# Patient Record
Sex: FEMALE | Race: WHITE | NOT HISPANIC OR LATINO | Employment: OTHER | URBAN - METROPOLITAN AREA
[De-identification: names, ages, dates, MRNs, and addresses within clinical notes are randomized per-mention and may not be internally consistent; named-entity substitution may affect disease eponyms.]

---

## 2017-01-05 ENCOUNTER — TELEPHONE (OUTPATIENT)
Dept: FAMILY MEDICINE | Facility: CLINIC | Age: 77
End: 2017-01-05

## 2017-01-05 DIAGNOSIS — Z00.00 ROUTINE GENERAL MEDICAL EXAMINATION AT A HEALTH CARE FACILITY: Primary | ICD-10-CM

## 2017-01-05 DIAGNOSIS — I10 HTN (HYPERTENSION), BENIGN: ICD-10-CM

## 2017-01-05 DIAGNOSIS — E11.9 CONTROLLED TYPE 2 DIABETES MELLITUS WITHOUT COMPLICATION, WITHOUT LONG-TERM CURRENT USE OF INSULIN: ICD-10-CM

## 2017-01-05 NOTE — TELEPHONE ENCOUNTER
----- Message from Alicia Olvera MA sent at 1/5/2017  1:13 PM CST -----  Contact: [phone]  Type: Orders Request    What orders/ testing are being requested? Labs    Is there a future appointment scheduled for the patient with PCP? Yes    When? 4/27/17    Comments: Please advise. Thanks!

## 2017-03-10 DIAGNOSIS — E11.9 DIABETES TYPE 2, CONTROLLED: ICD-10-CM

## 2017-03-10 RX ORDER — AMLODIPINE BESYLATE 5 MG/1
TABLET ORAL
Qty: 90 TABLET | Refills: 3 | Status: SHIPPED | OUTPATIENT
Start: 2017-03-10 | End: 2018-01-15 | Stop reason: SDUPTHER

## 2017-03-10 RX ORDER — SIMVASTATIN 10 MG/1
TABLET, FILM COATED ORAL
Qty: 90 TABLET | Refills: 3 | Status: SHIPPED | OUTPATIENT
Start: 2017-03-10 | End: 2018-01-15 | Stop reason: SDUPTHER

## 2017-04-04 RX ORDER — IBUPROFEN 600 MG/1
TABLET ORAL
Qty: 270 TABLET | Refills: 0 | Status: SHIPPED | OUTPATIENT
Start: 2017-04-04 | End: 2018-11-06

## 2017-04-21 ENCOUNTER — LAB VISIT (OUTPATIENT)
Dept: LAB | Facility: HOSPITAL | Age: 77
End: 2017-04-21
Attending: FAMILY MEDICINE
Payer: MEDICARE

## 2017-04-21 DIAGNOSIS — E11.9 CONTROLLED TYPE 2 DIABETES MELLITUS WITHOUT COMPLICATION, WITHOUT LONG-TERM CURRENT USE OF INSULIN: ICD-10-CM

## 2017-04-21 DIAGNOSIS — I10 HTN (HYPERTENSION), BENIGN: ICD-10-CM

## 2017-04-21 DIAGNOSIS — Z00.00 ROUTINE GENERAL MEDICAL EXAMINATION AT A HEALTH CARE FACILITY: ICD-10-CM

## 2017-04-21 LAB
ALBUMIN SERPL BCP-MCNC: 3.8 G/DL
ALP SERPL-CCNC: 61 U/L
ALT SERPL W/O P-5'-P-CCNC: 15 U/L
ANION GAP SERPL CALC-SCNC: 10 MMOL/L
AST SERPL-CCNC: 16 U/L
BASOPHILS # BLD AUTO: 0.05 K/UL
BASOPHILS NFR BLD: 1.3 %
BILIRUB SERPL-MCNC: 0.5 MG/DL
BUN SERPL-MCNC: 13 MG/DL
CALCIUM SERPL-MCNC: 9.6 MG/DL
CHLORIDE SERPL-SCNC: 107 MMOL/L
CHOLEST/HDLC SERPL: 2.8 {RATIO}
CO2 SERPL-SCNC: 23 MMOL/L
CREAT SERPL-MCNC: 0.8 MG/DL
DIFFERENTIAL METHOD: ABNORMAL
EOSINOPHIL # BLD AUTO: 0.3 K/UL
EOSINOPHIL NFR BLD: 8.5 %
ERYTHROCYTE [DISTWIDTH] IN BLOOD BY AUTOMATED COUNT: 13.9 %
EST. GFR  (AFRICAN AMERICAN): >60 ML/MIN/1.73 M^2
EST. GFR  (NON AFRICAN AMERICAN): >60 ML/MIN/1.73 M^2
ESTIMATED AVG GLUCOSE: 134 MG/DL
GLUCOSE SERPL-MCNC: 131 MG/DL
HBA1C MFR BLD HPLC: 6.3 %
HCT VFR BLD AUTO: 41.3 %
HDL/CHOLESTEROL RATIO: 35.8 %
HDLC SERPL-MCNC: 162 MG/DL
HDLC SERPL-MCNC: 58 MG/DL
HGB BLD-MCNC: 13.7 G/DL
LDLC SERPL CALC-MCNC: 87.4 MG/DL
LYMPHOCYTES # BLD AUTO: 1.3 K/UL
LYMPHOCYTES NFR BLD: 33.9 %
MCH RBC QN AUTO: 30 PG
MCHC RBC AUTO-ENTMCNC: 33.2 %
MCV RBC AUTO: 91 FL
MONOCYTES # BLD AUTO: 0.3 K/UL
MONOCYTES NFR BLD: 7.7 %
NEUTROPHILS # BLD AUTO: 1.8 K/UL
NEUTROPHILS NFR BLD: 48.1 %
NONHDLC SERPL-MCNC: 104 MG/DL
PLATELET # BLD AUTO: 168 K/UL
PMV BLD AUTO: 9.8 FL
POTASSIUM SERPL-SCNC: 4.1 MMOL/L
PROT SERPL-MCNC: 6.7 G/DL
RBC # BLD AUTO: 4.56 M/UL
SODIUM SERPL-SCNC: 140 MMOL/L
T4 FREE SERPL-MCNC: 0.9 NG/DL
TRIGL SERPL-MCNC: 83 MG/DL
TSH SERPL DL<=0.005 MIU/L-ACNC: 4.71 UIU/ML
WBC # BLD AUTO: 3.75 K/UL

## 2017-04-21 PROCEDURE — 85025 COMPLETE CBC W/AUTO DIFF WBC: CPT

## 2017-04-21 PROCEDURE — 36415 COLL VENOUS BLD VENIPUNCTURE: CPT | Mod: PO

## 2017-04-21 PROCEDURE — 80053 COMPREHEN METABOLIC PANEL: CPT

## 2017-04-21 PROCEDURE — 80061 LIPID PANEL: CPT

## 2017-04-21 PROCEDURE — 84439 ASSAY OF FREE THYROXINE: CPT

## 2017-04-21 PROCEDURE — 84443 ASSAY THYROID STIM HORMONE: CPT

## 2017-04-21 PROCEDURE — 83036 HEMOGLOBIN GLYCOSYLATED A1C: CPT

## 2017-04-27 ENCOUNTER — OFFICE VISIT (OUTPATIENT)
Dept: FAMILY MEDICINE | Facility: CLINIC | Age: 77
End: 2017-04-27
Payer: MEDICARE

## 2017-04-27 VITALS
BODY MASS INDEX: 28.07 KG/M2 | HEART RATE: 60 BPM | WEIGHT: 178.81 LBS | DIASTOLIC BLOOD PRESSURE: 64 MMHG | HEIGHT: 67 IN | TEMPERATURE: 98 F | SYSTOLIC BLOOD PRESSURE: 116 MMHG

## 2017-04-27 DIAGNOSIS — Z23 NEED FOR TDAP VACCINATION: ICD-10-CM

## 2017-04-27 DIAGNOSIS — Z00.00 ROUTINE GENERAL MEDICAL EXAMINATION AT A HEALTH CARE FACILITY: Primary | ICD-10-CM

## 2017-04-27 DIAGNOSIS — E78.5 HYPERLIPIDEMIA, UNSPECIFIED HYPERLIPIDEMIA TYPE: ICD-10-CM

## 2017-04-27 DIAGNOSIS — I10 HTN (HYPERTENSION), BENIGN: ICD-10-CM

## 2017-04-27 DIAGNOSIS — I70.0 ATHEROSCLEROSIS OF AORTA: ICD-10-CM

## 2017-04-27 DIAGNOSIS — R80.9 MICROALBUMINURIA DUE TO TYPE 2 DIABETES MELLITUS: ICD-10-CM

## 2017-04-27 DIAGNOSIS — Z85.3 HISTORY OF BREAST CANCER: ICD-10-CM

## 2017-04-27 DIAGNOSIS — Z28.39 IMMUNIZATION DEFICIENCY: ICD-10-CM

## 2017-04-27 DIAGNOSIS — E11.42 TYPE 2 DIABETES MELLITUS WITH DIABETIC POLYNEUROPATHY, WITHOUT LONG-TERM CURRENT USE OF INSULIN: ICD-10-CM

## 2017-04-27 DIAGNOSIS — E11.29 MICROALBUMINURIA DUE TO TYPE 2 DIABETES MELLITUS: ICD-10-CM

## 2017-04-27 DIAGNOSIS — M19.049 ARTHRITIS OF HAND: ICD-10-CM

## 2017-04-27 PROCEDURE — 82570 ASSAY OF URINE CREATININE: CPT

## 2017-04-27 PROCEDURE — 99397 PER PM REEVAL EST PAT 65+ YR: CPT | Mod: 25,S$GLB,, | Performed by: FAMILY MEDICINE

## 2017-04-27 PROCEDURE — G0009 ADMIN PNEUMOCOCCAL VACCINE: HCPCS | Mod: S$GLB,,, | Performed by: FAMILY MEDICINE

## 2017-04-27 PROCEDURE — 99499 UNLISTED E&M SERVICE: CPT | Mod: S$GLB,,, | Performed by: FAMILY MEDICINE

## 2017-04-27 PROCEDURE — 90670 PCV13 VACCINE IM: CPT | Mod: S$GLB,,, | Performed by: FAMILY MEDICINE

## 2017-04-27 PROCEDURE — 3074F SYST BP LT 130 MM HG: CPT | Mod: S$GLB,,, | Performed by: FAMILY MEDICINE

## 2017-04-27 PROCEDURE — 3078F DIAST BP <80 MM HG: CPT | Mod: S$GLB,,, | Performed by: FAMILY MEDICINE

## 2017-04-27 PROCEDURE — 99999 PR PBB SHADOW E&M-EST. PATIENT-LVL III: CPT | Mod: PBBFAC,,, | Performed by: FAMILY MEDICINE

## 2017-04-27 RX ORDER — ALBUTEROL SULFATE 90 UG/1
AEROSOL, METERED RESPIRATORY (INHALATION)
Qty: 18 G | Refills: 1 | Status: SHIPPED | OUTPATIENT
Start: 2017-04-27 | End: 2020-03-16 | Stop reason: SDUPTHER

## 2017-04-27 RX ORDER — GABAPENTIN 100 MG/1
100 CAPSULE ORAL 3 TIMES DAILY
Qty: 90 CAPSULE | Refills: 3 | Status: SHIPPED | OUTPATIENT
Start: 2017-04-27 | End: 2018-05-03 | Stop reason: SDUPTHER

## 2017-04-27 NOTE — PATIENT INSTRUCTIONS
tdap & Preumococcal 13    FOLLOW UP REMINDER for DIABETICS:  ===================================  If you have DIABETES, we should evaluate your blood test every 3 MONTHS if your Hg1c is >6.5% OR if you use INSULIN.     We can evaluate you every 6 MONTHS if your Hga1c is < 6.5% (contolled)  ===================================  Continue other medications    The future risks of uncontrolled diabetes - include heart attack, stroke, neuropathy (pain in hands & feet that could lead to infection and amputation), nephropathy (leading to kidney dialysis) , and blindness     To prevent complications that can be treated early, please see your  opthalmologist EYE DOCTOR YEARLY      Always wear protective shoes.     Focus on the American Diabetic Association diet, high fiber, low fat diet, exercise  - huffing & puffing for 20 minutes most days of the week    Focus on NO SUGAR and no sugar substitutes (splenda, etc) IN YOUR DRINKS. With respect to food - LOW CARBOHYDRATES - switch to whole wheat & brown rice.    Decrease & try to stop ALCOHOL, WHITE BREAD, WHITE PASTA, WHITE RICE , WHITE POTATOES- which all turn into sugar.    EAT an EXTRA VEGETABLE A DAY than you already do.    The ADA recommends taking  1. Aspirin 81 mg daily (unless you have had bleeding stomach ulcers or allergy to this)  2. STATIN medication (atorvastatin, pravastatin, rosuvastatin) to decrease inflammation in your blood vessels caused by SUGAR to prevent future heart attack & stroke. This is recommended even if your LDL cholesterol is <100.   3. ACE/ ARB blood pressure medication (Losartan, Lisinopril) to protect your kidneys from future dialysis from SUGAR. This is recommended even if you have normal blood pressure    ==============================================================      RECOMMENDATIONS FOR FEMALES    Prevent the #1 cause of death- cardiovascular disease (HEART ATTACK & STROKE) by checking for normal blood pressure, cholesterol, sugars, & by  not smoking.     VACCINES: Yearly FLU shot, ONE PNEUMONIA shot after 65, ONE SHINGLES shot after 60    Screening colonoscopy at AGE  50 & every 10 years to check for COLON CANCER,  one of the most common & preventable cancers. Repeat in 3 years if POLYP found    I recommend  high fiber (5 fresh fruits or vegetables daily), low fat diet and aerobic  exercise (huffing/ puffing/ sweating for 20 min straight at least 4 days a week)    Follow up yearly with fasting lipids, CMP, CBC, TSH prior.   ==============================================================    For your son:    SUBSTANCE ABUSE CLINICS  [facility] of [facility] location [phone]  [email or web address],  [place] based, [address], [facility] [phone]   [place], [address] [facility] [phone]  [facility] , [address] [facility], [phone], [phone]  [facility], [address] [facility], [address], [phone]  [facility], [address] [place] [address] , Jimmy, [phone]

## 2017-04-27 NOTE — PROGRESS NOTES
Subjective:      Patient ID: Kanchan Downign is a 76 y.o. female.    Chief Complaint: Annual Exam    Here today for general exam.     Here today for diabetes mellitus    Denies acute symptoms such as nocturia, polyuria, unexplained weight loss or blurred vision.    Last eye evaluation due    Last urine microalbumin today    Denies numbness, tingling sensation, or known h/o peripheral neuropathy.     Denies  Chest pain, shortness of breath.    Arthritis of Left knee is bad    She has some swelling of ankles after eating lots of seafood    Denies any chest pain, shortness of breath, nausea vomiting constipation diarrhea, blood in stool, heartburn      Lab Results   Component Value Date    HGBA1C 6.3 (H) 04/21/2017    HGBA1C 6.3 (H) 06/03/2016    HGBA1C 6.0 09/17/2015     No results found for: MICROALBUR  Lab Results   Component Value Date    LDLCALC 87.4 04/21/2017    LDLCALC 82.8 09/17/2015    CHOL 162 04/21/2017    HDL 58 04/21/2017    TRIG 83 04/21/2017       Lab Results   Component Value Date     04/21/2017    K 4.1 04/21/2017     04/21/2017    CO2 23 04/21/2017     (H) 04/21/2017    BUN 13 04/21/2017    CREATININE 0.8 04/21/2017    CALCIUM 9.6 04/21/2017    PROT 6.7 04/21/2017    ALBUMIN 3.8 04/21/2017    BILITOT 0.5 04/21/2017    ALKPHOS 61 04/21/2017    AST 16 04/21/2017    ALT 15 04/21/2017    ANIONGAP 10 04/21/2017    ESTGFRAFRICA >60.0 04/21/2017    EGFRNONAA >60.0 04/21/2017    WBC 3.75 (L) 04/21/2017    HGB 13.7 04/21/2017    HCT 41.3 04/21/2017    MCV 91 04/21/2017     04/21/2017    TSH 4.708 (H) 04/21/2017         Current Outpatient Prescriptions on File Prior to Visit   Medication Sig    amlodipine (NORVASC) 5 MG tablet TAKE 1 TABLET EVERY DAY    blood sugar diagnostic Strp 1 strip by Misc.(Non-Drug; Combo Route) route 2 (two) times daily. ACCU-CHEK BROOK PLUS    blood-glucose meter Misc 1 each by Misc.(Non-Drug; Combo Route) route as needed. ACCU-CHEK BROOK PLUS     cetirizine (ZYRTEC) 10 MG tablet Take 10 mg by mouth once daily.      fluticasone (FLONASE) 50 mcg/actuation nasal spray 1 spray by Each Nare route once daily.    ibuprofen (ADVIL,MOTRIN) 600 MG tablet TAKE 1 TABLET THREE TIMES DAILY WITH FOOD    lactobacillus rhamnosus, GG, (PROBIOTIC) 10 billion cell capsule Take by mouth. 1 Capsule Oral Every day    lancets Misc 1 each by Misc.(Non-Drug; Combo Route) route 2 (two) times daily. ACCU-CHEK SOFTCLIX    lisinopril (PRINIVIL,ZESTRIL) 5 MG tablet Take 1 tablet (5 mg total) by mouth once daily.    metformin (GLUCOPHAGE) 500 MG tablet Take 1 tablet (500 mg total) by mouth daily with breakfast.    metoprolol tartrate (LOPRESSOR) 100 MG tablet Take 1 tablet (100 mg total) by mouth once daily.    MULTIVITAMIN ORAL Take by mouth once daily.     NAPROXEN SODIUM (ALEVE ORAL) Take by mouth continuous prn.     polyethylene glycol (GLYCOLAX) 17 gram/dose powder TAKE 1 CAPFUL (17 GRAMS) DAILY AS DIRECTED    simvastatin (ZOCOR) 10 MG tablet TAKE 1 TABLET EVERY EVENING    UNABLE TO FIND medication name: Omega XL    [DISCONTINUED] albuterol (PROAIR HFA) 90 mcg/actuation inhaler INHALE 2 PUFFS INTO THE LUNGS EVERY 6 (SIX) HOURS AS  NEEDED FORWHEEZING.    [DISCONTINUED] letrozole (FEMARA) 2.5 mg Tab TAKE 1 TABLET ONE TIME DAILY    [DISCONTINUED] hydrocodone-acetaminophen 5-325mg (NORCO) 5-325 mg per tablet Take 1 tablet by mouth every 4 (four) hours as needed for Pain.     No current facility-administered medications on file prior to visit.      Past Medical History:   Diagnosis Date    Anxiety     Arthritis     Dr Schofield    Arthritis of hand 4/27/2017    Atherosclerosis of aorta 06/08/2016    CXR 2013    Breast cancer 2011    right breast invasive micropapillary CA, Stage !A    Diabetes mellitus type 2 without retinopathy 06/12/2014    6% metformin 500 bid, FU+ 2016    DVT (deep venous thrombosis) 2001    R , Dr Bonilla    Hyperlipidemia     LDL 82    Hypertension   "   Microalbuminuria due to type 2 diabetes mellitus 12/08/2015    taking lisinopril, losartan caused olivia effects    Strabismus      Past Surgical History:   Procedure Laterality Date    BREAST SURGERY      R breast lumpectomy    NH REMOVAL OF NAIL BED  6/10/2015    TONSILLECTOMY       Social History     Social History Narrative     to [place], 3 children, nondrinker, nonsmoker, she declines colonoscopy     Family History   Problem Relation Age of Onset    Heart disease Mother 58    Cataracts Mother     Heart disease Father 50    Thyroid disease Sister     Cancer Sister      Vitals:    04/27/17 1312   BP: 116/64   Pulse: 60   Temp: 98 °F (36.7 °C)   Weight: 81.1 kg (178 lb 12.7 oz)   Height: 5' 7" (1.702 m)     Objective:   Physical Exam   Constitutional: She is oriented to person, place, and time. She appears well-developed and well-nourished.   Mild swelling bilateral ankles   HENT:   Head: Normocephalic and atraumatic.   Right Ear: External ear normal.   Left Ear: External ear normal.   Nose: Nose normal.   Mouth/Throat: Oropharynx is clear and moist.   Eyes: EOM are normal. Pupils are equal, round, and reactive to light.   Neck: Neck supple. No thyromegaly present.   Cardiovascular: Normal rate, regular rhythm, normal heart sounds and intact distal pulses.    No murmur heard.  Pulmonary/Chest: Effort normal and breath sounds normal. She has no wheezes.   Abdominal: Soft. Bowel sounds are normal. She exhibits no distension and no mass. There is no tenderness. There is no rebound and no guarding.   Musculoskeletal: She exhibits no edema.        Right foot: There is normal range of motion and no deformity.        Left foot: There is normal range of motion and no deformity.   Enlarged DIP bilateral hands     Feet:   Right Foot:   Protective Sensation: 5 sites tested. 5 sites sensed.   Skin Integrity: Negative for ulcer, blister, skin breakdown, erythema or warmth.   Left Foot:   Protective Sensation: 5 " sites tested. 5 sites sensed.   Skin Integrity: Negative for ulcer, blister, skin breakdown, erythema or warmth.   Lymphadenopathy:     She has no cervical adenopathy.   Neurological: She is alert and oriented to person, place, and time.   I tested 5 sites on each foot, but she has DECREASED PROTECTIVE SENSATION ON BOTH FEET   Skin: Skin is warm and dry.   Psychiatric: She has a normal mood and affect. Her behavior is normal.     Assessment:     1. Routine general medical examination at a health care facility    2. Type 2 diabetes mellitus with diabetic polyneuropathy, without long-term current use of insulin    3. Microalbuminuria due to type 2 diabetes mellitus    4. Atherosclerosis of aorta    5. HTN (hypertension), benign    6. Hyperlipidemia, unspecified hyperlipidemia type    7. History of breast cancer    8. Arthritis of hand    9. Immunization deficiency    10. Need for Tdap vaccination      Plan:     Orders Placed This Encounter    Pneumococcal Conjugate Vaccine (13 Valent) (IM)    Microalbumin/creatinine urine ratio    gabapentin (NEURONTIN) 100 MG capsule    albuterol (PROAIR HFA) 90 mcg/actuation inhaler       Patient Instructions   tdap & Preumococcal 13    FOLLOW UP REMINDER for DIABETICS:  ===================================  If you have DIABETES, we should evaluate your blood test every 3 MONTHS if your Hg1c is >6.5% OR if you use INSULIN.     We can evaluate you every 6 MONTHS if your Hga1c is < 6.5% (contolled)  ===================================  Continue other medications    The future risks of uncontrolled diabetes - include heart attack, stroke, neuropathy (pain in hands & feet that could lead to infection and amputation), nephropathy (leading to kidney dialysis) , and blindness     To prevent complications that can be treated early, please see your  opthalmologist EYE DOCTOR YEARLY      Always wear protective shoes.     Focus on the American Diabetic Association diet, high fiber, low fat  diet, exercise  - huffing & puffing for 20 minutes most days of the week    Focus on NO SUGAR and no sugar substitutes (splenda, etc) IN YOUR DRINKS. With respect to food - LOW CARBOHYDRATES - switch to whole wheat & brown rice.    Decrease & try to stop ALCOHOL, WHITE BREAD, WHITE PASTA, WHITE RICE , WHITE POTATOES- which all turn into sugar.    EAT an EXTRA VEGETABLE A DAY than you already do.    The ADA recommends taking  1. Aspirin 81 mg daily (unless you have had bleeding stomach ulcers or allergy to this)  2. STATIN medication (atorvastatin, pravastatin, rosuvastatin) to decrease inflammation in your blood vessels caused by SUGAR to prevent future heart attack & stroke. This is recommended even if your LDL cholesterol is <100.   3. ACE/ ARB blood pressure medication (Losartan, Lisinopril) to protect your kidneys from future dialysis from SUGAR. This is recommended even if you have normal blood pressure    ==============================================================      RECOMMENDATIONS FOR FEMALES    Prevent the #1 cause of death- cardiovascular disease (HEART ATTACK & STROKE) by checking for normal blood pressure, cholesterol, sugars, & by not smoking.     VACCINES: Yearly FLU shot, ONE PNEUMONIA shot after 65, ONE SHINGLES shot after 60    Screening colonoscopy at AGE  50 & every 10 years to check for COLON CANCER,  one of the most common & preventable cancers. Repeat in 3 years if POLYP found    I recommend  high fiber (5 fresh fruits or vegetables daily), low fat diet and aerobic  exercise (huffing/ puffing/ sweating for 20 min straight at least 4 days a week)    Follow up yearly with fasting lipids, CMP, CBC, TSH prior.   ==============================================================    For your son:    SUBSTANCE ABUSE CLINICS  [facility] of [place] , [place]  location [phone]  [email or web address],  [place] based, [address], [place] [phone]    [employer], [address] [facility], [place] [phone]  [facility] , [address] [facility], [phone], [phone]  [facility], [address] [facility], [address], [phone]  [facility], [address] [place] [address] Jimmy Nicole [phone]

## 2017-04-27 NOTE — MR AVS SNAPSHOT
[facility]  [address] [facility], [address]  [facility] [address]  Phone: [phone]  Fax: [phone]                  Kanchan Downing   2017 1:00 PM   Office Visit    Description:  Female : 1940   Provider:  Viktoria Mckeon MD   Department:  [facility]           Reason for Visit     Annual Exam           Diagnoses this Visit        Comments    Routine general medical examination at a health care facility    -  Primary     Type 2 diabetes mellitus with diabetic polyneuropathy, without long-term current use of insulin         Microalbuminuria due to type 2 diabetes mellitus         Atherosclerosis of aorta         HTN (hypertension), benign         Hyperlipidemia, unspecified hyperlipidemia type         History of breast cancer         Arthritis of hand                To Do List           Goals (5 Years of Data)     None       These Medications        Disp Refills Start End    gabapentin (NEURONTIN) 100 MG capsule 90 capsule 3 2017    Take 1 capsule (100 mg total) by mouth 3 (three) times daily. - Oral    Pharmacy: [facility] - [address] Ph #: [phone]       albuterol (PROAIR HFA) 90 mcg/actuation inhaler 18 g 1 2017     INHALE 2 PUFFS INTO THE LUNGS EVERY 6 (SIX) HOURS AS  NEEDED FORWHEEZING.    Pharmacy: [facility] - [place] [id number] [facility] Ph #: [phone]         [facility]     [facility] Nurse Care Line - 24 Assistance  Unless otherwise directed by your provider, please contact [facility], our nurse care line that is available for / assistance.     Registered nurses in the [facility] provide: appointment scheduling, clinical advisement, health education, and other advisory services.  Call: [phone] (toll free)               Medications           Message regarding Medications     Verify the changes and/or  additions to your medication regime listed below are the same as discussed with your clinician today.  If any of these changes or additions are incorrect, please notify your healthcare provider.        START taking these NEW medications        Refills    gabapentin (NEURONTIN) 100 MG capsule 3    Sig: Take 1 capsule (100 mg total) by mouth 3 (three) times daily.    Class: Normal    Route: Oral      STOP taking these medications     hydrocodone-acetaminophen 5-325mg (NORCO) 5-325 mg per tablet Take 1 tablet by mouth every 4 (four) hours as needed for Pain.    letrozole (FEMARA) 2.5 mg Tab TAKE 1 TABLET ONE TIME DAILY           Verify that the below list of medications is an accurate representation of the medications you are currently taking.  If none reported, the list may be blank. If incorrect, please contact your healthcare provider. Carry this list with you in case of emergency.           Current Medications     albuterol (PROAIR HFA) 90 mcg/actuation inhaler INHALE 2 PUFFS INTO THE LUNGS EVERY 6 (SIX) HOURS AS  NEEDED FORWHEEZING.    amlodipine (NORVASC) 5 MG tablet TAKE 1 TABLET EVERY DAY    blood sugar diagnostic Strp 1 strip by Misc.(Non-Drug; Combo Route) route 2 (two) times daily. ACCU-CHEK BROOK PLUS    blood-glucose meter Misc 1 each by Misc.(Non-Drug; Combo Route) route as needed. ACCU-CHEK BROOK PLUS    cetirizine (ZYRTEC) 10 MG tablet Take 10 mg by mouth once daily.      fluticasone (FLONASE) 50 mcg/actuation nasal spray 1 spray by Each Nare route once daily.    ibuprofen (ADVIL,MOTRIN) 600 MG tablet TAKE 1 TABLET THREE TIMES DAILY WITH FOOD    lactobacillus rhamnosus, GG, (PROBIOTIC) 10 billion cell capsule Take by mouth. 1 Capsule Oral Every day    lancets Misc 1 each by Misc.(Non-Drug; Combo Route) route 2 (two) times daily. ACCU-CHEK SOFTCLIX    lisinopril (PRINIVIL,ZESTRIL) 5 MG tablet Take 1 tablet (5 mg total) by mouth once daily.    metformin (GLUCOPHAGE) 500 MG tablet Take 1 tablet (500 mg  "total) by mouth daily with breakfast.    metoprolol tartrate (LOPRESSOR) 100 MG tablet Take 1 tablet (100 mg total) by mouth once daily.    MULTIVITAMIN ORAL Take by mouth once daily.     NAPROXEN SODIUM (ALEVE ORAL) Take by mouth continuous prn.     polyethylene glycol (GLYCOLAX) 17 gram/dose powder TAKE 1 CAPFUL (17 GRAMS) DAILY AS DIRECTED    simvastatin (ZOCOR) 10 MG tablet TAKE 1 TABLET EVERY EVENING    UNABLE TO FIND medication name: Omega XL    gabapentin (NEURONTIN) 100 MG capsule Take 1 capsule (100 mg total) by mouth 3 (three) times daily.           Clinical Reference Information           Your Vitals Were     BP Pulse Temp Height Weight BMI    116/64 (BP Location: Right arm) 60 98 °F (36.7 °C) 5' 7" (1.702 m) 81.1 kg (178 lb 12.7 oz) 28 kg/m2      Blood Pressure          Most Recent Value    BP  116/64      Allergies as of 4/27/2017     Sulfa (Sulfonamide Antibiotics)      Immunizations Administered on Date of Encounter - 4/27/2017     None      Instructions    tdap & Preumococcal 13    FOLLOW UP REMINDER for DIABETICS:  ===================================  If you have DIABETES, we should evaluate your blood test every 3 MONTHS if your Hg1c is >6.5% OR if you use INSULIN.     We can evaluate you every 6 MONTHS if your Hga1c is < 6.5% (contolled)  ===================================  Continue other medications    The future risks of uncontrolled diabetes - include heart attack, stroke, neuropathy (pain in hands & feet that could lead to infection and amputation), nephropathy (leading to kidney dialysis) , and blindness     To prevent complications that can be treated early, please see your  opthalmologist EYE DOCTOR YEARLY      Always wear protective shoes.     Focus on the American Diabetic Association diet, high fiber, low fat diet, exercise  - huffing & puffing for 20 minutes most days of the week    Focus on NO SUGAR and no sugar substitutes (splenda, etc) IN YOUR DRINKS. With respect to food - LOW " CARBOHYDRATES - switch to whole wheat & brown rice.    Decrease & try to stop ALCOHOL, WHITE BREAD, WHITE PASTA, WHITE RICE , WHITE POTATOES- which all turn into sugar.    EAT an EXTRA VEGETABLE A DAY than you already do.    The ADA recommends taking  1. Aspirin 81 mg daily (unless you have had bleeding stomach ulcers or allergy to this)  2. STATIN medication (atorvastatin, pravastatin, rosuvastatin) to decrease inflammation in your blood vessels caused by SUGAR to prevent future heart attack & stroke. This is recommended even if your LDL cholesterol is <100.   3. ACE/ ARB blood pressure medication (Losartan, Lisinopril) to protect your kidneys from future dialysis from SUGAR. This is recommended even if you have normal blood pressure    ==============================================================      RECOMMENDATIONS FOR FEMALES    Prevent the #1 cause of death- cardiovascular disease (HEART ATTACK & STROKE) by checking for normal blood pressure, cholesterol, sugars, & by not smoking.     VACCINES: Yearly FLU shot, ONE PNEUMONIA shot after 65, ONE SHINGLES shot after 60    Screening colonoscopy at AGE  50 & every 10 years to check for COLON CANCER,  one of the most common & preventable cancers. Repeat in 3 years if POLYP found    I recommend  high fiber (5 fresh fruits or vegetables daily), low fat diet and aerobic  exercise (huffing/ puffing/ sweating for 20 min straight at least 4 days a week)    Follow up yearly with fasting lipids, CMP, CBC, TSH prior.   ==============================================================    For your son:    SUBSTANCE ABUSE CLINICS  [facility] of [place] , [place] [facility] location [phone]  [email or web address],  [place] based, [address], [place] [phone]   [facility] [address] [facility] [phone]  [facility] , [address] [facility], [phone], [phone]  [facility], [address], [address],  [phone]  [facility], [address] [place] [address] Jimmy Nicole, [phone]             Language Assistance Services     ATTENTION: Language assistance services are available, free of charge. Please call [phone].      ATENCIÓN: Si habla radhaañol, tiene a guzman disposición servicios gratuitos de asistencia lingüística. Llame al [phone].     CHÚ Ý: N?u b?n nói Ti?ng Vi?t, có các d?ch v? h? tr? ngôn ng? mi?n phí dành cho b?n. G?i s? [phone].         [facility] complies with applicable Federal civil rights laws and does not discriminate on the basis of race, color, national origin, age, disability, or sex.

## 2017-04-27 NOTE — PROGRESS NOTES
Two patient identifiers used, allergies reviewed, vaccine confirmed.  Prevnar/12 pneumococcal conjugate vaccine administered IM.  Pt tolerated well, no redness or bruising at injection site.  Pt advised to remain in clinic 15 mins following injection for observation.

## 2017-04-28 LAB
CREAT UR-MCNC: 49 MG/DL
MICROALBUMIN UR DL<=1MG/L-MCNC: 3 UG/ML
MICROALBUMIN/CREATININE RATIO: 6.1 UG/MG

## 2017-05-16 DIAGNOSIS — E11.9 DIABETES TYPE 2, CONTROLLED: ICD-10-CM

## 2017-05-16 RX ORDER — METFORMIN HYDROCHLORIDE 500 MG/1
TABLET ORAL
Qty: 90 TABLET | Refills: 1 | Status: SHIPPED | OUTPATIENT
Start: 2017-05-16 | End: 2017-12-29 | Stop reason: SDUPTHER

## 2017-05-16 RX ORDER — LISINOPRIL 5 MG/1
TABLET ORAL
Qty: 90 TABLET | Refills: 3 | Status: SHIPPED | OUTPATIENT
Start: 2017-05-16 | End: 2018-02-27 | Stop reason: SDUPTHER

## 2017-06-05 DIAGNOSIS — E11.9 DIABETES TYPE 2, CONTROLLED: ICD-10-CM

## 2017-06-06 RX ORDER — METOPROLOL TARTRATE 100 MG/1
TABLET ORAL
Qty: 90 TABLET | Refills: 3 | Status: SHIPPED | OUTPATIENT
Start: 2017-06-06 | End: 2018-03-19 | Stop reason: SDUPTHER

## 2017-09-15 RX ORDER — METHOCARBAMOL 500 MG/1
500 TABLET, FILM COATED ORAL 3 TIMES DAILY
Qty: 30 TABLET | Refills: 0 | Status: SHIPPED | OUTPATIENT
Start: 2017-09-15 | End: 2017-09-25

## 2017-09-15 NOTE — TELEPHONE ENCOUNTER
----- Message from Ekaterina Rehman sent at 9/15/2017  2:45 PM CDT -----  Contact: self/[phone]  Patient called in regards needing to talk with Dr Mckeon about if she can get some rx for muscle spasm. Please call and advise.       Thank you!!!

## 2017-09-15 NOTE — TELEPHONE ENCOUNTER
"LOV 04/27/17. Patient verbalizes that she is having "gripping" muscle spasms in her left hip for the past 2 days. Patient requesting a muscle relaxer. Please advise.   "

## 2017-10-31 ENCOUNTER — CLINICAL SUPPORT (OUTPATIENT)
Dept: CARDIOLOGY | Facility: CLINIC | Age: 77
End: 2017-10-31
Payer: MEDICARE

## 2017-10-31 ENCOUNTER — OFFICE VISIT (OUTPATIENT)
Dept: FAMILY MEDICINE | Facility: CLINIC | Age: 77
End: 2017-10-31
Payer: MEDICARE

## 2017-10-31 ENCOUNTER — HOSPITAL ENCOUNTER (EMERGENCY)
Facility: HOSPITAL | Age: 77
Discharge: HOME | End: 2017-10-31
Attending: EMERGENCY MEDICINE
Payer: MEDICARE

## 2017-10-31 VITALS
BODY MASS INDEX: 28.93 KG/M2 | WEIGHT: 180 LBS | TEMPERATURE: 98 F | HEART RATE: 61 BPM | HEIGHT: 66 IN | DIASTOLIC BLOOD PRESSURE: 66 MMHG | OXYGEN SATURATION: 98 % | SYSTOLIC BLOOD PRESSURE: 140 MMHG | RESPIRATION RATE: 16 BRPM

## 2017-10-31 VITALS
SYSTOLIC BLOOD PRESSURE: 139 MMHG | DIASTOLIC BLOOD PRESSURE: 80 MMHG | HEIGHT: 66 IN | TEMPERATURE: 98 F | HEART RATE: 80 BPM | BODY MASS INDEX: 28.95 KG/M2 | WEIGHT: 180.13 LBS

## 2017-10-31 DIAGNOSIS — Z86.718 HISTORY OF DVT (DEEP VEIN THROMBOSIS): ICD-10-CM

## 2017-10-31 DIAGNOSIS — M79.89 LEG SWELLING: Primary | ICD-10-CM

## 2017-10-31 DIAGNOSIS — E11.42 TYPE 2 DIABETES MELLITUS WITH DIABETIC POLYNEUROPATHY, WITHOUT LONG-TERM CURRENT USE OF INSULIN: ICD-10-CM

## 2017-10-31 DIAGNOSIS — Z85.3 HISTORY OF BREAST CANCER: ICD-10-CM

## 2017-10-31 DIAGNOSIS — I74.9 EMBOLISM AND THROMBOSIS OF ARTERY: ICD-10-CM

## 2017-10-31 DIAGNOSIS — I82.4Y1 DEEP VEIN THROMBOSIS (DVT) OF PROXIMAL VEIN OF RIGHT LOWER EXTREMITY, UNSPECIFIED CHRONICITY: Primary | ICD-10-CM

## 2017-10-31 DIAGNOSIS — I70.0 ATHEROSCLEROSIS OF AORTA: ICD-10-CM

## 2017-10-31 DIAGNOSIS — M79.89 LEG SWELLING: ICD-10-CM

## 2017-10-31 DIAGNOSIS — E78.5 HYPERLIPIDEMIA, UNSPECIFIED HYPERLIPIDEMIA TYPE: ICD-10-CM

## 2017-10-31 DIAGNOSIS — I10 HTN (HYPERTENSION), BENIGN: ICD-10-CM

## 2017-10-31 DIAGNOSIS — Z28.39 IMMUNIZATION DEFICIENCY: ICD-10-CM

## 2017-10-31 DIAGNOSIS — E11.59 CONTROLLED TYPE 2 DIABETES MELLITUS WITH OTHER CIRCULATORY COMPLICATION, WITHOUT LONG-TERM CURRENT USE OF INSULIN: ICD-10-CM

## 2017-10-31 LAB
ALBUMIN SERPL BCP-MCNC: 4 G/DL
ALP SERPL-CCNC: 72 U/L
ALT SERPL W/O P-5'-P-CCNC: 22 U/L
ANION GAP SERPL CALC-SCNC: 10 MMOL/L
AST SERPL-CCNC: 22 U/L
BASOPHILS # BLD AUTO: 0.08 K/UL
BASOPHILS NFR BLD: 1.3 %
BILIRUB SERPL-MCNC: 0.5 MG/DL
BUN SERPL-MCNC: 11 MG/DL
CALCIUM SERPL-MCNC: 10.2 MG/DL
CHLORIDE SERPL-SCNC: 104 MMOL/L
CO2 SERPL-SCNC: 24 MMOL/L
CREAT SERPL-MCNC: 0.8 MG/DL
DIFFERENTIAL METHOD: NORMAL
EOSINOPHIL # BLD AUTO: 0.4 K/UL
EOSINOPHIL NFR BLD: 6.1 %
ERYTHROCYTE [DISTWIDTH] IN BLOOD BY AUTOMATED COUNT: 13.2 %
EST. GFR  (AFRICAN AMERICAN): >60 ML/MIN/1.73 M^2
EST. GFR  (NON AFRICAN AMERICAN): >60 ML/MIN/1.73 M^2
GLUCOSE SERPL-MCNC: 122 MG/DL
HCT VFR BLD AUTO: 43.3 %
HGB BLD-MCNC: 14.5 G/DL
IMM GRANULOCYTES # BLD AUTO: 0.02 K/UL
IMM GRANULOCYTES NFR BLD AUTO: 0.3 %
INR PPP: 1
LYMPHOCYTES # BLD AUTO: 1.3 K/UL
LYMPHOCYTES NFR BLD: 21.5 %
MCH RBC QN AUTO: 29.1 PG
MCHC RBC AUTO-ENTMCNC: 33.5 G/DL
MCV RBC AUTO: 87 FL
MONOCYTES # BLD AUTO: 0.4 K/UL
MONOCYTES NFR BLD: 6.2 %
NEUTROPHILS # BLD AUTO: 3.8 K/UL
NEUTROPHILS NFR BLD: 64.6 %
NRBC BLD-RTO: 0 /100 WBC
PLATELET # BLD AUTO: 183 K/UL
PMV BLD AUTO: 9.3 FL
POTASSIUM SERPL-SCNC: 3.9 MMOL/L
PROT SERPL-MCNC: 7.5 G/DL
PROTHROMBIN TIME: 10.8 SEC
RBC # BLD AUTO: 4.99 M/UL
SODIUM SERPL-SCNC: 138 MMOL/L
WBC # BLD AUTO: 5.94 K/UL

## 2017-10-31 PROCEDURE — 99284 EMERGENCY DEPT VISIT MOD MDM: CPT | Mod: ,,, | Performed by: PHYSICIAN ASSISTANT

## 2017-10-31 PROCEDURE — 99214 OFFICE O/P EST MOD 30 MIN: CPT | Mod: S$GLB,,, | Performed by: FAMILY MEDICINE

## 2017-10-31 PROCEDURE — 99499 UNLISTED E&M SERVICE: CPT | Mod: S$GLB,,, | Performed by: FAMILY MEDICINE

## 2017-10-31 PROCEDURE — 80053 COMPREHEN METABOLIC PANEL: CPT

## 2017-10-31 PROCEDURE — 99283 EMERGENCY DEPT VISIT LOW MDM: CPT

## 2017-10-31 PROCEDURE — 25000003 PHARM REV CODE 250: Performed by: PHYSICIAN ASSISTANT

## 2017-10-31 PROCEDURE — 93971 EXTREMITY STUDY: CPT | Mod: S$GLB,,, | Performed by: INTERNAL MEDICINE

## 2017-10-31 PROCEDURE — 99999 PR PBB SHADOW E&M-EST. PATIENT-LVL IV: CPT | Mod: PBBFAC,,, | Performed by: FAMILY MEDICINE

## 2017-10-31 PROCEDURE — 85025 COMPLETE CBC W/AUTO DIFF WBC: CPT

## 2017-10-31 PROCEDURE — 85610 PROTHROMBIN TIME: CPT

## 2017-10-31 RX ORDER — NAPROXEN SODIUM 220 MG/1
81 TABLET, FILM COATED ORAL DAILY
Refills: 0 | COMMUNITY
Start: 2017-10-31 | End: 2019-05-02

## 2017-10-31 RX ADMIN — APIXABAN 10 MG: 5 TABLET, FILM COATED ORAL at 02:10

## 2017-10-31 NOTE — ED NOTES
"Appearance: Patient resting comfortably and in no acute distress. Patient is clean and well groomed, with clothing properly fastened.  Cardiac: Heart sounds audible and without abnormalities noted. Patient has a normal rate and regular rhythm, peripheral edema noted to bilateral lower extremities.  Neuro/LOC: Eyes open spontaneously, behavior appropriate to situation, follows commands, facial expression symmetrical, purposeful motor response noted. AAOx4; The patient is awake, alert and aware of environment with an appropriate affect, and speaking appropriately.  Respiratory: Breath sounds audible, slight expiratory wheezing noted bilaterally. Airway is open and patent, respirations are spontaneous, patient has a normal effort and rate, no accessory muscle use noted.  Skin: Intact, warm and dry. Color is consistent with ethnicity. Normal skin turgor and moist mucous membranes.  Gastro: Bowel sounds audible and active x4 quadrants. No tenderness and no distention are present.  Musculoskeletal: Patient moving all extremities spontaneously, no obvious swelling or deformities noted. Patient states she had "an US done this AM that showed a blood clot" to right posterior thigh. No redness or warmth noted.  Peripheral vascular: Pulses +2 x4 upper/lower extremities.     -Patient identifiers verified and correct for this patient.  -Patient resting with bedrails up x2 for safety, bed locked in low position, and call bell within reach.  "

## 2017-10-31 NOTE — ED NOTES
FRANCIS Almonte, requested to give patient 2 packs of adrián crackers due to patient feeling dizzy. Her BG was normal. Will watch patient for a few minutes to see how she feels after eating.

## 2017-10-31 NOTE — ED NOTES
Patient is resting on stretcher with call bell within reach, bed locked in the low position, and side rails up x2 for safety. Patient is in NAD. RR spontaneous, even, and unlabored. Denies pain. Will continue to monitor.    Family members remain at bedside.

## 2017-10-31 NOTE — ED TRIAGE NOTES
"Patient states she noticed "swelling to her right leg" but couldn't recall when, just says "for a long time." States "I had a blot clot years ago that dissolved on its own, by taking aspirin and putting my legs up real high."    +SOB "sometimes" that comes with doing house work.   Denies CP.  +numbness to right ankle and foot.  "

## 2017-10-31 NOTE — PROGRESS NOTES
Subjective:      Patient ID: Kanchan Downing is a 77 y.o. female.    Chief Complaint: Leg Swelling (bilateral, mostly right)    Here today for bilateral leg swelling, , R > L . She denies leg pain & the swelling does decrease when she raises the leg at night while sleeping. She has a hx of DVT Right leg in the past, but this swelling is new. I do not have previous cardiovascular reports on her R leg.     She has hx of breast cancer.     Denies any chest pain, shortness of breath, nausea vomiting constipation diarrhea, blood in stool      Lab Results   Component Value Date     04/21/2017    K 4.1 04/21/2017     04/21/2017    CO2 23 04/21/2017     (H) 04/21/2017    BUN 13 04/21/2017    CREATININE 0.8 04/21/2017    CALCIUM 9.6 04/21/2017    PROT 6.7 04/21/2017    ALBUMIN 3.8 04/21/2017    BILITOT 0.5 04/21/2017    ALKPHOS 61 04/21/2017    AST 16 04/21/2017    ALT 15 04/21/2017    ANIONGAP 10 04/21/2017    ESTGFRAFRICA >60.0 04/21/2017    EGFRNONAA >60.0 04/21/2017    WBC 3.75 (L) 04/21/2017    HGB 13.7 04/21/2017    HCT 41.3 04/21/2017    MCV 91 04/21/2017     04/21/2017    TSH 4.708 (H) 04/21/2017    MBRFPXLT10TF 41 05/08/2014         Current Outpatient Prescriptions on File Prior to Visit   Medication Sig    albuterol (PROAIR HFA) 90 mcg/actuation inhaler INHALE 2 PUFFS INTO THE LUNGS EVERY 6 (SIX) HOURS AS  NEEDED FORWHEEZING.    amlodipine (NORVASC) 5 MG tablet TAKE 1 TABLET EVERY DAY    blood sugar diagnostic Strp 1 strip by Misc.(Non-Drug; Combo Route) route 2 (two) times daily. ACCU-CHEK BROOK PLUS    cetirizine (ZYRTEC) 10 MG tablet Take 10 mg by mouth once daily.      fluticasone (FLONASE) 50 mcg/actuation nasal spray 1 spray by Each Nare route once daily.    gabapentin (NEURONTIN) 100 MG capsule Take 1 capsule (100 mg total) by mouth 3 (three) times daily.    ibuprofen (ADVIL,MOTRIN) 600 MG tablet TAKE 1 TABLET THREE TIMES DAILY WITH FOOD    lactobacillus rhamnosus, GG,  (PROBIOTIC) 10 billion cell capsule Take by mouth. 1 Capsule Oral Every day    lancets Misc 1 each by Misc.(Non-Drug; Combo Route) route 2 (two) times daily. ACCU-CHEK SOFTCLIX    lisinopril (PRINIVIL,ZESTRIL) 5 MG tablet TAKE 1 TABLET ONE TIME DAILY    metformin (GLUCOPHAGE) 500 MG tablet TAKE 1 TABLET EVERY DAY WITH BREAKFAST    metoprolol tartrate (LOPRESSOR) 100 MG tablet TAKE 1 TABLET ONE TIME DAILY    MULTIVITAMIN ORAL Take by mouth once daily.     NAPROXEN SODIUM (ALEVE ORAL) Take by mouth continuous prn.     polyethylene glycol (GLYCOLAX) 17 gram/dose powder TAKE 1 CAPFUL (17 GRAMS) DAILY AS DIRECTED    simvastatin (ZOCOR) 10 MG tablet TAKE 1 TABLET EVERY EVENING    UNABLE TO FIND medication name: Omega XL    blood-glucose meter Misc 1 each by Misc.(Non-Drug; Combo Route) route as needed. ACCU-CHEK BROOK PLUS     No current facility-administered medications on file prior to visit.      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, without long-term current use of insulin 10/31/2017    Diabetes mellitus type 2 without retinopathy 06/12/2014    6% metformin 500 bid, FU+ 2016    DVT (deep venous thrombosis) 2001    R , Dr Bonilla    Hyperlipidemia     LDL 82    Hypertension     Microalbuminuria due to type 2 diabetes mellitus 12/08/2015    taking lisinopril, losartan caused olivia effects    Strabismus      Past Surgical History:   Procedure Laterality Date    BREAST SURGERY      R breast lumpectomy    MO REMOVAL OF NAIL BED  6/10/2015    TONSILLECTOMY       Social History     Social History Narrative     to Mar[place], 3 children, nondrinker, nonsmoker, she declines colonoscopy     Family History   Problem Relation Age of Onset    Heart  "disease Mother 58    Cataracts Mother     Heart disease Father 50    Thyroid disease Sister      Vitals:    10/31/17 0834   BP: 139/80   Pulse: 80   Temp: 97.6 °F (36.4 °C)   Weight: 81.7 kg (180 lb 1.9 oz)   Height: 5' 6" (1.676 m)   PainSc:   3     Objective:   Physical Exam   Constitutional: She appears well-developed and well-nourished. No distress.   Cardiovascular: Normal rate and regular rhythm.    Pulmonary/Chest: Effort normal and breath sounds normal.   Musculoskeletal:   Compression hose, R leg 3 cm larger than L, nontender calf, negative Stanislav's , R knee with crepitance , no erythema or warmth, 1 + pulses distal bilateral DP, limping gait,    Psychiatric: She has a normal mood and affect. Her behavior is normal. Judgment and thought content normal.     Assessment:     1. Leg swelling    2. History of DVT (deep vein thrombosis)    3. Immunization deficiency    4. History of breast cancer    5. Type 2 diabetes mellitus with diabetic polyneuropathy, without long-term current use of insulin    6. Controlled type 2 diabetes mellitus with other circulatory complication, without long-term current use of insulin    7. Embolism and thrombosis of artery     8. Atherosclerosis of aorta    9. Hyperlipidemia, unspecified hyperlipidemia type    10. HTN (hypertension), benign      Plan:     Orders Placed This Encounter    Hemoglobin A1c    Lipid panel    Ambulatory referral to Vascular Surgery    Ambulatory referral to Ophthalmology    CAR Ultrasound doppler venous leg right    DIPH,PERTUSS,ACEL,,TET VAC,PF, ADULT (ADACEL) 2 Lf-(2.5-5-3-5 mcg)-5Lf/0.5 mL Syrg    aspirin 81 MG Chew     I just spoke with Cardiologist & study reveals a new DVT in R leg femoral - popliteal    I spoke with pt & advised her to go directly to the ER for evaluation and treatment. She agrees and will do so.     Patient Instructions   Wear compression hose    --------------------------  WATER BALANCE-  Your body systems work best with " 64 ounces DAILY (HALF A GALLON DAILY)  - to flush out impurities & cleanse our organs. For every 8 ounces of caffeine you drink, ADD ANOTHER CUP of water to your daily water needs. (2 cups of coffee and one diet coke = you need 11 glasses of water a day). We were not made to drink sugary drinks  - not even artificial sweeteners. You'll notice a difference in your brain function, energy level & overall wellness. Your liver & kidney will thank you too for keeping them properly cleansed  ---------------------------      Continue other medications    FOLLOW UP REMINDER for DIABETICS:  ===================================  If you have DIABETES, we should evaluate your blood test every 3 MONTHS if your Hg1c is >7% (uncontrolled)    We can evaluate you every 6 MONTHS if your Hga1c is < 6.9% (contolled)  ===================================    Your 1# medicine is  EXERCISE  - huffing & puffing for 20  MINUTES EVERY DAY - check your sugars & you'll see them go down    The future risks of uncontrolled diabetes - include heart attack, stroke, neuropathy (pain in hands & feet that could lead to infection and amputation), nephropathy (leading to kidney dialysis) , and blindness     To prevent complications that can be treated early, please see your  opthalmologist EYE DOCTOR YEARLY      Always wear protective SHOES    Focus on NO SUGAR and no sugar substitutes (splenda,s tevia, etc) IN YOUR DRINKS. With respect to food - LOW CARBOHYDRATES - switch to whole wheat & brown rice.    Decrease & try to stop ALCOHOL, WHITE BREAD, WHITE PASTA, WHITE RICE , WHITE POTATOES- which all turn into sugar.    EAT an EXTRA VEGETABLE A DAY than you already do.    The ADA recommends taking  1. Aspirin 81 mg daily (unless you have had bleeding stomach ulcers or allergy to this)  2. STATIN medication (atorvastatin, pravastatin, rosuvastatin) to decrease inflammation in your blood vessels caused by SUGAR to prevent future heart attack & stroke. This is  recommended even if your LDL cholesterol is <100.   3. ARB blood pressure medication (Losartan) to protect your kidneys from future dialysis from SUGAR. This is recommended even if you have normal blood pressure    Consider reading the book -  End Of Diabetes by Dr Avila    Once YEARLY I will check basic labs CBC, CMP, fasting lipids, TSH, Hga1C prior to your visit      ----------------------------

## 2017-10-31 NOTE — ED NOTES
FRANCIS Almonte, OK with patient going home. Patient is changing into her clothes, with assistance, and will be walked to [facility].

## 2017-10-31 NOTE — ED PROVIDER NOTES
Encounter Date: 10/31/2017       History     Chief Complaint   Patient presents with    Deep Vein Thrombosis     had ultrasound and large blood clot behing r leg     77 year old female with a PMH of HTN, DM, breast ca, DVT who presents to the ED with leg swelling. She reports a bilateral lower extremity swelling, worse on the right, for several months. A good friend noticed increased swelling of the right leg and recommended she see a doctor. She was seen in the clinic today and an outpatient ultrasound was ordered which demonstrates a DVT of the right femoral, popliteal, and posterior tibial veins. She was referred to the ED for further evaluation. Patient notes a prior hx of DVT in the right lower extremity over 30 years ago. She denies recent travel, surgery or immobilization, or exogenous estrogen use. She has not had a fever, chills, chest pain, shortness of breath, nausea/vomiting, abdominal pain, weakness or numbness.           Review of patient's allergies indicates:   Allergen Reactions    Sulfa (sulfonamide antibiotics)      Other reaction(s): Rash     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, without long-term current use of insulin 10/31/2017    Diabetes mellitus type 2 without retinopathy 06/12/2014    6% metformin 500 bid, FU+ 2016    DVT (deep venous thrombosis) 2001    R Dr Bonilla    Hyperlipidemia     LDL 82    Hypertension     Microalbuminuria due to type 2 diabetes mellitus 12/08/2015    taking lisinopril, losartan caused olivia effects    Strabismus      Past Surgical History:   Procedure Laterality Date    BREAST SURGERY      R breast lumpectomy    AL REMOVAL OF NAIL BED  6/10/2015    TONSILLECTOMY       Family History   Problem  Relation Age of Onset    Heart disease Mother 58    Cataracts Mother     Heart disease Father 50    Thyroid disease Sister     Cancer Sister     No Known Problems Brother     No Known Problems Maternal Aunt     No Known Problems Maternal Uncle     No Known Problems Paternal Aunt     No Known Problems Paternal Uncle     No Known Problems Maternal Grandmother     No Known Problems Maternal Grandfather     No Known Problems Paternal Grandmother     No Known Problems Paternal Grandfather     Amblyopia Neg Hx     Blindness Neg Hx     Diabetes Neg Hx     Glaucoma Neg Hx     Hypertension Neg Hx     Macular degeneration Neg Hx     Retinal detachment Neg Hx     Strabismus Neg Hx     Stroke Neg Hx      Social History   Substance Use Topics    Smoking status: Former Smoker     Packs/day: 7.00     Years: 0.50    Smokeless tobacco: Never Used    Alcohol use No     Review of Systems   Constitutional: Negative for chills and fever.   HENT: Negative for sore throat.    Respiratory: Negative for shortness of breath.    Cardiovascular: Positive for leg swelling. Negative for chest pain.   Gastrointestinal: Negative for nausea and vomiting.   Genitourinary: Negative for dysuria.   Musculoskeletal: Negative for back pain.   Skin: Negative for rash.   Neurological: Negative for weakness.   Hematological: Does not bruise/bleed easily.       Physical Exam     Initial Vitals [10/31/17 1201]   BP Pulse Resp Temp SpO2   (!) 142/71 67 18 98.1 °F (36.7 °C) 97 %      MAP       94.67         Physical Exam    Constitutional: She appears well-developed and well-nourished. No distress.   HENT:   Head: Atraumatic.   Eyes: Conjunctivae and EOM are normal. Pupils are equal, round, and reactive to light.   Neck: Normal range of motion. Neck supple.   Cardiovascular: Normal rate, regular rhythm, normal heart sounds and intact distal pulses.   Pulses:       Dorsalis pedis pulses are 2+ on the right side, and 2+ on the left side.  "  Right lower extremity edema. No overlying erythema or skin changes. Calf is nontender, negative Stanislav's sign. Sensation intact bilaterally.   Pulmonary/Chest: Breath sounds normal. No respiratory distress. She has no wheezes. She has no rhonchi. She has no rales.   Abdominal: Soft. Bowel sounds are normal. There is no tenderness. There is no rebound and no guarding.   Neurological: She is alert and oriented to person, place, and time.   Skin: Skin is warm and dry. No rash noted.         ED Course   Procedures  Labs Reviewed   COMPREHENSIVE METABOLIC PANEL - Abnormal; Notable for the following:        Result Value    Glucose 122 (*)     All other components within normal limits   CBC W/ AUTO DIFFERENTIAL   PROTIME-INR     U/S results reviewed and notable for DVT.          Medical Decision Making:   History:   Old Medical Records: I decided to obtain old medical records.  Old Records Summarized: other records.       <> Summary of Records:     Lower extremity US 10/31/17  "Age Indeterminate DVT of the Femoral and Popliteal and Posterior Tibial Veins."       APC / Resident Notes:   77 year old female referred to the ED for further evaluation of DVT seen on ultrasound today.  On exam she is afebrile and nontoxic. Lungs are clear. There is swelling of the right lower extremity without erythema, skin changes, or ttp. DP pulses are 2+ bilaterally and distal sensation is intact. Skin is warm to touch. There is no palpable cord.    Patient has prior hx of DVT.   Labs demonstrate normal H&H and renal function.    Discussed treatment with anticoagulation. Patient denies frequent falls, stroke, GI or other sources of bleeding. Recommend Eliquis for outpatient treatment of DVT. Discussed risks associated with anticoagulation including easy bleeding/bruising, nonreversible bleeding with trauma, GI bleeding. Advised benefits of anticoagulation for DVT. Patient verbalized understanding and agrees with course of treatment. All " questions were answered to patient and family's satisfaction.     Initial dose of Eliquis given in ED, prescription provided for 30 day supply. Referral to vascular surgery, , advised pt to schedule outpatient follow up in 1-2 weeks. She is stable for discharge. Return to ED precautions given. I discussed the care of this patient with my supervising MD.            Attending Attestation:     Physician Attestation Statement for NP/PA:   I discussed this assessment and plan of this patient with the NP/PA, but I did not personally examine the patient. The face to face encounter was performed by the NP/PA.                  ED Course      Clinical Impression:   The encounter diagnosis was Deep vein thrombosis (DVT) of proximal vein of right lower extremity, unspecified chronicity.    Disposition:   Disposition: Discharged  Condition: Stable                        Suzy Fisher PA-C  10/31/17 1929       Anastacio Chandra MD  11/01/17 1021

## 2017-10-31 NOTE — PATIENT INSTRUCTIONS
Wear compression hose    --------------------------  WATER BALANCE-  Your body systems work best with 64 ounces DAILY (HALF A GALLON DAILY)  - to flush out impurities & cleanse our organs. For every 8 ounces of caffeine you drink, ADD ANOTHER CUP of water to your daily water needs. (2 cups of coffee and one diet coke = you need 11 glasses of water a day). We were not made to drink sugary drinks  - not even artificial sweeteners. You'll notice a difference in your brain function, energy level & overall wellness. Your liver & kidney will thank you too for keeping them properly cleansed  ---------------------------      Continue other medications    FOLLOW UP REMINDER for DIABETICS:  ===================================  If you have DIABETES, we should evaluate your blood test every 3 MONTHS if your Hg1c is >7% (uncontrolled)    We can evaluate you every 6 MONTHS if your Hga1c is < 6.9% (contolled)  ===================================    Your 1# medicine is  EXERCISE  - huffing & puffing for 20  MINUTES EVERY DAY - check your sugars & you'll see them go down    The future risks of uncontrolled diabetes - include heart attack, stroke, neuropathy (pain in hands & feet that could lead to infection and amputation), nephropathy (leading to kidney dialysis) , and blindness     To prevent complications that can be treated early, please see your  opthalmologist EYE DOCTOR YEARLY      Always wear protective SHOES    Focus on NO SUGAR and no sugar substitutes (splenda,s tevia, etc) IN YOUR DRINKS. With respect to food - LOW CARBOHYDRATES - switch to whole wheat & brown rice.    Decrease & try to stop ALCOHOL, WHITE BREAD, WHITE PASTA, WHITE RICE , WHITE POTATOES- which all turn into sugar.    EAT an EXTRA VEGETABLE A DAY than you already do.    The ADA recommends taking  1. Aspirin 81 mg daily (unless you have had bleeding stomach ulcers or allergy to this)  2. STATIN medication (atorvastatin, pravastatin, rosuvastatin) to  decrease inflammation in your blood vessels caused by SUGAR to prevent future heart attack & stroke. This is recommended even if your LDL cholesterol is <100.   3. ARB blood pressure medication (Losartan) to protect your kidneys from future dialysis from SUGAR. This is recommended even if you have normal blood pressure    Consider reading the book -  End Of Diabetes by Dr Avila    Once YEARLY I will check basic labs CBC, CMP, fasting lipids, TSH, Hga1C prior to your visit      ----------------------------

## 2017-10-31 NOTE — DISCHARGE INSTRUCTIONS
Take Eliquis 10mg twice a day for the first week, then 5mg twice a day every day after that.    Follow up with  in the vascular surgery clinic.    Return to the ER if you have a fall, head injury, headache, black or bloody bowel movement, lightheadedness, dizziness, or any new or concerning symptoms.

## 2017-11-03 ENCOUNTER — TELEPHONE (OUTPATIENT)
Dept: FAMILY MEDICINE | Facility: CLINIC | Age: 77
End: 2017-11-03

## 2017-11-03 NOTE — TELEPHONE ENCOUNTER
Please let pt know that her cholesterol is fine. Her sugar is excellent 6%, see me 6 months, Hga1c prior

## 2017-11-15 ENCOUNTER — TELEPHONE (OUTPATIENT)
Dept: HEMATOLOGY/ONCOLOGY | Facility: CLINIC | Age: 77
End: 2017-11-15

## 2017-11-15 NOTE — TELEPHONE ENCOUNTER
Returned call, spoke with patient and assisted with rescheduling her apt. New apt slips mailed out

## 2017-11-15 NOTE — TELEPHONE ENCOUNTER
----- Message from Edelmira Arevalo PA-C sent at 11/15/2017  2:46 PM CST -----  Contact: Pt       ----- Message -----  From: Jesica Reardon MA  Sent: 11/15/2017   1:31 PM  To: Edelmira Arevalo PA-C        ----- Message -----  From: Nithya Weir  Sent: 11/15/2017   1:19 PM  To: Irina PERES ([facility]) Staff    Pt would like the nurse to give her a call back in regards to rescheduling her appointments for next year .. Contact number [phone]..

## 2017-11-17 ENCOUNTER — HOSPITAL ENCOUNTER (OUTPATIENT)
Dept: VASCULAR SURGERY | Facility: CLINIC | Age: 77
Discharge: HOME | End: 2017-11-17
Attending: SURGERY
Payer: MEDICARE

## 2017-11-17 ENCOUNTER — INITIAL CONSULT (OUTPATIENT)
Dept: VASCULAR SURGERY | Facility: CLINIC | Age: 77
End: 2017-11-17
Payer: MEDICARE

## 2017-11-17 VITALS
TEMPERATURE: 98 F | BODY MASS INDEX: 28.9 KG/M2 | DIASTOLIC BLOOD PRESSURE: 82 MMHG | SYSTOLIC BLOOD PRESSURE: 139 MMHG | WEIGHT: 179.81 LBS | RESPIRATION RATE: 18 BRPM | HEIGHT: 66 IN | HEART RATE: 82 BPM

## 2017-11-17 DIAGNOSIS — M79.604 PAIN OF RIGHT LOWER EXTREMITY: Primary | ICD-10-CM

## 2017-11-17 DIAGNOSIS — Z86.718 PERSONAL HISTORY OF DVT (DEEP VEIN THROMBOSIS): ICD-10-CM

## 2017-11-17 DIAGNOSIS — I82.A11 DVT OF RIGHT AXILLARY VEIN, ACUTE: ICD-10-CM

## 2017-11-17 DIAGNOSIS — M79.604 PAIN OF RIGHT LOWER EXTREMITY: ICD-10-CM

## 2017-11-17 PROCEDURE — 99499 UNLISTED E&M SERVICE: CPT | Mod: S$GLB,,, | Performed by: SURGERY

## 2017-11-17 PROCEDURE — 99204 OFFICE O/P NEW MOD 45 MIN: CPT | Mod: S$GLB,,, | Performed by: SURGERY

## 2017-11-17 PROCEDURE — 99999 PR PBB SHADOW E&M-EST. PATIENT-LVL IV: CPT | Mod: PBBFAC,,, | Performed by: SURGERY

## 2017-11-17 PROCEDURE — 93971 EXTREMITY STUDY: CPT | Mod: S$GLB,,, | Performed by: SURGERY

## 2017-11-17 RX ORDER — TETANUS TOXOID, REDUCED DIPHTHERIA TOXOID AND ACELLULAR PERTUSSIS VACCINE, ADSORBED 5; 2.5; 8; 8; 2.5 [IU]/.5ML; [IU]/.5ML; UG/.5ML; UG/.5ML; UG/.5ML
SUSPENSION INTRAMUSCULAR
COMMUNITY
Start: 2017-11-02 | End: 2018-05-01

## 2017-11-17 NOTE — LETTER
November 17, 2017      Viktoria Mckeon MD  [address] [facility]  [address]  [facility] [address]           [facility] - Vascular Surgery  [address]  [place] [address]  Phone: [phone]  Fax: [phone]          Patient: Kanchan Downing   MR Number: [id number]   YOB: 1940   Date of Visit: 11/17/2017       Dear Dr. Viktoria Mckeon:    Thank you for referring Kanchan Downing to me for evaluation. Attached you will find relevant portions of my assessment and plan of care.    If you have questions, please do not hesitate to call me. I look forward to following Kanchan Downing along with you.    Sincerely,    Franklin Stewart MD    Enclosure  CC:  No Recipients    If you would like to receive this communication electronically, please contact [email or web address] or [phone] to request more information on [facility] [employer] access.    For providers and/or their staff who would like to refer a patient to [facility], please contact us through our one-stop-shop provider referral line, [facility], at [phone].    If you feel you have received this communication in error or would no longer like to receive these types of communications, please e-mail [email or web address]

## 2017-11-17 NOTE — PROGRESS NOTES
Kanchan WALTER Downing  11/17/2017    HPI:  Patient is a 77 y.o. female referred by Dr. Mckeon with a h/o DVT in RLE 30 years ago, HTN, Dm, Breast CA  who is here today for evaluation of a recently diagnosed RLE DTV.  She reports a bilateral lower extremity swelling, worse on the right, for several years. No recent travel or estrogen use.  A good friend noticed increased swelling of the right leg and recommended she see a doctor. She was seen in another clinic on 10/31 and an outpatient ultrasound was ordered which demonstrated an age indeterminate DVT of the right femoral, popliteal, and posterior tibial veins. She presented to the ED on 11/ 1 and she was prescribed eliquis and compression stockings. She reports somewhat decreased but persistent swelling of the RLE. She intermittently wears the compression stockings which do give her some relief. Has a history of DVT in same leg 30 years ago. No chest pain, fevers or SOB.       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, without long-term current use of insulin 10/31/2017    Diabetes mellitus type 2 without retinopathy 06/12/2014    6% metformin 500 bid, FU+ 2016    DVT (deep venous thrombosis) 2001    R , Dr Bonilla    Hyperlipidemia     LDL 82    Hypertension     Microalbuminuria due to type 2 diabetes mellitus 12/08/2015    taking lisinopril, losartan caused olivia effects    Strabismus      Past Surgical History:   Procedure Laterality Date    BREAST SURGERY      R breast lumpectomy    [place] REMOVAL OF NAIL BED  6/10/2015    TONSILLECTOMY       Family History   Problem Relation Age of Onset    Heart disease Mother 58    Cataracts Mother     Heart disease Father 50    Thyroid  disease Sister     Cancer Sister     No Known Problems Brother     No Known Problems Maternal Aunt     No Known Problems Maternal Uncle     No Known Problems Paternal Aunt     No Known Problems Paternal Uncle     No Known Problems Maternal Grandmother     No Known Problems Maternal Grandfather     No Known Problems Paternal Grandmother     No Known Problems Paternal Grandfather     Amblyopia Neg Hx     Blindness Neg Hx     Diabetes Neg Hx     Glaucoma Neg Hx     Hypertension Neg Hx     Macular degeneration Neg Hx     Retinal detachment Neg Hx     Strabismus Neg Hx     Stroke Neg Hx      Social History     Social History    Marital status:      Spouse name: N/A    Number of children: N/A    Years of education: N/A     Occupational History    Not on file.     Social History Main Topics    Smoking status: Former Smoker     Packs/day: 7.00     Years: 0.50    Smokeless tobacco: Never Used    Alcohol use No    Drug use: Unknown    Sexual activity: No     Other Topics Concern    Not on file     Social History Narrative     to Marques, 3 children, nondrinker, nonsmoker, she declines colonoscopy       Current Outpatient Prescriptions:     albuterol (PROAIR HFA) 90 mcg/actuation inhaler, INHALE 2 PUFFS INTO THE LUNGS EVERY 6 (SIX) HOURS AS  NEEDED FORWHEEZING., Disp: 18 g, Rfl: 1    amlodipine (NORVASC) 5 MG tablet, TAKE 1 TABLET EVERY DAY, Disp: 90 tablet, Rfl: 3    apixaban 5 mg Tab, Take 2 tablets (10mg) every 12 hours for the first 7 days. Then take 1 tablet (5mg) every 12 hours daily., Disp: 74 tablet, Rfl: 0    cetirizine (ZYRTEC) 10 MG tablet, Take 10 mg by mouth once daily.  , Disp: , Rfl:     fluticasone (FLONASE) 50 mcg/actuation nasal spray, 1 spray by Each Nare route once daily., Disp: 16 g, Rfl: 12    ibuprofen (ADVIL,MOTRIN) 600 MG tablet, TAKE 1 TABLET THREE TIMES DAILY WITH FOOD, Disp: 270 tablet, Rfl: 0    lactobacillus rhamnosus, GG, (PROBIOTIC) 10 billion cell  capsule, Take by mouth. 1 Capsule Oral Every day, Disp: , Rfl:     lancets Misc, 1 each by Misc.(Non-Drug; Combo Route) route 2 (two) times daily. ACCU-CHEK SOFTCLIX, Disp: 200 each, Rfl: 3    lisinopril (PRINIVIL,ZESTRIL) 5 MG tablet, TAKE 1 TABLET ONE TIME DAILY, Disp: 90 tablet, Rfl: 3    metformin (GLUCOPHAGE) 500 MG tablet, TAKE 1 TABLET EVERY DAY WITH BREAKFAST, Disp: 90 tablet, Rfl: 1    metoprolol tartrate (LOPRESSOR) 100 MG tablet, TAKE 1 TABLET ONE TIME DAILY, Disp: 90 tablet, Rfl: 3    MULTIVITAMIN ORAL, Take by mouth once daily. , Disp: , Rfl:     NAPROXEN SODIUM (ALEVE ORAL), Take by mouth continuous prn. , Disp: , Rfl:     polyethylene glycol (GLYCOLAX) 17 gram/dose powder, TAKE 1 CAPFUL (17 GRAMS) DAILY AS DIRECTED, Disp: 527 g, Rfl: 10    simvastatin (ZOCOR) 10 MG tablet, TAKE 1 TABLET EVERY EVENING, Disp: 90 tablet, Rfl: 3    UNABLE TO FIND, medication name: Omega XL, Disp: , Rfl:     aspirin 81 MG Chew, Take 1 tablet (81 mg total) by mouth once daily., Disp: , Rfl: 0    blood sugar diagnostic Strp, 1 strip by Misc.(Non-Drug; Combo Route) route 2 (two) times daily. ACCU-CHEK BROOK PLUS, Disp: 200 strip, Rfl: 3    blood-glucose meter Misc, 1 each by Misc.(Non-Drug; Combo Route) route as needed. ACCU-CHEK BROOK PLUS, Disp: 1 each, Rfl: PRN    BOOSTRIX TDAP 2.5-8-5 Lf-mcg-Lf/0.5mL Syrg injection, , Disp: , Rfl:     gabapentin (NEURONTIN) 100 MG capsule, Take 1 capsule (100 mg total) by mouth 3 (three) times daily., Disp: 90 capsule, Rfl: 3    REVIEW OF SYSTEMS:  General: negative; ENT: negative; Allergy and Immunology: negative; Hematological and Lymphatic: Negative; Endocrine: negative; Respiratory: no cough, shortness of breath, or wheezing; Cardiovascular: no chest pain or dyspnea on exertion; Gastrointestinal: no abdominal pain/back, change in bowel habits, or bloody stools; Genito-Urinary: no dysuria, trouble voiding, or hematuria; Musculoskeletal: negative  Neurological: no TIA or  stroke symptoms; Psychiatric: no nervousness, anxiety or depression.    PHYSICAL EXAM:   Right Arm BP - Sittin/82 (17 1355)  Left Arm BP - Sittin/80 (17 1355)  Pulse: 82  Temp: 97.6 °F (36.4 °C)      General appearance:  Alert, well-appearing, and in no distress.  Oriented to person, place, and time   Neurological: Normal speech, no focal findings noted; CN II - XII grossly intact           Musculoskeletal: Digits/nail without cyanosis/clubbing.  Normal muscle strength/tone.                 Neck: Supple, no significant adenopathy; thyroid is not enlarged                  No carotid bruit can be auscultated                Chest:  Clear to auscultation, no wheezes, rales or rhonchi, symmetric air entry     No use of accessory muscles             Cardiac: Normal rate and regular rhythm, S1 and S2 normal; PMI non-displaced          Abdomen: Soft, nontender, nondistended, no masses or organomegaly     No rebound tenderness noted; bowel sounds normal     No pulsatile aortic mass.     No groin adenopathy      Extremities:   2+ femoral pulses bilaterally, Moderate swelling to RLE - approx 50% greater circumference than the left     Bilateral pedal pulses palpable 2+     No pre-tibial edema     No ulcerations    LAB RESULTS:  Lab Results   Component Value Date    K 3.9 10/31/2017    K 4.1 2017    K 4.0 2015    CREATININE 0.8 10/31/2017    CREATININE 0.8 2017    CREATININE 0.8 2015     Lab Results   Component Value Date    WBC 5.94 10/31/2017    WBC 3.75 (L) 2017    WBC 3.48 (L) 2014    HCT 43.3 10/31/2017    HCT 41.3 2017    HCT 43.9 2014     10/31/2017     2017     2014     Lab Results   Component Value Date    HGBA1C 6.0 (H) 10/31/2017    HGBA1C 6.3 (H) 2017    HGBA1C 6.3 (H) 2016     IMAGING:  Date of Procedure: 10/31/2017    PRE-TEST DATA   Findings:    RIGHT:  Normal compressibility, augmentation, and  flow were visualized in the Right Common Femoral, Profunda Femoral, Anterior Tibial, Peroneal, External Iliac, and Saphenofemoral Junction Veins.  The Posterior Tibial Vein is partially compressible.  The Femoral and Popliteal Veins are non-compressible.      Impression:    RIGHT:  Age Indeterminate DVT of the Femoral and Popliteal and Posterior Tibial Veins        IMP/PLAN:  77 y.o. female with probably subacute DVT of RLE now on Eliquis per ED visit on 11/1, she has completed 2 weeks of therapy thus far. No acute vascular intervention needed at this time.  Will obtain lower extremity venous duplex ultrasound today in our vascular lab for further evaluation.  Given the chronicity of her symptoms and her fall risk, if her DVT appears chronic in nature, I would recommend stopping the eliquis.  If it is indeed an acute DVT, she should continue the eliquis for a total of three months.      1) continue eliquis for now  2) bilateral lower extremity venous duplex today  3) continue compression stockings (new rx given today for 15-20 mmHg given patient difficulty with higher level of compression)  4) will plan to speak to patient by phone with US result and anticoag recommendation    Franklin Stewart MD  Vascular & Endovascular Surgery

## 2017-12-01 ENCOUNTER — TELEPHONE (OUTPATIENT)
Dept: VASCULAR SURGERY | Facility: CLINIC | Age: 77
End: 2017-12-01

## 2017-12-01 ENCOUNTER — TELEPHONE (OUTPATIENT)
Dept: FAMILY MEDICINE | Facility: CLINIC | Age: 77
End: 2017-12-01

## 2017-12-01 DIAGNOSIS — Z86.718 PERSONAL HISTORY OF VENOUS THROMBOSIS AND EMBOLISM: ICD-10-CM

## 2017-12-01 DIAGNOSIS — I82.592 CHRONIC EMBOLISM AND THROMBOSIS OF OTHER SPECIFIED DEEP VEIN OF LEFT LOWER EXTREMITY: ICD-10-CM

## 2017-12-01 DIAGNOSIS — I70.0 ATHEROSCLEROSIS OF AORTA: Primary | ICD-10-CM

## 2017-12-01 NOTE — TELEPHONE ENCOUNTER
Pt was able to speak with Dr. Stewart this morning at the number I provided.  All of her questions have been answered.

## 2017-12-01 NOTE — TELEPHONE ENCOUNTER
Pt advised Eliquis was not prescribed by Dr. Mckeon.  Pt is calling for our assistance because she has placed two calls to Dr. Stewart's office and has not received a return call.  Pt had US 11/17, but has not been contacted regarding result.    Pt advised that Dr. Mckeon is not in clinic on Fridays.  I will send her a msg.  Recommended that pt again try to contact Dr. Michael's Office @ [phone] which is the number is show for vascular medicine.

## 2017-12-01 NOTE — TELEPHONE ENCOUNTER
----- Message from Armida Sen sent at 12/1/2017  9:34 AM CST -----  Contact: pt [phone]  Pt only has two pills left for today for Eliquis. She would like a call back to discuss if she needs a refill or an appointment to get a refill

## 2017-12-01 NOTE — TELEPHONE ENCOUNTER
Patient had no more refills on Apixaban, Dr. Stewart explained to her to stop taking it and follow up in one month.  Patient understands

## 2017-12-29 NOTE — TELEPHONE ENCOUNTER
"----- Message from Zhanna Olson sent at 12/29/2017 11:30 AM CST -----  Contact: Pt [phone]  RX request - refill or new RX.  Is this a refill or new RX:  Refill   RX name and strength: metformin (GLUCOPHAGE) 500 MG tablet  Directions:   Is this a 30 day or 90 day RX:    Pharmacy name and phone # (DON'T enter "on file" or "in chart"): [facility] - [facility] [address] [phone] (Phone)  [phone] (Fax)  Comments:        "

## 2018-01-02 RX ORDER — METFORMIN HYDROCHLORIDE 500 MG/1
TABLET ORAL
Qty: 90 TABLET | Refills: 1 | Status: SHIPPED | OUTPATIENT
Start: 2018-01-02 | End: 2018-05-01

## 2018-01-05 ENCOUNTER — HOSPITAL ENCOUNTER (OUTPATIENT)
Dept: VASCULAR SURGERY | Facility: CLINIC | Age: 78
Discharge: HOME | End: 2018-01-05
Attending: SURGERY
Payer: MEDICARE

## 2018-01-05 ENCOUNTER — OFFICE VISIT (OUTPATIENT)
Dept: VASCULAR SURGERY | Facility: CLINIC | Age: 78
End: 2018-01-05
Attending: SURGERY
Payer: MEDICARE

## 2018-01-05 VITALS
HEART RATE: 66 BPM | WEIGHT: 180 LBS | TEMPERATURE: 98 F | BODY MASS INDEX: 28.25 KG/M2 | DIASTOLIC BLOOD PRESSURE: 69 MMHG | SYSTOLIC BLOOD PRESSURE: 136 MMHG | HEIGHT: 67 IN

## 2018-01-05 DIAGNOSIS — I70.0 ATHEROSCLEROSIS OF AORTA: ICD-10-CM

## 2018-01-05 DIAGNOSIS — I87.099 CHRONIC VENOUS HYPERTENSION DUE TO DVT: Primary | ICD-10-CM

## 2018-01-05 DIAGNOSIS — I82.592 CHRONIC EMBOLISM AND THROMBOSIS OF OTHER SPECIFIED DEEP VEIN OF LEFT LOWER EXTREMITY: ICD-10-CM

## 2018-01-05 DIAGNOSIS — Z86.718 PERSONAL HISTORY OF VENOUS THROMBOSIS AND EMBOLISM: ICD-10-CM

## 2018-01-05 PROCEDURE — 99214 OFFICE O/P EST MOD 30 MIN: CPT | Mod: S$GLB,,, | Performed by: SURGERY

## 2018-01-05 PROCEDURE — 99999 PR PBB SHADOW E&M-EST. PATIENT-LVL IV: CPT | Mod: PBBFAC,,, | Performed by: SURGERY

## 2018-01-05 PROCEDURE — 93971 EXTREMITY STUDY: CPT | Mod: S$GLB,,, | Performed by: SURGERY

## 2018-01-05 PROCEDURE — 99499 UNLISTED E&M SERVICE: CPT | Mod: S$GLB,,, | Performed by: SURGERY

## 2018-01-05 NOTE — PROGRESS NOTES
Kanchan M Chang  01/05/2018    HPI:  Patient is a 77 y.o. female referred by Dr. Mckeon with a h/o DVT in RLE 30 years ago, HTN, Dm, Breast CA, fall risk who presents today as a follow up for E DTV on 10/31. She initially presented with acute vs chronic right leg swelling and had an ultrasound  demonstrating an age indeterminate DVT of the right femoral, popliteal, and posterior tibial veins. She was started on eliquis and compression stockings. She had repeat US on 12/18 which revealed chronic residual non-occlusive thrombus in the distal femoral vein, popliteal vein and the anterior paired PTV. Eliquis was stopped on 12/1 due to probable chronic nature of DVT and patient's fall risks. Today she continues to have mild, chronic bilateral lower extremity swelling. No new complaints.  She intermittently wears rx compression stockings.     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, without long-term current use of insulin 10/31/2017    Diabetes mellitus type 2 without retinopathy 06/12/2014    6% metformin 500 bid, FU+ 2016    DVT (deep venous thrombosis) 2001    R , Dr Bonilla    Hyperlipidemia     LDL 82    Hypertension     Microalbuminuria due to type 2 diabetes mellitus 12/08/2015    taking lisinopril, losartan caused olivia effects    Strabismus      Past Surgical History:   Procedure Laterality Date    BREAST SURGERY      R breast lumpectomy    [place] REMOVAL OF NAIL BED  6/10/2015    TONSILLECTOMY       Family History   Problem Relation Age of Onset    Heart disease Mother 58    Cataracts Mother     Heart disease Father 50    Thyroid disease Sister     Cancer Sister     No Known Problems Brother     No Known  Problems Maternal Aunt     No Known Problems Maternal Uncle     No Known Problems Paternal Aunt     No Known Problems Paternal Uncle     No Known Problems Maternal Grandmother     No Known Problems Maternal Grandfather     No Known Problems Paternal Grandmother     No Known Problems Paternal Grandfather     Amblyopia Neg Hx     Blindness Neg Hx     Diabetes Neg Hx     Glaucoma Neg Hx     Hypertension Neg Hx     Macular degeneration Neg Hx     Retinal detachment Neg Hx     Strabismus Neg Hx     Stroke Neg Hx      Social History     Social History    Marital status:      Spouse name: N/A    Number of children: N/A    Years of education: N/A     Occupational History    Not on file.     Social History Main Topics    Smoking status: Former Smoker     Packs/day: 7.00     Years: 0.50    Smokeless tobacco: Never Used    Alcohol use No    Drug use: Unknown    Sexual activity: No     Other Topics Concern    Not on file     Social History Narrative     to [place], 3 children, nondrinker, nonsmoker, she declines colonoscopy       Current Outpatient Prescriptions:     albuterol (PROAIR HFA) 90 mcg/actuation inhaler, INHALE 2 PUFFS INTO THE LUNGS EVERY 6 (SIX) HOURS AS  NEEDED FORWHEEZING., Disp: 18 g, Rfl: 1    amlodipine (NORVASC) 5 MG tablet, TAKE 1 TABLET EVERY DAY, Disp: 90 tablet, Rfl: 3    aspirin 81 MG Chew, Take 1 tablet (81 mg total) by mouth once daily., Disp: , Rfl: 0    blood sugar diagnostic Strp, 1 strip by Misc.(Non-Drug; Combo Route) route 2 (two) times daily. ACCU-CHEK BROOK PLUS, Disp: 200 strip, Rfl: 3    BOOSTRIX TDAP 2.5-8-5 Lf-mcg-Lf/0.5mL Syrg injection, , Disp: , Rfl:     cetirizine (ZYRTEC) 10 MG tablet, Take 10 mg by mouth once daily.  , Disp: , Rfl:     fluticasone (FLONASE) 50 mcg/actuation nasal spray, 1 spray by Each Nare route once daily., Disp: 16 g, Rfl: 12    gabapentin (NEURONTIN) 100 MG capsule, Take 1 capsule (100 mg total) by mouth 3 (three)  times daily., Disp: 90 capsule, Rfl: 3    ibuprofen (ADVIL,MOTRIN) 600 MG tablet, TAKE 1 TABLET THREE TIMES DAILY WITH FOOD, Disp: 270 tablet, Rfl: 0    lactobacillus rhamnosus, GG, (PROBIOTIC) 10 billion cell capsule, Take by mouth. 1 Capsule Oral Every day, Disp: , Rfl:     lancets Misc, 1 each by Misc.(Non-Drug; Combo Route) route 2 (two) times daily. ACCU-CHEK SOFTCLIX, Disp: 200 each, Rfl: 3    lisinopril (PRINIVIL,ZESTRIL) 5 MG tablet, TAKE 1 TABLET ONE TIME DAILY, Disp: 90 tablet, Rfl: 3    metFORMIN (GLUCOPHAGE) 500 MG tablet, TAKE 1 TABLET EVERY DAY WITH BREAKFAST, Disp: 90 tablet, Rfl: 1    metoprolol tartrate (LOPRESSOR) 100 MG tablet, TAKE 1 TABLET ONE TIME DAILY, Disp: 90 tablet, Rfl: 3    MULTIVITAMIN ORAL, Take by mouth once daily. , Disp: , Rfl:     NAPROXEN SODIUM (ALEVE ORAL), Take by mouth continuous prn. , Disp: , Rfl:     polyethylene glycol (GLYCOLAX) 17 gram/dose powder, TAKE 1 CAPFUL (17 GRAMS) DAILY AS DIRECTED, Disp: 527 g, Rfl: 10    simvastatin (ZOCOR) 10 MG tablet, TAKE 1 TABLET EVERY EVENING, Disp: 90 tablet, Rfl: 3    UNABLE TO FIND, medication name: Omega XL, Disp: , Rfl:     blood-glucose meter Misc, 1 each by Misc.(Non-Drug; Combo Route) route as needed. ACCU-CHEK BROOK PLUS, Disp: 1 each, Rfl: PRN    REVIEW OF SYSTEMS:  General: negative; ENT: negative; Allergy and Immunology: negative; Hematological and Lymphatic: Negative; Endocrine: negative; Respiratory: no cough, shortness of breath, or wheezing; Cardiovascular: no chest pain or dyspnea on exertion; Gastrointestinal: no abdominal pain/back, change in bowel habits, or bloody stools; Genito-Urinary: no dysuria, trouble voiding, or hematuria; Musculoskeletal: negative  Neurological: no TIA or stroke symptoms; Psychiatric: no nervousness, anxiety or depression.    PHYSICAL EXAM:   Right Arm BP - Sittin/85 (18 1326)  Left Arm BP - Sittin/69 (18 1326)  Pulse: 66  Temp: 97.5 °F (36.4 °C)       General appearance:  Alert, well-appearing, and in no distress.  Oriented to person, place, and time   Neurological:  Normal speech, no focal findings noted; CN II - XII grossly intact           Musculoskeletal: Digits/nail without cyanosis/clubbing.  Normal muscle strength/tone.                 Neck: Supple, no significant adenopathy; thyroid is not enlarged                  No carotid bruit can be auscultated                Chest:  Clear to auscultation, no wheezes, rales or rhonchi, symmetric air entry      No use of accessory muscles             Cardiac: Normal rate and regular rhythm, S1 and S2 normal; PMI non-displaced          Abdomen: Soft, nontender, nondistended, no masses or organomegaly      No rebound tenderness noted; bowel sounds normal      No pulsatile aortic mass.      No groin adenopathy      Extremities:   2+ femoral pulses bilaterally, mild swelling bilateral LE R>L      Bilateral pedal pulses palpable 2+      No pre-tibial edema      No ulcerations    LAB RESULTS:  Lab Results   Component Value Date    K 3.9 10/31/2017    K 4.1 04/21/2017    K 4.0 09/17/2015    CREATININE 0.8 10/31/2017    CREATININE 0.8 04/21/2017    CREATININE 0.8 09/17/2015     Lab Results   Component Value Date    WBC 5.94 10/31/2017    WBC 3.75 (L) 04/21/2017    WBC 3.48 (L) 05/08/2014    HCT 43.3 10/31/2017    HCT 41.3 04/21/2017    HCT 43.9 05/08/2014     10/31/2017     04/21/2017     05/08/2014     Lab Results   Component Value Date    HGBA1C 6.0 (H) 10/31/2017    HGBA1C 6.3 (H) 04/21/2017    HGBA1C 6.3 (H) 06/03/2016     IMAGING:   LE Venous Duplex 1/5/18  Minimal chronic residual non-occlusive thrombus in the popliteal vein. No evidence of acute DVT.    IMP/PLAN:  77 y.o. female with RLE DTV on 10/31. Initial us showing DVT of the right femoral, popliteal, and posterior tibial veins.  LE venous duplex 1/5/18 shows minimal chronic residual non-occlusive thrombus of the pop vein, much improved from  previous US on 12/18. Eliquis stopped 12/1. Continues to have chronic bilateral swelling but no new complaints.     1) continue compression stockings (new rx given today for 15-20 mmHg given patient difficulty with higher level of compression)  2) No need for anticoagulation at this time. Continue daily 81 mg ASA    Franklin Stewart MD  Vascular & Endovascular Surgery

## 2018-01-15 DIAGNOSIS — E11.9 DIABETES TYPE 2, CONTROLLED: ICD-10-CM

## 2018-01-16 RX ORDER — AMLODIPINE BESYLATE 5 MG/1
TABLET ORAL
Qty: 90 TABLET | Refills: 1 | Status: SHIPPED | OUTPATIENT
Start: 2018-01-16 | End: 2018-05-01 | Stop reason: SDUPTHER

## 2018-01-16 RX ORDER — SIMVASTATIN 10 MG/1
TABLET, FILM COATED ORAL
Qty: 90 TABLET | Refills: 1 | Status: SHIPPED | OUTPATIENT
Start: 2018-01-16 | End: 2018-05-01 | Stop reason: SDUPTHER

## 2018-01-29 ENCOUNTER — TELEPHONE (OUTPATIENT)
Dept: HEMATOLOGY/ONCOLOGY | Facility: CLINIC | Age: 78
End: 2018-01-29

## 2018-01-29 NOTE — TELEPHONE ENCOUNTER
----- Message from Jesica Reardon MA sent at 1/29/2018  8:47 AM CST -----  Contact: self      ----- Message -----  From: Sheila Carrasco  Sent: 1/29/2018   8:35 AM  To: Irina PERES ([facility]) Staff    Pt is calling in regards to rescheduling her appts for today. Per pt she is not feeling well on today. Pt would like a call back to schedule new appts.    Pt can be reached at [phone].    Thank you

## 2018-02-06 DIAGNOSIS — Z00.00 ROUTINE GENERAL MEDICAL EXAMINATION AT A HEALTH CARE FACILITY: Primary | ICD-10-CM

## 2018-02-06 DIAGNOSIS — E11.59 CONTROLLED TYPE 2 DIABETES MELLITUS WITH OTHER CIRCULATORY COMPLICATION, WITHOUT LONG-TERM CURRENT USE OF INSULIN: ICD-10-CM

## 2018-02-20 ENCOUNTER — HOSPITAL ENCOUNTER (OUTPATIENT)
Dept: RADIOLOGY | Facility: HOSPITAL | Age: 78
Discharge: HOME | End: 2018-02-20
Attending: INTERNAL MEDICINE
Payer: MEDICARE

## 2018-02-20 ENCOUNTER — OFFICE VISIT (OUTPATIENT)
Dept: HEMATOLOGY/ONCOLOGY | Facility: CLINIC | Age: 78
End: 2018-02-20
Payer: MEDICARE

## 2018-02-20 VITALS
WEIGHT: 178.38 LBS | RESPIRATION RATE: 16 BRPM | OXYGEN SATURATION: 97 % | SYSTOLIC BLOOD PRESSURE: 130 MMHG | BODY MASS INDEX: 28 KG/M2 | HEART RATE: 53 BPM | TEMPERATURE: 98 F | HEIGHT: 67 IN | DIASTOLIC BLOOD PRESSURE: 62 MMHG

## 2018-02-20 VITALS — BODY MASS INDEX: 28.25 KG/M2 | HEIGHT: 67 IN | WEIGHT: 180 LBS

## 2018-02-20 DIAGNOSIS — Z85.3 HISTORY OF BREAST CANCER: Primary | ICD-10-CM

## 2018-02-20 DIAGNOSIS — Z85.3 HISTORY OF BREAST CANCER: ICD-10-CM

## 2018-02-20 PROCEDURE — 77066 DX MAMMO INCL CAD BI: CPT | Mod: 26,,, | Performed by: RADIOLOGY

## 2018-02-20 PROCEDURE — 99999 PR PBB SHADOW E&M-EST. PATIENT-LVL V: CPT | Mod: PBBFAC,,, | Performed by: PHYSICIAN ASSISTANT

## 2018-02-20 PROCEDURE — 77062 BREAST TOMOSYNTHESIS BI: CPT | Mod: 26,,, | Performed by: RADIOLOGY

## 2018-02-20 PROCEDURE — 77062 BREAST TOMOSYNTHESIS BI: CPT | Mod: TC,PO

## 2018-02-20 PROCEDURE — 1126F AMNT PAIN NOTED NONE PRSNT: CPT | Mod: S$GLB,,, | Performed by: PHYSICIAN ASSISTANT

## 2018-02-20 PROCEDURE — 1159F MED LIST DOCD IN RCRD: CPT | Mod: S$GLB,,, | Performed by: PHYSICIAN ASSISTANT

## 2018-02-20 PROCEDURE — 3008F BODY MASS INDEX DOCD: CPT | Mod: S$GLB,,, | Performed by: PHYSICIAN ASSISTANT

## 2018-02-20 PROCEDURE — 99213 OFFICE O/P EST LOW 20 MIN: CPT | Mod: S$GLB,,, | Performed by: PHYSICIAN ASSISTANT

## 2018-02-20 NOTE — PROGRESS NOTES
Subjective:       Patient ID: Kanchan Downing is a 77 y.o. female.    Chief Complaint: Breast cancer, stage 1, estrogen receptor positive, right    Ms. Downing is a 77-year-old with stage I carcinoma of   the right breast upper inner quadrant, status post lumpectomy in 01/2011   for T1c N0, ER positive, HER-2 negative tumor. She has been on adjvuant endocrine therapy since 2/2011.    Patient with dry patches on right side of back X 1 year.  No associated pruritus.    Overall feeling ok, notes continued arthritic symptoms.    No shortness of breath, breast pain, headaches or visual changes. Appetite and energy level are good.     Impression:  Bilateral  There is no mammographic evidence of malignancy.  BI-RADS Category:   Overall: 2 - Benign  Recommendation:  There are no mammo recommendations for this study.        Review of Systems   Constitutional: Negative.    HENT: Negative for congestion, rhinorrhea, sore throat and trouble swallowing.    Eyes: Negative for visual disturbance.   Respiratory: Negative for cough, chest tightness and shortness of breath.    Cardiovascular: Negative for chest pain, palpitations and leg swelling.   Gastrointestinal: Negative for abdominal pain, blood in stool, constipation, diarrhea, nausea and vomiting.   Genitourinary: Negative for dysuria, hematuria and vaginal bleeding.   Musculoskeletal: Positive for arthralgias. Negative for back pain and myalgias.   Skin: Positive for rash (at right upper back). Negative for pallor.   Neurological: Negative for dizziness, weakness and headaches.   Hematological: Negative for adenopathy. Does not bruise/bleed easily.   Psychiatric/Behavioral: Negative for dysphoric mood and suicidal ideas. The patient is not nervous/anxious.        Objective:      Physical Exam   Constitutional: She is oriented to person, place, and time. She appears well-developed and well-nourished. No distress.   Presents alone   HENT:   Head: Normocephalic.   Mouth/Throat:  Oropharynx is clear and moist. No oropharyngeal exudate.   Eyes: Conjunctivae are normal. Pupils are equal, round, and reactive to light. No scleral icterus.   Neck: Normal range of motion. Neck supple. No thyromegaly present.   Cardiovascular: Normal rate and regular rhythm.    Pulmonary/Chest: Effort normal and breath sounds normal. No respiratory distress.   Lumpectomy incision at medial right breast with associated volume loss. There is fibrosis at medial aspect of incision. Nipple inversion on left, present since prior to surgery.  Left breast without mass or nodule. No axillary or supraclavicular adenopathy.    Abdominal: Soft. Bowel sounds are normal. She exhibits no distension and no mass. There is no tenderness.   Musculoskeletal: Normal range of motion. She exhibits no edema or tenderness.   No spinal or paraspinal tenderness to palpation     Lymphadenopathy:     She has no cervical adenopathy.   Neurological: She is alert and oriented to person, place, and time. No cranial nerve deficit.   Skin: Skin is warm and dry.   Psychiatric: She has a normal mood and affect. Her behavior is normal. Judgment and thought content normal.   Vitals reviewed.      Assessment:       1. History of breast cancer        Plan:       Return to clinic in one year with annual mammogram.    Distress Screening Results: Psychosocial Distress screening score of Distress Score: 3 noted and reviewed. No intervention indicated.

## 2018-02-26 ENCOUNTER — OFFICE VISIT (OUTPATIENT)
Dept: URGENT CARE | Facility: CLINIC | Age: 78
End: 2018-02-26
Payer: MEDICARE

## 2018-02-26 VITALS
OXYGEN SATURATION: 98 % | HEART RATE: 61 BPM | TEMPERATURE: 99 F | DIASTOLIC BLOOD PRESSURE: 76 MMHG | BODY MASS INDEX: 28.09 KG/M2 | WEIGHT: 179 LBS | SYSTOLIC BLOOD PRESSURE: 145 MMHG | HEIGHT: 67 IN

## 2018-02-26 DIAGNOSIS — H69.93 DYSFUNCTION OF BOTH EUSTACHIAN TUBES: ICD-10-CM

## 2018-02-26 DIAGNOSIS — J30.9 ALLERGIC RHINITIS, UNSPECIFIED CHRONICITY, UNSPECIFIED SEASONALITY, UNSPECIFIED TRIGGER: Primary | ICD-10-CM

## 2018-02-26 PROCEDURE — 3008F BODY MASS INDEX DOCD: CPT | Mod: S$GLB,,, | Performed by: PHYSICIAN ASSISTANT

## 2018-02-26 PROCEDURE — 1159F MED LIST DOCD IN RCRD: CPT | Mod: S$GLB,,, | Performed by: PHYSICIAN ASSISTANT

## 2018-02-26 PROCEDURE — 99214 OFFICE O/P EST MOD 30 MIN: CPT | Mod: S$GLB,,, | Performed by: PHYSICIAN ASSISTANT

## 2018-02-26 NOTE — PATIENT INSTRUCTIONS
- Rest.    - Drink plenty of fluids.    - Tylenol or Ibuprofen as directed as needed for fever/pain.    - Take over-the-counter claritin, zyrtec, allegra, or xyzal as directed.  - Use over the counter Flonase as directed for sinus congestion and postnasal drip.  - use nasal saline prior to Flonase.  - Use Ocean Spray Nasal Saline 1-3 puffs each nostril every 2-3 hours then blow out onto tissue. This is the irrigate the nasal passage way to clear the sinus openings. Use until sinus problem resolved   - Follow up with your PCP or specialty clinic as directed in the next 1-2 weeks if not improved or as needed.  You can call [phone] to schedule an appointment with the appropriate provider.    - Go to the ED if your symptoms worsen.    Allergic Rhinitis  Allergic rhinitis is an allergic reaction that affects the nose, and often the eyes. Its often known as nasal allergies. Nasal allergies are often due to things in the environment that are breathed in. Depending what you are sensitive to, nasal allergies may occur only during certain seasons. Or they may occur year round. Common indoor allergens include house dust mites, mold, cockroaches, and pet dander. Outdoor allergens include pollen from trees, grasses, and weeds.   Symptoms include a drippy, stuffy, and itchy nose. They also include sneezing and red and itchy eyes. You may feel tired more often. Severe allergies may also affect your breathing and trigger a condition called asthma.   Tests can be done to see what allergens are affecting you. You may be referred to an allergy specialist for testing and further evaluation.  Home care  Your healthcare provider may prescribe medicines to help relieve allergy symptoms. These may include oral medicines, nasal sprays, or eye drops.  Ask your provider for advice on how to avoid substances that you are allergic to. Below are a few tips for each type of allergen.  Pet dander:  · Do not have pets with fur and  feathers.  · If you can't avoid having a pet, keep it out of your bedroom and off upholstered furniture.  Pollen:  · When pollen counts are high, keep windows of your car and home closed. If possible, use an air conditioner instead.  · Wear a filter mask when mowing or doing yard work.  House dust mites:  · Wash bedding every week in warm water and detergent and dry on a hot setting.  · Cover the mattress, box spring, and pillows with allergy covers.   · If possible, sleep in a room with no carpet, curtains, or upholstered furniture.  Cockroaches:  · Store food in sealed containers.  · Remove garbage from the home promptly.  · Fix water leaks  Mold:  · Keep humidity low by using a dehumidifier or air conditioner. Keep the dehumidifier and air conditioner clean and free of mold.  · Clean moldy areas with bleach and water.  In general:  · Vacuum once or twice a week. If possible, use a vacuum with a high-efficiency particulate air (HEPA) filter.  · Do not smoke. Avoid cigarette smoke. Cigarette smoke is an irritant that can make symptoms worse.  Follow-up care  Follow up as advised by the healthcare provider or our staff. If you were referred to an allergy specialist, make this appointment promptly.  When to seek medical advice  Call your healthcare provider right away if the following occur:  · Coughing or wheezing  · Fever greater than 100.4°F (38°C)  · Hives (raised red bumps)  · Continuing symptoms, new symptoms, or worsening symptoms  Call 911 right away if you have:  · Trouble breathing  · Severe swelling of the face or severe itching of the eyes or mouth  Date Last Reviewed: 3/1/2017  © 0701-6083 [employer]. [address] [place] [address], [place] [address]. All rights reserved. This information is not intended as a substitute for professional medical care. Always follow your healthcare professional's instructions.        Nasal Allergies: Related Problems  Allergies can cause nasal passages to swell.  This narrows the air passages. Allergies also cause increased mucus production in the nose. These changes result in nasal allergy symptoms. Common symptoms include itching, sneezing, stuffy nose, and runny nose. Nasal allergies can also cause problems in other parts of the respiratory system. Some of the more common problems are discussed below. If you think you have any of these problems, talk to your healthcare provider about treatment choices.    Sinus infections  Fluid may be trapped in the sinuses. Bacteria may grow in trapped fluid. This causes sinus infection (sinusitis).  Conjunctivitis  Allergens irritate your eyes, including the lining of the conjunctiva. This causes eyes to become red, itchy, puffy, and watery.  Ear problems  The eustachian tube connects the middle ear to nasal passages.  Allergies can block this tube, and make the ears feel plugged. Fluid may also build up, leading to an ear infection (otitis media).  Nasal polyps  Allergies cause nasal passages to swell. Constant swelling can lead to formation of a sac called a polyp. Polyps can grow large enough to block nasal passages.  Asthma  Asthma is inflammation and swelling of the air passages in the lungs. The symptoms are wheezing, shortness of breath, coughing, and chest tightness. Allergies, including nasal allergies, are common in people with asthma.  Date Last Reviewed: 9/1/2016 © 2000-2017 [employer]. [address] [place] [address], [place] [address]. All rights reserved. This information is not intended as a substitute for professional medical care. Always follow your healthcare professional's instructions.

## 2018-02-26 NOTE — PROGRESS NOTES
"Subjective:       Patient ID: Kanchan Downing is a 77 y.o. female.    Vitals:  height is 5' 7" (1.702 m) and weight is 81.2 kg (179 lb). Her oral temperature is 98.5 °F (36.9 °C). Her blood pressure is 145/76 (abnormal) and her pulse is 61. Her oxygen saturation is 98%.     Chief Complaint: Otalgia    This is a 77 y.o. 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  06/08/2016: Atherosclerosis of aorta      Comment: CXR 2013 2011: Breast cancer      Comment: right breast invasive micropapillary CA, Stage               !A  10/31/2017: Controlled type 2 diabetes mellitus with circu*  06/12/2014: Diabetes mellitus type 2 without retinopathy      Comment: 6% metformin 500 bid, FU+ 2016 2001: DVT (deep venous thrombosis)      Comment: Dr Chad PETIT  No date: Hyperlipidemia      Comment: LDL 82  No date: Hypertension  12/08/2015: Microalbuminuria due to type 2 diabetes mellit*      Comment: taking lisinopril, losartan caused olivia effects  No date: Strabismus   who presents today with a chief complaint of bilateral ears feeling clogged.  She has been having sinus congestion for approximately 1 week.  She then had left ear pain a few days ago, which has resolved.  Now it feels like her ears are "clogged".      Otalgia    There is pain in both ears. This is a new problem. The current episode started in the past 7 days. The problem occurs constantly. The problem has been waxing and waning. Pertinent negatives include no abdominal pain, coughing, headaches or sore throat.     Review of Systems   Constitution: Negative for chills, fever and malaise/fatigue.   HENT: Positive for ear pain. Negative for congestion, hoarse voice and sore throat.    Eyes: Negative for discharge and redness.   Cardiovascular: Negative for chest pain, dyspnea on exertion and leg swelling.   Respiratory: Negative for cough, shortness of breath, sputum production and wheezing.  "   Musculoskeletal: Negative for myalgias.   Gastrointestinal: Negative for abdominal pain and nausea.   Neurological: Negative for headaches.       Objective:      Physical Exam   Constitutional: She is oriented to person, place, and time. She appears well-developed and well-nourished.   HENT:   Head: Normocephalic and atraumatic.   Right Ear: Hearing, external ear and ear canal normal. A middle ear effusion (clear) is present.   Left Ear: Hearing, external ear and ear canal normal. A middle ear effusion (clear) is present.   Nose: Rhinorrhea present. Right sinus exhibits no maxillary sinus tenderness and no frontal sinus tenderness. Left sinus exhibits no maxillary sinus tenderness and no frontal sinus tenderness.   Mouth/Throat: Uvula is midline and oropharynx is clear and moist.   Eyes: Conjunctivae are normal.   Neck: Normal range of motion. Neck supple. No thyromegaly present.   Cardiovascular: Normal rate and regular rhythm.  Exam reveals no gallop and no friction rub.    No murmur heard.  Pulmonary/Chest: Effort normal and breath sounds normal. She has no wheezes. She has no rales.   Musculoskeletal: Normal range of motion.   Lymphadenopathy:     She has no cervical adenopathy.   Neurological: She is alert and oriented to person, place, and time.   Skin: Skin is warm and dry. No rash noted. No erythema.   Psychiatric: She has a normal mood and affect. Her behavior is normal. Judgment and thought content normal.   Nursing note and vitals reviewed.      Assessment:       1. Allergic rhinitis, unspecified chronicity, unspecified seasonality, unspecified trigger    2. Dysfunction of both eustachian tubes        Plan:         Allergic rhinitis, unspecified chronicity, unspecified seasonality, unspecified trigger    Dysfunction of both eustachian tubes      Kanchan was seen today for otalgia.    Diagnoses and all orders for this visit:    Allergic rhinitis, unspecified chronicity, unspecified seasonality,  unspecified trigger    Dysfunction of both eustachian tubes      Patient Instructions   - Rest.    - Drink plenty of fluids.    - Tylenol or Ibuprofen as directed as needed for fever/pain.    - Take over-the-counter claritin, zyrtec, allegra, or xyzal as directed.  - Use over the counter Flonase as directed for sinus congestion and postnasal drip.  - use nasal saline prior to Flonase.  - Use Ocean Spray Nasal Saline 1-3 puffs each nostril every 2-3 hours then blow out onto tissue. This is the irrigate the nasal passage way to clear the sinus openings. Use until sinus problem resolved   - Follow up with your PCP or specialty clinic as directed in the next 1-2 weeks if not improved or as needed.  You can call [phone] to schedule an appointment with the appropriate provider.    - Go to the ED if your symptoms worsen.    Allergic Rhinitis  Allergic rhinitis is an allergic reaction that affects the nose, and often the eyes. Its often known as nasal allergies. Nasal allergies are often due to things in the environment that are breathed in. Depending what you are sensitive to, nasal allergies may occur only during certain seasons. Or they may occur year round. Common indoor allergens include house dust mites, mold, cockroaches, and pet dander. Outdoor allergens include pollen from trees, grasses, and weeds.   Symptoms include a drippy, stuffy, and itchy nose. They also include sneezing and red and itchy eyes. You may feel tired more often. Severe allergies may also affect your breathing and trigger a condition called asthma.   Tests can be done to see what allergens are affecting you. You may be referred to an allergy specialist for testing and further evaluation.  Home care  Your healthcare provider may prescribe medicines to help relieve allergy symptoms. These may include oral medicines, nasal sprays, or eye drops.  Ask your provider for advice on how to avoid substances that you are allergic to. Below are a few  tips for each type of allergen.  Pet dander:  · Do not have pets with fur and feathers.  · If you can't avoid having a pet, keep it out of your bedroom and off upholstered furniture.  Pollen:  · When pollen counts are high, keep windows of your car and home closed. If possible, use an air conditioner instead.  · Wear a filter mask when mowing or doing yard work.  House dust mites:  · Wash bedding every week in warm water and detergent and dry on a hot setting.  · Cover the mattress, box spring, and pillows with allergy covers.   · If possible, sleep in a room with no carpet, curtains, or upholstered furniture.  Cockroaches:  · Store food in sealed containers.  · Remove garbage from the home promptly.  · Fix water leaks  Mold:  · Keep humidity low by using a dehumidifier or air conditioner. Keep the dehumidifier and air conditioner clean and free of mold.  · Clean moldy areas with bleach and water.  In general:  · Vacuum once or twice a week. If possible, use a vacuum with a high-efficiency particulate air (HEPA) filter.  · Do not smoke. Avoid cigarette smoke. Cigarette smoke is an irritant that can make symptoms worse.  Follow-up care  Follow up as advised by the healthcare provider or our staff. If you were referred to an allergy specialist, make this appointment promptly.  When to seek medical advice  Call your healthcare provider right away if the following occur:  · Coughing or wheezing  · Fever greater than 100.4°F (38°C)  · Hives (raised red bumps)  · Continuing symptoms, new symptoms, or worsening symptoms  Call 911 right away if you have:  · Trouble breathing  · Severe swelling of the face or severe itching of the eyes or mouth  Date Last Reviewed: 3/1/2017  © 9809-6461 [employer]. [address] [place] [address], [place] [address]. All rights reserved. This information is not intended as a substitute for professional medical care. Always follow your healthcare professional's instructions.        Nasal  Allergies: Related Problems  Allergies can cause nasal passages to swell. This narrows the air passages. Allergies also cause increased mucus production in the nose. These changes result in nasal allergy symptoms. Common symptoms include itching, sneezing, stuffy nose, and runny nose. Nasal allergies can also cause problems in other parts of the respiratory system. Some of the more common problems are discussed below. If you think you have any of these problems, talk to your healthcare provider about treatment choices.    Sinus infections  Fluid may be trapped in the sinuses. Bacteria may grow in trapped fluid. This causes sinus infection (sinusitis).  Conjunctivitis  Allergens irritate your eyes, including the lining of the conjunctiva. This causes eyes to become red, itchy, puffy, and watery.  Ear problems  The eustachian tube connects the middle ear to nasal passages.  Allergies can block this tube, and make the ears feel plugged. Fluid may also build up, leading to an ear infection (otitis media).  Nasal polyps  Allergies cause nasal passages to swell. Constant swelling can lead to formation of a sac called a polyp. Polyps can grow large enough to block nasal passages.  Asthma  Asthma is inflammation and swelling of the air passages in the lungs. The symptoms are wheezing, shortness of breath, coughing, and chest tightness. Allergies, including nasal allergies, are common in people with asthma.  Date Last Reviewed: 9/1/2016 © 2000-2017 [employer]. [address] [place] [id number]. All rights reserved. This information is not intended as a substitute for professional medical care. Always follow your healthcare professional's instructions.

## 2018-02-27 RX ORDER — LISINOPRIL 5 MG/1
TABLET ORAL
Qty: 90 TABLET | Refills: 3 | Status: SHIPPED | OUTPATIENT
Start: 2018-02-27 | End: 2018-05-01 | Stop reason: SDUPTHER

## 2018-03-19 DIAGNOSIS — E11.9 DIABETES TYPE 2, CONTROLLED: ICD-10-CM

## 2018-03-20 RX ORDER — METOPROLOL TARTRATE 100 MG/1
TABLET ORAL
Qty: 90 TABLET | Refills: 3 | Status: SHIPPED | OUTPATIENT
Start: 2018-03-20 | End: 2018-05-01 | Stop reason: SDUPTHER

## 2018-04-11 ENCOUNTER — PES CALL (OUTPATIENT)
Dept: ADMINISTRATIVE | Facility: CLINIC | Age: 78
End: 2018-04-11

## 2018-04-23 ENCOUNTER — LAB VISIT (OUTPATIENT)
Dept: LAB | Facility: HOSPITAL | Age: 78
End: 2018-04-23
Attending: FAMILY MEDICINE
Payer: MEDICARE

## 2018-04-23 DIAGNOSIS — E11.59 CONTROLLED TYPE 2 DIABETES MELLITUS WITH OTHER CIRCULATORY COMPLICATION, WITHOUT LONG-TERM CURRENT USE OF INSULIN: ICD-10-CM

## 2018-04-23 DIAGNOSIS — Z00.00 ROUTINE GENERAL MEDICAL EXAMINATION AT A HEALTH CARE FACILITY: ICD-10-CM

## 2018-04-23 LAB
ALBUMIN SERPL BCP-MCNC: 3.8 G/DL
ALP SERPL-CCNC: 55 U/L
ALT SERPL W/O P-5'-P-CCNC: 17 U/L
ANION GAP SERPL CALC-SCNC: 7 MMOL/L
AST SERPL-CCNC: 19 U/L
BASOPHILS # BLD AUTO: 0.06 K/UL
BASOPHILS NFR BLD: 1.4 %
BILIRUB SERPL-MCNC: 0.5 MG/DL
BUN SERPL-MCNC: 12 MG/DL
CALCIUM SERPL-MCNC: 9.8 MG/DL
CHLORIDE SERPL-SCNC: 108 MMOL/L
CHOLEST SERPL-MCNC: 165 MG/DL
CHOLEST/HDLC SERPL: 3.1 {RATIO}
CO2 SERPL-SCNC: 25 MMOL/L
CREAT SERPL-MCNC: 0.8 MG/DL
DIFFERENTIAL METHOD: ABNORMAL
EOSINOPHIL # BLD AUTO: 0.5 K/UL
EOSINOPHIL NFR BLD: 11.8 %
ERYTHROCYTE [DISTWIDTH] IN BLOOD BY AUTOMATED COUNT: 13.4 %
EST. GFR  (AFRICAN AMERICAN): >60 ML/MIN/1.73 M^2
EST. GFR  (NON AFRICAN AMERICAN): >60 ML/MIN/1.73 M^2
ESTIMATED AVG GLUCOSE: 120 MG/DL
GLUCOSE SERPL-MCNC: 136 MG/DL
HBA1C MFR BLD HPLC: 5.8 %
HCT VFR BLD AUTO: 42.9 %
HDLC SERPL-MCNC: 53 MG/DL
HDLC SERPL: 32.1 %
HGB BLD-MCNC: 13.9 G/DL
IMM GRANULOCYTES # BLD AUTO: 0.01 K/UL
IMM GRANULOCYTES NFR BLD AUTO: 0.2 %
LDLC SERPL CALC-MCNC: 95.6 MG/DL
LYMPHOCYTES # BLD AUTO: 1.4 K/UL
LYMPHOCYTES NFR BLD: 31.8 %
MCH RBC QN AUTO: 29.6 PG
MCHC RBC AUTO-ENTMCNC: 32.4 G/DL
MCV RBC AUTO: 91 FL
MONOCYTES # BLD AUTO: 0.4 K/UL
MONOCYTES NFR BLD: 8.7 %
NEUTROPHILS # BLD AUTO: 2 K/UL
NEUTROPHILS NFR BLD: 46.1 %
NONHDLC SERPL-MCNC: 112 MG/DL
NRBC BLD-RTO: 0 /100 WBC
PLATELET # BLD AUTO: 170 K/UL
PMV BLD AUTO: 10.3 FL
POTASSIUM SERPL-SCNC: 4.1 MMOL/L
PROT SERPL-MCNC: 6.6 G/DL
RBC # BLD AUTO: 4.7 M/UL
SODIUM SERPL-SCNC: 140 MMOL/L
TRIGL SERPL-MCNC: 82 MG/DL
WBC # BLD AUTO: 4.24 K/UL

## 2018-04-23 PROCEDURE — 36415 COLL VENOUS BLD VENIPUNCTURE: CPT | Mod: PO

## 2018-04-23 PROCEDURE — 80061 LIPID PANEL: CPT

## 2018-04-23 PROCEDURE — 83036 HEMOGLOBIN GLYCOSYLATED A1C: CPT

## 2018-04-23 PROCEDURE — 80053 COMPREHEN METABOLIC PANEL: CPT

## 2018-04-23 PROCEDURE — 85025 COMPLETE CBC W/AUTO DIFF WBC: CPT

## 2018-05-01 ENCOUNTER — OFFICE VISIT (OUTPATIENT)
Dept: FAMILY MEDICINE | Facility: CLINIC | Age: 78
End: 2018-05-01
Payer: MEDICARE

## 2018-05-01 VITALS
HEART RATE: 53 BPM | DIASTOLIC BLOOD PRESSURE: 66 MMHG | TEMPERATURE: 98 F | WEIGHT: 179.44 LBS | HEIGHT: 68 IN | BODY MASS INDEX: 27.19 KG/M2 | SYSTOLIC BLOOD PRESSURE: 126 MMHG

## 2018-05-01 DIAGNOSIS — E11.59 HYPERTENSION ASSOCIATED WITH DIABETES: ICD-10-CM

## 2018-05-01 DIAGNOSIS — E11.69 HYPERLIPIDEMIA ASSOCIATED WITH TYPE 2 DIABETES MELLITUS: ICD-10-CM

## 2018-05-01 DIAGNOSIS — Z00.00 ROUTINE GENERAL MEDICAL EXAMINATION AT A HEALTH CARE FACILITY: Primary | ICD-10-CM

## 2018-05-01 DIAGNOSIS — I70.0 ATHEROSCLEROSIS OF AORTA: ICD-10-CM

## 2018-05-01 DIAGNOSIS — E78.5 HYPERLIPIDEMIA ASSOCIATED WITH TYPE 2 DIABETES MELLITUS: ICD-10-CM

## 2018-05-01 DIAGNOSIS — E11.42 TYPE 2 DIABETES MELLITUS WITH DIABETIC POLYNEUROPATHY, WITHOUT LONG-TERM CURRENT USE OF INSULIN: ICD-10-CM

## 2018-05-01 DIAGNOSIS — Z85.3 HISTORY OF BREAST CANCER: ICD-10-CM

## 2018-05-01 DIAGNOSIS — E11.51 CONTROLLED TYPE 2 DIABETES MELLITUS WITH DIABETIC PERIPHERAL ANGIOPATHY WITHOUT GANGRENE, WITHOUT LONG-TERM CURRENT USE OF INSULIN: ICD-10-CM

## 2018-05-01 DIAGNOSIS — Z91.89 RISK FOR CORONARY ARTERY DISEASE GREATER THAN 20% IN NEXT 10 YEARS PER FRAMINGHAM SCORE: ICD-10-CM

## 2018-05-01 DIAGNOSIS — E11.21 CONTROLLED TYPE 2 DIABETES MELLITUS WITH DIABETIC NEPHROPATHY, WITHOUT LONG-TERM CURRENT USE OF INSULIN: ICD-10-CM

## 2018-05-01 DIAGNOSIS — I82.592 CHRONIC EMBOLISM AND THROMBOSIS OF OTHER SPECIFIED DEEP VEIN OF LEFT LOWER EXTREMITY: ICD-10-CM

## 2018-05-01 DIAGNOSIS — E11.21 CONTROLLED TYPE 2 DIABETES MELLITUS WITH DIABETIC NEPHROPATHY, WITHOUT LONG-TERM CURRENT USE OF INSULIN: Primary | ICD-10-CM

## 2018-05-01 DIAGNOSIS — I15.2 HYPERTENSION ASSOCIATED WITH DIABETES: ICD-10-CM

## 2018-05-01 PROCEDURE — 3078F DIAST BP <80 MM HG: CPT | Mod: CPTII,S$GLB,, | Performed by: FAMILY MEDICINE

## 2018-05-01 PROCEDURE — 3074F SYST BP LT 130 MM HG: CPT | Mod: CPTII,S$GLB,, | Performed by: FAMILY MEDICINE

## 2018-05-01 PROCEDURE — 99499 UNLISTED E&M SERVICE: CPT | Mod: S$GLB,,, | Performed by: FAMILY MEDICINE

## 2018-05-01 PROCEDURE — 99397 PER PM REEVAL EST PAT 65+ YR: CPT | Mod: S$GLB,,, | Performed by: FAMILY MEDICINE

## 2018-05-01 PROCEDURE — 99999 PR PBB SHADOW E&M-EST. PATIENT-LVL III: CPT | Mod: PBBFAC,,, | Performed by: FAMILY MEDICINE

## 2018-05-01 RX ORDER — AMLODIPINE BESYLATE 5 MG/1
5 TABLET ORAL DAILY
Qty: 90 TABLET | Refills: 1 | Status: SHIPPED | OUTPATIENT
Start: 2018-05-01 | End: 2018-11-01 | Stop reason: SDUPTHER

## 2018-05-01 RX ORDER — METOPROLOL TARTRATE 100 MG/1
100 TABLET ORAL DAILY
Qty: 90 TABLET | Refills: 3 | Status: SHIPPED | OUTPATIENT
Start: 2018-05-01 | End: 2018-11-06 | Stop reason: SDUPTHER

## 2018-05-01 RX ORDER — SIMVASTATIN 10 MG/1
10 TABLET, FILM COATED ORAL NIGHTLY
Qty: 90 TABLET | Refills: 3 | Status: SHIPPED | OUTPATIENT
Start: 2018-05-01 | End: 2018-11-06 | Stop reason: SDUPTHER

## 2018-05-01 RX ORDER — LISINOPRIL 5 MG/1
5 TABLET ORAL DAILY
Qty: 90 TABLET | Refills: 3 | Status: SHIPPED | OUTPATIENT
Start: 2018-05-01 | End: 2018-11-06 | Stop reason: SDUPTHER

## 2018-05-01 RX ORDER — METFORMIN HYDROCHLORIDE 500 MG/1
TABLET ORAL
Qty: 90 TABLET | Refills: 1 | Status: SHIPPED | OUTPATIENT
Start: 2018-05-01 | End: 2018-10-23 | Stop reason: SDUPTHER

## 2018-05-01 NOTE — PROGRESS NOTES
Subjective:      Patient ID: Kanchan Downing is a 77 y.o. female.    Chief Complaint: Annual Exam    Here today for general exam & DM with periopheral vascular disease    She is overdue to see eye doctor    For PVD, she wears compression hose. Denies swelling.     Denies any chest pain, shortness of breath, nausea vomiting constipation diarrhea, blood in stool, heartburn    The 10-year ASCVD risk score (Johnson MIRAMONTES Jr., et al., 2013) is: 42.5%    Values used to calculate the score:      Age: 77 years      Sex: Female      Is Non- : No      Diabetic: Yes      Tobacco smoker: No      Systolic Blood Pressure: 126 mmHg      Is BP treated: Yes      HDL Cholesterol: 53 mg/dL      Total Cholesterol: 165 mg/dL      Lab Results   Component Value Date    HGBA1C 5.8 (H) 04/23/2018    HGBA1C 6.0 (H) 10/31/2017    HGBA1C 6.3 (H) 04/21/2017     Lab Results   Component Value Date    MICALBCREAT 6.1 04/27/2017     Lab Results   Component Value Date    LDLCALC 95.6 04/23/2018    LDLCALC 99.2 10/31/2017    CHOL 165 04/23/2018    HDL 53 04/23/2018    TRIG 82 04/23/2018       Lab Results   Component Value Date     04/23/2018    K 4.1 04/23/2018     04/23/2018    CO2 25 04/23/2018     (H) 04/23/2018    BUN 12 04/23/2018    CREATININE 0.8 04/23/2018    CALCIUM 9.8 04/23/2018    PROT 6.6 04/23/2018    ALBUMIN 3.8 04/23/2018    BILITOT 0.5 04/23/2018    ALKPHOS 55 04/23/2018    AST 19 04/23/2018    ALT 17 04/23/2018    ANIONGAP 7 (L) 04/23/2018    ESTGFRAFRICA >60.0 04/23/2018    EGFRNONAA >60.0 04/23/2018    WBC 4.24 04/23/2018    HGB 13.9 04/23/2018    HCT 42.9 04/23/2018    MCV 91 04/23/2018     04/23/2018    TSH 4.708 (H) 04/21/2017    [id number] 41 05/08/2014         Current Outpatient Prescriptions on File Prior to Visit   Medication Sig    albuterol (PROAIR HFA) 90 mcg/actuation inhaler INHALE 2 PUFFS INTO THE LUNGS EVERY 6 (SIX) HOURS AS  NEEDED FORWHEEZING.    amLODIPine (NORVASC)  5 MG tablet TAKE 1 TABLET EVERY DAY    aspirin 81 MG Chew Take 1 tablet (81 mg total) by mouth once daily.    cetirizine (ZYRTEC) 10 MG tablet Take 10 mg by mouth once daily.      fluticasone (FLONASE) 50 mcg/actuation nasal spray 1 spray by Each Nare route once daily.    ibuprofen (ADVIL,MOTRIN) 600 MG tablet TAKE 1 TABLET THREE TIMES DAILY WITH FOOD    lactobacillus rhamnosus, GG, (PROBIOTIC) 10 billion cell capsule Take by mouth. 1 Capsule Oral Every day    lisinopril (PRINIVIL,ZESTRIL) 5 MG tablet TAKE 1 TABLET EVERY DAY    metoprolol tartrate (LOPRESSOR) 100 MG tablet TAKE 1 TABLET EVERY DAY    MULTIVITAMIN ORAL Take by mouth once daily.     NAPROXEN SODIUM (ALEVE ORAL) Take by mouth continuous prn.     polyethylene glycol (GLYCOLAX) 17 gram/dose powder TAKE 1 CAPFUL (17 GRAMS) DAILY AS DIRECTED    simvastatin (ZOCOR) 10 MG tablet TAKE 1 TABLET EVERY EVENING    UNABLE TO FIND medication name: Omega XL         blood sugar diagnostic Strp 1 strip by Misc.(Non-Drug; Combo Route) route 2 (two) times daily. ACCU-CHEK BROOK PLUS    blood-glucose meter Misc 1 each by Misc.(Non-Drug; Combo Route) route as needed. ACCU-CHEK BROOK PLUS    gabapentin (NEURONTIN) 100 MG capsule Take 1 capsule (100 mg total) by mouth 3 (three) times daily.    lancets Misc 1 each by Misc.(Non-Drug; Combo Route) route 2 (two) times daily. ACCU-CHEK SOFTCLIX     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, without long-term current use of insulin 10/31/2017    Diabetes mellitus type 2 without retinopathy 06/12/2014    6% metformin 500 bid, FU+ 2016    DVT (deep venous thrombosis) 2001    R , Dr Bonilla    Hyperlipidemia     LDL 82    Hyperlipidemia associated with type 2  "diabetes mellitus 9/11/2012    Hypertension     Hypertension associated with diabetes 9/11/2012    Microalbuminuria due to type 2 diabetes mellitus 12/08/2015    taking lisinopril, losartan caused olivia effects    Risk for coronary artery disease greater than 20% in next 10 years per Surrey score 5/1/2018    Strabismus      Past Surgical History:   Procedure Laterality Date    BREAST BIOPSY Right 2011    BREAST LUMPECTOMY Right 2011    CA REMOVAL OF NAIL BED  6/10/2015    TONSILLECTOMY       Social History     Social History Narrative     to Marques, 3 children, nondrinker, nonsmoker, she declines colonoscopy     Family History   Problem Relation Age of Onset    Heart disease Mother 58    Cataracts Mother     Heart disease Father 50    Thyroid disease Sister     Cancer Sister      Vitals:    05/01/18 1314   BP: 126/66   Pulse: (!) 53   Temp: 97.8 °F (36.6 °C)   TempSrc: Oral   Weight: 81.4 kg (179 lb 7.3 oz)   Height: 5' 8" (1.727 m)   PainSc:   6   PainLoc: Generalized     Objective:   Physical Exam   Constitutional: She is oriented to person, place, and time. She appears well-developed and well-nourished.   HENT:   Head: Normocephalic and atraumatic.   Right Ear: External ear normal.   Left Ear: External ear normal.   Nose: Nose normal.   Mouth/Throat: Oropharynx is clear and moist.   Eyes: EOM are normal. Pupils are equal, round, and reactive to light.   Neck: Neck supple. No thyromegaly present.   Cardiovascular: Normal rate, regular rhythm, normal heart sounds and intact distal pulses.    No murmur heard.  Pulmonary/Chest: Effort normal and breath sounds normal. She has no wheezes.   Abdominal: Soft. Bowel sounds are normal. She exhibits no distension and no mass. There is no tenderness. There is no rebound and no guarding.   Musculoskeletal: She exhibits no edema.        Right foot: There is normal range of motion and no deformity.        Left foot: There is normal range of motion and no " deformity.        Feet:   Right Foot:   Protective Sensation: 5 sites tested. 5 sites sensed.   Skin Integrity: Negative for ulcer, blister, skin breakdown, erythema or warmth.   Left Foot:   Protective Sensation: 5 sites tested. 5 sites sensed.   Skin Integrity: Negative for ulcer, blister, skin breakdown, erythema or warmth.   Lymphadenopathy:     She has no cervical adenopathy.   Neurological: She is alert and oriented to person, place, and time.   Skin: Skin is warm and dry.   Psychiatric: She has a normal mood and affect. Her behavior is normal.     Assessment:     1. Routine general medical examination at a health care facility    2. Atherosclerosis of aorta    3. History of breast cancer    4. Hypertension associated with diabetes    5. Hyperlipidemia associated with type 2 diabetes mellitus    6. Controlled type 2 diabetes mellitus with diabetic peripheral angiopathy without gangrene, without long-term current use of insulin    7. Type 2 diabetes mellitus with diabetic polyneuropathy, without long-term current use of insulin    8. 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 Risk for coronary artery disease greater than 20% in next 10 years per Stuart score    10. Controlled type 2 diabetes mellitus with diabetic nephropathy, without long-term current use of insulin      Plan:     Orders Placed This Encounter    Ambulatory referral to Ophthalmology    metFORMIN (GLUCOPHAGE) 500 MG tablet    simvastatin (ZOCOR) 10 MG tablet    metoprolol tartrate (LOPRESSOR) 100 MG tablet    lisinopril (PRINIVIL,ZESTRIL) 5 MG tablet    amLODIPine (NORVASC) 5 MG tablet       Patient Instructions   Continue other medications    FOR  DIABETES:  ===================================  SEE ME EVERY 3 MONTHS if your Hg1c is >7% (uncontrolled), try the [employer] laverne.  GET BLOODWORK ONE WEEK PRIOR     SEE ME EVERY 6 MONTHS if your Hga1c is < 6.9% (controlled)   GET BLOODWORK ONE WEEK  PRIOR  ===================================    Your 1# medicine is  EXERCISE  - huffing & puffing for 20  MINUTES EVERY DAY - check your sugars & you'll see them go down    The future risks of uncontrolled diabetes - include heart attack, stroke, neuropathy (pain in hands & feet that could lead to infection and amputation), nephropathy (leading to kidney dialysis) , and blindness     To prevent complications that can be treated early, please see your  opthalmologist EYE DOCTOR YEARLY      Always wear protective SHOES    Focus on NO SUGAR and no sugar substitutes (splenda,s tevia, etc) IN YOUR DRINKS. With respect to food - LOW CARBOHYDRATES - switch to whole wheat & brown rice.    Decrease & try to stop ALCOHOL, WHITE BREAD, WHITE PASTA, WHITE RICE , WHITE POTATOES- which all turn into sugar.    EAT an EXTRA VEGETABLE A DAY than you already do.    The ADA recommends taking  1. Aspirin 81 mg daily (unless you have had bleeding stomach ulcers or allergy to this)  2. STATIN medication (atorvastatin, pravastatin, rosuvastatin) to decrease inflammation in your blood vessels caused by SUGAR to prevent future heart attack & stroke. This is recommended even if your LDL cholesterol is <100.   3. ARB blood pressure medication (Losartan) to protect your kidneys from future dialysis from SUGAR. This is recommended even if you have normal blood pressure    Consider reading the book -  End Of Diabetes by Dr Avila    Once YEARLY I will check basic labs CBC, CMP, fasting lipids, TSH, Hga1C prior to your visit      ----------------------------      Due for  Vaccines  -   Your insurance will pay for certain vaccines only when administered by your pharmacy. We sent a prescription to your pharmacy    ==============================================================      RECOMMENDATIONS FOR FEMALES    Your #1 MEDICINE is DAILY EXERCISE - 15-20 minutes of huffing & puffing EVERY DAY.     Prevent the #1 cause of death- cardiovascular  disease (HEART ATTACK & STROKE) by checking for normal blood pressure, cholesterol, sugars, & by not smoking.     VACCINES: Yearly FLU shot, ONE PNEUMONIA shot after 65,  SHINGLES shot after 60    Screening colonoscopy at AGE  50 & every 10 years to check for COLON CANCER,  one of the most common & preventable cancers. Or FIT z12.11, Repeat in 3 years if POLYP found     I recommend  high fiber (5 fresh fruits or vegetables daily), low fat diet and aerobic  exercise (huffing/ puffing/ sweating for 20 min straight at least 4 days a week)    Follow up yearly with mammogram (every 2 years) , fasting lipids, CMP, CBC, TSH prior.   ==============================================================

## 2018-05-01 NOTE — PATIENT INSTRUCTIONS
Continue other medications    FOR  DIABETES:  ===================================  SEE ME EVERY 3 MONTHS if your Hg1c is >7% (uncontrolled), try the [employer] laverne.  GET BLOODWORK ONE WEEK PRIOR     SEE ME EVERY 6 MONTHS if your Hga1c is < 6.9% (controlled)   GET BLOODWORK ONE WEEK PRIOR  ===================================    Your 1# medicine is  EXERCISE  - huffing & puffing for 20  MINUTES EVERY DAY - check your sugars & you'll see them go down    The future risks of uncontrolled diabetes - include heart attack, stroke, neuropathy (pain in hands & feet that could lead to infection and amputation), nephropathy (leading to kidney dialysis) , and blindness     To prevent complications that can be treated early, please see your  opthalmologist EYE DOCTOR YEARLY      Always wear protective SHOES    Focus on NO SUGAR and no sugar substitutes (splenda,s tevia, etc) IN YOUR DRINKS. With respect to food - LOW CARBOHYDRATES - switch to whole wheat & brown rice.    Decrease & try to stop ALCOHOL, WHITE BREAD, WHITE PASTA, WHITE RICE , WHITE POTATOES- which all turn into sugar.    EAT an EXTRA VEGETABLE A DAY than you already do.    The ADA recommends taking  1. Aspirin 81 mg daily (unless you have had bleeding stomach ulcers or allergy to this)  2. STATIN medication (atorvastatin, pravastatin, rosuvastatin) to decrease inflammation in your blood vessels caused by SUGAR to prevent future heart attack & stroke. This is recommended even if your LDL cholesterol is <100.   3. ARB blood pressure medication (Losartan) to protect your kidneys from future dialysis from SUGAR. This is recommended even if you have normal blood pressure    Consider reading the book -  End Of Diabetes by Dr Avila    Once YEARLY I will check basic labs CBC, CMP, fasting lipids, TSH, Hga1C prior to your visit      ----------------------------      Due for  Vaccines  -   Your insurance will pay for certain vaccines only when administered by your pharmacy.  We sent a prescription to your pharmacy    ==============================================================      RECOMMENDATIONS FOR FEMALES    Your #1 MEDICINE is DAILY EXERCISE - 15-20 minutes of huffing & puffing EVERY DAY.     Prevent the #1 cause of death- cardiovascular disease (HEART ATTACK & STROKE) by checking for normal blood pressure, cholesterol, sugars, & by not smoking.     VACCINES: Yearly FLU shot, ONE PNEUMONIA shot after 65,  SHINGLES shot after 60    Screening colonoscopy at AGE  50 & every 10 years to check for COLON CANCER,  one of the most common & preventable cancers. Or FIT z12.11, Repeat in 3 years if POLYP found     I recommend  high fiber (5 fresh fruits or vegetables daily), low fat diet and aerobic  exercise (huffing/ puffing/ sweating for 20 min straight at least 4 days a week)    Follow up yearly with mammogram (every 2 years) , fasting lipids, CMP, CBC, TSH prior.   ==============================================================

## 2018-05-03 ENCOUNTER — TELEPHONE (OUTPATIENT)
Dept: FAMILY MEDICINE | Facility: CLINIC | Age: 78
End: 2018-05-03

## 2018-05-03 RX ORDER — GABAPENTIN 100 MG/1
100 CAPSULE ORAL 3 TIMES DAILY
Qty: 270 CAPSULE | Refills: 3 | Status: SHIPPED | OUTPATIENT
Start: 2018-05-03 | End: 2018-11-06 | Stop reason: SDUPTHER

## 2018-05-03 NOTE — TELEPHONE ENCOUNTER
Pt reports that Gabapentin was discussed, but no Rx was sent to her pharm.  Pt requesting 90 day Rx to [facility] which will be 0 copay.

## 2018-07-23 ENCOUNTER — OFFICE VISIT (OUTPATIENT)
Dept: OPTOMETRY | Facility: CLINIC | Age: 78
End: 2018-07-23
Payer: COMMERCIAL

## 2018-07-23 DIAGNOSIS — H52.7 REFRACTIVE ERROR: ICD-10-CM

## 2018-07-23 DIAGNOSIS — H25.13 NUCLEAR SCLEROSIS OF BOTH EYES: ICD-10-CM

## 2018-07-23 DIAGNOSIS — E11.9 TYPE 2 DIABETES MELLITUS WITHOUT RETINOPATHY: Primary | ICD-10-CM

## 2018-07-23 PROCEDURE — 99999 PR PBB SHADOW E&M-EST. PATIENT-LVL II: CPT | Mod: PBBFAC,,, | Performed by: OPTOMETRIST

## 2018-07-23 PROCEDURE — 92004 COMPRE OPH EXAM NEW PT 1/>: CPT | Mod: S$GLB,,, | Performed by: OPTOMETRIST

## 2018-07-23 PROCEDURE — 92015 DETERMINE REFRACTIVE STATE: CPT | Mod: S$GLB,,, | Performed by: OPTOMETRIST

## 2018-07-23 NOTE — PROGRESS NOTES
HPI     07/2015 Diabetic hasn't taken BS at home for awhile. Glasses about 3 yrs.   Old, takes longer to re-focus from near to distance. Has had ocular   migraine symptoms in the past. Uses allergy drops prn.  Diabetic eye exam  Hemoglobin A1C       Date                     Value               Ref Range             Status                04/23/2018               5.8 (H)             4.0 - 5.6 %           Final                10/31/2017               6.0 (H)             4.0 - 5.6 %           Final                 04/21/2017               6.3 (H)             4.5 - 6.2 %           Final                 Last edited by Khadar Boone, OD on 7/23/2018  1:41 PM. (History)            Assessment /Plan     For exam results, see Encounter Report.    Type 2 diabetes mellitus without retinopathy    Nuclear sclerosis of both eyes    Refractive error      1. No diabetic retinopathy, no csme. Return in 1 year for dilated eye exam.  2. Educated pt on presence of cataracts and effects on vision. No surgery at this time. Recheck in one year.  3. Spec Rx given. Different lens options discussed with patient. RTC 1 year full exam.

## 2018-07-23 NOTE — LETTER
July 23, 2018      Viktoria Mckeon MD  [address] [facility]  [address]  [facility] [address]           [place] [facility]  [address] [facility]  [place] [address]  Phone: [phone]  Fax: [phone]          Patient: Kanchan Downing   MR Number: [id number]   YOB: 1940   Date of Visit: 7/23/2018       Dear Dr. Viktoria Mckeon:    Thank you for referring Kanchan Downing to me for evaluation. Attached you will find relevant portions of my assessment and plan of care.    If you have questions, please do not hesitate to call me. I look forward to following Kanchan Downing along with you.    Sincerely,    Khadar Boone, OD    Enclosure  CC:  No Recipients    If you would like to receive this communication electronically, please contact [email or web address] or [phone] to request more information on [employer] access.    For providers and/or their staff who would like to refer a patient to [facility], please contact us through our one-stop-shop provider referral line, [facility], at [phone].    If you feel you have received this communication in error or would no longer like to receive these types of communications, please e-mail [email or web address]

## 2018-08-16 NOTE — TELEPHONE ENCOUNTER
Pt requesting Rx for new Freestyle Danna reader and sensors. She would like RXs sent to [facility].

## 2018-08-16 NOTE — TELEPHONE ENCOUNTER
----- Message from Melvin Cadena sent at 8/16/2018 12:19 PM CDT -----  Contact: Self [phone]  Pt will like to speak to the doctor or nurse in regarding a new prescription for diabetic machine

## 2018-09-25 ENCOUNTER — PES CALL (OUTPATIENT)
Dept: ADMINISTRATIVE | Facility: CLINIC | Age: 78
End: 2018-09-25

## 2018-09-27 ENCOUNTER — TELEPHONE (OUTPATIENT)
Dept: ORTHOPEDICS | Facility: CLINIC | Age: 78
End: 2018-09-27

## 2018-09-27 DIAGNOSIS — M25.562 LEFT KNEE PAIN, UNSPECIFIED CHRONICITY: Primary | ICD-10-CM

## 2018-09-27 NOTE — TELEPHONE ENCOUNTER
----- Message from Sangeetha Shepherd sent at 9/27/2018  4:17 PM CDT -----  Contact: self   Pt is requesting a call back to reschedule appt on 10/01. Pt also have xrays scheduled. Pt can be reached at [phone].

## 2018-10-23 DIAGNOSIS — E11.51 CONTROLLED TYPE 2 DIABETES MELLITUS WITH DIABETIC PERIPHERAL ANGIOPATHY WITHOUT GANGRENE, WITHOUT LONG-TERM CURRENT USE OF INSULIN: ICD-10-CM

## 2018-10-23 RX ORDER — METFORMIN HYDROCHLORIDE 500 MG/1
TABLET ORAL
Qty: 90 TABLET | Refills: 1 | Status: SHIPPED | OUTPATIENT
Start: 2018-10-23 | End: 2018-11-06 | Stop reason: SDUPTHER

## 2018-11-01 DIAGNOSIS — E11.21 CONTROLLED TYPE 2 DIABETES MELLITUS WITH DIABETIC NEPHROPATHY, WITHOUT LONG-TERM CURRENT USE OF INSULIN: ICD-10-CM

## 2018-11-01 RX ORDER — AMLODIPINE BESYLATE 5 MG/1
TABLET ORAL
Qty: 90 TABLET | Refills: 1 | Status: SHIPPED | OUTPATIENT
Start: 2018-11-01 | End: 2018-11-06 | Stop reason: SDUPTHER

## 2018-11-02 ENCOUNTER — LAB VISIT (OUTPATIENT)
Dept: LAB | Facility: HOSPITAL | Age: 78
End: 2018-11-02
Attending: FAMILY MEDICINE
Payer: MEDICARE

## 2018-11-02 DIAGNOSIS — E11.21 CONTROLLED TYPE 2 DIABETES MELLITUS WITH DIABETIC NEPHROPATHY, WITHOUT LONG-TERM CURRENT USE OF INSULIN: ICD-10-CM

## 2018-11-02 LAB
ESTIMATED AVG GLUCOSE: 128 MG/DL
HBA1C MFR BLD HPLC: 6.1 %

## 2018-11-02 PROCEDURE — 36415 COLL VENOUS BLD VENIPUNCTURE: CPT | Mod: HCNC,PO

## 2018-11-02 PROCEDURE — 83036 HEMOGLOBIN GLYCOSYLATED A1C: CPT | Mod: HCNC

## 2018-11-06 ENCOUNTER — OFFICE VISIT (OUTPATIENT)
Dept: FAMILY MEDICINE | Facility: CLINIC | Age: 78
End: 2018-11-06
Payer: MEDICARE

## 2018-11-06 VITALS
SYSTOLIC BLOOD PRESSURE: 138 MMHG | WEIGHT: 184.94 LBS | HEIGHT: 67 IN | TEMPERATURE: 98 F | DIASTOLIC BLOOD PRESSURE: 88 MMHG | HEART RATE: 60 BPM | BODY MASS INDEX: 29.03 KG/M2

## 2018-11-06 DIAGNOSIS — I70.0 ATHEROSCLEROSIS OF AORTA: ICD-10-CM

## 2018-11-06 DIAGNOSIS — E11.59 HYPERTENSION ASSOCIATED WITH DIABETES: ICD-10-CM

## 2018-11-06 DIAGNOSIS — E11.21 CONTROLLED TYPE 2 DIABETES MELLITUS WITH DIABETIC NEPHROPATHY, WITHOUT LONG-TERM CURRENT USE OF INSULIN: ICD-10-CM

## 2018-11-06 DIAGNOSIS — M19.049 ARTHRITIS OF HAND: ICD-10-CM

## 2018-11-06 DIAGNOSIS — I15.2 HYPERTENSION ASSOCIATED WITH DIABETES: ICD-10-CM

## 2018-11-06 DIAGNOSIS — E11.51 CONTROLLED TYPE 2 DIABETES MELLITUS WITH DIABETIC PERIPHERAL ANGIOPATHY WITHOUT GANGRENE, WITHOUT LONG-TERM CURRENT USE OF INSULIN: ICD-10-CM

## 2018-11-06 DIAGNOSIS — E11.42 TYPE 2 DIABETES MELLITUS WITH DIABETIC POLYNEUROPATHY, WITHOUT LONG-TERM CURRENT USE OF INSULIN: ICD-10-CM

## 2018-11-06 DIAGNOSIS — M15.9 GENERALIZED OSTEOARTHRITIS: ICD-10-CM

## 2018-11-06 DIAGNOSIS — Z00.00 ROUTINE GENERAL MEDICAL EXAMINATION AT A HEALTH CARE FACILITY: Primary | ICD-10-CM

## 2018-11-06 DIAGNOSIS — E78.5 HYPERLIPIDEMIA ASSOCIATED WITH TYPE 2 DIABETES MELLITUS: ICD-10-CM

## 2018-11-06 DIAGNOSIS — Z85.3 HISTORY OF BREAST CANCER: ICD-10-CM

## 2018-11-06 DIAGNOSIS — E11.69 HYPERLIPIDEMIA ASSOCIATED WITH TYPE 2 DIABETES MELLITUS: ICD-10-CM

## 2018-11-06 LAB
ALBUMIN/CREAT UR: 10.5 UG/MG
CREAT UR-MCNC: 86 MG/DL
MICROALBUMIN UR DL<=1MG/L-MCNC: 9 UG/ML

## 2018-11-06 PROCEDURE — 99499 UNLISTED E&M SERVICE: CPT | Mod: HCNC,S$GLB,, | Performed by: FAMILY MEDICINE

## 2018-11-06 PROCEDURE — 99999 PR PBB SHADOW E&M-EST. PATIENT-LVL III: CPT | Mod: PBBFAC,HCNC,, | Performed by: FAMILY MEDICINE

## 2018-11-06 PROCEDURE — 3079F DIAST BP 80-89 MM HG: CPT | Mod: CPTII,HCNC,S$GLB, | Performed by: FAMILY MEDICINE

## 2018-11-06 PROCEDURE — 3075F SYST BP GE 130 - 139MM HG: CPT | Mod: CPTII,HCNC,S$GLB, | Performed by: FAMILY MEDICINE

## 2018-11-06 PROCEDURE — 1101F PT FALLS ASSESS-DOCD LE1/YR: CPT | Mod: CPTII,HCNC,S$GLB, | Performed by: FAMILY MEDICINE

## 2018-11-06 PROCEDURE — 99214 OFFICE O/P EST MOD 30 MIN: CPT | Mod: HCNC,S$GLB,, | Performed by: FAMILY MEDICINE

## 2018-11-06 PROCEDURE — 82043 UR ALBUMIN QUANTITATIVE: CPT | Mod: HCNC

## 2018-11-06 RX ORDER — SIMVASTATIN 10 MG/1
10 TABLET, FILM COATED ORAL NIGHTLY
Qty: 90 TABLET | Refills: 3 | Status: SHIPPED | OUTPATIENT
Start: 2018-11-06 | End: 2019-03-18 | Stop reason: SDUPTHER

## 2018-11-06 RX ORDER — GABAPENTIN 100 MG/1
100 CAPSULE ORAL 3 TIMES DAILY
Qty: 270 CAPSULE | Refills: 3 | Status: SHIPPED | OUTPATIENT
Start: 2018-11-06 | End: 2019-05-02

## 2018-11-06 RX ORDER — AMLODIPINE BESYLATE 5 MG/1
TABLET ORAL
Qty: 90 TABLET | Refills: 3 | Status: SHIPPED | OUTPATIENT
Start: 2018-11-06 | End: 2019-03-18 | Stop reason: SDUPTHER

## 2018-11-06 RX ORDER — METOPROLOL TARTRATE 100 MG/1
100 TABLET ORAL DAILY
Qty: 90 TABLET | Refills: 3 | Status: SHIPPED | OUTPATIENT
Start: 2018-11-06 | End: 2019-03-18 | Stop reason: SDUPTHER

## 2018-11-06 RX ORDER — DICLOFENAC SODIUM 10 MG/G
2 GEL TOPICAL DAILY
Qty: 100 G | Refills: 6 | Status: SHIPPED | OUTPATIENT
Start: 2018-11-06 | End: 2022-05-24

## 2018-11-06 RX ORDER — METFORMIN HYDROCHLORIDE 500 MG/1
TABLET ORAL
Qty: 90 TABLET | Refills: 3 | Status: SHIPPED | OUTPATIENT
Start: 2018-11-06 | End: 2019-03-18 | Stop reason: SDUPTHER

## 2018-11-06 RX ORDER — LISINOPRIL 5 MG/1
5 TABLET ORAL DAILY
Qty: 90 TABLET | Refills: 3 | Status: SHIPPED | OUTPATIENT
Start: 2018-11-06 | End: 2019-03-18 | Stop reason: SDUPTHER

## 2018-11-06 NOTE — PATIENT INSTRUCTIONS
Continue medications    Consider stopping all dairy for 2 weeks to see if this helps with arthritis. Also can try an antiinflammatory diet.     FOR  DIABETES:  ==================================  SEE ME every  6 MONTHS with  BLOODWORK one week PRIOR  ===================================    Your 1# medicine is  EXERCISE  - huffing & puffing for 20  MINUTES EVERY DAY - check your sugars & you'll see them go down    The future risks of uncontrolled diabetes - include heart attack, stroke, neuropathy (pain in hands & feet that could lead to infection and amputation), nephropathy (leading to kidney dialysis) , and blindness     To prevent complications that can be treated early, please see your  opthalmologist EYE DOCTOR YEARLY      Always wear protective SHOES    Focus on NO SUGAR and no sugar substitutes (splenda,s tevia, etc) IN YOUR DRINKS. With respect to food - LOW CARBOHYDRATES - switch to whole wheat & brown rice.    Decrease & try to stop ALCOHOL, WHITE BREAD, WHITE PASTA, WHITE RICE , WHITE POTATOES- which all turn into sugar.    EAT an EXTRA VEGETABLE A DAY than you already do.    ================  Follow with GYN for female health & cancer prevention    ==============================  RECOMMENDATIONS FOR FEMALES  ==============================  Your #1 MEDICINE is DAILY EXERCISE - 15-20 minutes of huffing & puffing EVERY DAY.     Prevent the #1 cause of death- cardiovascular disease (HEART ATTACK & STROKE) by checking for normal blood pressure, cholesterol, sugars, & by not smoking.     VACCINES: Yearly FLU shot, PNEUMONIA shot after 65,  SHINGLES shot after 50    Screening colonoscopy at AGE  50 & every 10 years to check for COLON CANCER,  one of the most common & preventable cancers (Or FIT kit yearly) Repeat in 3 years if POLYP found     I recommend  high fiber (5 fresh fruits or vegetables daily), low fat diet and aerobic  exercise (huffing/ puffing/ sweating for 20 min straight at least 4 days a  week)    Follow up yearly with mammogram, fasting lipids, CMP, CBC prior.   ==============================================================

## 2018-11-06 NOTE — PROGRESS NOTES
Subjective:      Patient ID: Kanchan Downing is a 78 y.o. female.    Chief Complaint: Annual Exam; Fall; and Bleeding/Bruising (shoulder from fall)    Here today for general exam & DM    Denies acute symptoms such as nocturia, polyuria, unexplained weight loss or blurred vision.    Eye evaluation yearly    Urine microalbumin today     She does have peripheral neuropathy. She is requesting a topical med to use for swollen fingers & knees for arthritis pain.  Gabapentin twice a day is only helping slightly.  A friend uses topical Voltaren and she would like to try this    Denies any chest pain, shortness of breath, nausea vomiting constipation diarrhea, blood in stool, heartburn    With arthritis of her knees, she has some instability and fell 2 days ago while gardening, she has a bruise in the left upper chest, no severe pain, no difficulty taking a deep breath    Lab Results   Component Value Date    HGBA1C 6.1 (H) 11/02/2018    HGBA1C 5.8 (H) 04/23/2018    HGBA1C 6.0 (H) 10/31/2017     Lab Results   Component Value Date    MICALBCREAT 6.1 04/27/2017     Lab Results   Component Value Date    LDLCALC 95.6 04/23/2018    LDLCALC 99.2 10/31/2017    CHOL 165 04/23/2018    HDL 53 04/23/2018    TRIG 82 04/23/2018       Lab Results   Component Value Date     04/23/2018    K 4.1 04/23/2018     04/23/2018    CO2 25 04/23/2018     (H) 04/23/2018    BUN 12 04/23/2018    CREATININE 0.8 04/23/2018    CALCIUM 9.8 04/23/2018    PROT 6.6 04/23/2018    ALBUMIN 3.8 04/23/2018    BILITOT 0.5 04/23/2018    ALKPHOS 55 04/23/2018    AST 19 04/23/2018    ALT 17 04/23/2018    ANIONGAP 7 (L) 04/23/2018    ESTGFRAFRICA >60.0 04/23/2018    EGFRNONAA >60.0 04/23/2018    WBC 4.24 04/23/2018    HGB 13.9 04/23/2018    HCT 42.9 04/23/2018    MCV 91 04/23/2018     04/23/2018    TSH 4.708 (H) 04/21/2017    VLRSRNZS96HT 41 05/08/2014         Current Outpatient Medications on File Prior to Visit   Medication Sig    albuterol  (PROAIR HFA) 90 mcg/actuation inhaler INHALE 2 PUFFS INTO THE LUNGS EVERY 6 (SIX) HOURS AS  NEEDED FORWHEEZING.    aspirin 81 MG Chew Take 1 tablet (81 mg total) by mouth once daily.    blood sugar diagnostic Strp 1 strip by Misc.(Non-Drug; Combo Route) route 2 (two) times daily. ACCU-CHEK BROOK PLUS    cetirizine (ZYRTEC) 10 MG tablet Take 10 mg by mouth once daily.      flash glucose scanning reader (FREESTYLE FLOWER READER) Misc 1 each by Misc.(Non-Drug; Combo Route) route continuous.    fluticasone (FLONASE) 50 mcg/actuation nasal spray 1 spray by Each Nare route once daily.    lactobacillus rhamnosus, GG, (PROBIOTIC) 10 billion cell capsule Take by mouth. 1 Capsule Oral Every day    lancets Misc 1 each by Misc.(Non-Drug; Combo Route) route 2 (two) times daily. ACCU-CHEK SOFTCLIX    UNABLE TO FIND medication name: Omega XL    [DISCONTINUED] amLODIPine (NORVASC) 5 MG tablet TAKE 1 TABLET ONE TIME DAILY    [gabapentin (NEURONTIN) 100 MG capsule Take 1 capsule (100 mg total) by mouth 3 (three) times daily.          lisinopril (PRINIVIL,ZESTRIL) 5 MG tablet Take 1 tablet (5 mg total) by mouth once daily.    [metFORMIN (GLUCOPHAGE) 500 MG tablet TAKE 1 TABLET EVERY DAY WITH BREAKFAST     metoprolol tartrate (LOPRESSOR) 100 MG tablet Take 1 tablet (100 mg total) by mouth once daily.          simvastatin (ZOCOR) 10 MG tablet Take 1 tablet (10 mg total) by mouth every evening.     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, without long-term current use of insulin 10/31/2017    Diabetes mellitus type 2 without retinopathy 06/12/2014    6% metformin 500 bid, FU+ 2016    DVT (deep venous thrombosis) 2001    R , Dr Bonilla    Hyperlipidemia  "    LDL 82    Hyperlipidemia associated with type 2 diabetes mellitus 9/11/2012    Hypertension     Hypertension associated with diabetes 9/11/2012    Microalbuminuria due to type 2 diabetes mellitus 12/08/2015    taking lisinopril, losartan caused olivia effects    Risk for coronary artery disease greater than 20% in next 10 years per [place] score 5/1/2018    Strabismus      Past Surgical History:   Procedure Laterality Date    BREAST BIOPSY Right 2011    BREAST LUMPECTOMY Right 2011    [place] REMOVAL OF NAIL BED  6/10/2015    TONSILLECTOMY       Social History     Social History Narrative     to [place], 3 children, nondrinker, nonsmoker, she declines colonoscopy     Family History   Problem Relation Age of Onset    Heart disease Mother 58    Cataracts Mother     Heart disease Father 50    Thyroid disease Sister     Cancer Sister     No Known Problems Brother     No Known Problems Maternal Aunt     No Known Problems Maternal Uncle     No Known Problems Paternal Aunt     No Known Problems Paternal Uncle     No Known Problems Maternal Grandmother     No Known Problems Maternal Grandfather     No Known Problems Paternal Grandmother     No Known Problems Paternal Grandfather     Amblyopia Neg Hx     Blindness Neg Hx     Diabetes Neg Hx     Glaucoma Neg Hx     Hypertension Neg Hx     Macular degeneration Neg Hx     Retinal detachment Neg Hx     Strabismus Neg Hx     Stroke Neg Hx      Vitals:    11/06/18 1120 11/06/18 1140   BP: (!) 150/84 138/88   Pulse: 60    Temp: 97.5 °F (36.4 °C)    TempSrc: Oral    Weight: 83.9 kg (184 lb 15.5 oz)    Height: 5' 6.5" (1.689 m)    PainSc: 0-No pain      Objective:   Physical Exam   Constitutional: She is oriented to person, place, and time. She appears well-developed and well-nourished.   HENT:   Head: Normocephalic and atraumatic.   Right Ear: External ear normal.   Left Ear: External ear normal.   Nose: Nose normal.   Mouth/Throat: Oropharynx is " clear and moist.   Eyes: EOM are normal. Pupils are equal, round, and reactive to light.   Neck: Neck supple. No thyromegaly present.   Cardiovascular: Normal rate, regular rhythm, normal heart sounds and intact distal pulses.   No murmur heard.  Pulmonary/Chest: Effort normal and breath sounds normal. She has no wheezes.   Contusion left axillary line, mildly tender, no obvious deformity, no rib fracture, no pain with deep inspiration   Abdominal: Soft. Bowel sounds are normal. She exhibits no distension and no mass. There is no tenderness. There is no rebound and no guarding.   Musculoskeletal: She exhibits no edema.   Lymphadenopathy:     She has no cervical adenopathy.   Neurological: She is alert and oriented to person, place, and time.   Skin: Skin is warm and dry.   Numerous seborrheic keratoses scattered across her back   Psychiatric: She has a normal mood and affect. Her behavior is normal.     Assessment:     1. Routine general medical examination at a health care facility    2. Controlled type 2 diabetes mellitus with diabetic peripheral angiopathy without gangrene, without long-term current use of insulin    3. Hypertension associated with diabetes    4. Hyperlipidemia associated with type 2 diabetes mellitus    5. Type 2 diabetes mellitus with diabetic polyneuropathy, without long-term current use of insulin    6. History of breast cancer    7. Atherosclerosis of aorta    8. Generalized osteoarthritis    9. Arthritis of hand    10. Controlled type 2 diabetes mellitus with diabetic nephropathy, without long-term current use of insulin      Plan:     Orders Placed This Encounter    Microalbumin/creatinine urine ratio    diclofenac sodium (VOLTAREN) 1 % Gel    amLODIPine (NORVASC) 5 MG tablet    gabapentin (NEURONTIN) 100 MG capsule    lisinopril (PRINIVIL,ZESTRIL) 5 MG tablet    metFORMIN (GLUCOPHAGE) 500 MG tablet    metoprolol tartrate (LOPRESSOR) 100 MG tablet    simvastatin (ZOCOR) 10 MG  tablet       Patient Instructions   Continue medications    Consider stopping all dairy for 2 weeks to see if this helps with arthritis. Also can try an antiinflammatory diet.     FOR  DIABETES:  ==================================  SEE ME every  6 MONTHS with  BLOODWORK one week PRIOR  ===================================    Your 1# medicine is  EXERCISE  - huffing & puffing for 20  MINUTES EVERY DAY - check your sugars & you'll see them go down    The future risks of uncontrolled diabetes - include heart attack, stroke, neuropathy (pain in hands & feet that could lead to infection and amputation), nephropathy (leading to kidney dialysis) , and blindness     To prevent complications that can be treated early, please see your  opthalmologist EYE DOCTOR YEARLY      Always wear protective SHOES    Focus on NO SUGAR and no sugar substitutes (splenda,s tevia, etc) IN YOUR DRINKS. With respect to food - LOW CARBOHYDRATES - switch to whole wheat & brown rice.    Decrease & try to stop ALCOHOL, WHITE BREAD, WHITE PASTA, WHITE RICE , WHITE POTATOES- which all turn into sugar.    EAT an EXTRA VEGETABLE A DAY than you already do.    ================  Follow with GYN for female health & cancer prevention    ==============================  RECOMMENDATIONS FOR FEMALES  ==============================  Your #1 MEDICINE is DAILY EXERCISE - 15-20 minutes of huffing & puffing EVERY DAY.     Prevent the #1 cause of death- cardiovascular disease (HEART ATTACK & STROKE) by checking for normal blood pressure, cholesterol, sugars, & by not smoking.     VACCINES: Yearly FLU shot, PNEUMONIA shot after 65,  SHINGLES shot after 50    Screening colonoscopy at AGE  50 & every 10 years to check for COLON CANCER,  one of the most common & preventable cancers (Or FIT kit yearly) Repeat in 3 years if POLYP found     I recommend  high fiber (5 fresh fruits or vegetables daily), low fat diet and aerobic  exercise (huffing/ puffing/ sweating for 20 min  straight at least 4 days a week)    Follow up yearly with mammogram, fasting lipids, CMP, CBC prior.   ==============================================================

## 2018-12-07 ENCOUNTER — TELEPHONE (OUTPATIENT)
Dept: FAMILY MEDICINE | Facility: CLINIC | Age: 78
End: 2018-12-07

## 2018-12-07 NOTE — TELEPHONE ENCOUNTER
When I spoke today with pt's .  Ms. Hemphill questioned her last A1C results. Any change needed in her Metformin?  Should be continue on Metformin 500 mg once daily.

## 2019-01-31 ENCOUNTER — TELEPHONE (OUTPATIENT)
Dept: ORTHOPEDICS | Facility: CLINIC | Age: 79
End: 2019-01-31

## 2019-01-31 NOTE — TELEPHONE ENCOUNTER
Pt c/o L knee pain and states she has had gel injections in the past with last being around 3 years ago. Pt requests Ortho appt at [facility] location and understands that injections, if indicated, will need to authorized during this appt with subsequent clinic visits to be given.. Appt scheduled with VEGA Grimaldo/  [facility] on 2/4/19 at 6:15pm with arrival at 6:00pm. Pt confirms time and location of appt. Appt slip mailed.

## 2019-01-31 NOTE — TELEPHONE ENCOUNTER
----- Message from Michelle Guevara sent at 1/31/2019  2:55 PM CST -----  Contact: self  Pt stated that she is experiencing pain in her knee and needs to schedule an injection.  She can be reached at [phone]

## 2019-02-04 ENCOUNTER — OFFICE VISIT (OUTPATIENT)
Dept: ORTHOPEDICS | Facility: CLINIC | Age: 79
End: 2019-02-04
Payer: MEDICARE

## 2019-02-04 ENCOUNTER — HOSPITAL ENCOUNTER (OUTPATIENT)
Dept: RADIOLOGY | Facility: HOSPITAL | Age: 79
Discharge: HOME | End: 2019-02-04
Attending: PHYSICIAN ASSISTANT
Payer: MEDICARE

## 2019-02-04 VITALS
BODY MASS INDEX: 29.72 KG/M2 | WEIGHT: 184.94 LBS | SYSTOLIC BLOOD PRESSURE: 131 MMHG | HEART RATE: 80 BPM | DIASTOLIC BLOOD PRESSURE: 79 MMHG | HEIGHT: 66 IN

## 2019-02-04 DIAGNOSIS — M25.562 LEFT KNEE PAIN, UNSPECIFIED CHRONICITY: ICD-10-CM

## 2019-02-04 DIAGNOSIS — M17.0 PRIMARY OSTEOARTHRITIS OF BOTH KNEES: Primary | ICD-10-CM

## 2019-02-04 PROCEDURE — 3075F PR MOST RECENT SYSTOLIC BLOOD PRESS GE 130-139MM HG: ICD-10-PCS | Mod: HCNC,CPTII,S$GLB, | Performed by: PHYSICIAN ASSISTANT

## 2019-02-04 PROCEDURE — 3288F PR FALLS RISK ASSESSMENT DOCUMENTED: ICD-10-PCS | Mod: HCNC,CPTII,S$GLB, | Performed by: PHYSICIAN ASSISTANT

## 2019-02-04 PROCEDURE — 20610 PR DRAIN/INJECT LARGE JOINT/BURSA: ICD-10-PCS | Mod: 50,HCNC,S$GLB, | Performed by: PHYSICIAN ASSISTANT

## 2019-02-04 PROCEDURE — 1100F PR PT FALLS ASSESS DOC 2+ FALLS/FALL W/INJURY/YR: ICD-10-PCS | Mod: HCNC,CPTII,S$GLB, | Performed by: PHYSICIAN ASSISTANT

## 2019-02-04 PROCEDURE — 3078F PR MOST RECENT DIASTOLIC BLOOD PRESSURE < 80 MM HG: ICD-10-PCS | Mod: HCNC,CPTII,S$GLB, | Performed by: PHYSICIAN ASSISTANT

## 2019-02-04 PROCEDURE — 99203 PR OFFICE/OUTPT VISIT, NEW, LEVL III, 30-44 MIN: ICD-10-PCS | Mod: 25,HCNC,S$GLB, | Performed by: PHYSICIAN ASSISTANT

## 2019-02-04 PROCEDURE — 73564 XR KNEE ORTHO LEFT WITH FLEXION: ICD-10-PCS | Mod: 26,HCNC,LT, | Performed by: RADIOLOGY

## 2019-02-04 PROCEDURE — 1100F PTFALLS ASSESS-DOCD GE2>/YR: CPT | Mod: HCNC,CPTII,S$GLB, | Performed by: PHYSICIAN ASSISTANT

## 2019-02-04 PROCEDURE — 73562 X-RAY EXAM OF KNEE 3: CPT | Mod: TC,HCNC,PO,LT

## 2019-02-04 PROCEDURE — 73562 X-RAY EXAM OF KNEE 3: CPT | Mod: 26,59,HCNC,LT | Performed by: RADIOLOGY

## 2019-02-04 PROCEDURE — 99999 PR PBB SHADOW E&M-EST. PATIENT-LVL IV: ICD-10-PCS | Mod: PBBFAC,HCNC,, | Performed by: PHYSICIAN ASSISTANT

## 2019-02-04 PROCEDURE — 3075F SYST BP GE 130 - 139MM HG: CPT | Mod: HCNC,CPTII,S$GLB, | Performed by: PHYSICIAN ASSISTANT

## 2019-02-04 PROCEDURE — 20610 DRAIN/INJ JOINT/BURSA W/O US: CPT | Mod: 50,HCNC,S$GLB, | Performed by: PHYSICIAN ASSISTANT

## 2019-02-04 PROCEDURE — 73562 XR KNEE ORTHO LEFT WITH FLEXION: ICD-10-PCS | Mod: 26,59,HCNC,LT | Performed by: RADIOLOGY

## 2019-02-04 PROCEDURE — 99203 OFFICE O/P NEW LOW 30 MIN: CPT | Mod: 25,HCNC,S$GLB, | Performed by: PHYSICIAN ASSISTANT

## 2019-02-04 PROCEDURE — 3078F DIAST BP <80 MM HG: CPT | Mod: HCNC,CPTII,S$GLB, | Performed by: PHYSICIAN ASSISTANT

## 2019-02-04 PROCEDURE — 99999 PR PBB SHADOW E&M-EST. PATIENT-LVL IV: CPT | Mod: PBBFAC,HCNC,, | Performed by: PHYSICIAN ASSISTANT

## 2019-02-04 PROCEDURE — 3288F FALL RISK ASSESSMENT DOCD: CPT | Mod: HCNC,CPTII,S$GLB, | Performed by: PHYSICIAN ASSISTANT

## 2019-02-04 PROCEDURE — 73564 X-RAY EXAM KNEE 4 OR MORE: CPT | Mod: 26,HCNC,LT, | Performed by: RADIOLOGY

## 2019-02-05 NOTE — PROGRESS NOTES
SUBJECTIVE:     Chief Complaint : bilateral knee pain    History of Present Illness:  Kanchan Downing is a 78 y.o. female seen in clinic today with a chief complaint of chronic bilateral knee pain. Pt has longstanding osteoarthritis of both knees. She has been seen by rheumatology and received Synvisc injections in the past. Injections helped her tremendously. Last series was in 2016. She admits to intermittent swelling. She has tried ibuprofen and aspirin. She uses a cane. She does fall fairly frequently. Denies hip pain, back pain, groin pain.    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, without long-term current use of insulin 10/31/2017    Diabetes mellitus type 2 without retinopathy 06/12/2014    6% metformin 500 bid, FU+ 2016    DVT (deep venous thrombosis) 2001    R , Dr Bonilla    Hyperlipidemia     LDL 82    Hyperlipidemia associated with type 2 diabetes mellitus 9/11/2012    Hypertension     Hypertension associated with diabetes 9/11/2012    Microalbuminuria due to type 2 diabetes mellitus 12/08/2015    taking lisinopril, losartan caused olivia effects    Risk for coronary artery disease greater than 20% in next 10 years per [place] score 5/1/2018    Strabismus        Review of Systems:  Constitutional: no fever or chills  ENT: no nasal congestion or sore throat  Respiratory: no cough or shortness of breath  Cardiovascular: no chest pain or palpitations  Gastrointestinal: no nausea or vomiting, tolerating diet  Genitourinary: no hematuria or dysuria  Integument/Breast: no rash or pruritis  Hematologic/Lymphatic: no easy bruising or lymphadenopathy  Musculoskeletal: see HPI  Neurological: no seizures or  "tremors  Behavioral/Psych: no auditory or visual hallucinations    OBJECTIVE:     PHYSICAL EXAM:  Blood pressure 131/79, pulse 80, height 5' 6" (1.676 m), weight 83.9 kg (184 lb 15.5 oz).   General Appearance: WDWN, NAD  Gait: Abnormal, using a cane   Neuro/Psych: Mood & affect appropriate  Lungs: Respirations equal and unlabored.   CV: 2+ bilateral upper and lower extremity pulses.   Skin: Intact throughout LE  Extremities: No LE edema    Right Knee Exam  Range of Motion:5-110 active   Effusion:yes  Condition of skin:intact  Location of tenderness:Medial joint line and Lateral joint line   Strength:3 of 5 quadriceps strength and 4 of 5 hamstring strength  Stability:stable to testing    Left Knee Exam  Range of Motion: active   + Flexion contracture  Effusion:yes  Condition of skin:intact  Location of tenderness:Medial joint line and Lateral joint line   Strength:3 of 5 quadriceps strength and 4 of 5 hamstring strength  Stability:stable to testing    Varus deformity    Right Hip Examination: no pain with PROM     Left Hip Examination: no pain with PROM     RADIOGRAPHS: AP, lateral and merchant knee x-rays ordered and images reviewed today by me reveal advanced  tricompartmental degenerative changes of both knees. Changes are severe.    ASSESSMENT/PLAN:   Primary osteoarthritis both knees  - Pt not interested in surgery.  - Continue using assistive device  - She had relief with Synvisc in the past and would like to repeat.   - First injections given today. F/u for second injections as scheduled.     Procedure Note:  After time out was performed and patient ID, side, and site were verified, the right knee and the left knee were sterilly prepped in the standard fashion.  A 22-gauge needle was introduced into the right knee and the left knee joint from an charlene-lateral site without complication. The right knee and the left knee were then injected with 2 ml of Synvisc each. Sterile dressing was applied.  The " patient was instructed to resume activities as tolerated and to call with any problems.

## 2019-02-12 DIAGNOSIS — E78.5 HYPERLIPIDEMIA ASSOCIATED WITH TYPE 2 DIABETES MELLITUS: ICD-10-CM

## 2019-02-12 DIAGNOSIS — Z00.00 ANNUAL PHYSICAL EXAM: Primary | ICD-10-CM

## 2019-02-12 DIAGNOSIS — E11.69 HYPERLIPIDEMIA ASSOCIATED WITH TYPE 2 DIABETES MELLITUS: ICD-10-CM

## 2019-02-18 ENCOUNTER — OFFICE VISIT (OUTPATIENT)
Dept: ORTHOPEDICS | Facility: CLINIC | Age: 79
End: 2019-02-18
Payer: MEDICARE

## 2019-02-18 ENCOUNTER — TELEPHONE (OUTPATIENT)
Dept: HEMATOLOGY/ONCOLOGY | Facility: CLINIC | Age: 79
End: 2019-02-18

## 2019-02-18 VITALS — BODY MASS INDEX: 29.76 KG/M2 | HEIGHT: 66 IN | WEIGHT: 185.19 LBS

## 2019-02-18 DIAGNOSIS — M17.0 PRIMARY OSTEOARTHRITIS OF BOTH KNEES: Primary | ICD-10-CM

## 2019-02-18 PROCEDURE — 20610 DRAIN/INJ JOINT/BURSA W/O US: CPT | Mod: HCNC,50,S$GLB, | Performed by: PHYSICIAN ASSISTANT

## 2019-02-18 PROCEDURE — 99499 UNLISTED E&M SERVICE: CPT | Mod: HCNC,S$GLB,, | Performed by: PHYSICIAN ASSISTANT

## 2019-02-18 PROCEDURE — 99999 PR PBB SHADOW E&M-EST. PATIENT-LVL III: ICD-10-PCS | Mod: PBBFAC,HCNC,, | Performed by: PHYSICIAN ASSISTANT

## 2019-02-18 PROCEDURE — 99499 NO LOS: ICD-10-PCS | Mod: HCNC,S$GLB,, | Performed by: PHYSICIAN ASSISTANT

## 2019-02-18 PROCEDURE — 99999 PR PBB SHADOW E&M-EST. PATIENT-LVL III: CPT | Mod: PBBFAC,HCNC,, | Performed by: PHYSICIAN ASSISTANT

## 2019-02-18 PROCEDURE — 20610 PR DRAIN/INJECT LARGE JOINT/BURSA: ICD-10-PCS | Mod: HCNC,50,S$GLB, | Performed by: PHYSICIAN ASSISTANT

## 2019-02-18 NOTE — TELEPHONE ENCOUNTER
Patient rescheduled mammogram and appt with [facility] to 3/21. Mailed appt slip.        ----- Message from Oxana Antonio sent at 2/18/2019  2:13 PM CST -----  Contact: Pt   Pt called to reschedule appt 2/21 and would like 3/21 at would like appt for 3pm   Callback#[phone]  Thank You  DENNIS Antonio

## 2019-02-19 NOTE — PROGRESS NOTES
Kanchan Downing presents to clinic today for the second bilateral knee Synvisc injection.    Exam demonstrates the no effusion in the bilateral knee, and the skin is intact.    Diagnosis: osteoarthritis knee    After time out was performed and patient ID, side, and site were verified, the right knee and the left knee were sterilly prepped in the standard fashion.  A 22-gauge needle was introduced into the right knee and the left knee joint from an charlene-lateral site without complication. The right knee and the left knee were then injected with 2 ml of Synvisc each. Sterile dressing was applied.  The patient was instructed to resume activities as tolerated and to call with any problems.     We will see Kanchan Downing back next week for the third injection.

## 2019-02-25 ENCOUNTER — OFFICE VISIT (OUTPATIENT)
Dept: ORTHOPEDICS | Facility: CLINIC | Age: 79
End: 2019-02-25
Payer: MEDICARE

## 2019-02-25 VITALS — HEIGHT: 66 IN | WEIGHT: 185.19 LBS | BODY MASS INDEX: 29.76 KG/M2

## 2019-02-25 DIAGNOSIS — M17.0 PRIMARY OSTEOARTHRITIS OF BOTH KNEES: Primary | ICD-10-CM

## 2019-02-25 PROCEDURE — 99499 NO LOS: ICD-10-PCS | Mod: HCNC,S$GLB,, | Performed by: PHYSICIAN ASSISTANT

## 2019-02-25 PROCEDURE — 99999 PR PBB SHADOW E&M-EST. PATIENT-LVL III: CPT | Mod: PBBFAC,HCNC,, | Performed by: PHYSICIAN ASSISTANT

## 2019-02-25 PROCEDURE — 20610 DRAIN/INJ JOINT/BURSA W/O US: CPT | Mod: HCNC,50,S$GLB, | Performed by: PHYSICIAN ASSISTANT

## 2019-02-25 PROCEDURE — 99999 PR PBB SHADOW E&M-EST. PATIENT-LVL III: ICD-10-PCS | Mod: PBBFAC,HCNC,, | Performed by: PHYSICIAN ASSISTANT

## 2019-02-25 PROCEDURE — 20610 PR DRAIN/INJECT LARGE JOINT/BURSA: ICD-10-PCS | Mod: HCNC,50,S$GLB, | Performed by: PHYSICIAN ASSISTANT

## 2019-02-25 PROCEDURE — 99499 UNLISTED E&M SERVICE: CPT | Mod: HCNC,S$GLB,, | Performed by: PHYSICIAN ASSISTANT

## 2019-02-26 NOTE — PROGRESS NOTES
Kanchan Downing presents to clinic today for the third bilateral knee Synvisc injection.    Exam demonstrates the no effusion in the bilateral knee, and the skin is intact.    Diagnosis: osteoarthritis knee    After time out was performed and patient ID, side, and site were verified, the right knee and the left knee were sterilly prepped in the standard fashion.  A 22-gauge needle was introduced into the right knee and the left knee joint from an charlene-lateral site without complication. The right knee and the left knee were then injected with 2 ml of Synvisc each. Sterile dressing was applied.  The patient was instructed to resume activities as tolerated and to call with any problems.     F/u prn.

## 2019-03-03 ENCOUNTER — HOSPITAL ENCOUNTER (EMERGENCY)
Facility: HOSPITAL | Age: 79
Discharge: HOME | End: 2019-03-03
Attending: EMERGENCY MEDICINE
Payer: MEDICARE

## 2019-03-03 ENCOUNTER — NURSE TRIAGE (OUTPATIENT)
Dept: ADMINISTRATIVE | Facility: CLINIC | Age: 79
End: 2019-03-03

## 2019-03-03 VITALS
DIASTOLIC BLOOD PRESSURE: 81 MMHG | HEIGHT: 66 IN | RESPIRATION RATE: 19 BRPM | WEIGHT: 185 LBS | SYSTOLIC BLOOD PRESSURE: 172 MMHG | HEART RATE: 65 BPM | OXYGEN SATURATION: 97 % | BODY MASS INDEX: 29.73 KG/M2 | TEMPERATURE: 98 F

## 2019-03-03 DIAGNOSIS — M25.562 LEFT KNEE PAIN: ICD-10-CM

## 2019-03-03 DIAGNOSIS — M79.605 LEFT LEG PAIN: ICD-10-CM

## 2019-03-03 DIAGNOSIS — I80.9 THROMBOPHLEBITIS: Primary | ICD-10-CM

## 2019-03-03 PROCEDURE — 99284 EMERGENCY DEPT VISIT MOD MDM: CPT | Mod: HCNC,25

## 2019-03-03 NOTE — TELEPHONE ENCOUNTER
Reason for Disposition   Unable to walk    Protocols used: ST LEG PAIN-A-AH    Patient called with concerns the left leg is pink and hot compared to her right leg. She can't walk without severe pain and the area in the back of the knee around the baker's cyst is extremely painful. Patient advised to go to the ED for evaluation and she verbalized understanding.

## 2019-03-03 NOTE — ED PROVIDER NOTES
Encounter Date: 3/3/2019       History     Chief Complaint   Patient presents with    Knee Pain     pt presents to ED today c/o pain to left knee she reports having injections to knee x 6 days ago. noticed redness last night.      Kanchan Downing, a 78 y.o. 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     She presents to the ED for evaluation of left lower leg pain and swelling to medial aspect.  Patient states that she received the third of a series of 3 injections on the 26th of February.  She states that she noticed swelling and redness to the anterior aspect of her left pinky knee yesterday.  Patient states she feels like the swelling and the redness has improved somewhat however she still has some pain with palpation to the site.  She denies any pain to the joint itself or swelling to the joint.  She states she is able to ambulate without difficulty.  Denies any fever.  She does have a history of a DVT in the past.  No treatments tried for the pain today.          The history is provided by the patient.     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, without long-term current use of insulin 10/31/2017    Diabetes mellitus type 2 without retinopathy 06/12/2014    6% metformin 500 bid, FU+ 2016    DVT (deep venous thrombosis) 2001    R Dr Bonilla    Hyperlipidemia     LDL 82    Hyperlipidemia associated with type 2 diabetes mellitus 9/11/2012    Hypertension     Hypertension associated with diabetes 9/11/2012    Microalbuminuria due to type 2 diabetes mellitus 12/08/2015    taking lisinopril, losartan caused olivia effects    Risk for coronary artery disease greater than 20% in next 10 years per [place] score 5/1/2018    Strabismus      Past Surgical History:   Procedure Laterality Date    BREAST BIOPSY Right 2011    BREAST LUMPECTOMY Right 2011    AL REMOVAL OF NAIL BED  6/10/2015    TONSILLECTOMY       Family History   Problem Relation Age of Onset    Heart disease Mother 58    Cataracts Mother     Heart disease Father 50    Thyroid disease Sister     Cancer Sister     No Known Problems Brother     No Known Problems Maternal Aunt     No Known Problems Maternal Uncle     No Known Problems Paternal Aunt     No Known Problems Paternal Uncle     No Known Problems Maternal Grandmother     No Known Problems Maternal Grandfather     No Known Problems Paternal Grandmother     No Known Problems Paternal Grandfather     Amblyopia Neg Hx     Blindness Neg Hx     Diabetes Neg Hx     Glaucoma Neg Hx     Hypertension Neg Hx     Macular degeneration Neg Hx     Retinal detachment Neg Hx     Strabismus Neg Hx     Stroke Neg Hx      Social History     Tobacco Use    Smoking status: Former Smoker     Packs/day: 7.00     Years: 0.50     Pack years: 3.50    Smokeless tobacco: Never Used   Substance Use Topics    Alcohol use: No    Drug use: Not on file      Review of Systems   Constitutional: Negative for fever.   Gastrointestinal: Negative for nausea and vomiting.   Musculoskeletal: Positive for arthralgias (pain to medial aspect of left knee  ). Negative for joint swelling.   Skin: Positive for color change.   Allergic/Immunologic: Negative for immunocompromised state.   Neurological: Negative for weakness and numbness.   All other systems reviewed and are negative.      Physical Exam     Initial Vitals [03/03/19 1404]   BP Pulse Resp Temp SpO2   (!) 172/81 65 19 97.9 °F (36.6 °C) 97 %      MAP       --         Physical Exam    Nursing note and vitals reviewed.  Constitutional: She appears well-developed and well-nourished. She is not diaphoretic. No distress.   HENT:   Head: Normocephalic and atraumatic.   Right Ear: External ear normal.   Left Ear: External ear normal.   Nose: Nose normal.   Mouth/Throat: Oropharynx is clear and moist.   Eyes: Conjunctivae and EOM are normal.   Neck: Normal range of motion.   Cardiovascular: Normal rate and regular rhythm. Exam reveals no friction rub.    Pulmonary/Chest: Breath sounds normal. No respiratory distress. She has no wheezes. She has no rhonchi. She has no rales.   Abdominal: Soft. Bowel sounds are normal. She exhibits no distension. There is no tenderness. There is no rebound and no guarding.   Musculoskeletal: Normal range of motion.        Left knee: She exhibits normal range of motion, no swelling, no effusion, no ecchymosis, no deformity, no laceration, no erythema, normal alignment, no LCL laxity, normal patellar mobility, no bony tenderness, normal meniscus and no MCL laxity. No tenderness found. No medial joint line, no lateral joint line, no MCL, no LCL and no patellar tendon tenderness noted.   Mild swelling and erythema over the greater saphenous vein.  2+ pedal pulse.  No swelling to joint.     Neurological: She is alert and oriented to person, place, and time.   Skin: Skin is warm and dry. Capillary  refill takes less than 2 seconds. No rash noted. No erythema.   Psychiatric: She has a normal mood and affect. Thought content normal.         ED Course   Procedures  Labs Reviewed - No data to display       Imaging Results          US Lower Extremity Veins Left (Edited Result - FINAL)  Result time 03/03/19 16:43:00    Addendum 1 of 1 by Rowdy Kelley MD (03/03/19 16:43:00)      The maximum AP dimension of the thrombosed greater saphenous vein is 9 mm.      Electronically signed by: Rowdy Kelley MD  Date:    03/03/2019  Time:    16:43               Final result by Rowdy Kelley MD (03/03/19 16:17:07)                 Impression:      No evidence of deep venous thrombosis in the left lower extremity.    Left greater saphenous vein is thrombosis, considered a superficial vein.  Correlate clinically and further evaluation/follow-up as warranted.    This report was flagged in Epic as abnormal.      Electronically signed by: Rowdy Kelley MD  Date:    03/03/2019  Time:    16:17             Narrative:    EXAMINATION:  US LOWER EXTREMITY VEINS LEFT    CLINICAL HISTORY:  Pain in left leg    TECHNIQUE:  Duplex and color flow Doppler evaluation and graded compression of the left lower extremity veins was performed.    COMPARISON:  None    FINDINGS:  Left thigh veins: There is thrombus seen within the entire imaged left greater saphenous vein.  The common femoral, femoral, popliteal, and deep femoral veins are patent and free of thrombus. The veins are normally compressible and have normal phasic flow and augmentation response.    Left calf veins: The visualized calf veins are patent.    Contralateral CFV: The contralateral (right) common femoral vein is patent and free of thrombus.    Miscellaneous: None                               X-Ray Knee 3 View Left (Final result)  Result time 03/03/19 15:21:28    Final result by Moses Walsh DO (03/03/19 15:21:28)                 Impression:      Please see  above      Electronically signed by: Moses Walsh   Date:    03/03/2019  Time:    15:21             Narrative:    EXAMINATION:  XR KNEE 3 VIEW LEFT    CLINICAL HISTORY:  Pain in left knee    TECHNIQUE:  AP, lateral, and Merchant views of the left knee were performed.    COMPARISON:  02/04/2019    FINDINGS:  Continued advanced tricompartmental degenerative change.  No evidence for acute fracture or dislocation.  No focal bony erosion or evidence for significant joint effusion.                                 Medical Decision Making:   Initial Assessment:   Left knee pain after injection  Differential Diagnosis:   DVT, septic joint, superficial thrombophlebitis, cellulitis  Clinical Tests:   Radiological Study: Ordered and Reviewed  ED Management:  Patient presents to the ER for evaluation pain to medial aspect of left knee.  Patient has normal a ROM.  No swelling or erythema to joint.  X-ray shows no evidence of fracture dislocation.  The ultrasound shows evidence of left greater saphenous vein is thrombosis, considered a superficial vein at 9mm.  Size is not warrant further intervention other than symptomatic control including compresses and use of anti-inflammatories.  Patient was given this information instructed to monitor site for worsening.  She was instructed to return with any new or worsening symptoms and she verbalized understanding. Case discussed with supervising physician who agrees with plan.                Attending Attestation:     Physician Attestation Statement for NP/PA:   I discussed this assessment and plan of this patient with the NP/PA, but I did not personally examine the patient. The face to face encounter was performed by the NP/PA.                     Clinical Impression:       ICD-10-CM ICD-9-CM   1. Thrombophlebitis I80.9 451.9   2. Left knee pain M25.562 719.46   3. Left leg pain M79.605 729.5                                FOREST CrowC  03/04/19 0030       Almas El  MD Billy  03/05/19 0661

## 2019-03-11 ENCOUNTER — OFFICE VISIT (OUTPATIENT)
Dept: ORTHOPEDICS | Facility: CLINIC | Age: 79
End: 2019-03-11
Payer: MEDICARE

## 2019-03-11 ENCOUNTER — TELEPHONE (OUTPATIENT)
Dept: ORTHOPEDICS | Facility: CLINIC | Age: 79
End: 2019-03-11

## 2019-03-11 VITALS — WEIGHT: 184.94 LBS | BODY MASS INDEX: 29.72 KG/M2 | HEIGHT: 66 IN

## 2019-03-11 DIAGNOSIS — I80.02 SAPHENOUS VEIN THROMBOPHLEBITIS, LEFT: ICD-10-CM

## 2019-03-11 DIAGNOSIS — L03.116 CELLULITIS OF LEFT LOWER EXTREMITY: Primary | ICD-10-CM

## 2019-03-11 PROCEDURE — 99213 PR OFFICE/OUTPT VISIT, EST, LEVL III, 20-29 MIN: ICD-10-PCS | Mod: HCNC,S$GLB,, | Performed by: PHYSICIAN ASSISTANT

## 2019-03-11 PROCEDURE — 99999 PR PBB SHADOW E&M-EST. PATIENT-LVL IV: CPT | Mod: PBBFAC,HCNC,, | Performed by: PHYSICIAN ASSISTANT

## 2019-03-11 PROCEDURE — 1101F PR PT FALLS ASSESS DOC 0-1 FALLS W/OUT INJ PAST YR: ICD-10-PCS | Mod: HCNC,CPTII,S$GLB, | Performed by: PHYSICIAN ASSISTANT

## 2019-03-11 PROCEDURE — 99213 OFFICE O/P EST LOW 20 MIN: CPT | Mod: HCNC,S$GLB,, | Performed by: PHYSICIAN ASSISTANT

## 2019-03-11 PROCEDURE — 1101F PT FALLS ASSESS-DOCD LE1/YR: CPT | Mod: HCNC,CPTII,S$GLB, | Performed by: PHYSICIAN ASSISTANT

## 2019-03-11 PROCEDURE — 99999 PR PBB SHADOW E&M-EST. PATIENT-LVL IV: ICD-10-PCS | Mod: PBBFAC,HCNC,, | Performed by: PHYSICIAN ASSISTANT

## 2019-03-11 RX ORDER — CLINDAMYCIN HYDROCHLORIDE 150 MG/1
150 CAPSULE ORAL 3 TIMES DAILY
Qty: 30 CAPSULE | Refills: 0 | Status: SHIPPED | OUTPATIENT
Start: 2019-03-11 | End: 2019-03-21

## 2019-03-11 NOTE — TELEPHONE ENCOUNTER
Confirmed appt for 5 pm.   Patient still having pain in leg. Neg for blood clot confirmed via us. Knee injections helped with the walking now just having the pain. Patient has been doing warm compress with no relief.

## 2019-03-11 NOTE — PROGRESS NOTES
"  SUBJECTIVE:     Chief Complaint : left leg pain    History of Present Illness:  Kanchan Downing is a 78 y.o. female seen in clinic today with a chief complaint of left leg pain. Pt noticed some redness and pain in inner thigh and posteromedial aspect of left knee 2-3 weeks ago. She went to the ED and had ultrasound revealing thrombus within the entire imaged left greater saphenous vein.  Pt has been doing warm compresses and NSAIDs for the past week. Redness and pain have not improved. She has no knee pain or swelling. Knee pain has improved significantly since injections 2/25/2019.     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, without long-term current use of insulin 10/31/2017    Diabetes mellitus type 2 without retinopathy 06/12/2014    6% metformin 500 bid, FU+ 2016    DVT (deep venous thrombosis) 2001    R , Dr Bonilla    Hyperlipidemia     LDL 82    Hyperlipidemia associated with type 2 diabetes mellitus 9/11/2012    Hypertension     Hypertension associated with diabetes 9/11/2012    Microalbuminuria due to type 2 diabetes mellitus 12/08/2015    taking lisinopril, losartan caused olivia effects    Risk for coronary artery disease greater than 20% in next 10 years per [place] score 5/1/2018    Strabismus        Review of Systems:  Constitutional: no fever or chills  ENT: no nasal congestion or sore throat  Respiratory: no cough or shortness of breath  Cardiovascular: no chest pain or palpitations  Gastrointestinal: no nausea or vomiting, tolerating diet    OBJECTIVE:     PHYSICAL EXAM:  Height 5' 6" (1.676 m), weight 83.9 kg (184 lb 15.5 oz).   General Appearance: WDWN, NAD  Gait: Normal  Neuro/Psych: Mood & affect " appropriate  Lungs: Respirations equal and unlabored.   CV: 2+ bilateral upper and lower extremity pulses.   Skin: Intact throughout LE  Extremities: No LE edema    Left Lower Extremity:   No TTP, swelling or redness of left knee. No pain with PROM.  There is induration and erythema of medial aspect of right thigh and knee    ASSESSMENT/PLAN:   Thrombophlebitis left LE, saphenous   Symptoms not improving. Pt notes some spread of erythema since ultrasound.  Course of clindamycin for cellulitic component.   F/u with PCP.

## 2019-03-18 DIAGNOSIS — E11.51 CONTROLLED TYPE 2 DIABETES MELLITUS WITH DIABETIC PERIPHERAL ANGIOPATHY WITHOUT GANGRENE, WITHOUT LONG-TERM CURRENT USE OF INSULIN: ICD-10-CM

## 2019-03-18 DIAGNOSIS — E11.21 CONTROLLED TYPE 2 DIABETES MELLITUS WITH DIABETIC NEPHROPATHY, WITHOUT LONG-TERM CURRENT USE OF INSULIN: ICD-10-CM

## 2019-03-18 RX ORDER — METFORMIN HYDROCHLORIDE 500 MG/1
TABLET ORAL
Qty: 90 TABLET | Refills: 3 | Status: SHIPPED | OUTPATIENT
Start: 2019-03-18 | End: 2020-01-14

## 2019-03-18 RX ORDER — AMLODIPINE BESYLATE 5 MG/1
TABLET ORAL
Qty: 90 TABLET | Refills: 3 | Status: SHIPPED | OUTPATIENT
Start: 2019-03-18 | End: 2020-01-14

## 2019-03-18 RX ORDER — LISINOPRIL 5 MG/1
5 TABLET ORAL DAILY
Qty: 90 TABLET | Refills: 3 | Status: SHIPPED | OUTPATIENT
Start: 2019-03-18 | End: 2020-01-14

## 2019-03-18 RX ORDER — METOPROLOL TARTRATE 100 MG/1
100 TABLET ORAL DAILY
Qty: 90 TABLET | Refills: 3 | Status: SHIPPED | OUTPATIENT
Start: 2019-03-18 | End: 2020-01-14

## 2019-03-18 RX ORDER — SIMVASTATIN 10 MG/1
10 TABLET, FILM COATED ORAL NIGHTLY
Qty: 90 TABLET | Refills: 3 | Status: SHIPPED | OUTPATIENT
Start: 2019-03-18 | End: 2020-01-14

## 2019-03-18 NOTE — TELEPHONE ENCOUNTER
90 day refills sent to [facility] on 11/6/18 should have gone to [facility].  Refills pended.

## 2019-03-18 NOTE — TELEPHONE ENCOUNTER
"----- Message from Ronaldo Ying sent at 3/18/2019 12:52 PM CDT -----  Contact: Patient @ [phone]  RX request - refill or new RX.  Is this a refill or new RX:  refill  RX name and strength: metFORMIN (GLUCOPHAGE) 500 MG tablet  Directions: 1 po qd  Is this a 30 day or 90 day RX:  90  Local pharmacy or mail order pharmacy:  Mail order  Pharmacy name and phone # (DON'T enter "on file" or "in chart"): [facility] - [place], [place] - [address] [phone] (Phone)  [phone] (Fax)  Comments:  PLEASE CALL TO CONFIRM THAT IT IS SENT TO [facility], SHE ONLY HAS 8 DAYS LEFT      "

## 2019-03-19 ENCOUNTER — TELEPHONE (OUTPATIENT)
Dept: HEMATOLOGY/ONCOLOGY | Facility: CLINIC | Age: 79
End: 2019-03-19

## 2019-03-19 DIAGNOSIS — Z85.3 HISTORY OF BREAST CANCER: Primary | ICD-10-CM

## 2019-03-19 NOTE — TELEPHONE ENCOUNTER
Returned call left message to call the office number was provided.        ----- Message from Oxana Antonio sent at 3/18/2019 12:18 PM CDT -----  Contact: pt   Pt called to reschedule appt 3/21  Callback#[phone]  Thank You  DENNIS Antonio

## 2019-03-19 NOTE — TELEPHONE ENCOUNTER
Spoke with patient and got her rescheduled for her mammogram and MD visit in May as she requested. Informed pt would mail out some appointment reminder slips in the mail as well. Pt verbalized understanding.     ----- Message from Alanis Mcdaniels sent at 3/19/2019 10:45 AM CDT -----  Contact: Patient   Needs Advice    Reason for call: patient is injured and needs accommodations for a later appointment for mmg and this office, I was unable to accommodate the patient. Order would not allow me to schedule at [facility]. Patient will need new orders for her mammogram.        Communication Preference: [phone] and [phone]  Additional Information: please contact the caller

## 2019-03-26 ENCOUNTER — TELEPHONE (OUTPATIENT)
Dept: HEMATOLOGY/ONCOLOGY | Facility: CLINIC | Age: 79
End: 2019-03-26

## 2019-03-26 NOTE — TELEPHONE ENCOUNTER
Spoke with patient to reschedule her MD visit the same day as her mammogram. Pt agreeable to these times. Informed pt would send out appt reminder slips. Pt verbalized understanding of this information.     ----- Message from Tammie Martinez sent at 3/26/2019  4:33 PM CDT -----  Contact: Pt  Rerouting for scheduling.Thanks!    ----- Message -----  From: Emilee Hopson  Sent: 3/26/2019   3:29 PM  To: Lori Becker    Pt called to reschedule her 5/6 appt Please contact her at [phone].   She has a mammo scheduled for 5/9 and would like to come in that day if possible     Thank you!!!

## 2019-04-09 ENCOUNTER — PATIENT OUTREACH (OUTPATIENT)
Dept: ADMINISTRATIVE | Facility: HOSPITAL | Age: 79
End: 2019-04-09

## 2019-04-23 ENCOUNTER — TELEPHONE (OUTPATIENT)
Dept: HEMATOLOGY/ONCOLOGY | Facility: CLINIC | Age: 79
End: 2019-04-23

## 2019-04-23 NOTE — TELEPHONE ENCOUNTER
Called and spoke with patient in regards to her apt on 5/9 and explained that unfortunately mike would not be in that day and we would need to change her apt. She voiced understanding. apts for mike and mammogram rescheduled to 5/10. Apt slip mailed out

## 2019-04-26 ENCOUNTER — LAB VISIT (OUTPATIENT)
Dept: LAB | Facility: HOSPITAL | Age: 79
End: 2019-04-26
Attending: FAMILY MEDICINE
Payer: MEDICARE

## 2019-04-26 DIAGNOSIS — E78.5 HYPERLIPIDEMIA ASSOCIATED WITH TYPE 2 DIABETES MELLITUS: ICD-10-CM

## 2019-04-26 DIAGNOSIS — E11.69 HYPERLIPIDEMIA ASSOCIATED WITH TYPE 2 DIABETES MELLITUS: ICD-10-CM

## 2019-04-26 DIAGNOSIS — Z00.00 ANNUAL PHYSICAL EXAM: ICD-10-CM

## 2019-04-26 LAB
ALBUMIN SERPL BCP-MCNC: 4.2 G/DL (ref 3.5–5.2)
ALP SERPL-CCNC: 56 U/L (ref 55–135)
ALT SERPL W/O P-5'-P-CCNC: 19 U/L (ref 10–44)
ANION GAP SERPL CALC-SCNC: 8 MMOL/L (ref 8–16)
AST SERPL-CCNC: 23 U/L (ref 10–40)
BASOPHILS # BLD AUTO: 0.07 K/UL (ref 0–0.2)
BASOPHILS NFR BLD: 1.8 % (ref 0–1.9)
BILIRUB SERPL-MCNC: 0.8 MG/DL (ref 0.1–1)
BUN SERPL-MCNC: 11 MG/DL (ref 8–23)
CALCIUM SERPL-MCNC: 10.2 MG/DL (ref 8.7–10.5)
CHLORIDE SERPL-SCNC: 105 MMOL/L (ref 95–110)
CHOLEST SERPL-MCNC: 174 MG/DL (ref 120–199)
CHOLEST/HDLC SERPL: 3.3 {RATIO} (ref 2–5)
CO2 SERPL-SCNC: 26 MMOL/L (ref 23–29)
CREAT SERPL-MCNC: 0.9 MG/DL (ref 0.5–1.4)
DIFFERENTIAL METHOD: ABNORMAL
EOSINOPHIL # BLD AUTO: 0.3 K/UL (ref 0–0.5)
EOSINOPHIL NFR BLD: 8.1 % (ref 0–8)
ERYTHROCYTE [DISTWIDTH] IN BLOOD BY AUTOMATED COUNT: 13.5 % (ref 11.5–14.5)
EST. GFR  (AFRICAN AMERICAN): >60 ML/MIN/1.73 M^2
EST. GFR  (NON AFRICAN AMERICAN): >60 ML/MIN/1.73 M^2
ESTIMATED AVG GLUCOSE: 128 MG/DL (ref 68–131)
GLUCOSE SERPL-MCNC: 120 MG/DL (ref 70–110)
HBA1C MFR BLD HPLC: 6.1 % (ref 4–5.6)
HCT VFR BLD AUTO: 43.4 % (ref 37–48.5)
HDLC SERPL-MCNC: 53 MG/DL (ref 40–75)
HDLC SERPL: 30.5 % (ref 20–50)
HGB BLD-MCNC: 14.1 G/DL (ref 12–16)
IMM GRANULOCYTES # BLD AUTO: 0.01 K/UL (ref 0–0.04)
IMM GRANULOCYTES NFR BLD AUTO: 0.3 % (ref 0–0.5)
LDLC SERPL CALC-MCNC: 101.4 MG/DL (ref 63–159)
LYMPHOCYTES # BLD AUTO: 1.2 K/UL (ref 1–4.8)
LYMPHOCYTES NFR BLD: 32.3 % (ref 18–48)
MCH RBC QN AUTO: 29.1 PG (ref 27–31)
MCHC RBC AUTO-ENTMCNC: 32.5 G/DL (ref 32–36)
MCV RBC AUTO: 90 FL (ref 82–98)
MONOCYTES # BLD AUTO: 0.3 K/UL (ref 0.3–1)
MONOCYTES NFR BLD: 8.1 % (ref 4–15)
NEUTROPHILS # BLD AUTO: 1.9 K/UL (ref 1.8–7.7)
NEUTROPHILS NFR BLD: 49.4 % (ref 38–73)
NONHDLC SERPL-MCNC: 121 MG/DL
NRBC BLD-RTO: 0 /100 WBC
PLATELET # BLD AUTO: 170 K/UL (ref 150–350)
PMV BLD AUTO: 10.2 FL (ref 9.2–12.9)
POTASSIUM SERPL-SCNC: 3.8 MMOL/L (ref 3.5–5.1)
PROT SERPL-MCNC: 7.2 G/DL (ref 6–8.4)
RBC # BLD AUTO: 4.85 M/UL (ref 4–5.4)
SODIUM SERPL-SCNC: 139 MMOL/L (ref 136–145)
TRIGL SERPL-MCNC: 98 MG/DL (ref 30–150)
WBC # BLD AUTO: 3.84 K/UL (ref 3.9–12.7)

## 2019-04-26 PROCEDURE — 83036 HEMOGLOBIN GLYCOSYLATED A1C: CPT | Mod: HCNC

## 2019-04-26 PROCEDURE — 80061 LIPID PANEL: CPT | Mod: HCNC

## 2019-04-26 PROCEDURE — 36415 COLL VENOUS BLD VENIPUNCTURE: CPT | Mod: HCNC,PO

## 2019-04-26 PROCEDURE — 80053 COMPREHEN METABOLIC PANEL: CPT | Mod: HCNC

## 2019-04-26 PROCEDURE — 85025 COMPLETE CBC W/AUTO DIFF WBC: CPT | Mod: HCNC

## 2019-05-02 ENCOUNTER — OFFICE VISIT (OUTPATIENT)
Dept: FAMILY MEDICINE | Facility: CLINIC | Age: 79
End: 2019-05-02
Payer: MEDICARE

## 2019-05-02 VITALS
DIASTOLIC BLOOD PRESSURE: 74 MMHG | SYSTOLIC BLOOD PRESSURE: 134 MMHG | HEIGHT: 67 IN | TEMPERATURE: 98 F | BODY MASS INDEX: 28.72 KG/M2 | WEIGHT: 183 LBS | HEART RATE: 70 BPM

## 2019-05-02 DIAGNOSIS — I15.2 HYPERTENSION ASSOCIATED WITH DIABETES: ICD-10-CM

## 2019-05-02 DIAGNOSIS — E11.59 HYPERTENSION ASSOCIATED WITH DIABETES: ICD-10-CM

## 2019-05-02 DIAGNOSIS — I80.9 SUPERFICIAL THROMBOPHLEBITIS, INVOLVING UNSPECIFIED SITE: ICD-10-CM

## 2019-05-02 DIAGNOSIS — E11.42 TYPE 2 DIABETES MELLITUS WITH DIABETIC POLYNEUROPATHY, WITHOUT LONG-TERM CURRENT USE OF INSULIN: ICD-10-CM

## 2019-05-02 DIAGNOSIS — I73.9 PVD (PERIPHERAL VASCULAR DISEASE) WITH CLAUDICATION: ICD-10-CM

## 2019-05-02 DIAGNOSIS — E11.51 CONTROLLED TYPE 2 DIABETES MELLITUS WITH DIABETIC PERIPHERAL ANGIOPATHY WITHOUT GANGRENE, WITHOUT LONG-TERM CURRENT USE OF INSULIN: Primary | ICD-10-CM

## 2019-05-02 DIAGNOSIS — M15.9 GENERALIZED OSTEOARTHRITIS: ICD-10-CM

## 2019-05-02 DIAGNOSIS — I82.592 CHRONIC EMBOLISM AND THROMBOSIS OF OTHER SPECIFIED DEEP VEIN OF LEFT LOWER EXTREMITY: ICD-10-CM

## 2019-05-02 DIAGNOSIS — Z85.3 HISTORY OF BREAST CANCER: ICD-10-CM

## 2019-05-02 DIAGNOSIS — C50.811 MALIGNANT NEOPLASM OF OVERLAPPING SITES OF RIGHT FEMALE BREAST, UNSPECIFIED ESTROGEN RECEPTOR STATUS: ICD-10-CM

## 2019-05-02 DIAGNOSIS — I74.9 EMBOLISM AND THROMBOSIS OF ARTERY: ICD-10-CM

## 2019-05-02 PROCEDURE — 99999 PR PBB SHADOW E&M-EST. PATIENT-LVL III: CPT | Mod: PBBFAC,HCNC,, | Performed by: FAMILY MEDICINE

## 2019-05-02 PROCEDURE — 3078F DIAST BP <80 MM HG: CPT | Mod: HCNC,CPTII,S$GLB, | Performed by: FAMILY MEDICINE

## 2019-05-02 PROCEDURE — 3075F SYST BP GE 130 - 139MM HG: CPT | Mod: HCNC,CPTII,S$GLB, | Performed by: FAMILY MEDICINE

## 2019-05-02 PROCEDURE — 3075F PR MOST RECENT SYSTOLIC BLOOD PRESS GE 130-139MM HG: ICD-10-PCS | Mod: HCNC,CPTII,S$GLB, | Performed by: FAMILY MEDICINE

## 2019-05-02 PROCEDURE — 99214 OFFICE O/P EST MOD 30 MIN: CPT | Mod: HCNC,S$GLB,, | Performed by: FAMILY MEDICINE

## 2019-05-02 PROCEDURE — 1101F PT FALLS ASSESS-DOCD LE1/YR: CPT | Mod: HCNC,CPTII,S$GLB, | Performed by: FAMILY MEDICINE

## 2019-05-02 PROCEDURE — 3078F PR MOST RECENT DIASTOLIC BLOOD PRESSURE < 80 MM HG: ICD-10-PCS | Mod: HCNC,CPTII,S$GLB, | Performed by: FAMILY MEDICINE

## 2019-05-02 PROCEDURE — 99999 PR PBB SHADOW E&M-EST. PATIENT-LVL III: ICD-10-PCS | Mod: PBBFAC,HCNC,, | Performed by: FAMILY MEDICINE

## 2019-05-02 PROCEDURE — 99214 PR OFFICE/OUTPT VISIT, EST, LEVL IV, 30-39 MIN: ICD-10-PCS | Mod: HCNC,S$GLB,, | Performed by: FAMILY MEDICINE

## 2019-05-02 PROCEDURE — 1101F PR PT FALLS ASSESS DOC 0-1 FALLS W/OUT INJ PAST YR: ICD-10-PCS | Mod: HCNC,CPTII,S$GLB, | Performed by: FAMILY MEDICINE

## 2019-05-02 RX ORDER — LANCETS
EACH MISCELLANEOUS
Qty: 100 EACH | Refills: 12 | Status: SHIPPED | OUTPATIENT
Start: 2019-05-02

## 2019-05-02 RX ORDER — HYDROCHLOROTHIAZIDE 12.5 MG/1
12.5 CAPSULE ORAL DAILY
Qty: 90 CAPSULE | Refills: 3 | Status: SHIPPED | OUTPATIENT
Start: 2019-05-02 | End: 2020-04-27

## 2019-05-02 RX ORDER — INSULIN PUMP SYRINGE, 3 ML
EACH MISCELLANEOUS
Qty: 1 EACH | Refills: 0 | Status: SHIPPED | OUTPATIENT
Start: 2019-05-02 | End: 2020-02-19

## 2019-05-02 RX ORDER — ASPIRIN 81 MG/1
81 TABLET ORAL DAILY
Refills: 0 | COMMUNITY
Start: 2019-05-02 | End: 2020-06-09 | Stop reason: SDUPTHER

## 2019-05-02 NOTE — PATIENT INSTRUCTIONS
Follow with Oncologist/ GYN for breast cancer surveillance    Compression hose daily    She is requesting HCTZ mild diuretic for leg swelling    FOR  DIABETES:  ==================================  SEE ME every  6 MONTHS with  BLOODWORK one week PRIOR  ===================================     Your 1# medicine is  EXERCISE  - huffing & puffing for 20  MINUTES EVERY DAY - check your sugars & you'll see them go down     The future risks of uncontrolled diabetes - include heart attack, stroke, neuropathy (pain in hands & feet that could lead to infection and amputation), nephropathy (leading to kidney dialysis) , and blindness      To prevent complications that can be treated early, please see your  opthalmologist EYE DOCTOR YEARLY       Always wear protective SHOES     Focus on NO SUGAR and no sugar substitutes (splenda,s tevia, etc) IN YOUR DRINKS. With respect to food - LOW CARBOHYDRATES - switch to whole wheat & brown rice.     Decrease & try to stop ALCOHOL, WHITE BREAD, WHITE PASTA, WHITE RICE , WHITE POTATOES- which all turn into sugar.     EAT an EXTRA VEGETABLE A DAY than you already do.

## 2019-05-02 NOTE — PROGRESS NOTES
"Subjective:      Patient ID: Kanchan Downing is a 78 y.o. female.    Chief Complaint: Annual Exam    Here today for diabetes mellitus - 6.1% with metformin 500 qd    Denies acute symptoms such as nocturia, polyuria, unexplained weight loss or blurred vision.    Eye evaluation yearly    Urine microalbumin nml    She does have peripheral neuropathy & takes gabapentin    Denies  Chest pain, shortness of breath.    She had injection by Orthopedist in Feb 2019 for L knee arthritis. Subsequently, she had cellulitis & thrombophlebitis, tx with antibiotics. She has some residual palpable tender vein medial knee on the left side. She does have bilateral leg swelling. Had R DVT in the past & causes R leg swelling. She is requesting "a little diuretic". She drinks lots of water & minimal salt.       Current Outpatient Medications:     albuterol (PROAIR HFA) 90 mcg/actuation inhaler, INHALE 2 PUFFS INTO THE LUNGS EVERY 6 (SIX) HOURS AS  NEEDED FORWHEEZING., Disp: 18 g, Rfl: 1    amLODIPine (NORVASC) 5 MG tablet, TAKE 1 TABLET ONE TIME DAILY, Disp: 90 tablet, Rfl: 3    cetirizine (ZYRTEC) 10 MG tablet, Take 10 mg by mouth once daily.  , Disp: , Rfl:     diclofenac sodium (VOLTAREN) 1 % Gel, Apply 2 g topically once daily., Disp: 100 g, Rfl: 6    fluticasone (FLONASE) 50 mcg/actuation nasal spray, 1 spray by Each Nare route once daily., Disp: 16 g, Rfl: 12    lactobacillus rhamnosus, GG, (PROBIOTIC) 10 billion cell capsule, Take by mouth. 1 Capsule Oral Every day, Disp: , Rfl:     lisinopril (PRINIVIL,ZESTRIL) 5 MG tablet, Take 1 tablet (5 mg total) by mouth once daily., Disp: 90 tablet, Rfl: 3    metFORMIN (GLUCOPHAGE) 500 MG tablet, 1 po qd, Disp: 90 tablet, Rfl: 3    metoprolol tartrate (LOPRESSOR) 100 MG tablet, Take 1 tablet (100 mg total) by mouth once daily., Disp: 90 tablet, Rfl: 3    simvastatin (ZOCOR) 10 MG tablet, Take 1 tablet (10 mg total) by mouth every evening., Disp: 90 tablet, Rfl: 3    aspirin " (ECOTRIN) 81 MG EC tablet, Take 1 tablet (81 mg total) by mouth once daily., Disp: , Rfl: 0    blood sugar diagnostic (BLOOD GLUCOSE TEST) Strp, 1 strip by Misc.(Non-Drug; Combo Route) route 2 (two) times daily., Disp: 100 strip, Rfl: 12    blood sugar diagnostic Strp, 1 strip by Misc.(Non-Drug; Combo Route) route 2 (two) times daily. ACCU-CHEK BROOK PLUS, Disp: 200 strip, Rfl: 3    blood-glucose meter kit, Use as instructed, Disp: 1 each, Rfl: 0    flash glucose scanning reader (FREESTYLE FLOWER READER) Misc, 1 each by Misc.(Non-Drug; Combo Route) route continuous., Disp: 1 each, Rfl: once as needed    hydroCHLOROthiazide (MICROZIDE) 12.5 mg capsule, Take 1 capsule (12.5 mg total) by mouth once daily., Disp: 90 capsule, Rfl: 3    lancets Misc, Test tid, Disp: 100 each, Rfl: 12    polyethylene glycol (GLYCOLAX) 17 gram/dose powder, TAKE 1 CAPFUL (17 GRAMS) DAILY AS DIRECTED, Disp: 527 g, Rfl: 10    Lab Results   Component Value Date    HGBA1C 6.1 (H) 04/26/2019    HGBA1C 6.1 (H) 11/02/2018    HGBA1C 5.8 (H) 04/23/2018     Lab Results   Component Value Date    MICALBCREAT 10.5 11/06/2018     Lab Results   Component Value Date    LDLCALC 101.4 04/26/2019    LDLCALC 95.6 04/23/2018    CHOL 174 04/26/2019    HDL 53 04/26/2019    TRIG 98 04/26/2019       Lab Results   Component Value Date     04/26/2019    K 3.8 04/26/2019     04/26/2019    CO2 26 04/26/2019     (H) 04/26/2019    BUN 11 04/26/2019    CREATININE 0.9 04/26/2019    CALCIUM 10.2 04/26/2019    PROT 7.2 04/26/2019    ALBUMIN 4.2 04/26/2019    BILITOT 0.8 04/26/2019    ALKPHOS 56 04/26/2019    AST 23 04/26/2019    ALT 19 04/26/2019    ANIONGAP 8 04/26/2019    ESTGFRAFRICA >60.0 04/26/2019    EGFRNONAA >60.0 04/26/2019    WBC 3.84 (L) 04/26/2019    HGB 14.1 04/26/2019    HGB 13.9 04/23/2018    HCT 43.4 04/26/2019    MCV 90 04/26/2019     04/26/2019    TSH 4.708 (H) 04/21/2017    OWAKAZBX88DP 41 05/08/2014       Past Medical  History:   Diagnosis Date    Anxiety     Arthritis     Dr Schofield    Arthritis of hand 4/27/2017    Atherosclerosis of aorta 06/08/2016    CXR 2013    Breast cancer 2011    right breast invasive micropapillary CA, Stage !A    Cataract     Controlled type 2 diabetes mellitus with circulatory disorder, without long-term current use of insulin 10/31/2017    Diabetes mellitus type 2 without retinopathy 06/12/2014    6% metformin 500 bid, FU+ 2016    DVT (deep venous thrombosis) 2001    R , Dr Bonilla    Hyperlipidemia     LDL 82    Hyperlipidemia associated with type 2 diabetes mellitus 9/11/2012    Hypertension     Hypertension associated with diabetes 9/11/2012    Microalbuminuria due to type 2 diabetes mellitus 12/08/2015    taking lisinopril, losartan caused olivia effects    PVD (peripheral vascular disease) with claudication 5/2/2019    Compression hose    Risk for coronary artery disease greater than 20% in next 10 years per McLeod score 5/1/2018    Strabismus      Past Surgical History:   Procedure Laterality Date    BREAST BIOPSY Right 2011    BREAST LUMPECTOMY Right 2011    AL REMOVAL OF NAIL BED  6/10/2015    TONSILLECTOMY       Social History     Social History Narrative     to Marques, 3 children, nondrinker, nonsmoker, she declines colonoscopy     Family History   Problem Relation Age of Onset    Heart disease Mother 58    Cataracts Mother     Heart disease Father 50    Thyroid disease Sister     Cancer Sister     No Known Problems Brother     No Known Problems Maternal Aunt     No Known Problems Maternal Uncle     No Known Problems Paternal Aunt     No Known Problems Paternal Uncle     No Known Problems Maternal Grandmother     No Known Problems Maternal Grandfather     No Known Problems Paternal Grandmother     No Known Problems Paternal Grandfather     Amblyopia Neg Hx     Blindness Neg Hx     Diabetes Neg Hx     Glaucoma Neg Hx     Hypertension Neg Hx      "Macular degeneration Neg Hx     Retinal detachment Neg Hx     Strabismus Neg Hx     Stroke Neg Hx      Vitals:    05/02/19 1248   BP: 134/74   Pulse: 70   Temp: 97.8 °F (36.6 °C)   TempSrc: Oral   Weight: 83 kg (182 lb 15.7 oz)   Height: 5' 6.5" (1.689 m)   PainSc:   2   PainLoc: Knee     Objective:   Physical Exam   Constitutional: She is oriented to person, place, and time. She appears well-developed and well-nourished.   HENT:   Head: Normocephalic and atraumatic.   Right Ear: External ear normal.   Left Ear: External ear normal.   Nose: Nose normal.   Mouth/Throat: Oropharynx is clear and moist.   Eyes: Pupils are equal, round, and reactive to light. EOM are normal.   Neck: Neck supple. No thyromegaly present.   Cardiovascular: Normal rate, regular rhythm, normal heart sounds and intact distal pulses.   No murmur heard.  Pulmonary/Chest: Effort normal and breath sounds normal. She has no wheezes.   Abdominal: Soft. Bowel sounds are normal. She exhibits no distension and no mass. There is no tenderness. There is no rebound and no guarding.   Musculoskeletal: She exhibits no edema.        Right foot: There is normal range of motion and no deformity.        Left foot: There is normal range of motion and no deformity.        Feet:   Right Foot:   Protective Sensation: 5 sites tested. 5 sites sensed.   Skin Integrity: Negative for ulcer, blister, skin breakdown, erythema or warmth.   Left Foot:   Protective Sensation: 5 sites tested. 5 sites sensed.   Skin Integrity: Negative for ulcer, blister, skin breakdown, erythema or warmth.   Lymphadenopathy:     She has no cervical adenopathy.   Neurological: She is alert and oriented to person, place, and time.   Skin: Skin is warm and dry.   Psychiatric: She has a normal mood and affect. Her behavior is normal.     Assessment:     1. Controlled type 2 diabetes mellitus with diabetic peripheral angiopathy without gangrene, without long-term current use of insulin    2. " Hypertension associated with diabetes    3. 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 Generalized osteoarthritis    5. Type 2 diabetes mellitus with diabetic polyneuropathy, without long-term current use of insulin    6. History of breast cancer    7. PVD (peripheral vascular disease) with claudication    8. Malignant neoplasm of overlapping sites of right female breast, unspecified estrogen receptor status    9. Embolism and thrombosis of artery    10. Superficial thrombophlebitis, involving unspecified site      Plan:     Orders Placed This Encounter    aspirin (ECOTRIN) 81 MG EC tablet    blood-glucose meter kit    lancets Misc    blood sugar diagnostic (BLOOD GLUCOSE TEST) Strp    hydroCHLOROthiazide (MICROZIDE) 12.5 mg capsule       Patient Instructions   Follow with Oncologist/ GYN for breast cancer surveillance    Compression hose daily    She is requesting HCTZ mild diuretic for leg swelling    FOR  DIABETES:  ==================================  SEE ME every  6 MONTHS with  BLOODWORK one week PRIOR  ===================================     Your 1# medicine is  EXERCISE  - huffing & puffing for 20  MINUTES EVERY DAY - check your sugars & you'll see them go down     The future risks of uncontrolled diabetes - include heart attack, stroke, neuropathy (pain in hands & feet that could lead to infection and amputation), nephropathy (leading to kidney dialysis) , and blindness      To prevent complications that can be treated early, please see your  opthalmologist EYE DOCTOR YEARLY       Always wear protective SHOES     Focus on NO SUGAR and no sugar substitutes (splenda,s tevia, etc) IN YOUR DRINKS. With respect to food - LOW CARBOHYDRATES - switch to whole wheat & brown rice.     Decrease & try to stop ALCOHOL, WHITE BREAD, WHITE PASTA, WHITE RICE , WHITE POTATOES- which all turn into sugar.     EAT an EXTRA VEGETABLE A DAY than you already  .

## 2019-05-10 ENCOUNTER — OFFICE VISIT (OUTPATIENT)
Dept: HEMATOLOGY/ONCOLOGY | Facility: CLINIC | Age: 79
End: 2019-05-10
Payer: MEDICARE

## 2019-05-10 ENCOUNTER — HOSPITAL ENCOUNTER (OUTPATIENT)
Dept: RADIOLOGY | Facility: HOSPITAL | Age: 79
Discharge: HOME | End: 2019-05-10
Attending: INTERNAL MEDICINE
Payer: MEDICARE

## 2019-05-10 VITALS
TEMPERATURE: 98 F | BODY MASS INDEX: 28.86 KG/M2 | HEART RATE: 62 BPM | SYSTOLIC BLOOD PRESSURE: 120 MMHG | OXYGEN SATURATION: 95 % | RESPIRATION RATE: 18 BRPM | DIASTOLIC BLOOD PRESSURE: 75 MMHG | HEIGHT: 67 IN | WEIGHT: 183.88 LBS

## 2019-05-10 VITALS — WEIGHT: 182 LBS | BODY MASS INDEX: 28.56 KG/M2 | HEIGHT: 67 IN

## 2019-05-10 DIAGNOSIS — Z85.3 HISTORY OF BREAST CANCER: ICD-10-CM

## 2019-05-10 DIAGNOSIS — Z85.3 HISTORY OF BREAST CANCER: Primary | ICD-10-CM

## 2019-05-10 PROBLEM — C50.811 MALIGNANT NEOPLASM OF OVERLAPPING SITES OF RIGHT FEMALE BREAST: Status: RESOLVED | Noted: 2019-05-02 | Resolved: 2019-05-10

## 2019-05-10 PROCEDURE — 99213 PR OFFICE/OUTPT VISIT, EST, LEVL III, 20-29 MIN: ICD-10-PCS | Mod: HCNC,S$GLB,, | Performed by: PHYSICIAN ASSISTANT

## 2019-05-10 PROCEDURE — 1101F PR PT FALLS ASSESS DOC 0-1 FALLS W/OUT INJ PAST YR: ICD-10-PCS | Mod: HCNC,CPTII,S$GLB, | Performed by: PHYSICIAN ASSISTANT

## 2019-05-10 PROCEDURE — 3078F PR MOST RECENT DIASTOLIC BLOOD PRESSURE < 80 MM HG: ICD-10-PCS | Mod: HCNC,CPTII,S$GLB, | Performed by: PHYSICIAN ASSISTANT

## 2019-05-10 PROCEDURE — 3078F DIAST BP <80 MM HG: CPT | Mod: HCNC,CPTII,S$GLB, | Performed by: PHYSICIAN ASSISTANT

## 2019-05-10 PROCEDURE — 77062 MAMMO DIGITAL DIAGNOSTIC BILAT WITH TOMOSYNTHESIS_CAD: ICD-10-PCS | Mod: 26,HCNC,, | Performed by: RADIOLOGY

## 2019-05-10 PROCEDURE — 77066 MAMMO DIGITAL DIAGNOSTIC BILAT WITH TOMOSYNTHESIS_CAD: ICD-10-PCS | Mod: 26,HCNC,, | Performed by: RADIOLOGY

## 2019-05-10 PROCEDURE — 77066 DX MAMMO INCL CAD BI: CPT | Mod: 26,HCNC,, | Performed by: RADIOLOGY

## 2019-05-10 PROCEDURE — 77062 BREAST TOMOSYNTHESIS BI: CPT | Mod: TC,HCNC,PO

## 2019-05-10 PROCEDURE — 3074F PR MOST RECENT SYSTOLIC BLOOD PRESSURE < 130 MM HG: ICD-10-PCS | Mod: HCNC,CPTII,S$GLB, | Performed by: PHYSICIAN ASSISTANT

## 2019-05-10 PROCEDURE — 99999 PR PBB SHADOW E&M-EST. PATIENT-LVL V: ICD-10-PCS | Mod: PBBFAC,HCNC,, | Performed by: PHYSICIAN ASSISTANT

## 2019-05-10 PROCEDURE — 99213 OFFICE O/P EST LOW 20 MIN: CPT | Mod: HCNC,S$GLB,, | Performed by: PHYSICIAN ASSISTANT

## 2019-05-10 PROCEDURE — 1101F PT FALLS ASSESS-DOCD LE1/YR: CPT | Mod: HCNC,CPTII,S$GLB, | Performed by: PHYSICIAN ASSISTANT

## 2019-05-10 PROCEDURE — 3074F SYST BP LT 130 MM HG: CPT | Mod: HCNC,CPTII,S$GLB, | Performed by: PHYSICIAN ASSISTANT

## 2019-05-10 PROCEDURE — 77062 BREAST TOMOSYNTHESIS BI: CPT | Mod: 26,HCNC,, | Performed by: RADIOLOGY

## 2019-05-10 PROCEDURE — 99999 PR PBB SHADOW E&M-EST. PATIENT-LVL V: CPT | Mod: PBBFAC,HCNC,, | Performed by: PHYSICIAN ASSISTANT

## 2019-05-10 RX ORDER — LANCETS 33 GAUGE
EACH MISCELLANEOUS
Status: ON HOLD | COMMUNITY
Start: 2019-05-07 | End: 2024-01-14 | Stop reason: ALTCHOICE

## 2019-05-10 NOTE — PROGRESS NOTES
Subjective:       Patient ID: Kanchan Downing is a 78 y.o. female.    Chief Complaint: History of breast cancer    Ms. Downing is a 78-year-old with stage I carcinoma of   the right breast upper inner quadrant, status post lumpectomy in 01/2011   for T1c N0, ER positive, HER-2 negative tumor. She completed endocrine therapy in fall of 2016.    Patient has cyst on her back that has been evaluated by dermatology, deemed benign.     Overall feeling ok, notes continued arthritic symptoms.    No shortness of breath, breast pain, headaches or visual changes. Appetite and energy level are good.     Bilateral mammogram from today was unremarkable.       Review of Systems   Constitutional: Negative.    HENT: Negative for congestion, rhinorrhea, sore throat and trouble swallowing.    Eyes: Negative for visual disturbance.   Respiratory: Negative for cough, chest tightness and shortness of breath.    Cardiovascular: Negative for chest pain, palpitations and leg swelling.   Gastrointestinal: Negative for abdominal pain, blood in stool, constipation, diarrhea, nausea and vomiting.   Genitourinary: Negative for dysuria, hematuria and vaginal bleeding.   Musculoskeletal: Positive for arthralgias. Negative for back pain and myalgias.   Skin: Positive for rash. Negative for pallor.   Neurological: Negative for dizziness, weakness and headaches.   Hematological: Negative for adenopathy. Does not bruise/bleed easily.   Psychiatric/Behavioral: Negative for dysphoric mood and suicidal ideas. The patient is not nervous/anxious.        Objective:      Physical Exam   Constitutional: She is oriented to person, place, and time. She appears well-developed and well-nourished. No distress.   Presents alone   HENT:   Head: Normocephalic.   Mouth/Throat: Oropharynx is clear and moist. No oropharyngeal exudate.   Eyes: Pupils are equal, round, and reactive to light. Conjunctivae are normal. No scleral icterus.   Neck: Normal range of motion. Neck  supple. No thyromegaly present.   Cardiovascular: Normal rate and regular rhythm.   Pulmonary/Chest: Effort normal and breath sounds normal. No respiratory distress.   Lumpectomy incision at medial right breast with associated volume loss. There is fibrosis at medial aspect of incision. Nipple inversion on left, present since prior to surgery.  Left breast without mass or nodule. No axillary or supraclavicular adenopathy.    Abdominal: Soft. Bowel sounds are normal. She exhibits no distension and no mass. There is no tenderness.   Musculoskeletal: Normal range of motion. She exhibits no edema or tenderness.   No spinal or paraspinal tenderness to palpation     Lymphadenopathy:     She has no cervical adenopathy.   Neurological: She is alert and oriented to person, place, and time. No cranial nerve deficit.   Ambulates with cane    Skin: Skin is warm and dry.   Psychiatric: She has a normal mood and affect. Her behavior is normal. Judgment and thought content normal.   Vitals reviewed.      Assessment:       1. History of breast cancer        Plan:       Return to clinic in one year with annual mammogram.    Distress Screening Results: Psychosocial Distress screening score of Distress Score: 0 noted and reviewed. No intervention indicated.  Distress Screening Results: Psychosocial Distress screening score of Distress Score: 0 noted and reviewed. No intervention indicated.

## 2019-06-06 ENCOUNTER — PES CALL (OUTPATIENT)
Dept: ADMINISTRATIVE | Facility: CLINIC | Age: 79
End: 2019-06-06

## 2019-06-13 ENCOUNTER — PES CALL (OUTPATIENT)
Dept: ADMINISTRATIVE | Facility: CLINIC | Age: 79
End: 2019-06-13

## 2019-07-02 ENCOUNTER — OFFICE VISIT (OUTPATIENT)
Dept: URGENT CARE | Facility: CLINIC | Age: 79
End: 2019-07-02
Payer: MEDICARE

## 2019-07-02 VITALS
OXYGEN SATURATION: 96 % | WEIGHT: 184 LBS | DIASTOLIC BLOOD PRESSURE: 72 MMHG | RESPIRATION RATE: 20 BRPM | TEMPERATURE: 99 F | HEART RATE: 62 BPM | HEIGHT: 67 IN | BODY MASS INDEX: 28.88 KG/M2 | SYSTOLIC BLOOD PRESSURE: 134 MMHG

## 2019-07-02 DIAGNOSIS — H91.93 HEARING PROBLEM OF BOTH EARS: Primary | ICD-10-CM

## 2019-07-02 PROCEDURE — 99214 OFFICE O/P EST MOD 30 MIN: CPT | Mod: S$GLB,,, | Performed by: PHYSICIAN ASSISTANT

## 2019-07-02 PROCEDURE — 3075F SYST BP GE 130 - 139MM HG: CPT | Mod: CPTII,S$GLB,, | Performed by: PHYSICIAN ASSISTANT

## 2019-07-02 PROCEDURE — 3078F DIAST BP <80 MM HG: CPT | Mod: CPTII,S$GLB,, | Performed by: PHYSICIAN ASSISTANT

## 2019-07-02 PROCEDURE — 3075F PR MOST RECENT SYSTOLIC BLOOD PRESS GE 130-139MM HG: ICD-10-PCS | Mod: CPTII,S$GLB,, | Performed by: PHYSICIAN ASSISTANT

## 2019-07-02 PROCEDURE — 3078F PR MOST RECENT DIASTOLIC BLOOD PRESSURE < 80 MM HG: ICD-10-PCS | Mod: CPTII,S$GLB,, | Performed by: PHYSICIAN ASSISTANT

## 2019-07-02 PROCEDURE — 99214 PR OFFICE/OUTPT VISIT, EST, LEVL IV, 30-39 MIN: ICD-10-PCS | Mod: S$GLB,,, | Performed by: PHYSICIAN ASSISTANT

## 2019-07-02 PROCEDURE — 1101F PR PT FALLS ASSESS DOC 0-1 FALLS W/OUT INJ PAST YR: ICD-10-PCS | Mod: CPTII,S$GLB,, | Performed by: PHYSICIAN ASSISTANT

## 2019-07-02 PROCEDURE — 1101F PT FALLS ASSESS-DOCD LE1/YR: CPT | Mod: CPTII,S$GLB,, | Performed by: PHYSICIAN ASSISTANT

## 2019-07-02 NOTE — PROGRESS NOTES
"Subjective:       Patient ID: Kanchan Downing is a 78 y.o. female.    Vitals:  height is 5' 6.5" (1.689 m) and weight is 83.5 kg (184 lb). Her tympanic temperature is 98.7 °F (37.1 °C). Her blood pressure is 134/72 and her pulse is 62. Her respiration is 20 and oxygen saturation is 96%.     Chief Complaint: Otalgia (Bilateral Ear Fullness)    This is a 78 y.o. female who presents today with a chief complaint of bilateral ear fullness for a "couple of months".  She states she woke up this morning and her right ear was hurting.  Pain level 7/10.  She has not taken anything for this.     Ear Fullness    There is pain in both ears. This is a new problem. The current episode started more than 1 month ago ("A couple of months"). The problem occurs constantly. The problem has been gradually worsening. There has been no fever. The pain is at a severity of 7/10 (Right Ear). The pain is moderate. Associated symptoms include hearing loss. Pertinent negatives include no coughing, diarrhea, headaches, rash, sore throat or vomiting. She has tried nothing for the symptoms.       Constitution: Negative for chills, fatigue and fever.   HENT: Positive for hearing loss. Negative for congestion and sore throat.         Bilateral Ear "Fullness"   Neck: Negative for painful lymph nodes.   Cardiovascular: Negative for chest pain and leg swelling.   Eyes: Negative for double vision and blurred vision.   Respiratory: Negative for cough and shortness of breath.    Gastrointestinal: Negative for nausea, vomiting and diarrhea.   Genitourinary: Negative for dysuria, frequency, urgency and history of kidney stones.   Musculoskeletal: Negative for joint pain, joint swelling, muscle cramps and muscle ache.   Skin: Negative for color change, pale, rash, erythema and bruising.   Allergic/Immunologic: Negative for seasonal allergies.   Neurological: Negative for dizziness, history of vertigo, light-headedness, passing out and headaches. " POSTPARTUM     Experiences normal postpartum course Progressing     Appropriate maternal -  bonding Progressing     Establishment of infant feeding pattern Progressing     Incision(s), wounds(s) or drain site(s) healing without S/S of infection Progressing   Hematologic/Lymphatic: Negative for swollen lymph nodes.   Psychiatric/Behavioral: Negative for nervous/anxious, sleep disturbance and depression. The patient is not nervous/anxious.        Objective:      Physical Exam   Constitutional: She is oriented to person, place, and time. Vital signs are normal. She appears well-developed and well-nourished. She does not appear ill. No distress.   HENT:   Head: Normocephalic and atraumatic.   Right Ear: Hearing, tympanic membrane, external ear and ear canal normal.   Left Ear: Hearing, tympanic membrane, external ear and ear canal normal.   Nose: Nose normal. No mucosal edema. Right sinus exhibits no maxillary sinus tenderness and no frontal sinus tenderness. Left sinus exhibits no maxillary sinus tenderness and no frontal sinus tenderness.   Mouth/Throat: Uvula is midline and oropharynx is clear and moist. No oropharyngeal exudate, posterior oropharyngeal edema or posterior oropharyngeal erythema.   No cerumen in ear canals   Eyes: Conjunctivae, EOM and lids are normal. Right eye exhibits no discharge. Left eye exhibits no discharge.   Neck: Normal range of motion. Neck supple.   Cardiovascular: Normal rate, regular rhythm and normal heart sounds. Exam reveals no gallop and no friction rub.   No murmur heard.  Pulmonary/Chest: Effort normal and breath sounds normal. No stridor. No respiratory distress. She has no decreased breath sounds. She has no wheezes. She has no rhonchi. She has no rales.   Musculoskeletal: Normal range of motion.   Lymphadenopathy:        Head (right side): No submandibular and no tonsillar adenopathy present.        Head (left side): No submandibular and no tonsillar adenopathy present.   Neurological: She is alert and oriented to person, place, and time.   Skin: Skin is warm and dry. No rash noted. She is not diaphoretic. No erythema. No pallor.   Psychiatric: She has a normal mood and affect. Her behavior is normal.   Nursing note and vitals  reviewed.      Assessment:       1. Hearing problem of both ears        Plan:       Called [facility] to schedule patient to be seen by ENT. Appointments are available only at main campus and [facility] location - patient prefers [facility] location, so she would like to wait and schedule the appointment at a later date.    Hearing problem of both ears  -     Ambulatory referral to ENT      Patient Instructions   Call [phone] to schedule an appointment with ENT. A referral has been placed in your chart.    Please follow up with your primary care provider within 2-5 days if your signs and symptoms have not resolved or worsen.     If your condition worsens or fails to improve we recommend that you receive another evaluation at the emergency room immediately or contact your primary medical clinic to discuss your concerns.   You must understand that you have received an Urgent Care treatment only and that you may be released before all of your medical problems are known or treated. You, the patient, will arrange for follow up care as instructed.

## 2019-07-02 NOTE — PATIENT INSTRUCTIONS
Call [phone] to schedule an appointment with ENT. A referral has been placed in your chart.    Please follow up with your primary care provider within 2-5 days if your signs and symptoms have not resolved or worsen.     If your condition worsens or fails to improve we recommend that you receive another evaluation at the emergency room immediately or contact your primary medical clinic to discuss your concerns.   You must understand that you have received an Urgent Care treatment only and that you may be released before all of your medical problems are known or treated. You, the patient, will arrange for follow up care as instructed.

## 2019-07-05 ENCOUNTER — TELEPHONE (OUTPATIENT)
Dept: URGENT CARE | Facility: CLINIC | Age: 79
End: 2019-07-05

## 2019-07-05 NOTE — TELEPHONE ENCOUNTER
07/05/19 Patient stated her hearing and pain has improved after she started taking Zyrtec. ENT called to set up and appointment and she declined. She will see ENT if needed.

## 2019-07-26 ENCOUNTER — OFFICE VISIT (OUTPATIENT)
Dept: OPTOMETRY | Facility: CLINIC | Age: 79
End: 2019-07-26
Payer: COMMERCIAL

## 2019-07-26 DIAGNOSIS — H25.13 NUCLEAR SCLEROSIS OF BOTH EYES: ICD-10-CM

## 2019-07-26 DIAGNOSIS — E11.9 TYPE 2 DIABETES MELLITUS WITHOUT RETINOPATHY: Primary | ICD-10-CM

## 2019-07-26 DIAGNOSIS — H52.7 REFRACTIVE ERROR: ICD-10-CM

## 2019-07-26 PROCEDURE — 99999 PR PBB SHADOW E&M-EST. PATIENT-LVL III: ICD-10-PCS | Mod: PBBFAC,,, | Performed by: OPTOMETRIST

## 2019-07-26 PROCEDURE — 99499 RISK ADDL DX/OHS AUDIT: ICD-10-PCS | Mod: S$GLB,,, | Performed by: OPTOMETRIST

## 2019-07-26 PROCEDURE — 92015 PR REFRACTION: ICD-10-PCS | Mod: S$GLB,,, | Performed by: OPTOMETRIST

## 2019-07-26 PROCEDURE — 92014 COMPRE OPH EXAM EST PT 1/>: CPT | Mod: S$GLB,,, | Performed by: OPTOMETRIST

## 2019-07-26 PROCEDURE — 99499 UNLISTED E&M SERVICE: CPT | Mod: S$GLB,,, | Performed by: OPTOMETRIST

## 2019-07-26 PROCEDURE — 99999 PR PBB SHADOW E&M-EST. PATIENT-LVL III: CPT | Mod: PBBFAC,,, | Performed by: OPTOMETRIST

## 2019-07-26 PROCEDURE — 92014 PR EYE EXAM, EST PATIENT,COMPREHESV: ICD-10-PCS | Mod: S$GLB,,, | Performed by: OPTOMETRIST

## 2019-07-26 PROCEDURE — 92015 DETERMINE REFRACTIVE STATE: CPT | Mod: S$GLB,,, | Performed by: OPTOMETRIST

## 2019-07-26 RX ORDER — OLOPATADINE HYDROCHLORIDE 2 MG/ML
1 SOLUTION/ DROPS OPHTHALMIC DAILY
Qty: 2.5 ML | Refills: 12 | Status: ON HOLD | OUTPATIENT
Start: 2019-07-26 | End: 2024-01-14 | Stop reason: ALTCHOICE

## 2019-07-26 NOTE — PROGRESS NOTES
HPI     ELDER 07/2018 Diabetic doesn't monitor BS at home.  Glasses about 4 yrs.   Old, distance is blurred OD>OS.  Occasional FBS in outer corner OD.  Uses   allergy drops prn.    Hemoglobin A1C       Date                     Value               Ref Range             Status                04/26/2019               6.1 (H)             4.0 - 5.6 %           Final           11/02/2018               6.1 (H)             4.0 - 5.6 %           Final            04/23/2018               5.8 (H)             4.0 - 5.6 %           Final                  Last edited by Thuy Warner on 7/26/2019 11:24 AM. (History)            Assessment /Plan     For exam results, see Encounter Report.    Type 2 diabetes mellitus without retinopathy    Nuclear sclerosis of both eyes    Refractive error    Other orders  -     olopatadine (PATADAY) 0.2 % Drop; Place 1 drop into both eyes once daily.  Dispense: 2.5 mL; Refill: 12      1. No diabetic retinopathy, no csme. Return in 1 year for dilated eye exam.  2. Educated pt on presence of cataracts and effects on vision. No surgery at this time. Recheck in one year.  3. New Spec Rx given. Different lens options discussed with patient. RTC 1 year full exam.  4. Recommended Pataday OU for itching

## 2019-09-10 ENCOUNTER — OFFICE VISIT (OUTPATIENT)
Dept: URGENT CARE | Facility: CLINIC | Age: 79
End: 2019-09-10
Payer: MEDICARE

## 2019-09-10 VITALS
RESPIRATION RATE: 20 BRPM | TEMPERATURE: 98 F | HEIGHT: 67 IN | DIASTOLIC BLOOD PRESSURE: 56 MMHG | WEIGHT: 185 LBS | SYSTOLIC BLOOD PRESSURE: 131 MMHG | OXYGEN SATURATION: 98 % | HEART RATE: 63 BPM | BODY MASS INDEX: 29.03 KG/M2

## 2019-09-10 DIAGNOSIS — B35.3 TINEA PEDIS OF LEFT FOOT: Primary | ICD-10-CM

## 2019-09-10 DIAGNOSIS — E08.628: ICD-10-CM

## 2019-09-10 PROCEDURE — 99499 UNLISTED E&M SERVICE: CPT | Mod: S$GLB,,, | Performed by: NURSE PRACTITIONER

## 2019-09-10 PROCEDURE — 1101F PT FALLS ASSESS-DOCD LE1/YR: CPT | Mod: CPTII,S$GLB,, | Performed by: NURSE PRACTITIONER

## 2019-09-10 PROCEDURE — 99213 OFFICE O/P EST LOW 20 MIN: CPT | Mod: S$GLB,,, | Performed by: NURSE PRACTITIONER

## 2019-09-10 PROCEDURE — 1101F PR PT FALLS ASSESS DOC 0-1 FALLS W/OUT INJ PAST YR: ICD-10-PCS | Mod: CPTII,S$GLB,, | Performed by: NURSE PRACTITIONER

## 2019-09-10 PROCEDURE — 3075F SYST BP GE 130 - 139MM HG: CPT | Mod: CPTII,S$GLB,, | Performed by: NURSE PRACTITIONER

## 2019-09-10 PROCEDURE — 3078F DIAST BP <80 MM HG: CPT | Mod: CPTII,S$GLB,, | Performed by: NURSE PRACTITIONER

## 2019-09-10 PROCEDURE — 3075F PR MOST RECENT SYSTOLIC BLOOD PRESS GE 130-139MM HG: ICD-10-PCS | Mod: CPTII,S$GLB,, | Performed by: NURSE PRACTITIONER

## 2019-09-10 PROCEDURE — 3078F PR MOST RECENT DIASTOLIC BLOOD PRESSURE < 80 MM HG: ICD-10-PCS | Mod: CPTII,S$GLB,, | Performed by: NURSE PRACTITIONER

## 2019-09-10 PROCEDURE — 99499 RISK ADDL DX/OHS AUDIT: ICD-10-PCS | Mod: S$GLB,,, | Performed by: NURSE PRACTITIONER

## 2019-09-10 PROCEDURE — 99213 PR OFFICE/OUTPT VISIT, EST, LEVL III, 20-29 MIN: ICD-10-PCS | Mod: S$GLB,,, | Performed by: NURSE PRACTITIONER

## 2019-09-10 RX ORDER — PRENATAL VIT 91/IRON/FOLIC/DHA 28-975-200
COMBINATION PACKAGE (EA) ORAL 2 TIMES DAILY
Qty: 24 G | Refills: 0 | Status: SHIPPED | OUTPATIENT
Start: 2019-09-10 | End: 2022-04-06 | Stop reason: ALTCHOICE

## 2019-09-10 NOTE — PROGRESS NOTES
"Subjective:       Patient ID: Kanchan Downing is a 79 y.o. female.    Vitals:  height is 5' 6.5" (1.689 m) and weight is 83.9 kg (185 lb). Her oral temperature is 98 °F (36.7 °C). Her blood pressure is 131/56 (abnormal) and her pulse is 63. Her respiration is 20 and oxygen saturation is 98%.     Chief Complaint: Recurrent Skin Infections    This is a 79 y.o. female   who presents today with a chief complaint of left foot problem. She noticed a spot on her foot two days ago. She suffers from diabetes and was worried about what it could be. She's not complaining of pain or discomrofrt. She hasn't used any medication to help relieve her symptoms.     Foot Injury    The incident occurred 2 days ago. The incident occurred at home. There was no injury mechanism. The pain is present in the left foot. The pain is at a severity of 0/10. The patient is experiencing no pain. She reports no foreign bodies present. Nothing aggravates the symptoms. She has tried nothing for the symptoms.       Constitution: Negative for chills.   Neck: Negative for painful lymph nodes.   Cardiovascular: Negative for chest pain and leg swelling.   Eyes: Negative for double vision and blurred vision.   Gastrointestinal: Negative for nausea.   Genitourinary: Negative for dysuria, frequency, urgency and history of kidney stones.   Musculoskeletal: Negative for joint pain, joint swelling, muscle cramps and muscle ache.   Skin: Positive for color change. Negative for pale, erythema and bruising.   Allergic/Immunologic: Negative for seasonal allergies.   Neurological: Negative for dizziness, history of vertigo, light-headedness, passing out and headaches.   Hematologic/Lymphatic: Negative for swollen lymph nodes.   Psychiatric/Behavioral: Negative for nervous/anxious, sleep disturbance and depression. The patient is not nervous/anxious.        Objective:      Physical Exam   Constitutional: She is oriented to person, place, and time. She appears " well-developed and well-nourished.   HENT:   Head: Normocephalic and atraumatic. Head is without abrasion, without contusion and without laceration.   Right Ear: External ear normal.   Left Ear: External ear normal.   Nose: Nose normal.   Mouth/Throat: Oropharynx is clear and moist.   Eyes: Pupils are equal, round, and reactive to light. Conjunctivae, EOM and lids are normal.   Neck: Trachea normal, full passive range of motion without pain and phonation normal. Neck supple.   Cardiovascular: Normal rate, regular rhythm and normal heart sounds.   Pulses:       Dorsalis pedis pulses are 2+ on the left side.   Pulmonary/Chest: Effort normal and breath sounds normal. No stridor. No respiratory distress.   Musculoskeletal: Normal range of motion.        Left foot: There is normal range of motion, no tenderness and normal capillary refill.        Feet:    Neurological: She is alert and oriented to person, place, and time.   Skin: Skin is warm, dry and intact. Capillary refill takes less than 2 seconds. Rash (white plaque noted between 4th and 5th toe left foot) noted. No abrasion, no bruising, no burn, no ecchymosis, no laceration and no lesion noted. No erythema.   Psychiatric: She has a normal mood and affect. Her speech is normal and behavior is normal. Judgment and thought content normal. Cognition and memory are normal.   Nursing note and vitals reviewed.      Assessment:       1. Tinea pedis of left foot    2. 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 On rash x 14 days  Dispense: 24 g; Refill: 0

## 2019-09-10 NOTE — PATIENT INSTRUCTIONS
Return to Urgent Care or go to ER if symptoms worsen or fail to improve.  Follow up with PCP as recommended for further management.     You have been referred to Podiatry for further management.  Scheduling should contact you to make an appointment.  If you do not hear from anyone, please call [phone] to schedule an appointment with Podiatry.      Athletes Foot    Athletes foot (tinea pedis) is caused by a fungal infection in the skin. It affects the skin between the toes, causing cracks in the skin called fissures. It can also affect the bottom of the foot where it causes dry white scales and peeling of the skin. This infection is more likely to occur when the foot is in hot, sweaty socks and shoes for long periods of time. You may feel itching and burning between your toes. This infection is treated with skin creams or medicine taken by mouth.  Home care  The following are general care guidelines:  · It is important to keep the feet dry. Use absorbent cotton socks and change them if they become sweaty. Or wear an open-toe shoe or sandal. Wash the feet at least once a day with soap and water.  · Apply the antifungal cream as prescribed. Some antifungal creams are available without a prescription.  · It may take a week before the rash starts to improve. It can take about 3 to 4 weeks to completely clear. Continue the medicine until the rash is all gone.  · Use over-the-counter antifungal powders or sprays on your feet after exposure to high-risk environments, such as public showers, gyms, and locker rooms. This can help prevent future infections. Wearing appropriate shoes in these situations can help.  Prevention  The following tips may help prevent athletes foot:  · Don't share shoes or socks with someone who has athlete's foot.  · Don't walk barefoot in places where a fungal infection can spread quickly such as locker rooms, showers, and swimming pools.  · Change socks regularly.  · Alternate shoes to  assist in drying.  Follow-up care  Follow up with your healthcare provider as recommended if the rash does not improve after 10 days of treatment, or if the rash continues to spread.  When to seek medical care  Get medical attention right away if any of the following occur:  · Fever of 100.4°F (38°C) or higher, or as directed  · Increasing redness or swelling of the foot  · Infection comes back soon after treatment  · Pus draining from cracks in the skin  Date Last Reviewed: 8/1/2016 © 2000-2017 [employer]. [address] [place] [address], [place] [address]. All rights reserved. This information is not intended as a substitute for professional medical care. Always follow your healthcare professional's instructions.

## 2019-10-10 RX ORDER — GABAPENTIN 100 MG/1
100 CAPSULE ORAL 3 TIMES DAILY
Qty: 270 CAPSULE | Refills: 3 | Status: SHIPPED | OUTPATIENT
Start: 2019-10-10 | End: 2022-04-06

## 2019-10-10 NOTE — TELEPHONE ENCOUNTER
----- Message from Jewell Gomez sent at 10/10/2019 11:07 AM CDT -----  Contact: pt [phone]  Patient is calling for an RX refill or new RX.  Is this a refill or new RX:  New   RX name and strength: gabapentin 100 mg capsule   Directions (copy/paste from chart):  1 capsule three times a day  Is this a 30 day or 90 day RX:  30  Local pharmacy or mail order pharmacy:  Mail order  Pharmacy name and phone # (copy/paste from chart):  [facility] - [place], [place] - [address] [facility] [address] [phone] (Phone)  [phone] (Fax)  Comments:

## 2020-01-13 DIAGNOSIS — E11.21 CONTROLLED TYPE 2 DIABETES MELLITUS WITH DIABETIC NEPHROPATHY, WITHOUT LONG-TERM CURRENT USE OF INSULIN: ICD-10-CM

## 2020-01-13 DIAGNOSIS — E11.51 CONTROLLED TYPE 2 DIABETES MELLITUS WITH DIABETIC PERIPHERAL ANGIOPATHY WITHOUT GANGRENE, WITHOUT LONG-TERM CURRENT USE OF INSULIN: ICD-10-CM

## 2020-01-14 RX ORDER — LISINOPRIL 5 MG/1
TABLET ORAL
Qty: 90 TABLET | Refills: 3 | Status: SHIPPED | OUTPATIENT
Start: 2020-01-14 | End: 2020-10-22

## 2020-01-14 RX ORDER — AMLODIPINE BESYLATE 5 MG/1
TABLET ORAL
Qty: 90 TABLET | Refills: 3 | Status: SHIPPED | OUTPATIENT
Start: 2020-01-14 | End: 2020-10-22

## 2020-01-14 RX ORDER — METOPROLOL TARTRATE 100 MG/1
TABLET ORAL
Qty: 90 TABLET | Refills: 3 | Status: SHIPPED | OUTPATIENT
Start: 2020-01-14 | End: 2020-10-22

## 2020-01-14 RX ORDER — METFORMIN HYDROCHLORIDE 500 MG/1
TABLET ORAL
Qty: 90 TABLET | Refills: 3 | Status: SHIPPED | OUTPATIENT
Start: 2020-01-14 | End: 2020-10-22

## 2020-01-14 RX ORDER — SIMVASTATIN 10 MG/1
TABLET, FILM COATED ORAL
Qty: 90 TABLET | Refills: 3 | Status: SHIPPED | OUTPATIENT
Start: 2020-01-14 | End: 2020-10-22

## 2020-01-14 NOTE — TELEPHONE ENCOUNTER
Pt not due for annual exam until on/after 5/2/20.  Discussed with Dr. Mckeon, ok to cancel 1/21/20 appts for both pt and her spouse Marques Downing.  Both pts should keep 1/15/20 lab appts as scheduled.    Pts prefer to wait until  returns in early June for annual exams.  Both pts have been added to our wait list.

## 2020-01-14 NOTE — TELEPHONE ENCOUNTER
Last Hga1c excellent 6.1% April 2019, but needs to get this nonfasting lab every 6 months -     I refilled other meds, due for PHYS after Feb 12, 2019. Please schedule this with all labs & include Hga1c. thanks

## 2020-01-16 ENCOUNTER — LAB VISIT (OUTPATIENT)
Dept: LAB | Facility: HOSPITAL | Age: 80
End: 2020-01-16
Attending: FAMILY MEDICINE
Payer: MEDICARE

## 2020-01-16 DIAGNOSIS — E11.21 CONTROLLED TYPE 2 DIABETES MELLITUS WITH DIABETIC NEPHROPATHY, WITHOUT LONG-TERM CURRENT USE OF INSULIN: ICD-10-CM

## 2020-01-16 LAB
ESTIMATED AVG GLUCOSE: 140 MG/DL (ref 68–131)
HBA1C MFR BLD HPLC: 6.5 % (ref 4–5.6)

## 2020-01-16 PROCEDURE — 83036 HEMOGLOBIN GLYCOSYLATED A1C: CPT | Mod: HCNC

## 2020-01-16 PROCEDURE — 36415 COLL VENOUS BLD VENIPUNCTURE: CPT | Mod: HCNC,PO

## 2020-01-17 ENCOUNTER — TELEPHONE (OUTPATIENT)
Dept: PRIMARY CARE CLINIC | Facility: CLINIC | Age: 80
End: 2020-01-17

## 2020-01-17 DIAGNOSIS — Z00.00 ANNUAL PHYSICAL EXAM: Primary | ICD-10-CM

## 2020-01-17 DIAGNOSIS — E78.5 HYPERLIPIDEMIA ASSOCIATED WITH TYPE 2 DIABETES MELLITUS: ICD-10-CM

## 2020-01-17 DIAGNOSIS — E11.42 TYPE 2 DIABETES MELLITUS WITH DIABETIC POLYNEUROPATHY, WITHOUT LONG-TERM CURRENT USE OF INSULIN: ICD-10-CM

## 2020-01-17 DIAGNOSIS — E11.69 HYPERLIPIDEMIA ASSOCIATED WITH TYPE 2 DIABETES MELLITUS: ICD-10-CM

## 2020-01-17 NOTE — TELEPHONE ENCOUNTER
Pt advised, verbalizes understanding.  Pt would like to wait until June when Dr. Mckeon returns to do her annual exam.  Annual exam scheduled 6/9/20.  Advised will need labs prior to appt.  Unable to schedule June lab now because June lab scheduled not yet released.  Appt confirm mailed.

## 2020-01-17 NOTE — TELEPHONE ENCOUNTER
Please let pt know that her sugar is controlled 6.4%, continue medication & she can come for her regular physical when due.

## 2020-02-19 RX ORDER — INSULIN PUMP SYRINGE, 3 ML
EACH MISCELLANEOUS
Qty: 1 EACH | Refills: 0 | Status: ON HOLD | OUTPATIENT
Start: 2020-02-19 | End: 2024-01-14 | Stop reason: ALTCHOICE

## 2020-03-10 ENCOUNTER — PES CALL (OUTPATIENT)
Dept: ADMINISTRATIVE | Facility: CLINIC | Age: 80
End: 2020-03-10

## 2020-03-16 RX ORDER — ALBUTEROL SULFATE 90 UG/1
AEROSOL, METERED RESPIRATORY (INHALATION)
Qty: 18 G | Refills: 2 | Status: SHIPPED | OUTPATIENT
Start: 2020-03-16 | End: 2022-04-06

## 2020-03-16 NOTE — TELEPHONE ENCOUNTER
----- Message from Alayna Asher sent at 3/16/2020 12:56 PM CDT -----  Contact: Patient [phone]  Prescription Request:     Name of medication: albuterol (PROAIR HFA) 90 mcg/actuation inhaler    Reason for request: Refill    Pharmacy: [id number] [place] [id number] [address] [facility] [address]    Please advise.    Thank You

## 2020-04-27 RX ORDER — HYDROCHLOROTHIAZIDE 12.5 MG/1
12.5 CAPSULE ORAL DAILY
Qty: 90 CAPSULE | Refills: 3 | Status: SHIPPED | OUTPATIENT
Start: 2020-04-27 | End: 2021-04-19

## 2020-05-01 ENCOUNTER — NURSE TRIAGE (OUTPATIENT)
Dept: ADMINISTRATIVE | Facility: CLINIC | Age: 80
End: 2020-05-01

## 2020-05-01 NOTE — TELEPHONE ENCOUNTER
"Pt states she began taking HCTZ at the beginning of this year. Pt states she is concerned about taking HCTZ since she is allergic to sulfa. Pt c/o worsening bilateral dry eyes worse to R eye. Pt c/o "grit" to eye. Pt c/o intermittent blurred vision to R eye for "quite a while." Pt states she was seen per Optho and medication prescribed. Pt states she would like to wean herself from HCTZ. Pt advised per protocol and pt verbalizes understanding. Pt requesting call from PCP office. Message sent to PCP staff.     Reason for Disposition   Blurred vision    Additional Information   Negative: SEVERE pain (e.g., excruciating)   Negative: Patient sounds very sick or weak to the triager   Negative: MODERATE eye pain (e.g., interferes with normal activities)    Protocols used: EYE - PUS OR [id number]      "

## 2020-05-06 ENCOUNTER — TELEPHONE (OUTPATIENT)
Dept: HEMATOLOGY/ONCOLOGY | Facility: CLINIC | Age: 80
End: 2020-05-06

## 2020-05-06 NOTE — TELEPHONE ENCOUNTER
Message fwd to .       ----- Message from Shruthi Hatfield sent at 5/6/2020  1:10 PM CDT -----  Contact: PT   PT has a mammogram and appointment with the doctor on 5/11 and wants to move it to some time in July. Please call back     Callback: [phone]

## 2020-05-26 ENCOUNTER — PATIENT OUTREACH (OUTPATIENT)
Dept: ADMINISTRATIVE | Facility: HOSPITAL | Age: 80
End: 2020-05-26

## 2020-05-26 NOTE — PROGRESS NOTES
Pre-visit chart review completed.  Immunizations reviewed and updated.    Patient due for the following    Aspirin/Antiplatelet Therapy     Shingles Vaccine (2 of 3)    Lipid Panel     Foot Exam     DEXA SCAN       Patient is scheduled to have labs done.

## 2020-06-02 ENCOUNTER — LAB VISIT (OUTPATIENT)
Dept: LAB | Facility: HOSPITAL | Age: 80
End: 2020-06-02
Payer: MEDICARE

## 2020-06-02 DIAGNOSIS — E11.42 TYPE 2 DIABETES MELLITUS WITH DIABETIC POLYNEUROPATHY, WITHOUT LONG-TERM CURRENT USE OF INSULIN: ICD-10-CM

## 2020-06-02 DIAGNOSIS — Z00.00 ANNUAL PHYSICAL EXAM: ICD-10-CM

## 2020-06-02 DIAGNOSIS — E11.69 HYPERLIPIDEMIA ASSOCIATED WITH TYPE 2 DIABETES MELLITUS: ICD-10-CM

## 2020-06-02 DIAGNOSIS — E78.5 HYPERLIPIDEMIA ASSOCIATED WITH TYPE 2 DIABETES MELLITUS: ICD-10-CM

## 2020-06-02 LAB
ALBUMIN SERPL BCP-MCNC: 4 G/DL (ref 3.5–5.2)
ALP SERPL-CCNC: 41 U/L (ref 55–135)
ALT SERPL W/O P-5'-P-CCNC: 17 U/L (ref 10–44)
ANION GAP SERPL CALC-SCNC: 12 MMOL/L (ref 8–16)
AST SERPL-CCNC: 20 U/L (ref 10–40)
BASOPHILS # BLD AUTO: 0.06 K/UL (ref 0–0.2)
BASOPHILS NFR BLD: 1.4 % (ref 0–1.9)
BILIRUB SERPL-MCNC: 0.6 MG/DL (ref 0.1–1)
BUN SERPL-MCNC: 13 MG/DL (ref 8–23)
CALCIUM SERPL-MCNC: 10.1 MG/DL (ref 8.7–10.5)
CHLORIDE SERPL-SCNC: 105 MMOL/L (ref 95–110)
CHOLEST SERPL-MCNC: 177 MG/DL (ref 120–199)
CHOLEST/HDLC SERPL: 3.4 {RATIO} (ref 2–5)
CO2 SERPL-SCNC: 24 MMOL/L (ref 23–29)
CREAT SERPL-MCNC: 0.9 MG/DL (ref 0.5–1.4)
DIFFERENTIAL METHOD: ABNORMAL
EOSINOPHIL # BLD AUTO: 0.4 K/UL (ref 0–0.5)
EOSINOPHIL NFR BLD: 9.9 % (ref 0–8)
ERYTHROCYTE [DISTWIDTH] IN BLOOD BY AUTOMATED COUNT: 14 % (ref 11.5–14.5)
EST. GFR  (AFRICAN AMERICAN): >60 ML/MIN/1.73 M^2
EST. GFR  (NON AFRICAN AMERICAN): >60 ML/MIN/1.73 M^2
ESTIMATED AVG GLUCOSE: 134 MG/DL (ref 68–131)
GLUCOSE SERPL-MCNC: 129 MG/DL (ref 70–110)
HBA1C MFR BLD HPLC: 6.3 % (ref 4–5.6)
HCT VFR BLD AUTO: 43.5 % (ref 37–48.5)
HDLC SERPL-MCNC: 52 MG/DL (ref 40–75)
HDLC SERPL: 29.4 % (ref 20–50)
HGB BLD-MCNC: 13.7 G/DL (ref 12–16)
IMM GRANULOCYTES # BLD AUTO: 0.01 K/UL (ref 0–0.04)
IMM GRANULOCYTES NFR BLD AUTO: 0.2 % (ref 0–0.5)
LDLC SERPL CALC-MCNC: 103.2 MG/DL (ref 63–159)
LYMPHOCYTES # BLD AUTO: 1.5 K/UL (ref 1–4.8)
LYMPHOCYTES NFR BLD: 34.6 % (ref 18–48)
MCH RBC QN AUTO: 28.7 PG (ref 27–31)
MCHC RBC AUTO-ENTMCNC: 31.5 G/DL (ref 32–36)
MCV RBC AUTO: 91 FL (ref 82–98)
MONOCYTES # BLD AUTO: 0.4 K/UL (ref 0.3–1)
MONOCYTES NFR BLD: 9.2 % (ref 4–15)
NEUTROPHILS # BLD AUTO: 1.9 K/UL (ref 1.8–7.7)
NEUTROPHILS NFR BLD: 44.7 % (ref 38–73)
NONHDLC SERPL-MCNC: 125 MG/DL
NRBC BLD-RTO: 0 /100 WBC
PLATELET # BLD AUTO: 185 K/UL (ref 150–350)
PMV BLD AUTO: 9.9 FL (ref 9.2–12.9)
POTASSIUM SERPL-SCNC: 3.8 MMOL/L (ref 3.5–5.1)
PROT SERPL-MCNC: 7.1 G/DL (ref 6–8.4)
RBC # BLD AUTO: 4.78 M/UL (ref 4–5.4)
SODIUM SERPL-SCNC: 141 MMOL/L (ref 136–145)
TRIGL SERPL-MCNC: 109 MG/DL (ref 30–150)
WBC # BLD AUTO: 4.25 K/UL (ref 3.9–12.7)

## 2020-06-02 PROCEDURE — 83036 HEMOGLOBIN GLYCOSYLATED A1C: CPT | Mod: HCNC

## 2020-06-02 PROCEDURE — 36415 COLL VENOUS BLD VENIPUNCTURE: CPT | Mod: HCNC,PO

## 2020-06-02 PROCEDURE — 85025 COMPLETE CBC W/AUTO DIFF WBC: CPT | Mod: HCNC

## 2020-06-02 PROCEDURE — 80061 LIPID PANEL: CPT | Mod: HCNC

## 2020-06-02 PROCEDURE — 80053 COMPREHEN METABOLIC PANEL: CPT | Mod: HCNC

## 2020-06-08 DIAGNOSIS — Z85.3 HISTORY OF BREAST CANCER: Primary | ICD-10-CM

## 2020-06-09 ENCOUNTER — OFFICE VISIT (OUTPATIENT)
Dept: PRIMARY CARE CLINIC | Facility: CLINIC | Age: 80
End: 2020-06-09
Payer: MEDICARE

## 2020-06-09 VITALS
WEIGHT: 179.44 LBS | DIASTOLIC BLOOD PRESSURE: 70 MMHG | SYSTOLIC BLOOD PRESSURE: 120 MMHG | HEART RATE: 59 BPM | TEMPERATURE: 98 F | BODY MASS INDEX: 28.16 KG/M2 | OXYGEN SATURATION: 98 % | HEIGHT: 67 IN

## 2020-06-09 DIAGNOSIS — E11.59 HYPERTENSION ASSOCIATED WITH DIABETES: ICD-10-CM

## 2020-06-09 DIAGNOSIS — I73.9 PVD (PERIPHERAL VASCULAR DISEASE) WITH CLAUDICATION: ICD-10-CM

## 2020-06-09 DIAGNOSIS — I15.2 HYPERTENSION ASSOCIATED WITH DIABETES: ICD-10-CM

## 2020-06-09 DIAGNOSIS — E11.51 CONTROLLED TYPE 2 DIABETES MELLITUS WITH DIABETIC PERIPHERAL ANGIOPATHY WITHOUT GANGRENE, WITHOUT LONG-TERM CURRENT USE OF INSULIN: Primary | ICD-10-CM

## 2020-06-09 DIAGNOSIS — Z78.0 POSTMENOPAUSAL: ICD-10-CM

## 2020-06-09 DIAGNOSIS — I82.592 CHRONIC EMBOLISM AND THROMBOSIS OF OTHER SPECIFIED DEEP VEIN OF LEFT LOWER EXTREMITY: ICD-10-CM

## 2020-06-09 DIAGNOSIS — I70.0 ATHEROSCLEROSIS OF AORTA: ICD-10-CM

## 2020-06-09 DIAGNOSIS — Z85.3 HISTORY OF BREAST CANCER: ICD-10-CM

## 2020-06-09 DIAGNOSIS — E78.5 HYPERLIPIDEMIA ASSOCIATED WITH TYPE 2 DIABETES MELLITUS: ICD-10-CM

## 2020-06-09 DIAGNOSIS — E11.69 HYPERLIPIDEMIA ASSOCIATED WITH TYPE 2 DIABETES MELLITUS: ICD-10-CM

## 2020-06-09 PROBLEM — I74.9 EMBOLISM AND THROMBOSIS OF ARTERY: Status: RESOLVED | Noted: 2019-05-02 | Resolved: 2020-06-09

## 2020-06-09 PROBLEM — Z86.718 PERSONAL HISTORY OF VENOUS THROMBOSIS AND EMBOLISM: Status: RESOLVED | Noted: 2017-10-31 | Resolved: 2020-06-09

## 2020-06-09 PROBLEM — Z91.89 RISK FOR CORONARY ARTERY DISEASE GREATER THAN 20% IN NEXT 10 YEARS PER FRAMINGHAM SCORE: Status: RESOLVED | Noted: 2018-05-01 | Resolved: 2020-06-09

## 2020-06-09 LAB
ALBUMIN/CREAT UR: 22.4 UG/MG (ref 0–30)
CREAT UR-MCNC: 125 MG/DL (ref 15–325)
MICROALBUMIN UR DL<=1MG/L-MCNC: 28 UG/ML

## 2020-06-09 PROCEDURE — 1159F PR MEDICATION LIST DOCUMENTED IN MEDICAL RECORD: ICD-10-PCS | Mod: HCNC,S$GLB,, | Performed by: FAMILY MEDICINE

## 2020-06-09 PROCEDURE — 3074F SYST BP LT 130 MM HG: CPT | Mod: HCNC,CPTII,S$GLB, | Performed by: FAMILY MEDICINE

## 2020-06-09 PROCEDURE — 99999 PR PBB SHADOW E&M-EST. PATIENT-LVL III: ICD-10-PCS | Mod: PBBFAC,HCNC,, | Performed by: FAMILY MEDICINE

## 2020-06-09 PROCEDURE — 99499 RISK ADDL DX/OHS AUDIT: ICD-10-PCS | Mod: S$GLB,,, | Performed by: FAMILY MEDICINE

## 2020-06-09 PROCEDURE — 3074F PR MOST RECENT SYSTOLIC BLOOD PRESSURE < 130 MM HG: ICD-10-PCS | Mod: HCNC,CPTII,S$GLB, | Performed by: FAMILY MEDICINE

## 2020-06-09 PROCEDURE — 3078F DIAST BP <80 MM HG: CPT | Mod: HCNC,CPTII,S$GLB, | Performed by: FAMILY MEDICINE

## 2020-06-09 PROCEDURE — 1159F MED LIST DOCD IN RCRD: CPT | Mod: HCNC,S$GLB,, | Performed by: FAMILY MEDICINE

## 2020-06-09 PROCEDURE — 1101F PR PT FALLS ASSESS DOC 0-1 FALLS W/OUT INJ PAST YR: ICD-10-PCS | Mod: HCNC,CPTII,S$GLB, | Performed by: FAMILY MEDICINE

## 2020-06-09 PROCEDURE — 99214 PR OFFICE/OUTPT VISIT, EST, LEVL IV, 30-39 MIN: ICD-10-PCS | Mod: HCNC,S$GLB,, | Performed by: FAMILY MEDICINE

## 2020-06-09 PROCEDURE — 82043 UR ALBUMIN QUANTITATIVE: CPT | Mod: HCNC

## 2020-06-09 PROCEDURE — 99499 UNLISTED E&M SERVICE: CPT | Mod: S$GLB,,, | Performed by: FAMILY MEDICINE

## 2020-06-09 PROCEDURE — 99214 OFFICE O/P EST MOD 30 MIN: CPT | Mod: HCNC,S$GLB,, | Performed by: FAMILY MEDICINE

## 2020-06-09 PROCEDURE — 99999 PR PBB SHADOW E&M-EST. PATIENT-LVL III: CPT | Mod: PBBFAC,HCNC,, | Performed by: FAMILY MEDICINE

## 2020-06-09 PROCEDURE — 3078F PR MOST RECENT DIASTOLIC BLOOD PRESSURE < 80 MM HG: ICD-10-PCS | Mod: HCNC,CPTII,S$GLB, | Performed by: FAMILY MEDICINE

## 2020-06-09 PROCEDURE — 1101F PT FALLS ASSESS-DOCD LE1/YR: CPT | Mod: HCNC,CPTII,S$GLB, | Performed by: FAMILY MEDICINE

## 2020-06-09 RX ORDER — ASPIRIN 81 MG/1
81 TABLET ORAL DAILY
Refills: 0 | Status: ON HOLD | COMMUNITY
Start: 2020-06-09 | End: 2024-01-17 | Stop reason: HOSPADM

## 2020-06-09 NOTE — PROGRESS NOTES
Subjective:      Patient ID: Kanchan Downing is a 79 y.o. female.    Chief Complaint: follow up    Here today for diabetes mellitus with metformin 500 qd    Denies acute symptoms such as nocturia, polyuria, unexplained weight loss or blurred vision.    Eye evaluation yearly    Urine microalbumin today    Denies numbness, tingling sensation, or known h/o peripheral neuropathy.     Denies  Chest pain, shortness of breath.    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 ACCU-CHEK BROOK PLUS, Disp: 200 strip, Rfl: 3    blood-glucose meter kit, TRUE METRIX AIR [employer] SMART w/Device  Kit - use as instructed, Disp: 1 each, Rfl: 0    cetirizine (ZYRTEC) 10 MG tablet, Take 10 mg by mouth once daily.  , Disp: , Rfl:     diclofenac sodium (VOLTAREN) 1 % Gel, Apply 2 g topically once daily., Disp: 100 g, Rfl: 6    flash glucose scanning reader (FREESTYLE FLOWER READER) Misc, 1 each by Misc.(Non-Drug; Combo Route) route continuous., Disp: 1 each, Rfl: once as needed    fluticasone (FLONASE) 50 mcg/actuation nasal spray, 1 spray by Each Nare route once daily., Disp: 16 g, Rfl: 12    gabapentin (NEURONTIN) 100 MG capsule, Take 1 capsule (100 mg total) by mouth 3 (three) times daily., Disp: 270 capsule, Rfl: 3    hydroCHLOROthiazide (MICROZIDE) 12.5 mg capsule, TAKE 1 CAPSULE (12.5 MG TOTAL) BY MOUTH ONCE DAILY., Disp: 90 capsule, Rfl: 3     lactobacillus rhamnosus, GG, (PROBIOTIC) 10 billion cell capsule, Take by mouth. 1 Capsule Oral Every day, Disp: , Rfl:     lancets Misc, Test tid, Disp: 100 each, Rfl: 12    lisinopril (PRINIVIL,ZESTRIL) 5 MG tablet, TAKE 1 TABLET ONE TIME DAILY., Disp: 90 tablet, Rfl: 3    metFORMIN (GLUCOPHAGE) 500 MG tablet, TAKE 1 TABLET DAILY, Disp: 90 tablet, Rfl: 3    metoprolol tartrate (LOPRESSOR) 100 MG tablet, TAKE 1 TABLET ONE TIME DAILY., Disp: 90 tablet, Rfl: 3    olopatadine (PATADAY) 0.2 % Drop, Place 1 drop into both eyes once daily., Disp: 2.5 mL, Rfl: 12    polyethylene glycol (GLYCOLAX) 17 gram/dose powder, TAKE 1 CAPFUL (17 GRAMS) DAILY AS DIRECTED, Disp: 527 g, Rfl: 10    simvastatin (ZOCOR) 10 MG tablet, TAKE 1 TABLET EVERY EVENING., Disp: 90 tablet, Rfl: 3    terbinafine HCl (LAMISIL) 1 % cream, Apply topically 2 (two) times daily. On rash x 14 days, Disp: 24 g, Rfl: 0    TRUEPLUS LANCETS 33 gauge Misc, , Disp: , Rfl:     aspirin (ECOTRIN) 81 MG EC tablet, Take 1 tablet (81 mg total) by mouth once daily., Disp: , Rfl: 0    Lab Results   Component Value Date    HGBA1C 6.3 (H) 06/02/2020    HGBA1C 6.5 (H) 01/16/2020    HGBA1C 6.1 (H) 04/26/2019     Lab Results   Component Value Date    MICALBCREAT 10.5 11/06/2018     Lab Results   Component Value Date    LDLCALC 103.2 06/02/2020    LDLCALC 101.4 04/26/2019    CHOL 177 06/02/2020    HDL 52 06/02/2020    TRIG 109 06/02/2020       Lab Results   Component Value Date     06/02/2020    K 3.8 06/02/2020     06/02/2020    CO2 24 06/02/2020     (H) 06/02/2020    BUN 13 06/02/2020    CREATININE 0.9 06/02/2020    CALCIUM 10.1 06/02/2020    PROT 7.1 06/02/2020    ALBUMIN 4.0 06/02/2020    BILITOT 0.6 06/02/2020    ALKPHOS 41 (L) 06/02/2020    AST 20 06/02/2020    ALT 17 06/02/2020    ANIONGAP 12 06/02/2020    ESTGFRAFRICA >60.0 06/02/2020    EGFRNONAA >60.0 06/02/2020    WBC 4.25 06/02/2020    HGB 13.7 06/02/2020    HGB 14.1 04/26/2019    HCT  43.5 06/02/2020    MCV 91 06/02/2020     06/02/2020    TSH 4.708 (H) 04/21/2017       Lab Results   Component Value Date    [id number] 41 05/08/2014         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, without long-term current use of insulin 10/31/2017    Controlled type 2 diabetes mellitus with circulatory disorder, without long-term current use of insulin 10/31/2017    Diabetes mellitus type 2 without retinopathy 06/12/2014    6% metformin 500 bid, FU+ 2016    DVT (deep venous thrombosis) 2001    R , Dr Bonilla    Hyperlipidemia     LDL 82    Hyperlipidemia associated with type 2 diabetes mellitus 9/11/2012    Hypertension     Hypertension associated with diabetes 9/11/2012    Microalbuminuria due to type 2 diabetes mellitus 12/08/2015    taking lisinopril, losartan caused olivia effects    PVD (peripheral vascular disease) with claudication 5/2/2019    Compression hose    Risk for coronary artery disease greater than 20% in next 10 years per Kohler score 5/1/2018    Strabismus     Type 2 diabetes mellitus with diabetic polyneuropathy 6/12/2014    Type 2 diabetes mellitus with diabetic polyneuropathy, without long-term current use of insulin 6/12/2014     Past Surgical History:   Procedure Laterality Date    BREAST BIOPSY Right 2011    BREAST LUMPECTOMY Right 2011    ID REMOVAL OF NAIL BED  6/10/2015    TONSILLECTOMY       Social History     Social History Narrative     to Marques, 3 children, nondrinker, nonsmoker, she declines colonoscopy     Family History   Problem Relation Age of Onset    Heart disease Mother 58    Cataracts Mother     Heart disease Father 50    Thyroid disease Sister     Breast cancer  "Sister     No Known Problems Brother     No Known Problems Maternal Aunt     No Known Problems Maternal Uncle     No Known Problems Paternal Aunt     No Known Problems Paternal Uncle     No Known Problems Maternal Grandmother     No Known Problems Maternal Grandfather     No Known Problems Paternal Grandmother     No Known Problems Paternal Grandfather     Amblyopia Neg Hx     Blindness Neg Hx     Diabetes Neg Hx     Glaucoma Neg Hx     Hypertension Neg Hx     Macular degeneration Neg Hx     Retinal detachment Neg Hx     Strabismus Neg Hx     Stroke Neg Hx      Vitals:    06/09/20 1021   BP: 120/70   Pulse: (!) 59   Temp: 98.3 °F (36.8 °C)   SpO2: 98%   Weight: 81.4 kg (179 lb 7.3 oz)   Height: 5' 6.5" (1.689 m)     Objective:   Physical Exam   Constitutional: She is oriented to person, place, and time. She appears well-developed and well-nourished.   HENT:   Head: Normocephalic and atraumatic.   Right Ear: External ear normal.   Left Ear: External ear normal.   Nose: Nose normal.   Mouth/Throat: Oropharynx is clear and moist.   Eyes: Pupils are equal, round, and reactive to light. EOM are normal.   Neck: Neck supple. No thyromegaly present.   Cardiovascular: Normal rate, regular rhythm, normal heart sounds and intact distal pulses.   No murmur heard.  Pulmonary/Chest: Effort normal and breath sounds normal. She has no wheezes.   Abdominal: Soft. Bowel sounds are normal. She exhibits no distension and no mass. There is no tenderness. There is no rebound and no guarding.   Musculoskeletal: She exhibits no edema.        Right foot: There is normal range of motion and no deformity.        Left foot: There is normal range of motion and no deformity.        Feet:   Right Foot:   Protective Sensation: 5 sites tested. 5 sites sensed.   Skin Integrity: Negative for ulcer, blister, skin breakdown, erythema or warmth.   Left Foot:   Protective Sensation: 5 sites tested. 5 sites sensed.   Skin Integrity: " Negative for ulcer, blister, skin breakdown, erythema or warmth.   Lymphadenopathy:     She has no cervical adenopathy.   Neurological: She is alert and oriented to person, place, and time.   Skin: Skin is warm and dry.   Psychiatric: She has a normal mood and affect. Her behavior is normal.     Assessment:     1. Controlled type 2 diabetes mellitus with diabetic peripheral angiopathy without gangrene, without long-term current use of insulin    2. Postmenopausal    3. History of breast cancer    4. 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 Hyperlipidemia associated with type 2 diabetes mellitus    6. Hypertension associated with diabetes    7. PVD (peripheral vascular disease) with claudication    8. Atherosclerosis of aorta      Plan:     Orders Placed This Encounter    DXA Bone Density Spine And Hip    aspirin (ECOTRIN) 81 MG EC tablet      Continue medications    Follow with oncologist    FOR  DIABETES:  ==================================  SEE me in  6 MONTHS with  BLOODWORK one week PRIOR  ===================================    Your 1# medicine is  EXERCISE  - huffing & puffing for 20  MINUTES EVERY DAY - check your sugars & you'll see them go down    The future risks of uncontrolled diabetes - include heart attack, stroke, neuropathy (pain in hands & feet that could lead to infection and amputation), nephropathy (leading to kidney dialysis) , and blindness     To prevent complications that can be treated early, please see your  opthalmologist EYE DOCTOR YEARLY      Always wear protective SHOES    Focus on NO SUGAR and no sugar substitutes (splenda,s tevia, etc) IN YOUR DRINKS. With respect to food - LOW CARBOHYDRATES - switch to whole wheat & brown rice.    Decrease & try to stop ALCOHOL, WHITE BREAD, WHITE PASTA, WHITE RICE , WHITE POTATOES- which all turn into sugar.    EAT an EXTRA VEGETABLE A DAY than you already do.    Consider reading the book -  End Of  Diabetes by Dr Avila    Once YEARLY I will check basic labs CBC, CMP, fasting lipids, TSH, Hga1C prior to your visit      ----------------------------        There are no Patient Instructions on file for this visit.

## 2020-06-29 ENCOUNTER — OFFICE VISIT (OUTPATIENT)
Dept: HEMATOLOGY/ONCOLOGY | Facility: CLINIC | Age: 80
End: 2020-06-29
Payer: MEDICARE

## 2020-06-29 ENCOUNTER — HOSPITAL ENCOUNTER (OUTPATIENT)
Dept: RADIOLOGY | Facility: HOSPITAL | Age: 80
Discharge: HOME | End: 2020-06-29
Attending: PHYSICIAN ASSISTANT
Payer: MEDICARE

## 2020-06-29 VITALS
TEMPERATURE: 98 F | RESPIRATION RATE: 16 BRPM | OXYGEN SATURATION: 95 % | BODY MASS INDEX: 28.63 KG/M2 | WEIGHT: 178.13 LBS | HEIGHT: 66 IN | SYSTOLIC BLOOD PRESSURE: 129 MMHG | DIASTOLIC BLOOD PRESSURE: 69 MMHG | HEART RATE: 65 BPM

## 2020-06-29 DIAGNOSIS — Z85.3 HISTORY OF BREAST CANCER: ICD-10-CM

## 2020-06-29 DIAGNOSIS — Z85.3 HISTORY OF BREAST CANCER: Primary | ICD-10-CM

## 2020-06-29 PROCEDURE — 77066 DX MAMMO INCL CAD BI: CPT | Mod: 26,HCNC,, | Performed by: RADIOLOGY

## 2020-06-29 PROCEDURE — 3074F SYST BP LT 130 MM HG: CPT | Mod: HCNC,CPTII,S$GLB, | Performed by: PHYSICIAN ASSISTANT

## 2020-06-29 PROCEDURE — 1159F MED LIST DOCD IN RCRD: CPT | Mod: HCNC,S$GLB,, | Performed by: PHYSICIAN ASSISTANT

## 2020-06-29 PROCEDURE — 1101F PT FALLS ASSESS-DOCD LE1/YR: CPT | Mod: HCNC,CPTII,S$GLB, | Performed by: PHYSICIAN ASSISTANT

## 2020-06-29 PROCEDURE — 99213 PR OFFICE/OUTPT VISIT, EST, LEVL III, 20-29 MIN: ICD-10-PCS | Mod: HCNC,S$GLB,, | Performed by: PHYSICIAN ASSISTANT

## 2020-06-29 PROCEDURE — 3078F PR MOST RECENT DIASTOLIC BLOOD PRESSURE < 80 MM HG: ICD-10-PCS | Mod: HCNC,CPTII,S$GLB, | Performed by: PHYSICIAN ASSISTANT

## 2020-06-29 PROCEDURE — 1126F PR PAIN SEVERITY QUANTIFIED, NO PAIN PRESENT: ICD-10-PCS | Mod: HCNC,S$GLB,, | Performed by: PHYSICIAN ASSISTANT

## 2020-06-29 PROCEDURE — 3078F DIAST BP <80 MM HG: CPT | Mod: HCNC,CPTII,S$GLB, | Performed by: PHYSICIAN ASSISTANT

## 2020-06-29 PROCEDURE — 99213 OFFICE O/P EST LOW 20 MIN: CPT | Mod: HCNC,S$GLB,, | Performed by: PHYSICIAN ASSISTANT

## 2020-06-29 PROCEDURE — 99999 PR PBB SHADOW E&M-EST. PATIENT-LVL V: CPT | Mod: PBBFAC,HCNC,, | Performed by: PHYSICIAN ASSISTANT

## 2020-06-29 PROCEDURE — 77066 MAMMO DIGITAL DIAGNOSTIC BILAT WITH TOMOSYNTHESIS_CAD: ICD-10-PCS | Mod: 26,HCNC,, | Performed by: RADIOLOGY

## 2020-06-29 PROCEDURE — 1159F PR MEDICATION LIST DOCUMENTED IN MEDICAL RECORD: ICD-10-PCS | Mod: HCNC,S$GLB,, | Performed by: PHYSICIAN ASSISTANT

## 2020-06-29 PROCEDURE — 1126F AMNT PAIN NOTED NONE PRSNT: CPT | Mod: HCNC,S$GLB,, | Performed by: PHYSICIAN ASSISTANT

## 2020-06-29 PROCEDURE — 1101F PR PT FALLS ASSESS DOC 0-1 FALLS W/OUT INJ PAST YR: ICD-10-PCS | Mod: HCNC,CPTII,S$GLB, | Performed by: PHYSICIAN ASSISTANT

## 2020-06-29 PROCEDURE — 77062 MAMMO DIGITAL DIAGNOSTIC BILAT WITH TOMOSYNTHESIS_CAD: ICD-10-PCS | Mod: 26,HCNC,, | Performed by: RADIOLOGY

## 2020-06-29 PROCEDURE — 77062 BREAST TOMOSYNTHESIS BI: CPT | Mod: TC,HCNC,PO

## 2020-06-29 PROCEDURE — 77062 BREAST TOMOSYNTHESIS BI: CPT | Mod: 26,HCNC,, | Performed by: RADIOLOGY

## 2020-06-29 PROCEDURE — 3074F PR MOST RECENT SYSTOLIC BLOOD PRESSURE < 130 MM HG: ICD-10-PCS | Mod: HCNC,CPTII,S$GLB, | Performed by: PHYSICIAN ASSISTANT

## 2020-06-29 PROCEDURE — 99999 PR PBB SHADOW E&M-EST. PATIENT-LVL V: ICD-10-PCS | Mod: PBBFAC,HCNC,, | Performed by: PHYSICIAN ASSISTANT

## 2020-06-29 NOTE — PROGRESS NOTES
Subjective:       Patient ID: Kanchan Downing is a 79 y.o. female.    Chief Complaint: Follow-up    Ms. 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 She completed endocrine therapy in fall of 2016.         Overall feeling ok, notes continued arthritic symptoms, especially at hands.     No shortness of breath, breast pain, headaches or visual changes. Appetite and energy level are good.     Bilateral mammogram from today was unremarkable.     She notes her sister was recently diagnosed with breast cancer.  Her other sister passed away from breast cancer.  She had not had genetic testing completed but she believes the living sister has been tested and was negative.       Review of Systems   Constitutional: Negative.    HENT: Negative for nasal congestion, rhinorrhea, sore throat and trouble swallowing.    Eyes: Negative for visual disturbance.   Respiratory: Negative for cough, chest tightness and shortness of breath.    Cardiovascular: Negative for chest pain, palpitations and leg swelling.   Gastrointestinal: Negative for abdominal pain, blood in stool, constipation, diarrhea, nausea and vomiting.   Genitourinary: Negative for dysuria, hematuria and vaginal bleeding.   Musculoskeletal: Positive for arthralgias. Negative for back pain and myalgias.   Integumentary:  Negative for pallor and rash.   Neurological: Negative for dizziness, weakness and headaches.   Hematological: Negative for adenopathy. Does not bruise/bleed easily.   Psychiatric/Behavioral: Negative for dysphoric mood and suicidal ideas. The patient is not nervous/anxious.          Objective:      Physical Exam  Vitals signs reviewed.   Constitutional:       General: She is not in acute distress.     Appearance: She is well-developed.      Comments: Presents alone   HENT:      Head: Normocephalic.      Mouth/Throat:       Pharynx: No oropharyngeal exudate.   Eyes:      General: No scleral icterus.     Conjunctiva/sclera: Conjunctivae normal.      Pupils: Pupils are equal, round, and reactive to light.   Neck:      Musculoskeletal: Normal range of motion and neck supple.      Thyroid: No thyromegaly.   Cardiovascular:      Rate and Rhythm: Normal rate and regular rhythm.   Pulmonary:      Effort: Pulmonary effort is normal. No respiratory distress.      Breath sounds: Normal breath sounds.      Comments: Lumpectomy incision at medial right breast with associated volume loss. There is fibrosis at medial aspect of incision. Nipple inversion on left, present since prior to surgery.  Left breast without mass or nodule. No axillary or supraclavicular adenopathy.    Abdominal:      General: Bowel sounds are normal. There is no distension.      Palpations: Abdomen is soft. There is no mass.      Tenderness: There is no abdominal tenderness.   Musculoskeletal: Normal range of motion.         General: No tenderness.      Comments: No spinal or paraspinal tenderness to palpation     Lymphadenopathy:      Cervical: No cervical adenopathy.   Skin:     General: Skin is warm and dry.   Neurological:      Mental Status: She is alert and oriented to person, place, and time.      Cranial Nerves: No cranial nerve deficit.      Comments: Ambulates with cane    Psychiatric:         Behavior: Behavior normal.         Thought Content: Thought content normal.         Judgment: Judgment normal.         Assessment:       1. History of breast cancer        Plan:       Patient clinically doing well and with normal mammogram. RTC in one year with mammogram.  I recommended that the patient pursue genetic testing given that her two sisters also had breast cancer.  It is unclear if they have been tested. Patient states she would like to discuss genetic testing with her daughter prior to scheduling that appointment. I asked her to please let me know when she spoke  with her and we'd be happy to set that appt up.  Patient amenable to plan.

## 2020-06-29 NOTE — PROGRESS NOTES
Subjective:       Patient ID: Kanchan Downing is a 79 y.o. female.    Chief Complaint: Follow-up    HPI  Review of Systems      Objective:      Physical Exam    Assessment:       No diagnosis found.    Plan:       ***

## 2020-09-28 ENCOUNTER — PES CALL (OUTPATIENT)
Dept: ADMINISTRATIVE | Facility: CLINIC | Age: 80
End: 2020-09-28

## 2020-12-01 ENCOUNTER — TELEPHONE (OUTPATIENT)
Dept: PRIMARY CARE CLINIC | Facility: CLINIC | Age: 80
End: 2020-12-01

## 2020-12-01 NOTE — TELEPHONE ENCOUNTER
"Spoke with patient, "brain MD" its a nutritional supplement.  She wants to make sure it is safe to take with her prescription medications.  "

## 2020-12-02 NOTE — TELEPHONE ENCOUNTER
"I don't recommend it, but she can try it for a few months & see if it helps.     It would be better & safer for her to focus on fresh fruits & vegetables every day for her vitamins & minerals. "5 colors" a day and water  "

## 2020-12-02 NOTE — TELEPHONE ENCOUNTER
Spoke with patient, informed of provider message and recommendations.  Patient verbalized understanding.

## 2021-02-17 ENCOUNTER — PES CALL (OUTPATIENT)
Dept: ADMINISTRATIVE | Facility: CLINIC | Age: 81
End: 2021-02-17

## 2021-03-19 ENCOUNTER — TELEPHONE (OUTPATIENT)
Dept: ADMINISTRATIVE | Facility: CLINIC | Age: 81
End: 2021-03-19

## 2021-04-08 ENCOUNTER — TELEPHONE (OUTPATIENT)
Dept: PRIMARY CARE CLINIC | Facility: CLINIC | Age: 81
End: 2021-04-08

## 2021-04-08 ENCOUNTER — PATIENT MESSAGE (OUTPATIENT)
Dept: PRIMARY CARE CLINIC | Facility: CLINIC | Age: 81
End: 2021-04-08

## 2021-04-08 ENCOUNTER — PATIENT MESSAGE (OUTPATIENT)
Dept: ADMINISTRATIVE | Facility: OTHER | Age: 81
End: 2021-04-08

## 2021-04-19 RX ORDER — HYDROCHLOROTHIAZIDE 12.5 MG/1
CAPSULE ORAL
Qty: 90 CAPSULE | Refills: 3 | Status: SHIPPED | OUTPATIENT
Start: 2021-04-19 | End: 2022-04-08

## 2021-04-23 ENCOUNTER — PES CALL (OUTPATIENT)
Dept: ADMINISTRATIVE | Facility: CLINIC | Age: 81
End: 2021-04-23

## 2021-07-01 ENCOUNTER — PES CALL (OUTPATIENT)
Dept: ADMINISTRATIVE | Facility: CLINIC | Age: 81
End: 2021-07-01

## 2021-07-06 ENCOUNTER — PATIENT MESSAGE (OUTPATIENT)
Dept: ADMINISTRATIVE | Facility: HOSPITAL | Age: 81
End: 2021-07-06

## 2021-07-08 ENCOUNTER — HOSPITAL ENCOUNTER (OUTPATIENT)
Dept: RADIOLOGY | Facility: HOSPITAL | Age: 81
Discharge: HOME | End: 2021-07-08
Attending: PHYSICIAN ASSISTANT
Payer: MEDICARE

## 2021-07-08 ENCOUNTER — OFFICE VISIT (OUTPATIENT)
Dept: HEMATOLOGY/ONCOLOGY | Facility: CLINIC | Age: 81
End: 2021-07-08
Payer: MEDICARE

## 2021-07-08 VITALS
SYSTOLIC BLOOD PRESSURE: 127 MMHG | RESPIRATION RATE: 20 BRPM | HEART RATE: 79 BPM | DIASTOLIC BLOOD PRESSURE: 65 MMHG | OXYGEN SATURATION: 96 % | BODY MASS INDEX: 28.5 KG/M2 | TEMPERATURE: 98 F | WEIGHT: 176.56 LBS

## 2021-07-08 VITALS — WEIGHT: 185 LBS | BODY MASS INDEX: 29.86 KG/M2

## 2021-07-08 DIAGNOSIS — Z85.3 HISTORY OF BREAST CANCER: Primary | ICD-10-CM

## 2021-07-08 DIAGNOSIS — Z85.3 HISTORY OF BREAST CANCER: ICD-10-CM

## 2021-07-08 PROCEDURE — 77066 MAMMO DIGITAL DIAGNOSTIC BILAT WITH TOMO: ICD-10-PCS | Mod: 26,,, | Performed by: RADIOLOGY

## 2021-07-08 PROCEDURE — 1159F PR MEDICATION LIST DOCUMENTED IN MEDICAL RECORD: ICD-10-PCS | Mod: S$GLB,,, | Performed by: PHYSICIAN ASSISTANT

## 2021-07-08 PROCEDURE — 99999 PR PBB SHADOW E&M-EST. PATIENT-LVL IV: ICD-10-PCS | Mod: PBBFAC,,, | Performed by: PHYSICIAN ASSISTANT

## 2021-07-08 PROCEDURE — 99213 PR OFFICE/OUTPT VISIT, EST, LEVL III, 20-29 MIN: ICD-10-PCS | Mod: S$GLB,,, | Performed by: PHYSICIAN ASSISTANT

## 2021-07-08 PROCEDURE — 77062 BREAST TOMOSYNTHESIS BI: CPT | Mod: 26,,, | Performed by: RADIOLOGY

## 2021-07-08 PROCEDURE — 77062 MAMMO DIGITAL DIAGNOSTIC BILAT WITH TOMO: ICD-10-PCS | Mod: 26,,, | Performed by: RADIOLOGY

## 2021-07-08 PROCEDURE — 77062 BREAST TOMOSYNTHESIS BI: CPT | Mod: TC

## 2021-07-08 PROCEDURE — 99999 PR PBB SHADOW E&M-EST. PATIENT-LVL IV: CPT | Mod: PBBFAC,,, | Performed by: PHYSICIAN ASSISTANT

## 2021-07-08 PROCEDURE — 99213 OFFICE O/P EST LOW 20 MIN: CPT | Mod: S$GLB,,, | Performed by: PHYSICIAN ASSISTANT

## 2021-07-08 PROCEDURE — 77066 DX MAMMO INCL CAD BI: CPT | Mod: 26,,, | Performed by: RADIOLOGY

## 2021-07-08 PROCEDURE — 1159F MED LIST DOCD IN RCRD: CPT | Mod: S$GLB,,, | Performed by: PHYSICIAN ASSISTANT

## 2021-07-12 ENCOUNTER — PES CALL (OUTPATIENT)
Dept: ADMINISTRATIVE | Facility: CLINIC | Age: 81
End: 2021-07-12

## 2021-08-26 ENCOUNTER — PES CALL (OUTPATIENT)
Dept: ADMINISTRATIVE | Facility: CLINIC | Age: 81
End: 2021-08-26

## 2021-10-05 ENCOUNTER — PATIENT MESSAGE (OUTPATIENT)
Dept: ADMINISTRATIVE | Facility: HOSPITAL | Age: 81
End: 2021-10-05

## 2021-10-06 DIAGNOSIS — E11.21 CONTROLLED TYPE 2 DIABETES MELLITUS WITH DIABETIC NEPHROPATHY, WITHOUT LONG-TERM CURRENT USE OF INSULIN: ICD-10-CM

## 2021-10-07 RX ORDER — AMLODIPINE BESYLATE 5 MG/1
TABLET ORAL
Qty: 90 TABLET | Refills: 3 | Status: SHIPPED | OUTPATIENT
Start: 2021-10-07 | End: 2022-09-27 | Stop reason: SDUPTHER

## 2021-10-07 RX ORDER — METOPROLOL TARTRATE 100 MG/1
TABLET ORAL
Qty: 90 TABLET | Refills: 3 | Status: SHIPPED | OUTPATIENT
Start: 2021-10-07 | End: 2022-09-27 | Stop reason: SDUPTHER

## 2021-11-09 ENCOUNTER — TELEPHONE (OUTPATIENT)
Dept: BEHAVIORAL HEALTH | Facility: CLINIC | Age: 81
End: 2021-11-09
Payer: MEDICARE

## 2021-11-09 ENCOUNTER — TELEPHONE (OUTPATIENT)
Dept: PRIMARY CARE CLINIC | Facility: CLINIC | Age: 81
End: 2021-11-09
Payer: MEDICARE

## 2021-11-09 ENCOUNTER — OFFICE VISIT (OUTPATIENT)
Dept: INTERNAL MEDICINE | Facility: CLINIC | Age: 81
End: 2021-11-09
Payer: MEDICARE

## 2021-11-09 DIAGNOSIS — F41.1 ANXIETY, GENERALIZED: Primary | ICD-10-CM

## 2021-11-09 DIAGNOSIS — F41.9 ANXIETY: Primary | ICD-10-CM

## 2021-11-09 PROCEDURE — 99441 PR PHYSICIAN TELEPHONE EVALUATION 5-10 MIN: CPT | Mod: HCNC,95,, | Performed by: INTERNAL MEDICINE

## 2021-11-09 PROCEDURE — 99441 PR PHYSICIAN TELEPHONE EVALUATION 5-10 MIN: ICD-10-PCS | Mod: HCNC,95,, | Performed by: INTERNAL MEDICINE

## 2021-11-09 RX ORDER — SERTRALINE HYDROCHLORIDE 25 MG/1
25 TABLET, FILM COATED ORAL DAILY
Qty: 30 TABLET | Refills: 2 | Status: SHIPPED | OUTPATIENT
Start: 2021-11-09 | End: 2022-03-03

## 2021-12-02 ENCOUNTER — LAB VISIT (OUTPATIENT)
Dept: LAB | Facility: HOSPITAL | Age: 81
End: 2021-12-02
Attending: FAMILY MEDICINE
Payer: MEDICARE

## 2021-12-02 DIAGNOSIS — E11.21 CONTROLLED TYPE 2 DIABETES MELLITUS WITH DIABETIC NEPHROPATHY, WITHOUT LONG-TERM CURRENT USE OF INSULIN: ICD-10-CM

## 2021-12-02 LAB
ESTIMATED AVG GLUCOSE: 134 MG/DL (ref 68–131)
HBA1C MFR BLD: 6.3 % (ref 4–5.6)

## 2021-12-02 PROCEDURE — 36415 COLL VENOUS BLD VENIPUNCTURE: CPT | Mod: HCNC,PO | Performed by: FAMILY MEDICINE

## 2021-12-02 PROCEDURE — 83036 HEMOGLOBIN GLYCOSYLATED A1C: CPT | Mod: HCNC | Performed by: FAMILY MEDICINE

## 2022-01-25 ENCOUNTER — TELEPHONE (OUTPATIENT)
Dept: PRIMARY CARE CLINIC | Facility: CLINIC | Age: 82
End: 2022-01-25
Payer: MEDICARE

## 2022-01-25 DIAGNOSIS — I15.2 HYPERTENSION ASSOCIATED WITH DIABETES: ICD-10-CM

## 2022-01-25 DIAGNOSIS — E11.59 HYPERTENSION ASSOCIATED WITH DIABETES: ICD-10-CM

## 2022-01-25 DIAGNOSIS — Z00.00 ROUTINE GENERAL MEDICAL EXAMINATION AT A HEALTH CARE FACILITY: Primary | ICD-10-CM

## 2022-03-03 RX ORDER — SERTRALINE HYDROCHLORIDE 25 MG/1
25 TABLET, FILM COATED ORAL DAILY
Qty: 90 TABLET | Refills: 3 | Status: SHIPPED | OUTPATIENT
Start: 2022-03-03 | End: 2022-04-06

## 2022-03-31 ENCOUNTER — LAB VISIT (OUTPATIENT)
Dept: LAB | Facility: HOSPITAL | Age: 82
End: 2022-03-31
Attending: FAMILY MEDICINE
Payer: MEDICARE

## 2022-03-31 DIAGNOSIS — I15.2 HYPERTENSION ASSOCIATED WITH DIABETES: ICD-10-CM

## 2022-03-31 DIAGNOSIS — E11.59 HYPERTENSION ASSOCIATED WITH DIABETES: ICD-10-CM

## 2022-03-31 LAB
ALBUMIN SERPL BCP-MCNC: 4 G/DL (ref 3.5–5.2)
ALP SERPL-CCNC: 49 U/L (ref 55–135)
ALT SERPL W/O P-5'-P-CCNC: 14 U/L (ref 10–44)
ANION GAP SERPL CALC-SCNC: 10 MMOL/L (ref 8–16)
AST SERPL-CCNC: 21 U/L (ref 10–40)
BASOPHILS # BLD AUTO: 0.06 K/UL (ref 0–0.2)
BASOPHILS NFR BLD: 1.4 % (ref 0–1.9)
BILIRUB SERPL-MCNC: 0.7 MG/DL (ref 0.1–1)
BUN SERPL-MCNC: 13 MG/DL (ref 8–23)
CALCIUM SERPL-MCNC: 10.4 MG/DL (ref 8.7–10.5)
CHLORIDE SERPL-SCNC: 104 MMOL/L (ref 95–110)
CHOLEST SERPL-MCNC: 180 MG/DL (ref 120–199)
CHOLEST/HDLC SERPL: 3 {RATIO} (ref 2–5)
CO2 SERPL-SCNC: 26 MMOL/L (ref 23–29)
CREAT SERPL-MCNC: 0.8 MG/DL (ref 0.5–1.4)
DIFFERENTIAL METHOD: ABNORMAL
EOSINOPHIL # BLD AUTO: 0.4 K/UL (ref 0–0.5)
EOSINOPHIL NFR BLD: 8.3 % (ref 0–8)
ERYTHROCYTE [DISTWIDTH] IN BLOOD BY AUTOMATED COUNT: 13.9 % (ref 11.5–14.5)
EST. GFR  (AFRICAN AMERICAN): >60 ML/MIN/1.73 M^2
EST. GFR  (NON AFRICAN AMERICAN): >60 ML/MIN/1.73 M^2
ESTIMATED AVG GLUCOSE: 120 MG/DL (ref 68–131)
GLUCOSE SERPL-MCNC: 122 MG/DL (ref 70–110)
HBA1C MFR BLD: 5.8 % (ref 4–5.6)
HCT VFR BLD AUTO: 41.9 % (ref 37–48.5)
HDLC SERPL-MCNC: 60 MG/DL (ref 40–75)
HDLC SERPL: 33.3 % (ref 20–50)
HGB BLD-MCNC: 13.5 G/DL (ref 12–16)
IMM GRANULOCYTES # BLD AUTO: 0.01 K/UL (ref 0–0.04)
IMM GRANULOCYTES NFR BLD AUTO: 0.2 % (ref 0–0.5)
LDLC SERPL CALC-MCNC: 107.4 MG/DL (ref 63–159)
LYMPHOCYTES # BLD AUTO: 1 K/UL (ref 1–4.8)
LYMPHOCYTES NFR BLD: 24 % (ref 18–48)
MCH RBC QN AUTO: 29.7 PG (ref 27–31)
MCHC RBC AUTO-ENTMCNC: 32.2 G/DL (ref 32–36)
MCV RBC AUTO: 92 FL (ref 82–98)
MONOCYTES # BLD AUTO: 0.4 K/UL (ref 0.3–1)
MONOCYTES NFR BLD: 8.8 % (ref 4–15)
NEUTROPHILS # BLD AUTO: 2.4 K/UL (ref 1.8–7.7)
NEUTROPHILS NFR BLD: 57.3 % (ref 38–73)
NONHDLC SERPL-MCNC: 120 MG/DL
NRBC BLD-RTO: 0 /100 WBC
PLATELET # BLD AUTO: 193 K/UL (ref 150–450)
PMV BLD AUTO: 10.3 FL (ref 9.2–12.9)
POTASSIUM SERPL-SCNC: 3.9 MMOL/L (ref 3.5–5.1)
PROT SERPL-MCNC: 6.8 G/DL (ref 6–8.4)
RBC # BLD AUTO: 4.54 M/UL (ref 4–5.4)
SODIUM SERPL-SCNC: 140 MMOL/L (ref 136–145)
TRIGL SERPL-MCNC: 63 MG/DL (ref 30–150)
WBC # BLD AUTO: 4.2 K/UL (ref 3.9–12.7)

## 2022-03-31 PROCEDURE — 83036 HEMOGLOBIN GLYCOSYLATED A1C: CPT | Performed by: FAMILY MEDICINE

## 2022-03-31 PROCEDURE — 36415 COLL VENOUS BLD VENIPUNCTURE: CPT | Mod: PO | Performed by: FAMILY MEDICINE

## 2022-03-31 PROCEDURE — 80053 COMPREHEN METABOLIC PANEL: CPT | Performed by: FAMILY MEDICINE

## 2022-03-31 PROCEDURE — 80061 LIPID PANEL: CPT | Performed by: FAMILY MEDICINE

## 2022-03-31 PROCEDURE — 85025 COMPLETE CBC W/AUTO DIFF WBC: CPT | Performed by: FAMILY MEDICINE

## 2022-04-06 ENCOUNTER — TELEPHONE (OUTPATIENT)
Dept: BEHAVIORAL HEALTH | Facility: CLINIC | Age: 82
End: 2022-04-06
Payer: MEDICARE

## 2022-04-06 ENCOUNTER — TELEPHONE (OUTPATIENT)
Dept: PRIMARY CARE CLINIC | Facility: CLINIC | Age: 82
End: 2022-04-06

## 2022-04-06 ENCOUNTER — OFFICE VISIT (OUTPATIENT)
Dept: PRIMARY CARE CLINIC | Facility: CLINIC | Age: 82
End: 2022-04-06
Payer: MEDICARE

## 2022-04-06 VITALS
SYSTOLIC BLOOD PRESSURE: 137 MMHG | WEIGHT: 167.31 LBS | HEIGHT: 66 IN | HEART RATE: 76 BPM | BODY MASS INDEX: 26.89 KG/M2 | TEMPERATURE: 98 F | DIASTOLIC BLOOD PRESSURE: 76 MMHG | OXYGEN SATURATION: 99 %

## 2022-04-06 DIAGNOSIS — E11.51 CONTROLLED TYPE 2 DIABETES MELLITUS WITH DIABETIC PERIPHERAL ANGIOPATHY WITHOUT GANGRENE, WITHOUT LONG-TERM CURRENT USE OF INSULIN: Primary | ICD-10-CM

## 2022-04-06 DIAGNOSIS — F41.9 ANXIETY: ICD-10-CM

## 2022-04-06 DIAGNOSIS — E11.59 HYPERTENSION ASSOCIATED WITH DIABETES: ICD-10-CM

## 2022-04-06 DIAGNOSIS — F43.21 GRIEF: ICD-10-CM

## 2022-04-06 DIAGNOSIS — E11.69 HYPERLIPIDEMIA ASSOCIATED WITH TYPE 2 DIABETES MELLITUS: ICD-10-CM

## 2022-04-06 DIAGNOSIS — M19.049 ARTHRITIS OF HAND: ICD-10-CM

## 2022-04-06 DIAGNOSIS — E11.42 TYPE 2 DIABETES MELLITUS WITH DIABETIC POLYNEUROPATHY, WITHOUT LONG-TERM CURRENT USE OF INSULIN: Primary | ICD-10-CM

## 2022-04-06 DIAGNOSIS — E78.5 HYPERLIPIDEMIA ASSOCIATED WITH TYPE 2 DIABETES MELLITUS: ICD-10-CM

## 2022-04-06 DIAGNOSIS — E11.9 ENCOUNTER FOR COMPREHENSIVE DIABETIC FOOT EXAMINATION, TYPE 2 DIABETES MELLITUS: ICD-10-CM

## 2022-04-06 DIAGNOSIS — I73.9 PVD (PERIPHERAL VASCULAR DISEASE) WITH CLAUDICATION: ICD-10-CM

## 2022-04-06 DIAGNOSIS — I82.592 CHRONIC EMBOLISM AND THROMBOSIS OF OTHER SPECIFIED DEEP VEIN OF LEFT LOWER EXTREMITY: ICD-10-CM

## 2022-04-06 DIAGNOSIS — Z85.3 HISTORY OF BREAST CANCER: ICD-10-CM

## 2022-04-06 DIAGNOSIS — Z13.31 POSITIVE DEPRESSION SCREENING: ICD-10-CM

## 2022-04-06 DIAGNOSIS — I70.0 ATHEROSCLEROSIS OF AORTA: ICD-10-CM

## 2022-04-06 DIAGNOSIS — I15.2 HYPERTENSION ASSOCIATED WITH DIABETES: ICD-10-CM

## 2022-04-06 PROCEDURE — 1126F AMNT PAIN NOTED NONE PRSNT: CPT | Mod: CPTII,S$GLB,, | Performed by: FAMILY MEDICINE

## 2022-04-06 PROCEDURE — 1160F PR REVIEW ALL MEDS BY PRESCRIBER/CLIN PHARMACIST DOCUMENTED: ICD-10-PCS | Mod: CPTII,S$GLB,, | Performed by: FAMILY MEDICINE

## 2022-04-06 PROCEDURE — 1159F PR MEDICATION LIST DOCUMENTED IN MEDICAL RECORD: ICD-10-PCS | Mod: CPTII,S$GLB,, | Performed by: FAMILY MEDICINE

## 2022-04-06 PROCEDURE — 99999 PR PBB SHADOW E&M-EST. PATIENT-LVL V: ICD-10-PCS | Mod: PBBFAC,,, | Performed by: FAMILY MEDICINE

## 2022-04-06 PROCEDURE — 1159F MED LIST DOCD IN RCRD: CPT | Mod: CPTII,S$GLB,, | Performed by: FAMILY MEDICINE

## 2022-04-06 PROCEDURE — 1160F RVW MEDS BY RX/DR IN RCRD: CPT | Mod: CPTII,S$GLB,, | Performed by: FAMILY MEDICINE

## 2022-04-06 PROCEDURE — 3075F PR MOST RECENT SYSTOLIC BLOOD PRESS GE 130-139MM HG: ICD-10-PCS | Mod: CPTII,S$GLB,, | Performed by: FAMILY MEDICINE

## 2022-04-06 PROCEDURE — 99214 PR OFFICE/OUTPT VISIT, EST, LEVL IV, 30-39 MIN: ICD-10-PCS | Mod: S$GLB,,, | Performed by: FAMILY MEDICINE

## 2022-04-06 PROCEDURE — 3288F FALL RISK ASSESSMENT DOCD: CPT | Mod: CPTII,S$GLB,, | Performed by: FAMILY MEDICINE

## 2022-04-06 PROCEDURE — 1126F PR PAIN SEVERITY QUANTIFIED, NO PAIN PRESENT: ICD-10-PCS | Mod: CPTII,S$GLB,, | Performed by: FAMILY MEDICINE

## 2022-04-06 PROCEDURE — 99214 OFFICE O/P EST MOD 30 MIN: CPT | Mod: S$GLB,,, | Performed by: FAMILY MEDICINE

## 2022-04-06 PROCEDURE — 99999 PR PBB SHADOW E&M-EST. PATIENT-LVL V: CPT | Mod: PBBFAC,,, | Performed by: FAMILY MEDICINE

## 2022-04-06 PROCEDURE — 1100F PTFALLS ASSESS-DOCD GE2>/YR: CPT | Mod: CPTII,S$GLB,, | Performed by: FAMILY MEDICINE

## 2022-04-06 PROCEDURE — 99499 RISK ADDL DX/OHS AUDIT: ICD-10-PCS | Mod: HCNC,S$GLB,, | Performed by: FAMILY MEDICINE

## 2022-04-06 PROCEDURE — 3078F DIAST BP <80 MM HG: CPT | Mod: CPTII,S$GLB,, | Performed by: FAMILY MEDICINE

## 2022-04-06 PROCEDURE — 3288F PR FALLS RISK ASSESSMENT DOCUMENTED: ICD-10-PCS | Mod: CPTII,S$GLB,, | Performed by: FAMILY MEDICINE

## 2022-04-06 PROCEDURE — 3078F PR MOST RECENT DIASTOLIC BLOOD PRESSURE < 80 MM HG: ICD-10-PCS | Mod: CPTII,S$GLB,, | Performed by: FAMILY MEDICINE

## 2022-04-06 PROCEDURE — 3075F SYST BP GE 130 - 139MM HG: CPT | Mod: CPTII,S$GLB,, | Performed by: FAMILY MEDICINE

## 2022-04-06 PROCEDURE — 99499 UNLISTED E&M SERVICE: CPT | Mod: HCNC,S$GLB,, | Performed by: FAMILY MEDICINE

## 2022-04-06 PROCEDURE — 1100F PR PT FALLS ASSESS DOC 2+ FALLS/FALL W/INJURY/YR: ICD-10-PCS | Mod: CPTII,S$GLB,, | Performed by: FAMILY MEDICINE

## 2022-04-06 RX ORDER — SERTRALINE HYDROCHLORIDE 50 MG/1
50 TABLET, FILM COATED ORAL DAILY
Qty: 90 TABLET | Refills: 3 | Status: SHIPPED | OUTPATIENT
Start: 2022-04-06 | End: 2022-12-29

## 2022-04-06 NOTE — PROGRESS NOTES
CHW reached out to patient to complete intake.  Pt will discuss with her daughter and CHW will call back on 4/7/22 at 10:0am to see if pt will join I Program.

## 2022-04-06 NOTE — PROGRESS NOTES
Subjective:      Patient ID: Kanchan Downing is a 81 y.o. female.    Chief Complaint: follow up    Here today for follo wup DM, HTN, HLD, PVD, chronic embolism DVT, history breast cancer (follows katy Storey), anxiety    She is greiving the loss of her  who passed in Jan 2022.  for 63 yeaqrs.  Taking Zoloft 25, but not helping enough. Still crying & feels so jumpy inside. Her daugher is in touch w her mom daily. Tough at home bc her kitchen flooded & can't use her kitchen now. Lost weight, not eating. 62 yo son with Lewy body dementia    Here today for diabetes mellitus 5.8 w Metformin 500 qd    Denies acute symptoms such as nocturia, polyuria, unexplained weight loss or blurred vision.    Eye evaluation overdue    Urine microalbumin today    Denies numbness, tingling sensation, or known h/o peripheral neuropathy.     Noticing more arthritis in both hands    Denies any chest pain, shortness of breath, nausea vomiting constipation diarrhea, blood in stool, heartburn    Current Outpatient Medications   Medication Instructions    amLODIPine (NORVASC) 5 MG tablet TAKE 1 TABLET EVERY DAY    aspirin (ECOTRIN) 81 mg, Oral, Daily    blood sugar diagnostic (BLOOD GLUCOSE TEST) Strp 1 strip, Misc.(Non-Drug; Combo Route), 2 times daily    blood sugar diagnostic Strp 1 strip, Misc.(Non-Drug; Combo Route), 2 times daily, ACCU-CHEK BROOK PLUS    blood-glucose meter kit TRUE METRIX AIR W/Modify SMART w/Device  Kit - use as instructed    cetirizine (ZYRTEC) 10 mg, Oral, Daily,      diclofenac sodium (VOLTAREN) 2 g, Topical (Top), Daily    flash glucose scanning reader (FREESTYLE FLOWER READER) Misc 1 each, Misc.(Non-Drug; Combo Route), Continuous    fluticasone (FLONASE) 50 mcg/actuation nasal spray 1 spray, Each Nostril, Daily    hydroCHLOROthiazide (MICROZIDE) 12.5 mg capsule TAKE 1 CAPSULE EVERY DAY    Lactobacillus rhamnosus GG (CULTURELLE) 10 billion cell capsule Take by mouth. 1 Capsule Oral Every  day    lancets Misc Test tid    lisinopriL (PRINIVIL,ZESTRIL) 5 MG tablet TAKE 1 TABLET EVERY DAY    metFORMIN (GLUCOPHAGE) 500 MG tablet TAKE 1 TABLET EVERY DAY    metoprolol tartrate (LOPRESSOR) 100 MG tablet TAKE 1 TABLET EVERY DAY    olopatadine (PATADAY) 0.2 % Drop 1 drop, Both Eyes, Daily    polyethylene glycol (GLYCOLAX) 17 gram/dose powder TAKE 1 CAPFUL (17 GRAMS) DAILY AS DIRECTED    sertraline (ZOLOFT) 50 mg, Oral, Daily    simvastatin (ZOCOR) 10 MG tablet TAKE 1 TABLET EVERY EVENING.    TRUEPLUS LANCETS 33 gauge Misc No dose, route, or frequency recorded.       Lab Results   Component Value Date    HGBA1C 5.8 (H) 03/31/2022    HGBA1C 6.3 (H) 12/02/2021    HGBA1C 6.3 (H) 06/02/2020     Lab Results   Component Value Date    MICALBCREAT 22.4 06/09/2020     Lab Results   Component Value Date    LDLCALC 107.4 03/31/2022    LDLCALC 103.2 06/02/2020    CHOL 180 03/31/2022    HDL 60 03/31/2022    TRIG 63 03/31/2022       Lab Results   Component Value Date     03/31/2022    K 3.9 03/31/2022     03/31/2022    CO2 26 03/31/2022     (H) 03/31/2022    BUN 13 03/31/2022    CREATININE 0.8 03/31/2022    CALCIUM 10.4 03/31/2022    PROT 6.8 03/31/2022    ALBUMIN 4.0 03/31/2022    BILITOT 0.7 03/31/2022    ALKPHOS 49 (L) 03/31/2022    AST 21 03/31/2022    ALT 14 03/31/2022    ANIONGAP 10 03/31/2022    ESTGFRAFRICA >60.0 03/31/2022    EGFRNONAA >60.0 03/31/2022    WBC 4.20 03/31/2022    HGB 13.5 03/31/2022    HGB 13.7 06/02/2020    HCT 41.9 03/31/2022    MCV 92 03/31/2022     03/31/2022    TSH 4.708 (H) 04/21/2017       Lab Results   Component Value Date    [id number] 41 05/08/2014         Past Medical History:   Diagnosis Date    Anxiety     Arthritis     Dr Schofield    Arthritis of hand 4/27/2017    Atherosclerosis of aorta 06/08/2016    CXR 2013    Breast cancer 2011    right breast invasive micropapillary CA, Stage !A    Cataract     Controlled type 2 diabetes mellitus with  circulatory disorder, without long-term current use of insulin 10/31/2017    Controlled type 2 diabetes mellitus with circulatory disorder, without long-term current use of insulin 10/31/2017    Diabetes mellitus type 2 without retinopathy 06/12/2014    6% metformin 500 bid, FU+ 2016    DVT (deep venous thrombosis) 2001    R , Dr Chad Golden 4/6/2022     Marques passed 1/2022    Hyperlipidemia     LDL 82    Hyperlipidemia associated with type 2 diabetes mellitus 9/11/2012    Hypertension     Hypertension associated with diabetes 9/11/2012    Microalbuminuria due to type 2 diabetes mellitus 12/08/2015    taking lisinopril, losartan caused olivia effects    PVD (peripheral vascular disease) with claudication 5/2/2019    Compression hose    Risk for coronary artery disease greater than 20% in next 10 years per [place] score 5/1/2018    Strabismus     Type 2 diabetes mellitus with diabetic polyneuropathy 6/12/2014    Type 2 diabetes mellitus with diabetic polyneuropathy, without long-term current use of insulin 6/12/2014     Past Surgical History:   Procedure Laterality Date    BREAST BIOPSY Right 2011    BREAST LUMPECTOMY Right 2011    CA REMOVAL OF NAIL BED  6/10/2015    TONSILLECTOMY       Social History     Social History Narrative     to Marques, 3 children, nondrinker, nonsmoker, she declines colonoscopy     Family History   Problem Relation Age of Onset    Heart disease Mother 58    Cataracts Mother     Heart disease Father 50    Thyroid disease Sister     Breast cancer Sister     No Known Problems Brother     No Known Problems Maternal Aunt     No Known Problems Maternal Uncle     No Known Problems Paternal Aunt     No Known Problems Paternal Uncle     No Known Problems Maternal Grandmother     No Known Problems Maternal Grandfather     No Known Problems Paternal Grandmother     No Known Problems Paternal Grandfather     Amblyopia Neg Hx     Blindness Neg Hx      "Diabetes Neg Hx     Glaucoma Neg Hx     Hypertension Neg Hx     Macular degeneration Neg Hx     Retinal detachment Neg Hx     Strabismus Neg Hx     Stroke Neg Hx      Vitals:    04/06/22 1004 04/06/22 1031   BP: (!) 146/80 137/76   Pulse: 76    Temp: 98 °F (36.7 °C)    SpO2: 99%    Weight: 75.9 kg (167 lb 5.3 oz)    Height: 5' 6" (1.676 m)    PainSc: 0-No pain      Objective:   Physical Exam  Constitutional:       Appearance: She is well-developed.   HENT:      Head: Normocephalic and atraumatic.      Right Ear: External ear normal.      Left Ear: External ear normal.      Nose: Nose normal.      Comments: Wearing mask due to current COVID 19 pandemic, Nose & mouth exam deferred  Eyes:      Pupils: Pupils are equal, round, and reactive to light.   Neck:      Thyroid: No thyromegaly.   Cardiovascular:      Rate and Rhythm: Normal rate and regular rhythm.      Heart sounds: Normal heart sounds. No murmur heard.  Pulmonary:      Effort: Pulmonary effort is normal.      Breath sounds: Normal breath sounds. No wheezing.   Abdominal:      General: Bowel sounds are normal. There is no distension.      Palpations: Abdomen is soft. There is no mass.      Tenderness: There is no abdominal tenderness. There is no guarding or rebound.   Musculoskeletal:      Cervical back: Neck supple.      Right foot: Normal range of motion. No deformity.      Left foot: Normal range of motion. No deformity.      Comments:   Hypertrophic bony overgrowth PIP & DIP bilateral  2+ pulses equal bilateral, < 2 second capillary refill        Feet:      Right foot:      Protective Sensation: 5 sites tested. 5 sites sensed.      Skin integrity: No ulcer, blister, skin breakdown, erythema or warmth.      Left foot:      Protective Sensation: 5 sites tested. 5 sites sensed.      Skin integrity: No ulcer, blister, skin breakdown, erythema or warmth.   Lymphadenopathy:      Cervical: No cervical adenopathy.   Skin:     General: Skin is warm and dry. "   Neurological:      Mental Status: She is alert and oriented to person, place, and time.   Psychiatric:         Behavior: Behavior normal.       Assessment:     1. Controlled type 2 diabetes mellitus with diabetic peripheral angiopathy without gangrene, without long-term current use of insulin    2. Grief    3. Hypertension associated with diabetes    4. Hyperlipidemia associated with type 2 diabetes mellitus    5. Atherosclerosis of aorta    6. PVD (peripheral vascular disease) with claudication    7. 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 History of breast cancer    9. Anxiety    10. Positive depression screening    11. Encounter for comprehensive diabetic foot examination, type 2 diabetes mellitus    12. Arthritis of hand      Plan:     Orders Placed This Encounter    Ambulatory referral/consult to Ophthalmology    Ambulatory referral/consult to Primary Care Behavioral Health (Non-Opioids)    sertraline (ZOLOFT) 50 MG tablet     Urine microalbumin today    Continue medications    FOR  DIABETES:  ==================================  SEE me in  6 MONTHS with  BLOODWORK one week PRIOR  ===================================    Your 1# medicine is  EXERCISE  - huffing & puffing for 20  MINUTES EVERY DAY - check your sugars & you'll see them go down    The future risks of uncontrolled diabetes - include heart attack, stroke, neuropathy (pain in hands & feet that could lead to infection and amputation), nephropathy (leading to kidney dialysis) , and blindness     To prevent complications that can be treated early, please see your  opthalmologist EYE DOCTOR YEARLY      Always wear protective SHOES    Focus on NO SUGAR and no sugar substitutes (splenda,s tevia, etc) IN YOUR DRINKS. With respect to food - LOW CARBOHYDRATES - switch to whole wheat & brown rice.    Decrease & try to stop ALCOHOL, WHITE BREAD, WHITE PASTA, WHITE RICE , WHITE POTATOES- which all turn into  sugar.    EAT an EXTRA VEGETABLE A DAY than you already do.    Consider reading the book -  End Of Diabetes by Dr Avila    ==============================  RECOMMENDATIONS FOR FEMALES  ==============================  Your #1 MEDICINE is DAILY EXERCISE - 15-20 minutes of huffing & puffing EVERY DAY.     Prevent the #1 cause of death- cardiovascular disease (HEART ATTACK & STROKE) by checking for normal blood pressure, cholesterol, sugars, & by not smoking.     VACCINES: Yearly FLU shot, PNEUMONIA shot after 65,  SHINGLES shot after 50    COLON CANCER screening colonoscopy starting at 46 yo &  every 10 years (or Cologuard kit every 3 yrs) , repeat test sooner if POLYP is found    I recommend  high fiber (5 fresh fruits or vegetables daily), low fat diet and aerobic  exercise (huffing/ puffing/ sweating for 20 min straight at least 4 days a week)    Follow up yearly with mammogram, fasting lipids, CMP, CBC prior.   ==============================================================      There are no Patient Instructions on file for this visit.                            I have used clinical judgement based on duration and functional status to consider definite necessity for treatment.

## 2022-04-07 ENCOUNTER — TELEPHONE (OUTPATIENT)
Dept: BEHAVIORAL HEALTH | Facility: CLINIC | Age: 82
End: 2022-04-07
Payer: MEDICARE

## 2022-04-07 NOTE — PROGRESS NOTES
Behavioral Health Community Health Worker  Initial Assessment  Completed by: Jessenia Betancourt    Date:  4/7/2022    Patient Enrollment in Behavioral Health Program:  · Patient verbalized understanding of Behavioral Health Integration services to include:  · Patient understands that CHW, LCSW, PharmD and consulting Psychiatrist are members of the care team working collaboratively with his/her primary care provider: Yes  · Patient understands that activation of their [facility] patient portal account is required for accessing the full scope of team services: Yes  · Patient understands that some counseling sessions may occur via video: Yes  · Clinic visits with the psychiatrist may be subject to a co-pay based on your insurance: Yes  · Patient consents to enroll in BHI program: Yes    Assessments     Single Item Health Literacy Scale:  · How often do you need to have someone help you read instructions, pamphlets or other written material from your doctor or pharmacy?: Never    Promis 10:  · Promis 10 Responses  · In general, would you say your health is: Fair  · In general, would you say your quality of life is: Fair  · In general, how would you rate your physical health?: Poor  · In general, how would you rate your mental health, including your mood and your ability to think?: Fair  · In general, how would you rate your satisfaction with your social activities and relationships?: Fair  · In general, please rate how well you carry out your usual social activities and roles. (This includes activities at home, at work and in your community, and responsibilities as a parent, child, spouse, employee, friend, etc.): Very good  · To what extent are you able to carry out your everyday physical activities such as walking, climbing stairs, carrying groceries, or moving a chair? : Moderately  · How often have you been bothered by emotional problems such as feeling anxious, depressed or irritable?: Often  · In the past 7 days, how  "would you rate your fatigue on average?: Moderate  · In the past 7 days, on a scale of 0 to 10 (where 0 is no pain and 10 is the worst pain imaginable) how would you rate your pain on average?: 5  · Global Physical Health: 12  · Global Mental health Score: 10    Depression PHQ:  PHQ9 4/6/2022   Total Score 6         Generalized Anxiety Disorder 7-Item Scale:  GAD7 4/7/2022   1. Feeling nervous, anxious, or on edge? 1   2. Not being able to stop or control worrying? 2   3. Worrying too much about different things? 1   4. Trouble relaxing? 3   5. Being so restless that it is hard to sit still? 3   6. Becoming easily annoyed or irritable? 3   7. Feeling afraid as if something awful might happen? 1   8. If you checked off any problems, how difficult have these problems made it for you to do your work, take care of things at home, or get along with other people? 3   BANDAR-7 Score 14       History     Social History     Socioeconomic History    Marital status:    Tobacco Use    Smoking status: Former Smoker     Packs/day: 7.00     Years: 0.50     Pack years: 3.50    Smokeless tobacco: Never Used   Substance and Sexual Activity    Alcohol use: No    Drug use: Never    Sexual activity: Never   Social History Narrative     to Marques, 3 children, nondrinker, nonsmoker, she declines colonoscopy       Call Summary     Patient was referred to the BHI (Non-opioid) program by Primary Care Provider, Dr. Viktoria Mckeon.  CHW contacted Kanchan Downing who reports depression and anxiety that limits her activities of daily living (ADLs).   Patient scored "6" on the PHQ9 and "14" on the BANDAR 7. Based on these scores patient is eligible for the Behavioral Health Integration (Non-opioid) Program. CHW completed the intake and scheduled a virtual appointment for patient with Patricia Contreras LCSW, on 04/14/22 at 1:30pm.         "

## 2022-04-07 NOTE — TELEPHONE ENCOUNTER
No new care gaps identified.  Powered by [employer] Reference number: [id number].   4/07/2022 1:37:05 PM CDT

## 2022-04-08 RX ORDER — HYDROCHLOROTHIAZIDE 12.5 MG/1
CAPSULE ORAL
Qty: 90 CAPSULE | Refills: 3 | Status: ON HOLD | OUTPATIENT
Start: 2022-04-08 | End: 2024-01-17 | Stop reason: HOSPADM

## 2022-04-08 NOTE — TELEPHONE ENCOUNTER
Refill Authorization Note   Kanchan Downing  is requesting a refill authorization.  Brief Assessment and Rationale for Refill:  Approve     Medication Therapy Plan:       Medication Reconciliation Completed: No   Comments:     No Care Gaps recommended.     Note composed:2:56 PM 04/08/2022

## 2022-04-13 ENCOUNTER — PATIENT MESSAGE (OUTPATIENT)
Dept: BEHAVIORAL HEALTH | Facility: CLINIC | Age: 82
End: 2022-04-13
Payer: MEDICARE

## 2022-04-13 ENCOUNTER — TELEPHONE (OUTPATIENT)
Dept: BEHAVIORAL HEALTH | Facility: CLINIC | Age: 82
End: 2022-04-13
Payer: MEDICARE

## 2022-04-13 NOTE — PROGRESS NOTES
CHW reached out to pt to remind her of virtual appointment with Patricia Contreras LCSW, on tomorrow. No answer, left VM of virtual appointment. Sent virtual visit instructions to pt Roberto.

## 2022-04-14 ENCOUNTER — TELEPHONE (OUTPATIENT)
Dept: BEHAVIORAL HEALTH | Facility: CLINIC | Age: 82
End: 2022-04-14
Payer: MEDICARE

## 2022-04-14 NOTE — PROGRESS NOTES
CHW received call that pt cannot keep appt today. Daughter was caught in traffic today, can't assist with virtual visit. Rescheduled pt to 4/26/22 at 2:30pm virtual with Patricia Contreras LCSW.

## 2022-04-25 ENCOUNTER — TELEPHONE (OUTPATIENT)
Dept: BEHAVIORAL HEALTH | Facility: CLINIC | Age: 82
End: 2022-04-25
Payer: MEDICARE

## 2022-04-25 NOTE — PROGRESS NOTES
CHW reached out to pt to remind her of virtual appointment with Patricia Contreras LCSW, on tomorrow. Pt confirmed  the appointment.

## 2022-04-26 ENCOUNTER — OFFICE VISIT (OUTPATIENT)
Dept: BEHAVIORAL HEALTH | Facility: CLINIC | Age: 82
End: 2022-04-26
Payer: MEDICARE

## 2022-04-26 DIAGNOSIS — F43.21 GRIEF: Primary | ICD-10-CM

## 2022-04-26 DIAGNOSIS — F41.1 GAD (GENERALIZED ANXIETY DISORDER): ICD-10-CM

## 2022-04-26 PROCEDURE — 90791 PSYCH DIAGNOSTIC EVALUATION: CPT | Mod: 95,,, | Performed by: SOCIAL WORKER

## 2022-04-26 PROCEDURE — 99499 RISK ADDL DX/OHS AUDIT: ICD-10-PCS | Mod: 95,,, | Performed by: SOCIAL WORKER

## 2022-04-26 PROCEDURE — 99499 UNLISTED E&M SERVICE: CPT | Mod: 95,,, | Performed by: SOCIAL WORKER

## 2022-04-26 PROCEDURE — 90791 PR PSYCHIATRIC DIAGNOSTIC EVALUATION: ICD-10-PCS | Mod: 95,,, | Performed by: SOCIAL WORKER
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
## 2022-05-02 ENCOUNTER — TELEPHONE (OUTPATIENT)
Dept: BEHAVIORAL HEALTH | Facility: CLINIC | Age: 82
End: 2022-05-02
Payer: MEDICARE

## 2022-05-02 NOTE — PROGRESS NOTES
CHW reached out to pt to remind her of virtual appointment with Patricia Contreras LCSW, on tomorrow at 2:00pm. Pt confirmed  the appointment.

## 2022-05-03 ENCOUNTER — OFFICE VISIT (OUTPATIENT)
Dept: BEHAVIORAL HEALTH | Facility: CLINIC | Age: 82
End: 2022-05-03
Payer: MEDICARE

## 2022-05-03 DIAGNOSIS — F43.21 GRIEF: ICD-10-CM

## 2022-05-03 DIAGNOSIS — F41.1 GAD (GENERALIZED ANXIETY DISORDER): Primary | ICD-10-CM

## 2022-05-03 PROCEDURE — 90832 PSYTX W PT 30 MINUTES: CPT | Mod: 95,,, | Performed by: SOCIAL WORKER

## 2022-05-03 PROCEDURE — 90832 PR PSYCHOTHERAPY W/PATIENT, 30 MIN: ICD-10-PCS | Mod: 95,,, | Performed by: SOCIAL WORKER

## 2022-05-03 NOTE — PROGRESS NOTES
The patient location is: Dtr's home in [place]  The chief complaint leading to consultation is: grief    Visit type: audiovisual    Face to Face time with patient: 30  45 minutes of total time spent on the encounter, which includes face to face time and non-face to face time preparing to see the patient (eg, review of tests), Obtaining and/or reviewing separately obtained history, Documenting clinical information in the electronic or other health record, Independently interpreting results (not separately reported) and communicating results to the patient/family/caregiver, or Care coordination (not separately reported).     Each patient to whom he or she provides medical services by telemedicine is:  (1) informed of the relationship between the physician and patient and the respective role of any other health care provider with respect to management of the patient; and (2) notified that he or she may decline to receive medical services by telemedicine and may withdraw from such care at any time.    Individual Psychotherapy (LCSW/PhD)  Kanchan Downing,  5/3/2022    Site:  Tele[facility]         Therapeutic Intervention: Met with patient for individual psychotherapy.    Chief complaint/reason for encounter: anxiety     Interval history and content of current session:   Didn't get to go to bible study.  Pt states that youngest sister found out has mini strokes, not sure what they will do for her- monitor strokes- last of pt's siblings  Fell out the bed at the hospital - very sad- however pt will put in God's hands.    Son buying a house, 2 houses away from pt's best friend.      Pt states that at times inside pt's heart is racing then pt will start crying.     Discussed going to bible study .    Discussed and practiced breath 123 hold 123 out 123. Pt will practice several times and day and will practice when anxious or stressed.      Will give 's medications to [facility]    Goals: give 's  medications to [facility]; practice breathing    Treatment plan:  · Target symptoms: grief  · Why chosen therapy is appropriate versus another modality: patient responds to this modality  · Outcome monitoring methods: self-report, checklist/rating scale  · Therapeutic intervention type: insight oriented psychotherapy, behavior modifying psychotherapy    Risk parameters:  Patient reports no suicidal ideation  Patient reports no homicidal ideation  Patient reports no self-injurious behavior  Patient reports no violent behavior    Verbal deficits: None    Patient's response to intervention:  The patient's response to intervention is accepting.    Progress toward goals and other mental status changes:  The patient's progress toward goals is fair .    Diagnosis:     ICD-10-CM ICD-9-CM   1. BANDAR (generalized anxiety disorder)  F41.1 300.02   2. Grief  F43.21 309.0       Plan: Pt plans to continue individual psychotherapy    Return to clinic: 3 weeks, 5/20 at 3pm    Length of Service (minutes): 30

## 2022-05-04 PROBLEM — F41.1 GAD (GENERALIZED ANXIETY DISORDER): Status: ACTIVE | Noted: 2022-05-04

## 2022-05-19 ENCOUNTER — TELEPHONE (OUTPATIENT)
Dept: BEHAVIORAL HEALTH | Facility: CLINIC | Age: 82
End: 2022-05-19
Payer: MEDICARE

## 2022-05-19 PROBLEM — Z91.81 HISTORY OF FALL: Status: ACTIVE | Noted: 2022-05-19

## 2022-05-19 NOTE — PROGRESS NOTES
Behavioral Health Community Health Worker  Follow-Up  Completed by: Jessenia Betancourt     Date:  5/19/2022    Patient Enrollment in Behavioral Health Program:  · Kanchan Downing was enrolled in the Behavioral Health Program on 4/07/2022    Assessments     Promis 10:  PROMIS-10 Questionnaire Scores 5/19/2022   Global Physical Health 14   Global Mental health Score 12       Depression PHQ:  PHQ9 5/19/2022   Total Score 8       Generalized Anxiety Disorder 7-Item Scale:  GAD7 5/19/2022   1. Feeling nervous, anxious, or on edge? 1   2. Not being able to stop or control worrying? 0   3. Worrying too much about different things? 0   4. Trouble relaxing? 1   5. Being so restless that it is hard to sit still? 2   6. Becoming easily annoyed or irritable? 1   7. Feeling afraid as if something awful might happen? 0   8. If you checked off any problems, how difficult have these problems made it for you to do your work, take care of things at home, or get along with other people? 1   BANDAR-7 Score 5       Patients' Global Impression of Change (PGIC) Scale:  Since beginning treatment at this clinic, how would you describe the change (if any) in ACTIVITY LIMITATIONS, SYMPTOMS, EMOTIONS, and OVERALL QUALITY OF LIFE, related to your painful condition?  No Value exists for the : [facility]#91751      In a similar way, please check the number below that matches your degree of change since beginning care at this clinic (Much better (0) - Much Worse (10)): No Value exists for the : [facility]#37938        Much Better                                     No Change                                    Much Worse                        -----------------------------------------------------------------------------                        0       1       2       3       4       5       6       7      8       9      10                     Call Summary     Patient was referred to the BHI (Non-opioid) program by Primary Care Provider, Dr. Viktoria Mckeon.  CHW contacted  "Kanchan Downing who reports depression and anxiety that limits her activities of daily living (ADLs).   Patient scored "8" on the PHQ9 and "5" on the BANDAR 7. Based on these scores patient is eligible for the Behavioral Health Integration (Non-opioid) Program. CHW completed the follow up assessments and an appointment for patient with Patricia Contreras LCSW, was previously scheduled on 05/20/22 at 3:00pm.  Pt stated that medication (Zoloft) caused her to hallucinate; pt stopped taking Zoloft and needs something milder.  Pt reported feeling "jumpy".   "

## 2022-05-24 NOTE — PROGRESS NOTES
"Patient discussed during [facility] meeting today.  Patient interested in trying another medication for depression and anxiety.  Zoloft caused her visual hallucinations at 50mg daily.  She is asking for a "milder" medication that could help her feel calm.  I would recommend starting very low dose Lexapro 2.5mg daily x 1 week and then increase to 5mg daily.  LCSW will discuss with patient at next appointment if she is still interested.      Time: 20 minutes      "

## 2022-05-31 ENCOUNTER — PATIENT OUTREACH (OUTPATIENT)
Dept: BEHAVIORAL HEALTH | Facility: CLINIC | Age: 82
End: 2022-05-31
Payer: MEDICARE

## 2022-05-31 ENCOUNTER — TELEPHONE (OUTPATIENT)
Dept: BEHAVIORAL HEALTH | Facility: CLINIC | Age: 82
End: 2022-05-31
Payer: MEDICARE

## 2022-05-31 ENCOUNTER — PATIENT MESSAGE (OUTPATIENT)
Dept: BEHAVIORAL HEALTH | Facility: CLINIC | Age: 82
End: 2022-05-31
Payer: MEDICARE

## 2022-05-31 NOTE — PROGRESS NOTES
CHW reached out to pt to reschedule appointment with Patricia Contreras LCSW. No answer, left VM and sent [facility] message to pls call.

## 2022-06-01 ENCOUNTER — TELEPHONE (OUTPATIENT)
Dept: BEHAVIORAL HEALTH | Facility: CLINIC | Age: 82
End: 2022-06-01
Payer: MEDICARE

## 2022-06-01 NOTE — PROGRESS NOTES
CHW received call from pt to reschedule virtual visit with Patricia Contreras LCSW, rescheduled virtual on 6/9/22 at 2:30pm.

## 2022-06-08 ENCOUNTER — TELEPHONE (OUTPATIENT)
Dept: BEHAVIORAL HEALTH | Facility: CLINIC | Age: 82
End: 2022-06-08
Payer: MEDICARE

## 2022-06-08 NOTE — PROGRESS NOTES
CHW reached out to pt to remind her of virtual appointment with Patricia Contreras LCSW,  on  tomorrow pt confirmed  the appointment. Pt will try to get into virtual visit but her daughter who helps her will be out of town.

## 2022-06-13 ENCOUNTER — TELEPHONE (OUTPATIENT)
Dept: BEHAVIORAL HEALTH | Facility: CLINIC | Age: 82
End: 2022-06-13
Payer: MEDICARE

## 2022-06-13 NOTE — PROGRESS NOTES
"CHW reached out to pt to reschedule an appointment with MARIANNE StanfordW, pt reschedule appointment for office appt on 6/15/22 at 3:30pm. Pt want meds for "when she feels jumpy".      "

## 2022-06-14 ENCOUNTER — TELEPHONE (OUTPATIENT)
Dept: BEHAVIORAL HEALTH | Facility: CLINIC | Age: 82
End: 2022-06-14
Payer: MEDICARE

## 2022-06-17 ENCOUNTER — PATIENT MESSAGE (OUTPATIENT)
Dept: BEHAVIORAL HEALTH | Facility: CLINIC | Age: 82
End: 2022-06-17
Payer: MEDICARE

## 2022-06-17 ENCOUNTER — TELEPHONE (OUTPATIENT)
Dept: BEHAVIORAL HEALTH | Facility: CLINIC | Age: 82
End: 2022-06-17
Payer: MEDICARE

## 2022-06-17 NOTE — PROGRESS NOTES
Patient said she was sorry she did not make yesterday appointment and opted to take the referrals and not set another appointment.

## 2022-06-30 ENCOUNTER — PATIENT OUTREACH (OUTPATIENT)
Dept: BEHAVIORAL HEALTH | Facility: CLINIC | Age: 82
End: 2022-06-30
Payer: MEDICARE

## 2022-07-19 ENCOUNTER — PATIENT MESSAGE (OUTPATIENT)
Dept: ADMINISTRATIVE | Facility: OTHER | Age: 82
End: 2022-07-19
Payer: MEDICARE

## 2022-08-02 ENCOUNTER — OFFICE VISIT (OUTPATIENT)
Dept: OPTOMETRY | Facility: CLINIC | Age: 82
End: 2022-08-02
Payer: MEDICARE

## 2022-08-02 DIAGNOSIS — H25.13 NUCLEAR SCLEROSIS OF BOTH EYES: ICD-10-CM

## 2022-08-02 DIAGNOSIS — H52.7 REFRACTIVE ERROR: ICD-10-CM

## 2022-08-02 DIAGNOSIS — E11.9 TYPE 2 DIABETES MELLITUS WITHOUT RETINOPATHY: Primary | ICD-10-CM

## 2022-08-02 DIAGNOSIS — E11.51 CONTROLLED TYPE 2 DIABETES MELLITUS WITH DIABETIC PERIPHERAL ANGIOPATHY WITHOUT GANGRENE, WITHOUT LONG-TERM CURRENT USE OF INSULIN: ICD-10-CM

## 2022-08-02 PROCEDURE — 1159F MED LIST DOCD IN RCRD: CPT | Mod: CPTII,S$GLB,, | Performed by: OPTOMETRIST

## 2022-08-02 PROCEDURE — 2023F DILAT RTA XM W/O RTNOPTHY: CPT | Mod: CPTII,S$GLB,, | Performed by: OPTOMETRIST

## 2022-08-02 PROCEDURE — 1159F PR MEDICATION LIST DOCUMENTED IN MEDICAL RECORD: ICD-10-PCS | Mod: CPTII,S$GLB,, | Performed by: OPTOMETRIST

## 2022-08-02 PROCEDURE — 3288F PR FALLS RISK ASSESSMENT DOCUMENTED: ICD-10-PCS | Mod: CPTII,S$GLB,, | Performed by: OPTOMETRIST

## 2022-08-02 PROCEDURE — 92004 PR EYE EXAM, NEW PATIENT,COMPREHESV: ICD-10-PCS | Mod: S$GLB,,, | Performed by: OPTOMETRIST

## 2022-08-02 PROCEDURE — 1126F PR PAIN SEVERITY QUANTIFIED, NO PAIN PRESENT: ICD-10-PCS | Mod: CPTII,S$GLB,, | Performed by: OPTOMETRIST

## 2022-08-02 PROCEDURE — 99999 PR PBB SHADOW E&M-EST. PATIENT-LVL III: CPT | Mod: PBBFAC,,, | Performed by: OPTOMETRIST

## 2022-08-02 PROCEDURE — 92015 DETERMINE REFRACTIVE STATE: CPT | Mod: S$GLB,,, | Performed by: OPTOMETRIST

## 2022-08-02 PROCEDURE — 1160F RVW MEDS BY RX/DR IN RCRD: CPT | Mod: CPTII,S$GLB,, | Performed by: OPTOMETRIST

## 2022-08-02 PROCEDURE — 1160F PR REVIEW ALL MEDS BY PRESCRIBER/CLIN PHARMACIST DOCUMENTED: ICD-10-PCS | Mod: CPTII,S$GLB,, | Performed by: OPTOMETRIST

## 2022-08-02 PROCEDURE — 92015 PR REFRACTION: ICD-10-PCS | Mod: S$GLB,,, | Performed by: OPTOMETRIST

## 2022-08-02 PROCEDURE — 1101F PT FALLS ASSESS-DOCD LE1/YR: CPT | Mod: CPTII,S$GLB,, | Performed by: OPTOMETRIST

## 2022-08-02 PROCEDURE — 3288F FALL RISK ASSESSMENT DOCD: CPT | Mod: CPTII,S$GLB,, | Performed by: OPTOMETRIST

## 2022-08-02 PROCEDURE — 1126F AMNT PAIN NOTED NONE PRSNT: CPT | Mod: CPTII,S$GLB,, | Performed by: OPTOMETRIST

## 2022-08-02 PROCEDURE — 1101F PR PT FALLS ASSESS DOC 0-1 FALLS W/OUT INJ PAST YR: ICD-10-PCS | Mod: CPTII,S$GLB,, | Performed by: OPTOMETRIST

## 2022-08-02 PROCEDURE — 99999 PR PBB SHADOW E&M-EST. PATIENT-LVL III: ICD-10-PCS | Mod: PBBFAC,,, | Performed by: OPTOMETRIST

## 2022-08-02 PROCEDURE — 2023F PR DILATED RETINAL EXAM W/O EVID OF RETINOPATHY: ICD-10-PCS | Mod: CPTII,S$GLB,, | Performed by: OPTOMETRIST

## 2022-08-02 PROCEDURE — 92004 COMPRE OPH EXAM NEW PT 1/>: CPT | Mod: S$GLB,,, | Performed by: OPTOMETRIST

## 2022-08-02 NOTE — PROGRESS NOTES
HPI     80 Y/o female is here for routine eye exam with C/o pt states the her   left eye is feeling strained and does not focus as well.  Pt denies pain and discomfort   No f/f    Eye med: no gtt    Last edited by Tosha Guzman MA on 8/2/2022  1:09 PM. (History)            Assessment /Plan     For exam results, see Encounter Report.    Type 2 diabetes mellitus without retinopathy    Controlled type 2 diabetes mellitus with diabetic peripheral angiopathy without gangrene, without long-term current use of insulin  -     Ambulatory referral/consult to Ophthalmology    Nuclear sclerosis of both eyes    Refractive error      1,2. No diabetic retinopathy, no csme. Return in 1 year for dilated eye exam.  3. Educated pt on presence of cataracts and effects on vision. No surgery at this time. Recheck in one year.  4. Spectacle Rx given, discussed different options for glasses. RTC 1 year routine eye exam.

## 2022-09-27 DIAGNOSIS — Z12.31 ENCOUNTER FOR SCREENING MAMMOGRAM FOR BREAST CANCER: Primary | ICD-10-CM

## 2022-09-27 DIAGNOSIS — E11.51 CONTROLLED TYPE 2 DIABETES MELLITUS WITH DIABETIC PERIPHERAL ANGIOPATHY WITHOUT GANGRENE, WITHOUT LONG-TERM CURRENT USE OF INSULIN: ICD-10-CM

## 2022-09-27 DIAGNOSIS — E11.21 CONTROLLED TYPE 2 DIABETES MELLITUS WITH DIABETIC NEPHROPATHY, WITHOUT LONG-TERM CURRENT USE OF INSULIN: ICD-10-CM

## 2022-09-27 NOTE — TELEPHONE ENCOUNTER
----- Message from Hellen Alcaraz sent at 9/27/2022  3:19 PM CDT -----  Contact: [phone]  Caller is requesting to schedule their annual screening mammogram appointment. Order is not listed in Epic.  Please enter order and contact patient to schedule.  Would the patient like a call back, or a response through their [facility] portal?:   phone

## 2022-09-27 NOTE — TELEPHONE ENCOUNTER
----- Message from Hellen Alcaraz sent at 9/27/2022  3:13 PM CDT -----  Contact: [phone]  Requesting an RX refill or new RX.  Is this a refill or new RX: refill  RX name and strength:   simvastatin (ZOCOR) 10 MG tablet  Is this a 30 day or 90 day RX: 90  Pharmacy name and phone #   [facility] [address]  [address] [address]  Phone: [phone] Fax: [phone]  The doctors have asked that we provide their patients with the following 2 reminders -- prescription refills can take up to 72 hours, and a friendly reminder that in the future you can use your [facility] account to request refills: yes      Requesting an RX refill or new RX.  Is this a refill or new RX: refill  RX name and strength:amLODIPine (NORVASC) 5 MG tablet  Is this a 30 day or 90 day RX: 90  Pharmacy name and phone #   [facility] - [place], [place] - [address]  [address] [address]  Phone: [phone] Fax: [phone]    The doctors have asked that we provide their patients with the following 2 reminders -- prescription refills can take up to 72 hours, and a friendly reminder that in the future you can use your [employer] account to request refills: yes      Requesting an RX refill or new RX.  Is this a refill or new RX: refill  RX name and strength:lisinopriL (PRINIVIL,ZESTRIL) 5 MG tablet  Is this a 30 day or 90 day RX: 90  Pharmacy name and phone #   [facility] [address] [facility] [address]  [address] [id number]  Phone: [phone] Fax: [phone]    The doctors have asked that we provide their patients with the following 2 reminders -- prescription refills can take up to 72 hours, and a friendly reminder that in the future you can use your [facility] account to request refills: yes

## 2022-09-27 NOTE — TELEPHONE ENCOUNTER
----- Message from Hellen Alcaraz sent at 9/27/2022  3:17 PM CDT -----  Contact: [phone]  Requesting an RX refill or new RX.  Is this a refill or new RX: refill  RX name and strength :metoprolol tartrate (LOPRESSOR) 100 MG tablet  Is this a 30 day or 90 day RX: 90  Pharmacy name and phone #  [facility] - [facility] [address]  [address] [address]  Phone: [phone] Fax: [phone]    The doctors have asked that we provide their patients with the following 2 reminders -- prescription refills can take up to 72 hours, and a friendly reminder that in the future you can use your [facility] account to request refills: yes       Requesting an RX refill or new RX.  Is this a refill or new RX: refill  RX name and strength :metFORMIN (GLUCOPHAGE) 500 MG tablet  Is this a 30 day or 90 day RX: 90  Pharmacy name and phone #[facility] - [facility] [address]  [address] [address]  Phone: [phone] Fax: [phone]    The doctors have asked that we provide their patients with the following 2 reminders -- prescription refills can take up to 72 hours, and a friendly reminder that in the future you can use your [facility] account to request refills: yes

## 2022-09-27 NOTE — TELEPHONE ENCOUNTER
Care Due:                  Date            Visit Type   Department     Provider  --------------------------------------------------------------------------------                                EP -                              PRIMARY      [facility] PRIMARY  Last Visit: 04-      CARE ([facility])   DORINA Mckeon                               -                              PRIMARY      [facility] PRIMARY  Next Visit: 10-      CARE ([facility])   [facility]           Viktoria Mckeon                                                            Last  Test          Frequency    Reason                     Performed    Due Date  --------------------------------------------------------------------------------    HBA1C.......  6 months...  metFORMIN................  03- 09-    [employer] Embedded Care Gaps. Reference number: [id number]. 9/27/2022   4:51:54 PM CDT

## 2022-09-28 RX ORDER — METFORMIN HYDROCHLORIDE 500 MG/1
500 TABLET ORAL DAILY
Qty: 90 TABLET | Refills: 3 | Status: ON HOLD | OUTPATIENT
Start: 2022-09-28 | End: 2024-01-17 | Stop reason: HOSPADM

## 2022-09-28 RX ORDER — SIMVASTATIN 10 MG/1
10 TABLET, FILM COATED ORAL NIGHTLY
Qty: 90 TABLET | Refills: 3 | Status: SHIPPED | OUTPATIENT
Start: 2022-09-28 | End: 2024-02-01

## 2022-09-28 RX ORDER — LISINOPRIL 5 MG/1
5 TABLET ORAL DAILY
Qty: 90 TABLET | Refills: 3 | Status: ON HOLD | OUTPATIENT
Start: 2022-09-28 | End: 2024-01-29

## 2022-09-28 RX ORDER — METOPROLOL TARTRATE 100 MG/1
100 TABLET ORAL DAILY
Qty: 90 TABLET | Refills: 3 | Status: ON HOLD | OUTPATIENT
Start: 2022-09-28 | End: 2024-01-17 | Stop reason: HOSPADM

## 2022-09-28 RX ORDER — AMLODIPINE BESYLATE 5 MG/1
5 TABLET ORAL DAILY
Qty: 90 TABLET | Refills: 3 | Status: ON HOLD | OUTPATIENT
Start: 2022-09-28 | End: 2024-01-29

## 2022-10-21 ENCOUNTER — TELEPHONE (OUTPATIENT)
Dept: PRIMARY CARE CLINIC | Facility: CLINIC | Age: 82
End: 2022-10-21

## 2022-10-21 NOTE — TELEPHONE ENCOUNTER
----- Message from Laura Magallanes sent at 10/21/2022  4:35 PM CDT -----  Type:  Sooner Apoointment Request    Caller is requesting a sooner appointment.  Caller declined first available appointment listed below.  Caller will not accept being placed on the waitlist and is requesting a message be sent to doctor.  Name of Caller:pt   When is the first available appointment?february   Symptoms:f/u   Would the patient rather a call back or a response via [facility]? Call back   Best Call Back Number:[phone]  Additional Information: pt would like first available appt not on Wednesday.

## 2022-10-25 ENCOUNTER — PATIENT MESSAGE (OUTPATIENT)
Dept: ADMINISTRATIVE | Facility: OTHER | Age: 82
End: 2022-10-25
Payer: MEDICARE

## 2022-10-25 ENCOUNTER — TELEPHONE (OUTPATIENT)
Dept: PRIMARY CARE CLINIC | Facility: CLINIC | Age: 82
End: 2022-10-25
Payer: MEDICARE

## 2022-10-25 DIAGNOSIS — Z91.81 HISTORY OF FALL: ICD-10-CM

## 2022-10-25 DIAGNOSIS — R26.89 BALANCE DISORDER: Primary | ICD-10-CM

## 2022-10-25 DIAGNOSIS — Z74.09 MOBILITY IMPAIRED: ICD-10-CM

## 2022-10-25 NOTE — TELEPHONE ENCOUNTER
----- Message from Richard Chew sent at 10/25/2022  2:46 PM CDT -----  Jesus Mckeon,     Ms. Downing is currently enrolled in our Connected Stability program for fall prevention.   The patient is requesting physical therapy for balance and mobility. Can you please assist?       Thanks,   Richard Chew

## 2022-12-05 ENCOUNTER — HOSPITAL ENCOUNTER (OUTPATIENT)
Dept: RADIOLOGY | Facility: HOSPITAL | Age: 82
Discharge: HOME | End: 2022-12-05
Attending: FAMILY MEDICINE
Payer: MEDICARE

## 2022-12-05 VITALS — BODY MASS INDEX: 25.82 KG/M2 | WEIGHT: 160 LBS

## 2022-12-05 DIAGNOSIS — Z12.31 ENCOUNTER FOR SCREENING MAMMOGRAM FOR BREAST CANCER: ICD-10-CM

## 2022-12-05 PROCEDURE — 77067 MAMMO DIGITAL SCREENING BILAT WITH TOMO: ICD-10-PCS | Mod: 26,,, | Performed by: RADIOLOGY

## 2022-12-05 PROCEDURE — 77067 SCR MAMMO BI INCL CAD: CPT | Mod: TC

## 2022-12-05 PROCEDURE — 77063 MAMMO DIGITAL SCREENING BILAT WITH TOMO: ICD-10-PCS | Mod: 26,,, | Performed by: RADIOLOGY

## 2022-12-05 PROCEDURE — 77067 SCR MAMMO BI INCL CAD: CPT | Mod: 26,,, | Performed by: RADIOLOGY

## 2022-12-05 PROCEDURE — 77063 BREAST TOMOSYNTHESIS BI: CPT | Mod: 26,,, | Performed by: RADIOLOGY

## 2022-12-09 ENCOUNTER — TELEPHONE (OUTPATIENT)
Dept: RADIOLOGY | Facility: HOSPITAL | Age: 82
End: 2022-12-09
Payer: MEDICARE

## 2022-12-09 NOTE — TELEPHONE ENCOUNTER
Spoke with patient and explained mammogram findings.Patient expressed understanding of results. Patient scheduled abnormal mammogram follow up appointment at [facility] on 12/13/2022.

## 2022-12-12 ENCOUNTER — TELEPHONE (OUTPATIENT)
Dept: RADIOLOGY | Facility: HOSPITAL | Age: 82
End: 2022-12-12
Payer: MEDICARE

## 2022-12-22 ENCOUNTER — TELEPHONE (OUTPATIENT)
Dept: PRIMARY CARE CLINIC | Facility: CLINIC | Age: 82
End: 2022-12-22
Payer: MEDICARE

## 2022-12-22 NOTE — TELEPHONE ENCOUNTER
----- Message from Karla Tinoco sent at 12/22/2022  4:42 PM CST -----  Contact: [phone] Patient  Pt is calling about having 25 mg of zoloft but not 10 mg. Pt wants to know if it is ok to cut in half. Pt stated she does not want to hallucinate. Please call and advise.

## 2022-12-22 NOTE — TELEPHONE ENCOUNTER
----- Message from Viktoria Mckeon MD sent at 2022  2:59 PM CST -----  Contact: [phone]  I saw her in April (8 mo ago ) & gave Zoloft. Please offer virtual next week, 8 am on Thursday w her daughter so we can discuss her symptoms & medicine options  ----- Message -----  From: Samantha Hall MA  Sent: 2022  12:39 PM CST  To: Viktoria Mckeon MD      ----- Message -----  From: Sara Simon  Sent: 2022  12:14 PM CST  To: Linda [facility]ANT[facility] Staff    Pt's daughter Eusebia called to advise that her mother says she feels like she is shaking on the inside and cannot stop crying. She says after her father  the pt was given some medication to help, but explained it was too strong and the dose was supposed to be lowered, but never was. She would like to know if something can be sent to the pharmacy for her. Please Advise.      [id number], [place] - [address]  [facility] [address]  Phone: [phone] Fax: [phone]

## 2022-12-26 ENCOUNTER — HOSPITAL ENCOUNTER (EMERGENCY)
Facility: HOSPITAL | Age: 82
Discharge: HOME | End: 2022-12-27
Attending: STUDENT IN AN ORGANIZED HEALTH CARE EDUCATION/TRAINING PROGRAM
Payer: MEDICARE
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
## 2022-12-26 DIAGNOSIS — M25.519 SHOULDER PAIN, UNSPECIFIED CHRONICITY, UNSPECIFIED LATERALITY: Primary | ICD-10-CM

## 2022-12-26 DIAGNOSIS — R52 PAIN: ICD-10-CM

## 2022-12-26 DIAGNOSIS — M54.9 BACK PAIN, UNSPECIFIED BACK LOCATION, UNSPECIFIED BACK PAIN LATERALITY, UNSPECIFIED CHRONICITY: ICD-10-CM

## 2022-12-26 LAB
BASOPHILS # BLD AUTO: 0.09 K/UL (ref 0–0.2)
BASOPHILS NFR BLD: 1.1 % (ref 0–1.9)
DIFFERENTIAL METHOD: ABNORMAL
EOSINOPHIL # BLD AUTO: 0.6 K/UL (ref 0–0.5)
EOSINOPHIL NFR BLD: 6.7 % (ref 0–8)
ERYTHROCYTE [DISTWIDTH] IN BLOOD BY AUTOMATED COUNT: 13.6 % (ref 11.5–14.5)
HCT VFR BLD AUTO: 44.4 % (ref 37–48.5)
HGB BLD-MCNC: 14.7 G/DL (ref 12–16)
IMM GRANULOCYTES # BLD AUTO: 0.04 K/UL (ref 0–0.04)
IMM GRANULOCYTES NFR BLD AUTO: 0.5 % (ref 0–0.5)
LYMPHOCYTES # BLD AUTO: 1.5 K/UL (ref 1–4.8)
LYMPHOCYTES NFR BLD: 17.6 % (ref 18–48)
MCH RBC QN AUTO: 29.5 PG (ref 27–31)
MCHC RBC AUTO-ENTMCNC: 33.1 G/DL (ref 32–36)
MCV RBC AUTO: 89 FL (ref 82–98)
MONOCYTES # BLD AUTO: 0.6 K/UL (ref 0.3–1)
MONOCYTES NFR BLD: 6.7 % (ref 4–15)
NEUTROPHILS # BLD AUTO: 5.8 K/UL (ref 1.8–7.7)
NEUTROPHILS NFR BLD: 67.4 % (ref 38–73)
NRBC BLD-RTO: 0 /100 WBC
PLATELET # BLD AUTO: 206 K/UL (ref 150–450)
PMV BLD AUTO: 10 FL (ref 9.2–12.9)
RBC # BLD AUTO: 4.98 M/UL (ref 4–5.4)
WBC # BLD AUTO: 8.54 K/UL (ref 3.9–12.7)

## 2022-12-26 PROCEDURE — 80053 COMPREHEN METABOLIC PANEL: CPT | Mod: HCNC | Performed by: STUDENT IN AN ORGANIZED HEALTH CARE EDUCATION/TRAINING PROGRAM

## 2022-12-26 PROCEDURE — 85025 COMPLETE CBC W/AUTO DIFF WBC: CPT | Mod: HCNC | Performed by: STUDENT IN AN ORGANIZED HEALTH CARE EDUCATION/TRAINING PROGRAM

## 2022-12-26 PROCEDURE — 99284 PR EMERGENCY DEPT VISIT,LEVEL IV: ICD-10-PCS | Mod: HCNC,,, | Performed by: STUDENT IN AN ORGANIZED HEALTH CARE EDUCATION/TRAINING PROGRAM

## 2022-12-26 PROCEDURE — 29130 APPL FINGER SPLINT STATIC: CPT | Mod: HCNC,RT

## 2022-12-26 PROCEDURE — 99285 EMERGENCY DEPT VISIT HI MDM: CPT | Mod: 25,HCNC

## 2022-12-26 PROCEDURE — 25000003 PHARM REV CODE 250: Mod: HCNC | Performed by: STUDENT IN AN ORGANIZED HEALTH CARE EDUCATION/TRAINING PROGRAM

## 2022-12-26 PROCEDURE — 99284 EMERGENCY DEPT VISIT MOD MDM: CPT | Mod: HCNC,,, | Performed by: STUDENT IN AN ORGANIZED HEALTH CARE EDUCATION/TRAINING PROGRAM

## 2022-12-26 RX ORDER — CYCLOBENZAPRINE HCL 5 MG
10 TABLET ORAL
Status: COMPLETED | OUTPATIENT
Start: 2022-12-26 | End: 2022-12-26

## 2022-12-26 RX ORDER — ACETAMINOPHEN 500 MG
1000 TABLET ORAL
Status: COMPLETED | OUTPATIENT
Start: 2022-12-26 | End: 2022-12-26

## 2022-12-26 RX ADMIN — ACETAMINOPHEN 1000 MG: 500 TABLET ORAL at 11:12

## 2022-12-26 RX ADMIN — CYCLOBENZAPRINE HYDROCHLORIDE 10 MG: 5 TABLET, FILM COATED ORAL at 11:12

## 2022-12-27 VITALS
RESPIRATION RATE: 17 BRPM | SYSTOLIC BLOOD PRESSURE: 152 MMHG | DIASTOLIC BLOOD PRESSURE: 76 MMHG | OXYGEN SATURATION: 100 % | HEART RATE: 80 BPM | BODY MASS INDEX: 25.82 KG/M2 | TEMPERATURE: 99 F | WEIGHT: 160 LBS

## 2022-12-27 LAB
ALBUMIN SERPL BCP-MCNC: 4.2 G/DL (ref 3.5–5.2)
ALP SERPL-CCNC: 55 U/L (ref 55–135)
ALT SERPL W/O P-5'-P-CCNC: 15 U/L (ref 10–44)
ANION GAP SERPL CALC-SCNC: 11 MMOL/L (ref 8–16)
AST SERPL-CCNC: 21 U/L (ref 10–40)
BILIRUB SERPL-MCNC: 0.5 MG/DL (ref 0.1–1)
BUN SERPL-MCNC: 15 MG/DL (ref 8–23)
CALCIUM SERPL-MCNC: 10.5 MG/DL (ref 8.7–10.5)
CHLORIDE SERPL-SCNC: 104 MMOL/L (ref 95–110)
CO2 SERPL-SCNC: 25 MMOL/L (ref 23–29)
CREAT SERPL-MCNC: 0.8 MG/DL (ref 0.5–1.4)
EST. GFR  (NO RACE VARIABLE): >60 ML/MIN/1.73 M^2
GLUCOSE SERPL-MCNC: 148 MG/DL (ref 70–110)
POTASSIUM SERPL-SCNC: 3.7 MMOL/L (ref 3.5–5.1)
PROT SERPL-MCNC: 7.3 G/DL (ref 6–8.4)
SODIUM SERPL-SCNC: 140 MMOL/L (ref 136–145)

## 2022-12-27 PROCEDURE — 93010 ELECTROCARDIOGRAM REPORT: CPT | Mod: HCNC,,, | Performed by: INTERNAL MEDICINE

## 2022-12-27 PROCEDURE — 93010 EKG 12-LEAD: ICD-10-PCS | Mod: HCNC,,, | Performed by: INTERNAL MEDICINE

## 2022-12-27 PROCEDURE — 93005 ELECTROCARDIOGRAM TRACING: CPT | Mod: HCNC

## 2022-12-27 RX ORDER — CYCLOBENZAPRINE HCL 10 MG
5 TABLET ORAL 3 TIMES DAILY PRN
Qty: 15 TABLET | Refills: 0 | Status: SHIPPED | OUTPATIENT
Start: 2022-12-27 | End: 2022-12-29

## 2022-12-27 NOTE — ED TRIAGE NOTES
Kanchan Downing, a 82 y.o. female presents to the ED w/ complaint of mechanical fall. Reports falling at [employer] around 6pm today. Swelling noted to R middle finger. Denies LOC, blood thinners.    Adult Physical Assessment  LOC: Kanchan Downing, 82 y.o. female verified via two identifiers.  The patient is awake, alert, oriented and speaking appropriately at this time.  APPEARANCE: Patient resting comfortably and appears to be in no acute distress at this time. Patient is clean and well groomed, patient's clothing is properly fastened.  SKIN:The skin is warm and dry, color consistent with ethnicity, patient has normal skin turgor and moist mucus membranes, skin intact, no breakdown or brusing noted.  MUSCULOSKELETAL: Patient moving all extremities well. Patient reports falling at [employer] around 6pm today. Swelling noted to R middle finger. Denies numbness, tingling.   RESPIRATORY: Airway is open and patent, respirations are spontaneous, patient has a normal effort and rate, no accessory muscle use noted.  CARDIAC: Patient has a normal rate and rhythm, no periphreal edema noted in any extremity, capillary refill < 3 seconds in all extremities  ABDOMEN: Soft and non tender to palpation, no abdominal distention noted. Bowel sounds present in all four quadrants.  NEUROLOGIC: Eyes open spontaneously, behavior appropriate to situation, follows commands, facial expression symmetrical, bilateral hand grasp equal and even, purposeful motor response noted, normal sensation in all extremities when touched with a finger. Denies LOC associated with fall      Triage note:  Chief Complaint   Patient presents with    Fall     Mechanical fall at [employer]. Has back spasms. 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, without long-term current use of insulin 10/31/2017    Diabetes mellitus type 2 without retinopathy 06/12/2014    6% metformin 500 bid, FU+ 2016    DVT (deep venous thrombosis) 2001    R , Dr Chad Golden 4/6/2022     Marques passed 1/2022    Hyperlipidemia     LDL 82    Hyperlipidemia associated with type 2 diabetes mellitus 9/11/2012    Hypertension     Hypertension associated with diabetes 9/11/2012    Microalbuminuria due to type 2 diabetes mellitus 12/08/2015    taking lisinopril, losartan caused olivia effects    PVD (peripheral vascular disease) with claudication 5/2/2019    Compression hose    Risk for coronary artery disease greater than 20% in next 10 years per [place] score 5/1/2018    Strabismus     Type 2 diabetes mellitus with diabetic polyneuropathy 6/12/2014    Type 2 diabetes mellitus with diabetic polyneuropathy, without long-term current use of insulin 6/12/2014

## 2022-12-27 NOTE — ED PROVIDER NOTES
Encounter Date: 12/26/2022    SCRIBE #1 NOTE: I, Nolvia Garvin, am scribing for, and in the presence of,  La Montes De Oca MD. I have scribed the following portions of the note - Other sections scribed: HPI, ROS, PE.     History     Chief Complaint   Patient presents with    Fall     Mechanical fall at [facility]. Has back spasms. Ambulatory     Kanchan Downing is a 82 y.o. female with history of hyperlipidemia, hypertension, arthritis, type 2 diabetes who presents for evaluation after a mechanical fall.  Patient was leaving [facility] when she fell getting into her car.  Patient states that the pavement with slanted and she fell backwards and struck her head.  She denies any preceding chest pain, shortness of breath, palpitations, lightheadedness, dizziness.  She is reporting right shoulder pain as well as left low back pain and right middle finger pain.  Pain is moderate, constant, worse with movement.  She denies loss of consciousness.  She denies being on blood thinners.      The history is provided by the patient and medical records. No  was used.   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, without long-term current use of insulin 10/31/2017    Diabetes mellitus type 2 without retinopathy 06/12/2014    6% metformin 500 bid, FU+ 2016    DVT (deep venous thrombosis) 2001    R , Dr Bonilla    Grief 4/6/2022     Marques passed 1/2022    Hyperlipidemia     LDL 82    Hyperlipidemia  associated with type 2 diabetes mellitus 9/11/2012    Hypertension     Hypertension associated with diabetes 9/11/2012    Microalbuminuria due to type 2 diabetes mellitus 12/08/2015    taking lisinopril, losartan caused olivia effects    PVD (peripheral vascular disease) with claudication 5/2/2019    Compression hose    Risk for coronary artery disease greater than 20% in next 10 years per [place] score 5/1/2018    Strabismus     Type 2 diabetes mellitus with diabetic polyneuropathy 6/12/2014    Type 2 diabetes mellitus with diabetic polyneuropathy, without long-term current use of insulin 6/12/2014     Past Surgical History:   Procedure Laterality Date    BREAST BIOPSY Right 2011    BREAST LUMPECTOMY Right 2011    [place] REMOVAL OF NAIL BED  6/10/2015    TONSILLECTOMY       Family History   Problem Relation Age of Onset    Heart disease Mother 58    Cataracts Mother     Heart disease Father 50    Thyroid disease Sister     Breast cancer Sister     No Known Problems Brother     No Known Problems Maternal Aunt     No Known Problems Maternal Uncle     No Known Problems Paternal Aunt     No Known Problems Paternal Uncle     No Known Problems Maternal Grandmother     No Known Problems Maternal Grandfather     No Known Problems Paternal Grandmother     No Known Problems Paternal Grandfather     Amblyopia Neg Hx     Blindness Neg Hx     Diabetes Neg Hx     Glaucoma Neg Hx     Hypertension Neg Hx     Macular degeneration Neg Hx     Retinal detachment Neg Hx     Strabismus Neg Hx     Stroke Neg Hx      Social History     Tobacco Use    Smoking status: Former     Packs/day: 7.00     Years: 0.50     Pack years: 3.50     Types: Cigarettes    Smokeless tobacco: Never   Substance Use Topics    Alcohol use: No    Drug use: Never     Review of Systems    Constitutional: No fever  HENT: No sore throat  Eyes: No eye pain  Respiratory: No shortness of breath  Cardiovascular: No chest pain, No palpitations   Gastrointestinal: No abdominal  pain  Genitourinary: No dysuria  Musculoskeletal:  Positive right shoulder pain, positive low back pain  Neurological: No headache  Psychiatric: No agitation      Physical Exam     Initial Vitals [12/26/22 2027]   BP Pulse Resp Temp SpO2   (!) 162/78 84 18 98.8 °F (37.1 °C) 100 %      MAP       --         Physical Exam    Nursing note and vitals reviewed.    Constitutional: No acute distress, well appearing  HENT: Normocephalic, atraumatic  Eyes: PERRL  Spine: No C/T/L-spine tenderness, no step offs or deformities  Respiratory: Breath sounds equal bilaterally  Cardiovascular: Regular rate and rhythm, intact distal pulses in all extremities  Gastrointestinal: Soft, non-tender, non-distended  Pelvis: Stable  Musculoskeletal: No deformity, mildly tender to palpation over the right posterior shoulder and right trapezius as well as the right lateral neck, range of motion at the shoulder is normal, she is neurovascularly intact distally, she has pain and swelling to the right middle finger, no midline spinal tenderness the patient does have paraspinal lumbar tenderness  Integumentary: Warm and dry  Neurological: Awake and alert, follows commands in all extremities, sensation light touch intact in all extremities, motor symmetric and normal bilaterally in all extremities       ED Course   Procedures  Labs Reviewed   CBC W/ AUTO DIFFERENTIAL - Abnormal; Notable for the following components:       Result Value    Eos # 0.6 (*)     Lymph % 17.6 (*)     All other components within normal limits   COMPREHENSIVE METABOLIC PANEL - Abnormal; Notable for the following components:    Glucose 148 (*)     All other components within normal limits          Imaging Results              CT Head Without Contrast (Final result)  Result time 12/27/22 01:01:42      Final result by Inder Carter MD (12/27/22 01:01:42)                   Impression:      Minimal chronic small vessel type changes in the deep white matter and basal  ganglia.    No evidence of acute hemorrhage, mass or infarction of the brain or meninges.    Cervical spondylosis without evidence of fracture, subluxation or hematoma.      Electronically signed by: Inder Carter  Date:    12/27/2022  Time:    01:01               Narrative:    EXAMINATION:  CT HEAD WITHOUT CONTRAST; CT CERVICAL SPINE WITHOUT CONTRAST    CLINICAL HISTORY:  Head trauma, minor (Age >= 65y);; Neck trauma (Age >= 65y);    TECHNIQUE:  Low dose axial images were obtained through the head and cervical spine.  Coronal and sagittal reformations were also performed. Contrast was not administered.    COMPARISON:  None.    FINDINGS:  Calvarium is intact.  Air cells are clear.  Orbits and orbital contents appear unremarkable.  There is no scalp soft tissue swelling.    The brain parenchyma appears normal in attenuation with normal gray-white matter differentiation.  Some mild involutional type low densities are noted in the deep white matter and basal ganglia compatible with small vessel change.  Vascular calcifications in the carotid siphon is noted.  There is no loss of gray-white matter junction differentiation, mass or mass effect.    Multilevel cervical spondylosis is noted most severe in the lower cervical and upper thoracic segments.  Osteophytes are present with disc space narrowing and facet arthropathy.  No displaced fracture or subluxation is evident.  Hypertrophic facet arthropathy causes bony foraminal encroachment most severely bilaterally at C5-6, C6-7 and on the right at C7-T1.  The surrounding visceral spaces within the field of view appear unremarkable.  Lung apices are clear.                                       CT Cervical Spine Without Contrast (Final result)  Result time 12/27/22 01:01:42      Final result by Inder Carter MD (12/27/22 01:01:42)                   Impression:      Minimal chronic small vessel type changes in the deep white matter and basal ganglia.    No evidence of  acute hemorrhage, mass or infarction of the brain or meninges.    Cervical spondylosis without evidence of fracture, subluxation or hematoma.      Electronically signed by: Inder Carter  Date:    12/27/2022  Time:    01:01               Narrative:    EXAMINATION:  CT HEAD WITHOUT CONTRAST; CT CERVICAL SPINE WITHOUT CONTRAST    CLINICAL HISTORY:  Head trauma, minor (Age >= 65y);; Neck trauma (Age >= 65y);    TECHNIQUE:  Low dose axial images were obtained through the head and cervical spine.  Coronal and sagittal reformations were also performed. Contrast was not administered.    COMPARISON:  None.    FINDINGS:  Calvarium is intact.  Air cells are clear.  Orbits and orbital contents appear unremarkable.  There is no scalp soft tissue swelling.    The brain parenchyma appears normal in attenuation with normal gray-white matter differentiation.  Some mild involutional type low densities are noted in the deep white matter and basal ganglia compatible with small vessel change.  Vascular calcifications in the carotid siphon is noted.  There is no loss of gray-white matter junction differentiation, mass or mass effect.    Multilevel cervical spondylosis is noted most severe in the lower cervical and upper thoracic segments.  Osteophytes are present with disc space narrowing and facet arthropathy.  No displaced fracture or subluxation is evident.  Hypertrophic facet arthropathy causes bony foraminal encroachment most severely bilaterally at C5-6, C6-7 and on the right at C7-T1.  The surrounding visceral spaces within the field of view appear unremarkable.  Lung apices are clear.                                       X-Ray Wrist Complete Right (Final result)  Result time 12/27/22 01:07:55      Final result by Inder Carter MD (12/27/22 01:07:55)                   Impression:      No acute findings evident within the right wrist.      Electronically signed by: Inder Carter  Date:    12/27/2022  Time:    01:07                Narrative:    EXAMINATION:  XR WRIST COMPLETE 3 VIEWS RIGHT    CLINICAL HISTORY:  Pain, unspecified    TECHNIQUE:  PA, lateral, and oblique views of the right wrist were performed.    COMPARISON:  None    FINDINGS:  There is no discrete fracture.  Osteoarthritic changes through the carpus are noted especially at triscaphe.  IV tubing projects over the wrist laterally.                                       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      Electronically signed by: Inder Carter  Date:    12/27/2022  Time:    01:04               Narrative:    EXAMINATION:  XR SHOULDER TRAUMA 3 VIEW RIGHT    CLINICAL HISTORY:  Pain, unspecified    TECHNIQUE:  Three or four views of the right shoulder were performed.    COMPARISON:  None    FINDINGS:  Degenerative changes are noted within the AC joint and glenohumeral joint.  No displaced fracture is evident.  No subluxation is evident.                                       X-Ray Pelvis Routine AP (Final result)  Result time 12/27/22 01:18:32      Final result by Inder Carter MD (12/27/22 01:18:32)                   Impression:      Degenerative changes in the pelvis and hips with no acute findings.      Electronically signed by: Inder Carter  Date:    12/27/2022  Time:    01:18               Narrative:    EXAMINATION:  XR PELVIS ROUTINE AP    CLINICAL HISTORY:  Pain, unspecified    TECHNIQUE:  AP view of the pelvis was performed.    COMPARISON:  None.    FINDINGS:  Pelvic ring is intact.  Degenerative changes in both femoroacetabular joints are noted.  There is no evidence of fracture or displacement.  The soft tissues are unremarkable.                                       X-Ray Lumbar Spine Ap And Lateral (Final result)  Result time 12/27/22 01:14:53       Final result by Inder Carter MD (12/27/22 01:14:53)                   Impression:      Degenerative changes throughout the lumbar spine with no acute findings.      Electronically signed by: Inder Carter  Date:    12/27/2022  Time:    01:14               Narrative:    EXAMINATION:  XR LUMBAR SPINE AP AND LATERAL    CLINICAL HISTORY:  pain;    TECHNIQUE:  AP, lateral and spot images were performed of the lumbar spine.    COMPARISON:  None    FINDINGS:  Hypertrophic spondylitic changes noted throughout with osteophytes and facet arthropathy.  There is no evidence of compression fracture, pedicle destruction or subluxation.  Vascular calcifications in the aorta are noted.                                       X-Ray Hand 3 View Right (Final result)  Result time 12/27/22 01:05:19      Final result by Inder Carter MD (12/27/22 01:05:19)                   Impression:      No acute findings evident the right hand.      Electronically signed by: Inder Carter  Date:    12/27/2022  Time:    01:05               Narrative:    EXAMINATION:  XR HAND COMPLETE 3 VIEW RIGHT    CLINICAL HISTORY:  pain;    TECHNIQUE:  PA, lateral, and oblique views of the right hand were performed.    COMPARISON:  None    FINDINGS:  Marked osteoarthritic changes are noted in the interphalangeal joints especially in the index and middle finger.  There is no evidence of displaced fracture or subluxation.  IV tubing projects over the thumb and wrist.  Soft tissues appear unremarkable.  There is no soft tissue swelling or foreign body.                                       Medications   cyclobenzaprine tablet 10 mg (10 mg Oral Given 12/26/22 2312)   acetaminophen tablet 1,000 mg (1,000 mg Oral Given 12/26/22 2312)     Medical Decision Making:   History:   Old Medical Records: I decided to obtain old medical records.  Clinical Tests:   Lab Tests: Ordered and Reviewed  Radiological Study: Ordered and Reviewed  ED Management:  Patient  presents for evaluation after a mechanical fall screening labs and EKG ordered.  CT and x-rays ordered to assess for traumatic injuries.  Pain control provided in the emergency department, will re-evaluate.    On re-evaluation, patient resting comfortably.  Labs, EKG and imaging reviewed.  She has no traumatic findings on imaging.  She does have arthritis in her right shoulder as well as the right middle finger.  Because her middle finger is swollen and bruised, she was placed in a aluminum finger splint for comfort.  Patient provided short course of muscle relaxers for home and also counseled on rice therapy.  Patient feels comfortable discharge home.  She is instructed follow up with her primary care doctor.  Return precautions provided to the patient and her family members at bedside.        Scribe Attestation:   Scribe #1: I performed the above scribed service and the documentation accurately describes the services I performed. I attest to the accuracy of the note.      ED Course as of 12/27/22 0300   Tue Dec 27, 2022   0038 EKG with normal sinus rhythm, rate 65, normal intervals, no STEMI. [NN]      ED Course User Index  [NN] La Montes De Oca MD                 Clinical Impression:   Final diagnoses:  [R52] Pain  [W19.XXXA] Fall  [M25.519] Shoulder pain, unspecified chronicity, unspecified laterality (Primary)  [M54.9] Back pain, unspecified back location, unspecified back pain laterality, unspecified chronicity        ED Disposition Condition    Discharge Stable          ED Prescriptions       Medication Sig Dispense Start Date End Date Auth. Provider    cyclobenzaprine (FLEXERIL) 10 MG tablet Take 0.5 tablets (5 mg total) by mouth 3 (three) times daily as needed for Muscle spasms. 15 tablet 12/27/2022 1/1/2023 La Montes De Oca MD          Follow-up Information       Follow up With Specialties Details Why Contact Info    Viktoria Mckeon MD Family Medicine Schedule an appointment as soon as possible for a  visit   [address]  [address]  [facility] [address]  [phone]      [facility] - Emergency Dept Emergency Medicine  As needed, If symptoms worsen [id number]  [facility] [address] [phone]             La Montes De Oca MD  12/27/22 [id number]

## 2022-12-29 ENCOUNTER — OFFICE VISIT (OUTPATIENT)
Dept: PRIMARY CARE CLINIC | Facility: CLINIC | Age: 82
End: 2022-12-29
Payer: MEDICARE

## 2022-12-29 ENCOUNTER — TELEPHONE (OUTPATIENT)
Dept: PRIMARY CARE CLINIC | Facility: CLINIC | Age: 82
End: 2022-12-29
Payer: MEDICARE

## 2022-12-29 DIAGNOSIS — R41.3 MEMORY LOSS: ICD-10-CM

## 2022-12-29 DIAGNOSIS — F41.1 GAD (GENERALIZED ANXIETY DISORDER): ICD-10-CM

## 2022-12-29 DIAGNOSIS — R53.81 DEBILITY: ICD-10-CM

## 2022-12-29 DIAGNOSIS — Z91.81 HISTORY OF RECENT FALL: Primary | ICD-10-CM

## 2022-12-29 DIAGNOSIS — R44.1 VISUAL HALLUCINATIONS: ICD-10-CM

## 2022-12-29 PROCEDURE — 1159F MED LIST DOCD IN RCRD: CPT | Mod: HCNC,CPTII,95, | Performed by: FAMILY MEDICINE

## 2022-12-29 PROCEDURE — 99442 PR PHYSICIAN TELEPHONE EVALUATION 11-20 MIN: CPT | Mod: HCNC,95,, | Performed by: FAMILY MEDICINE

## 2022-12-29 PROCEDURE — 99442 PR PHYSICIAN TELEPHONE EVALUATION 11-20 MIN: ICD-10-PCS | Mod: HCNC,95,, | Performed by: FAMILY MEDICINE

## 2022-12-29 PROCEDURE — 1160F PR REVIEW ALL MEDS BY PRESCRIBER/CLIN PHARMACIST DOCUMENTED: ICD-10-PCS | Mod: HCNC,CPTII,95, | Performed by: FAMILY MEDICINE

## 2022-12-29 PROCEDURE — 1160F RVW MEDS BY RX/DR IN RCRD: CPT | Mod: HCNC,CPTII,95, | Performed by: FAMILY MEDICINE

## 2022-12-29 PROCEDURE — 1159F PR MEDICATION LIST DOCUMENTED IN MEDICAL RECORD: ICD-10-PCS | Mod: HCNC,CPTII,95, | Performed by: FAMILY MEDICINE

## 2022-12-29 RX ORDER — SERTRALINE HYDROCHLORIDE 25 MG/1
12.5 TABLET, FILM COATED ORAL DAILY
Qty: 30 TABLET | Refills: 0
Start: 2022-12-29 | End: 2023-02-01

## 2022-12-29 NOTE — PROGRESS NOTES
Subjective:      Patient ID: Kanchan Downing is a 82 y.o. female.    Chief Complaint: No chief complaint on file.    Audio Only Telehealth Visit     The patient location is: home  The chief complaint leading to consultation is: visual hallucinations, fall  Visit type: Virtual visit with audio only (telephone)  Total time spent with patient: 25     The reason for the audio only service rather than synchronous audio and video virtual visit was related to technical difficulties or patient preference/necessity.     Each patient to whom I provide medical services by telemedicine is:  (1) informed of the relationship between the physician and patient and the respective role of any other health care provider with respect to management of the patient; and (2) notified that they may decline to receive medical services by telemedicine and may withdraw from such care at any time. Patient verbally consented to receive this service via voice-only telephone call.       HPI: fall     81 yo w memory loss, grief,  DM, HTN, HLD, PVD, chronic embolism DVT, history breast cancer (follows w [place]), anxiety     I saw her in April (8 mo ago ) & gave Zoloft. Please offer virtual next week, 8 am on Thursday w her daughter so we can discuss her symptoms & medicine options    Mom felt she was feeling jumpy, visual hallucinations over the past few months. I did see her 8 mo ago in April 2022 & prescribed Zoloft 50. Last week I recommended cut the tablet in half & just take 25. She has not had hallucinations in past.     12/26 she was in ER after slip & fall at [facility]. Fell & hit her head. She was discharged home w Flexeril 10, she took just half tablet on 12/27 & 12/28. Did not take it today. Last night I was able to stretch out in bed. I felt another back spasm. I've been sleeping in a chair. I had no m spasms yesterday.  Yesterday I took 1000mg total of Tylenol.     Daughter asking for HH PT since fall 2d ago in ER. She is homebound  now after her fall in the ER. She lives along & is grieving the passing of her  who  in 2022. She is achy. Mrs Hemphill is noticing more memory loss. When asked, she does not recall her daughter's virthday. Eusebia says she repeats herself.     CT HEAD 22  Minimal chronic small vessel type changes in the deep white matter and basal ganglia.  No evidence of acute hemorrhage, mass or infarction of the brain or meninges.  Cervical spondylosis without evidence of fracture, subluxation or hematoma.    XRAY R WRIST no fx  R HAND marked osteoarthritis  R SHOULDER - osteoarthritis  PELVIS degenerative changes, no fx  LUMBAR SPINE no fx      This service was not originating from a related E/M service provided within the previous 7 days nor will  to an E/M service or procedure within the next 24 hours or my soonest available appointment.  Prevailing standard of care was able to be met in this audio-only visit.       Current Outpatient Medications   Medication Instructions    amLODIPine (NORVASC) 5 mg, Oral, Daily    aspirin (ECOTRIN) 81 mg, Oral, Daily    blood sugar diagnostic (BLOOD GLUCOSE TEST) Strp 1 strip, Misc.(Non-Drug; Combo Route), 2 times daily    blood sugar diagnostic Strp 1 strip, Misc.(Non-Drug; Combo Route), 2 times daily, ACCU-CHEK BROOK PLUS    blood-glucose meter kit TRUE METRIX AIR W/[employer] SMART w/Device  Kit - use as instructed    cetirizine (ZYRTEC) 10 mg, Oral, Daily,      flash glucose scanning reader (FREESTYLE FLOWER READER) Misc 1 each, Misc.(Non-Drug; Combo Route), Continuous    fluticasone (FLONASE) 50 mcg/actuation nasal spray 1 spray, Each Nostril, Daily    hydroCHLOROthiazide (MICROZIDE) 12.5 mg capsule TAKE 1 CAPSULE EVERY DAY    Lactobacillus rhamnosus GG (CULTURELLE) 10 billion cell capsule Take by mouth. 1 Capsule Oral Every day    lancets Misc Test tid    lisinopriL (PRINIVIL,ZESTRIL) 5 mg, Oral, Daily    metFORMIN (GLUCOPHAGE) 500 mg, Oral, Daily     metoprolol tartrate (LOPRESSOR) 100 mg, Oral, Daily    olopatadine (PATADAY) 0.2 % Drop 1 drop, Both Eyes, Daily    polyethylene glycol (GLYCOLAX) 17 gram/dose powder TAKE 1 CAPFUL (17 GRAMS) DAILY AS DIRECTED    sertraline (ZOLOFT) 12.5 mg, Oral, Daily    simvastatin (ZOCOR) 10 mg, Oral, Nightly    TRUEPLUS LANCETS 33 gauge Misc No dose, route, or frequency recorded.       Lab Results   Component Value Date    HGBA1C 5.8 (H) 03/31/2022    HGBA1C 6.3 (H) 12/02/2021    HGBA1C 6.3 (H) 06/02/2020     Lab Results   Component Value Date    MICALBCREAT 22.4 06/09/2020     Lab Results   Component Value Date    LDLCALC 107.4 03/31/2022    LDLCALC 103.2 06/02/2020    CHOL 180 03/31/2022    HDL 60 03/31/2022    TRIG 63 03/31/2022       Lab Results   Component Value Date     12/26/2022    K 3.7 12/26/2022     12/26/2022    CO2 25 12/26/2022     (H) 12/26/2022    BUN 15 12/26/2022    CREATININE 0.8 12/26/2022    CALCIUM 10.5 12/26/2022    PROT 7.3 12/26/2022    ALBUMIN 4.2 12/26/2022    BILITOT 0.5 12/26/2022    ALKPHOS 55 12/26/2022    AST 21 12/26/2022    ALT 15 12/26/2022    ANIONGAP 11 12/26/2022    ESTGFRAFRICA >60.0 03/31/2022    EGFRNONAA >60.0 03/31/2022    WBC 8.54 12/26/2022    HGB 14.7 12/26/2022    HGB 13.5 03/31/2022    HCT 44.4 12/26/2022    MCV 89 12/26/2022     12/26/2022    TSH 4.708 (H) 04/21/2017       Lab Results   Component Value Date    [id number] 41 05/08/2014         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, without long-term current use of insulin 10/31/2017    Diabetes mellitus type 2 without  retinopathy 06/12/2014    6% metformin 500 bid, FU+ 2016    DVT (deep venous thrombosis) 2001    R , Dr Bonilla    Grief 04/06/2022     Marques passed 1/2022    Hyperlipidemia     LDL 82    Hyperlipidemia associated with type 2 diabetes mellitus 09/11/2012    Hypertension     Hypertension associated with diabetes 09/11/2012    Memory loss 12/29/2022    CT chronic changes 2022    Microalbuminuria due to type 2 diabetes mellitus 12/08/2015    taking lisinopril, losartan caused olivia effects    PVD (peripheral vascular disease) with claudication 05/02/2019    Compression hose    Risk for coronary artery disease greater than 20% in next 10 years per [place] score 05/01/2018    Strabismus     Type 2 diabetes mellitus with diabetic polyneuropathy 06/12/2014    Type 2 diabetes mellitus with diabetic polyneuropathy, without long-term current use of insulin 06/12/2014     Past Surgical History:   Procedure Laterality Date    BREAST BIOPSY Right 2011    BREAST LUMPECTOMY Right 2011    [place] REMOVAL OF NAIL BED  6/10/2015    TONSILLECTOMY       Social History     Social History Narrative     to Marques, 3 children, nondrinker, nonsmoker, she declines colonoscopy     Family History   Problem Relation Age of Onset    Heart disease Mother 58    Cataracts Mother     Heart disease Father 50    Thyroid disease Sister     Breast cancer Sister     No Known Problems Brother     No Known Problems Maternal Aunt     No Known Problems Maternal Uncle     No Known Problems Paternal Aunt     No Known Problems Paternal Uncle     No Known Problems Maternal Grandmother     No Known Problems Maternal Grandfather     No Known Problems Paternal Grandmother     No Known Problems Paternal Grandfather     Amblyopia Neg Hx     Blindness Neg Hx     Diabetes Neg Hx     Glaucoma Neg Hx     Hypertension Neg Hx     Macular degeneration Neg Hx     Retinal detachment Neg Hx     Strabismus Neg Hx     Stroke Neg Hx      There were no vitals filed for this  visit.  Objective:   Physical Exam  Assessment:     1. History of recent fall    2. Debility    3. Visual hallucinations    4. BANDAR (generalized anxiety disorder)    5. Memory loss      Plan:     Orders Placed This Encounter    Ambulatory referral/consult to Home Health    sertraline (ZOLOFT) 25 MG tablet   1/2 tab daily  Call me Monday w update & if she does not hear from HH  Stop Cyclobenzaprine for risk of falling & drowsiness  Can take 0.5 tabs if needed in am prior to PT.   Do not drive, dont' leave home  Her daughter will check on her often  Tylenol 1000mg TID if needed for pain  Has appt w me March 14. Eusebia will try to come with her in person.   Come & see me sooner if needed  AUDIO  There are no Patient Instructions on file for this visit.

## 2023-01-04 PROCEDURE — G0180 PR HOME HEALTH MD CERTIFICATION: ICD-10-PCS | Mod: ,,, | Performed by: FAMILY MEDICINE

## 2023-01-04 PROCEDURE — G0180 MD CERTIFICATION HHA PATIENT: HCPCS | Mod: ,,, | Performed by: FAMILY MEDICINE

## 2023-01-06 ENCOUNTER — TELEPHONE (OUTPATIENT)
Dept: RADIOLOGY | Facility: HOSPITAL | Age: 83
End: 2023-01-06
Payer: MEDICARE

## 2023-01-17 ENCOUNTER — TELEPHONE (OUTPATIENT)
Dept: PRIMARY CARE CLINIC | Facility: CLINIC | Age: 83
End: 2023-01-17
Payer: MEDICARE

## 2023-01-17 NOTE — TELEPHONE ENCOUNTER
----- Message from Alayna Asher sent at 1/17/2023  3:39 PM CST -----  Contact: Annabel[facility] [phone]  Was told by the patient that she is no longer taking Rx hydroCHLOROthiazide (MICROZIDE) 12.5 mg capsule      Please call and advise.    Thank You

## 2023-01-24 ENCOUNTER — EXTERNAL HOME HEALTH (OUTPATIENT)
Dept: HOME HEALTH SERVICES | Facility: HOSPITAL | Age: 83
End: 2023-01-24
Payer: MEDICARE

## 2023-01-26 ENCOUNTER — TELEPHONE (OUTPATIENT)
Dept: PRIMARY CARE CLINIC | Facility: CLINIC | Age: 83
End: 2023-01-26
Payer: MEDICARE

## 2023-01-26 NOTE — TELEPHONE ENCOUNTER
Calling to report per pt medication sertraline (ZOLOFT) 25 MG tablet causes hallucinations.     Addi with [facility] states pt is requesting a med change.

## 2023-01-26 NOTE — TELEPHONE ENCOUNTER
----- Message from Jesica Betancourt sent at 1/26/2023  1:04 PM CST -----  Contact: Addi @PT at [facility] [phone]  Would like to receive medical advice.    Pharmacy:     [facility] - [place], [place] - [address]  [address] [address]  Phone: [phone] Fax: [phone]    [id number] - [place], [place] - [address] [address]  Phone: [phone] Fax: [phone]      Would they like a call back or a response via [facility]:  call back    Additional information:  Calling to report per pt medication sertraline (ZOLOFT) 25 MG tablet causes hallucinations. Addi states pt is requesting a med change.

## 2023-01-26 NOTE — TELEPHONE ENCOUNTER
----- Message from Jesica Betancourt sent at 1/26/2023  1:04 PM CST -----  Contact: Addi @PT at [facility] [phone]  Would like to receive medical advice.    Pharmacy:     [facility] - [place], [place] - [address]  [address] [address]  Phone: [phone] Fax: [phone]    [id number] - [place], [place] - [address] [address]  Phone: [phone] Fax: [phone]      Would they like a call back or a response via [facility]:  call back    Additional information:  Calling to report per pt medication sertraline (ZOLOFT) 25 MG tablet causes hallucinations. Addi states pt is requesting a med change.

## 2023-01-27 ENCOUNTER — TELEPHONE (OUTPATIENT)
Dept: PRIMARY CARE CLINIC | Facility: CLINIC | Age: 83
End: 2023-01-27
Payer: MEDICARE

## 2023-01-27 NOTE — TELEPHONE ENCOUNTER
----- Message from Teresita Isabel sent at 1/27/2023  2:15 PM CST -----  Contact: self/[phone]  Patient is returning a phone call.  Who left a message for the patient: lpn  Does patient know what this is regarding:    Would you like a call back, or a response through your [facility] portal?:   call back  Comments:      Please advise

## 2023-02-01 ENCOUNTER — OFFICE VISIT (OUTPATIENT)
Dept: PRIMARY CARE CLINIC | Facility: CLINIC | Age: 83
End: 2023-02-01
Payer: MEDICARE

## 2023-02-01 ENCOUNTER — TELEPHONE (OUTPATIENT)
Dept: PRIMARY CARE CLINIC | Facility: CLINIC | Age: 83
End: 2023-02-01

## 2023-02-01 VITALS — SYSTOLIC BLOOD PRESSURE: 139 MMHG | DIASTOLIC BLOOD PRESSURE: 87 MMHG

## 2023-02-01 DIAGNOSIS — E11.59 CONTROLLED TYPE 2 DIABETES MELLITUS WITH OTHER CIRCULATORY COMPLICATION, WITHOUT LONG-TERM CURRENT USE OF INSULIN: ICD-10-CM

## 2023-02-01 DIAGNOSIS — R44.1 VISUAL HALLUCINATIONS: Primary | ICD-10-CM

## 2023-02-01 DIAGNOSIS — E11.59 CONTROLLED TYPE 2 DIABETES MELLITUS WITH OTHER CIRCULATORY COMPLICATION, WITHOUT LONG-TERM CURRENT USE OF INSULIN: Primary | ICD-10-CM

## 2023-02-01 DIAGNOSIS — F41.1 GAD (GENERALIZED ANXIETY DISORDER): ICD-10-CM

## 2023-02-01 DIAGNOSIS — F43.21 GRIEF: ICD-10-CM

## 2023-02-01 PROCEDURE — 99442 PR PHYSICIAN TELEPHONE EVALUATION 11-20 MIN: ICD-10-PCS | Mod: HCNC,95,, | Performed by: FAMILY MEDICINE

## 2023-02-01 PROCEDURE — 3079F DIAST BP 80-89 MM HG: CPT | Mod: HCNC,CPTII,95, | Performed by: FAMILY MEDICINE

## 2023-02-01 PROCEDURE — 3075F SYST BP GE 130 - 139MM HG: CPT | Mod: HCNC,CPTII,95, | Performed by: FAMILY MEDICINE

## 2023-02-01 PROCEDURE — 1159F MED LIST DOCD IN RCRD: CPT | Mod: HCNC,CPTII,95, | Performed by: FAMILY MEDICINE

## 2023-02-01 PROCEDURE — 1160F PR REVIEW ALL MEDS BY PRESCRIBER/CLIN PHARMACIST DOCUMENTED: ICD-10-PCS | Mod: HCNC,CPTII,95, | Performed by: FAMILY MEDICINE

## 2023-02-01 PROCEDURE — 3075F PR MOST RECENT SYSTOLIC BLOOD PRESS GE 130-139MM HG: ICD-10-PCS | Mod: HCNC,CPTII,95, | Performed by: FAMILY MEDICINE

## 2023-02-01 PROCEDURE — 1159F PR MEDICATION LIST DOCUMENTED IN MEDICAL RECORD: ICD-10-PCS | Mod: HCNC,CPTII,95, | Performed by: FAMILY MEDICINE

## 2023-02-01 PROCEDURE — 3072F LOW RISK FOR RETINOPATHY: CPT | Mod: HCNC,CPTII,95, | Performed by: FAMILY MEDICINE

## 2023-02-01 PROCEDURE — 1160F RVW MEDS BY RX/DR IN RCRD: CPT | Mod: HCNC,CPTII,95, | Performed by: FAMILY MEDICINE

## 2023-02-01 PROCEDURE — 99442 PR PHYSICIAN TELEPHONE EVALUATION 11-20 MIN: CPT | Mod: HCNC,95,, | Performed by: FAMILY MEDICINE

## 2023-02-01 PROCEDURE — 3079F PR MOST RECENT DIASTOLIC BLOOD PRESSURE 80-89 MM HG: ICD-10-PCS | Mod: HCNC,CPTII,95, | Performed by: FAMILY MEDICINE

## 2023-02-01 PROCEDURE — 3072F PR LOW RISK FOR RETINOPATHY: ICD-10-PCS | Mod: HCNC,CPTII,95, | Performed by: FAMILY MEDICINE

## 2023-02-01 RX ORDER — FLASH GLUCOSE SENSOR
KIT MISCELLANEOUS
Qty: 1 KIT | Refills: 12 | Status: SHIPPED | OUTPATIENT
Start: 2023-02-01 | End: 2023-02-02 | Stop reason: SDUPTHER

## 2023-02-01 RX ORDER — FLUOXETINE 10 MG/1
10 CAPSULE ORAL DAILY
Qty: 30 CAPSULE | Refills: 11 | Status: SHIPPED | OUTPATIENT
Start: 2023-02-01 | End: 2024-03-14

## 2023-02-01 RX ORDER — FLASH GLUCOSE SCANNING READER
EACH MISCELLANEOUS
Qty: 1 EACH | Refills: 12 | Status: SHIPPED | OUTPATIENT
Start: 2023-02-01 | End: 2023-02-02 | Stop reason: SDUPTHER

## 2023-02-01 NOTE — PROGRESS NOTES
Subjective:      Patient ID: Kancahn Downing is a 82 y.o. female.    Chief Complaint: No chief complaint on file.    The patient location is: home  The chief complaint leading to consultation is: zoloft    Visit type: audio only    Face to Face time with patient:     14 minutes of total time spent on the encounter, which includes face to face time and non-face to face time preparing to see the patient (eg, review of tests), Obtaining and/or reviewing separately obtained history, Documenting clinical information in the electronic or other health record, Independently interpreting results (not separately reported) and communicating results to the patient/family/caregiver, or Care coordination (not separately reported).         Each patient to whom he or she provides medical services by telemedicine is:  (1) informed of the relationship between the physician and patient and the respective role of any other health care provider with respect to management of the patient; and (2) notified that he or she may decline to receive medical services by telemedicine and may withdraw from such care at any time.    Notes:     I took the lower dosage Zoloft 25 & was having hallucinations & seeing my . I cut it in half to 12.5 but still had hallucinations. So I stopped it after taking it 2 days. I've been off of it awhile. I do cry a lot. Not suicidal. I'm sad because I don't see people much. My daughter is busy single mom, real estate, caring for grandson.  My neighbors do come around. I'm sad bc I miss Marques (passed).  I do drive, I'm more forgetful. I'd like a medicine to take as needed , not daily. I don't want anything that will cause hallucinations    I am shaking & feel it's due to too much sugar in my diet. I have to get off sugar. I drink tea when I'm out, fruit drinks in the gallon jug.  I hate checking my sugars & pricking my skin. Last time it was 187, so I tried to drink more water. I'd like one of those sensors  I don't have to prick my fingers.     /87    See me March w labs, urine micro prior    Current Outpatient Medications   Medication Instructions    amLODIPine (NORVASC) 5 mg, Oral, Daily    aspirin (ECOTRIN) 81 mg, Oral, Daily    blood sugar diagnostic (BLOOD GLUCOSE TEST) Strp 1 strip, Misc.(Non-Drug; Combo Route), 2 times daily    blood sugar diagnostic Strp 1 strip, Misc.(Non-Drug; Combo Route), 2 times daily, ACCU-CHEK BROOK PLUS    blood-glucose meter kit TRUE METRIX AIR W/[employer] SMART w/Device  Kit - use as instructed    cetirizine (ZYRTEC) 10 mg, Oral, Daily,      flash glucose scanning reader (FREESTYLE FLOWER 2 READER) Misc Continuous glucose reader    flash glucose sensor (FREESTYLE FLOWER 2 SENSOR) Kit Continuous glucose monitor         fluticasone (FLONASE) 50 mcg/actuation nasal spray 1 spray, Each Nostril, Daily    hydroCHLOROthiazide (MICROZIDE) 12.5 mg capsule TAKE 1 CAPSULE EVERY DAY    Lactobacillus rhamnosus GG (CULTURELLE) 10 billion cell capsule Take by mouth. 1 Capsule Oral Every day    lancets Misc Test tid    lisinopriL (PRINIVIL,ZESTRIL) 5 mg, Oral, Daily    metFORMIN (GLUCOPHAGE) 500 mg, Oral, Daily    metoprolol tartrate (LOPRESSOR) 100 mg, Oral, Daily    olopatadine (PATADAY) 0.2 % Drop 1 drop, Both Eyes, Daily    polyethylene glycol (GLYCOLAX) 17 gram/dose powder TAKE 1 CAPFUL (17 GRAMS) DAILY AS DIRECTED    simvastatin (ZOCOR) 10 mg, Oral, Nightly    TRUEPLUS LANCETS 33 gauge Misc No dose, route, or frequency recorded.       Lab Results   Component Value Date    HGBA1C 5.8 (H) 03/31/2022    HGBA1C 6.3 (H) 12/02/2021    HGBA1C 6.3 (H) 06/02/2020     Lab Results   Component Value Date    MICALBCREAT 22.4 06/09/2020     Lab Results   Component Value Date    LDLCALC 107.4 03/31/2022    LDLCALC 103.2 06/02/2020    CHOL 180 03/31/2022    HDL 60 03/31/2022    TRIG 63 03/31/2022       Lab Results   Component Value Date     12/26/2022    K 3.7 12/26/2022     12/26/2022    CO2  25 12/26/2022     (H) 12/26/2022    BUN 15 12/26/2022    CREATININE 0.8 12/26/2022    CALCIUM 10.5 12/26/2022    PROT 7.3 12/26/2022    ALBUMIN 4.2 12/26/2022    BILITOT 0.5 12/26/2022    ALKPHOS 55 12/26/2022    AST 21 12/26/2022    ALT 15 12/26/2022    ANIONGAP 11 12/26/2022    ESTGFRAFRICA >60.0 03/31/2022    EGFRNONAA >60.0 03/31/2022    WBC 8.54 12/26/2022    HGB 14.7 12/26/2022    HGB 13.5 03/31/2022    HCT 44.4 12/26/2022    MCV 89 12/26/2022     12/26/2022    TSH 4.708 (H) 04/21/2017       Lab Results   Component Value Date    [id number] 41 05/08/2014         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, without long-term current use of insulin 10/31/2017    Diabetes mellitus type 2 without retinopathy 06/12/2014    6% metformin 500 bid, FU+ 2016    DVT (deep venous thrombosis) 2001    R , Dr Chad Golden 04/06/2022     Marques passed 1/2022    Hyperlipidemia     LDL 82    Hyperlipidemia associated with type 2 diabetes mellitus 09/11/2012    Hypertension     Hypertension associated with diabetes 09/11/2012    Memory loss 12/29/2022    CT chronic changes 2022    Microalbuminuria due to type 2 diabetes mellitus 12/08/2015    taking lisinopril, losartan caused olivia effects    PVD (peripheral vascular disease) with claudication 05/02/2019    Compression hose    Risk for coronary artery disease greater than 20% in next 10 years per [place] score 05/01/2018    Strabismus     Type 2 diabetes mellitus with diabetic polyneuropathy 06/12/2014    Type 2 diabetes mellitus with diabetic polyneuropathy, without long-term  current use of insulin 06/12/2014     Past Surgical History:   Procedure Laterality Date    BREAST BIOPSY Right 2011    BREAST LUMPECTOMY Right 2011    [place] REMOVAL OF NAIL BED  6/10/2015    TONSILLECTOMY       Social History     Social History Narrative     to Marques, 3 children, nondrinker, nonsmoker, she declines colonoscopy     Family History   Problem Relation Age of Onset    Heart disease Mother 58    Cataracts Mother     Heart disease Father 50    Thyroid disease Sister     Breast cancer Sister     No Known Problems Brother     No Known Problems Maternal Aunt     No Known Problems Maternal Uncle     No Known Problems Paternal Aunt     No Known Problems Paternal Uncle     No Known Problems Maternal Grandmother     No Known Problems Maternal Grandfather     No Known Problems Paternal Grandmother     No Known Problems Paternal Grandfather     Amblyopia Neg Hx     Blindness Neg Hx     Diabetes Neg Hx     Glaucoma Neg Hx     Hypertension Neg Hx     Macular degeneration Neg Hx     Retinal detachment Neg Hx     Strabismus Neg Hx     Stroke Neg Hx      Vitals:    02/01/23 0952   BP: 139/87     Objective:   Physical Exam  Assessment:     1. Visual hallucinations    2. BANDAR (generalized anxiety disorder)    3. Grief    4. Controlled type 2 diabetes mellitus with other circulatory complication, without long-term current use of insulin      Plan:     Orders Placed This Encounter    CBC Auto Differential    Comprehensive Metabolic Panel    Lipid Panel    Hemoglobin A1C    Microalbumin/Creatinine Ratio, Urine    FLUoxetine 10 MG capsule    flash glucose scanning reader (FREESTYLE FLOWER 2 READER) Misc    flash glucose sensor (FREESTYLE FLOWER 2 SENSOR) Kit   Stop drinks w sugar, stop juice  See me in March w labs & ruine microlbumin prior  TELPHONE AUDIO    There are no Patient Instructions on file for this visit.

## 2023-02-01 NOTE — TELEPHONE ENCOUNTER
----- Message from Viktoria Mckeon MD sent at 2/1/2023  9:55 AM CST -----  I spoke w pt, please schedule labs & urine microalbumin prior to her visit this Spring w me. Thanks

## 2023-02-02 RX ORDER — FLASH GLUCOSE SCANNING READER
EACH MISCELLANEOUS
Qty: 1 EACH | Refills: 12 | Status: SHIPPED | OUTPATIENT
Start: 2023-02-02 | End: 2024-03-14

## 2023-02-02 RX ORDER — FLASH GLUCOSE SENSOR
KIT MISCELLANEOUS
Qty: 1 KIT | Refills: 12 | Status: SHIPPED | OUTPATIENT
Start: 2023-02-02 | End: 2024-03-14

## 2023-02-02 NOTE — TELEPHONE ENCOUNTER
----- Message from Hilda Olmstead sent at 2/2/2023  2:25 PM CST -----  Contact: [phone] Patient  Pt states her insurance advised her the Freestyle Danna CGM is not covered by her insurance. Please call and advise.

## 2023-02-02 NOTE — TELEPHONE ENCOUNTER
Care Due:                  Date            Visit Type   Department     Provider  --------------------------------------------------------------------------------                                VIRTUAL      [facility] PRIMARY  Last Visit: 02-      AUDIO ONLY   CARE           Viktoria Mckeon                              EP -                              PRIMARY      [facility] PRIMARY  Next Visit: 03-      CARE ([facility])   CARE           Viktoria cMkeon                                                            Last  Test          Frequency    Reason                     Performed    Due Date  --------------------------------------------------------------------------------    HBA1C.......  6 months...  metFORMIN................  03- 09-    Lipid Panel.  12 months..  simvastatin..............  03- 03-    [facility] Embedded Care Gaps. Reference number: [id number]. 2/02/2023   3:03:41 PM CST

## 2023-02-02 NOTE — TELEPHONE ENCOUNTER
Pt states insurance will not cover Freestyle Danna. She said you would try sending it to her local pharmacy so she could pay cash price for it.

## 2023-02-07 DIAGNOSIS — Z00.00 ENCOUNTER FOR MEDICARE ANNUAL WELLNESS EXAM: ICD-10-CM

## 2023-02-09 DIAGNOSIS — Z00.00 ENCOUNTER FOR MEDICARE ANNUAL WELLNESS EXAM: ICD-10-CM

## 2023-02-16 ENCOUNTER — HOSPITAL ENCOUNTER (OUTPATIENT)
Dept: RADIOLOGY | Facility: HOSPITAL | Age: 83
Discharge: HOME | End: 2023-02-16
Attending: FAMILY MEDICINE
Payer: MEDICARE

## 2023-02-16 DIAGNOSIS — R92.8 ABNORMAL FINDING ON BREAST IMAGING: ICD-10-CM

## 2023-02-16 PROCEDURE — 77061 BREAST TOMOSYNTHESIS UNI: CPT | Mod: 26,HCNC,LT, | Performed by: RADIOLOGY

## 2023-02-16 PROCEDURE — 77061 MAMMO DIGITAL DIAGNOSTIC LEFT WITH TOMO: ICD-10-PCS | Mod: 26,HCNC,LT, | Performed by: RADIOLOGY

## 2023-02-16 PROCEDURE — 77065 MAMMO DIGITAL DIAGNOSTIC LEFT WITH TOMO: ICD-10-PCS | Mod: 26,HCNC,LT, | Performed by: RADIOLOGY

## 2023-02-16 PROCEDURE — 77061 BREAST TOMOSYNTHESIS UNI: CPT | Mod: TC,HCNC,LT

## 2023-02-16 PROCEDURE — 77065 DX MAMMO INCL CAD UNI: CPT | Mod: 26,HCNC,LT, | Performed by: RADIOLOGY

## 2023-02-17 ENCOUNTER — TELEPHONE (OUTPATIENT)
Dept: RADIOLOGY | Facility: HOSPITAL | Age: 83
End: 2023-02-17
Payer: MEDICARE

## 2023-02-27 ENCOUNTER — HOSPITAL ENCOUNTER (OUTPATIENT)
Dept: RADIOLOGY | Facility: HOSPITAL | Age: 83
Discharge: HOME | End: 2023-02-27
Attending: FAMILY MEDICINE
Payer: MEDICARE

## 2023-02-27 DIAGNOSIS — R92.8 ABNORMAL FINDING ON BREAST IMAGING: ICD-10-CM

## 2023-02-27 PROCEDURE — 88305 TISSUE EXAM BY PATHOLOGIST: ICD-10-PCS | Mod: 26,HCNC,, | Performed by: STUDENT IN AN ORGANIZED HEALTH CARE EDUCATION/TRAINING PROGRAM

## 2023-02-27 PROCEDURE — A4648 IMPLANTABLE TISSUE MARKER: HCPCS | Mod: HCNC

## 2023-02-27 PROCEDURE — 88305 TISSUE EXAM BY PATHOLOGIST: CPT | Mod: 26,HCNC,, | Performed by: STUDENT IN AN ORGANIZED HEALTH CARE EDUCATION/TRAINING PROGRAM

## 2023-02-27 PROCEDURE — 88341 PR IHC OR ICC EACH ADD'L SINGLE ANTIBODY  STAINPR: ICD-10-PCS | Mod: 26,HCNC,, | Performed by: STUDENT IN AN ORGANIZED HEALTH CARE EDUCATION/TRAINING PROGRAM

## 2023-02-27 PROCEDURE — 88342 IMHCHEM/IMCYTCHM 1ST ANTB: CPT | Mod: HCNC | Performed by: STUDENT IN AN ORGANIZED HEALTH CARE EDUCATION/TRAINING PROGRAM

## 2023-02-27 PROCEDURE — 88342 CHG IMMUNOCYTOCHEMISTRY: ICD-10-PCS | Mod: 26,HCNC,, | Performed by: STUDENT IN AN ORGANIZED HEALTH CARE EDUCATION/TRAINING PROGRAM

## 2023-02-27 PROCEDURE — 88342 IMHCHEM/IMCYTCHM 1ST ANTB: CPT | Mod: 26,HCNC,, | Performed by: STUDENT IN AN ORGANIZED HEALTH CARE EDUCATION/TRAINING PROGRAM

## 2023-02-27 PROCEDURE — 88341 IMHCHEM/IMCYTCHM EA ADD ANTB: CPT | Mod: 26,HCNC,, | Performed by: STUDENT IN AN ORGANIZED HEALTH CARE EDUCATION/TRAINING PROGRAM

## 2023-02-27 PROCEDURE — 27200939 MAMMO BREAST STEREOTACTIC BREAST BIOPSY LEFT: Mod: HCNC

## 2023-02-27 PROCEDURE — 88341 IMHCHEM/IMCYTCHM EA ADD ANTB: CPT | Mod: 59,HCNC | Performed by: STUDENT IN AN ORGANIZED HEALTH CARE EDUCATION/TRAINING PROGRAM

## 2023-02-27 PROCEDURE — 19081 MAMMO BREAST STEREOTACTIC BREAST BIOPSY LEFT: ICD-10-PCS | Mod: HCNC,LT,, | Performed by: RADIOLOGY

## 2023-02-27 PROCEDURE — 88305 TISSUE EXAM BY PATHOLOGIST: CPT | Mod: HCNC | Performed by: STUDENT IN AN ORGANIZED HEALTH CARE EDUCATION/TRAINING PROGRAM

## 2023-02-27 PROCEDURE — 19081 BX BREAST 1ST LESION STRTCTC: CPT | Mod: HCNC,LT,, | Performed by: RADIOLOGY

## 2023-02-27 PROCEDURE — 25000003 PHARM REV CODE 250: Mod: HCNC | Performed by: FAMILY MEDICINE

## 2023-02-27 RX ORDER — LIDOCAINE HYDROCHLORIDE 10 MG/ML
3 INJECTION, SOLUTION EPIDURAL; INFILTRATION; INTRACAUDAL; PERINEURAL ONCE
Status: COMPLETED | OUTPATIENT
Start: 2023-02-27 | End: 2023-02-27

## 2023-02-27 RX ORDER — BUPIVACAINE HCL/EPINEPHRINE 0.25-.0005
20 VIAL (ML) INJECTION ONCE
Status: COMPLETED | OUTPATIENT
Start: 2023-02-27 | End: 2023-02-27

## 2023-02-27 RX ADMIN — LIDOCAINE HYDROCHLORIDE 30 MG: 10 INJECTION, SOLUTION EPIDURAL; INFILTRATION; INTRACAUDAL; PERINEURAL at 03:02

## 2023-02-27 RX ADMIN — BUPIVACAINE HYDROCHLORIDE AND EPINEPHRINE BITARTRATE 20 ML: 2.5; .005 INJECTION, SOLUTION INFILTRATION; PERINEURAL at 03:02

## 2023-03-02 ENCOUNTER — DOCUMENTATION ONLY (OUTPATIENT)
Dept: SURGERY | Facility: CLINIC | Age: 83
End: 2023-03-02
Payer: MEDICARE

## 2023-03-02 LAB
FINAL PATHOLOGIC DIAGNOSIS: NORMAL
GROSS: NORMAL
Lab: NORMAL

## 2023-03-02 NOTE — NURSING
Called Patient with results of breast biopsy from [facility].  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 Discussed what this means and that the next step is to meet with a breast surgeon. An appt was made for [facility] with Dr. Hendrickson.  Reviewed location of breast center. Patient verbalized understanding.  Oncology Navigation   Intake  Cancer Type: Breast  Internal / External Referral: Internal  Date of Referral: 03/02/23  Initial Nurse Navigator Contact: 03/02/23  Referral to Initial Contact Timeline (days): 0  Date Worked: 03/02/23  First Appointment Available: 03/13/23  Appointment Date: 03/13/23  First Available Date vs. Scheduled Date (days): 0     Treatment  Current Status: Staging work-up    Surgical Oncologist: Dr. Hendrickson  Consult Date: 03/13/23          Procedures: Biopsy  Biopsy Schedule Date: 02/27/23       ER: Negative  OK: Negative           Acuity      Follow Up  No follow-ups on file.

## 2023-03-08 ENCOUNTER — PATIENT MESSAGE (OUTPATIENT)
Dept: ADMINISTRATIVE | Facility: HOSPITAL | Age: 83
End: 2023-03-08
Payer: MEDICARE

## 2023-03-13 ENCOUNTER — OFFICE VISIT (OUTPATIENT)
Dept: SURGERY | Facility: CLINIC | Age: 83
End: 2023-03-13
Payer: MEDICARE

## 2023-03-13 ENCOUNTER — DOCUMENTATION ONLY (OUTPATIENT)
Dept: SURGERY | Facility: CLINIC | Age: 83
End: 2023-03-13

## 2023-03-13 VITALS
WEIGHT: 165 LBS | DIASTOLIC BLOOD PRESSURE: 77 MMHG | HEIGHT: 66 IN | BODY MASS INDEX: 26.52 KG/M2 | OXYGEN SATURATION: 99 % | TEMPERATURE: 98 F | SYSTOLIC BLOOD PRESSURE: 138 MMHG | HEART RATE: 68 BPM

## 2023-03-13 DIAGNOSIS — C50.919 BREAST CANCER: Primary | ICD-10-CM

## 2023-03-13 DIAGNOSIS — D05.11 DUCTAL CARCINOMA IN SITU (DCIS) OF RIGHT BREAST: Primary | ICD-10-CM

## 2023-03-13 DIAGNOSIS — Z01.818 PRE-OP TESTING: ICD-10-CM

## 2023-03-13 PROCEDURE — 3072F PR LOW RISK FOR RETINOPATHY: ICD-10-PCS | Mod: HCNC,CPTII,S$GLB, | Performed by: SURGERY

## 2023-03-13 PROCEDURE — 1126F AMNT PAIN NOTED NONE PRSNT: CPT | Mod: HCNC,CPTII,S$GLB, | Performed by: SURGERY

## 2023-03-13 PROCEDURE — 1126F PR PAIN SEVERITY QUANTIFIED, NO PAIN PRESENT: ICD-10-PCS | Mod: HCNC,CPTII,S$GLB, | Performed by: SURGERY

## 2023-03-13 PROCEDURE — 3072F LOW RISK FOR RETINOPATHY: CPT | Mod: HCNC,CPTII,S$GLB, | Performed by: SURGERY

## 2023-03-13 PROCEDURE — 1159F MED LIST DOCD IN RCRD: CPT | Mod: HCNC,CPTII,S$GLB, | Performed by: SURGERY

## 2023-03-13 PROCEDURE — 1160F RVW MEDS BY RX/DR IN RCRD: CPT | Mod: HCNC,CPTII,S$GLB, | Performed by: SURGERY

## 2023-03-13 PROCEDURE — 1159F PR MEDICATION LIST DOCUMENTED IN MEDICAL RECORD: ICD-10-PCS | Mod: HCNC,CPTII,S$GLB, | Performed by: SURGERY

## 2023-03-13 PROCEDURE — 99999 PR PBB SHADOW E&M-EST. PATIENT-LVL V: CPT | Mod: PBBFAC,HCNC,, | Performed by: SURGERY

## 2023-03-13 PROCEDURE — 99205 OFFICE O/P NEW HI 60 MIN: CPT | Mod: HCNC,S$GLB,, | Performed by: SURGERY

## 2023-03-13 PROCEDURE — 3078F PR MOST RECENT DIASTOLIC BLOOD PRESSURE < 80 MM HG: ICD-10-PCS | Mod: HCNC,CPTII,S$GLB, | Performed by: SURGERY

## 2023-03-13 PROCEDURE — 99205 PR OFFICE/OUTPT VISIT, NEW, LEVL V, 60-74 MIN: ICD-10-PCS | Mod: HCNC,S$GLB,, | Performed by: SURGERY

## 2023-03-13 PROCEDURE — 3078F DIAST BP <80 MM HG: CPT | Mod: HCNC,CPTII,S$GLB, | Performed by: SURGERY

## 2023-03-13 PROCEDURE — 3075F PR MOST RECENT SYSTOLIC BLOOD PRESS GE 130-139MM HG: ICD-10-PCS | Mod: HCNC,CPTII,S$GLB, | Performed by: SURGERY

## 2023-03-13 PROCEDURE — 1160F PR REVIEW ALL MEDS BY PRESCRIBER/CLIN PHARMACIST DOCUMENTED: ICD-10-PCS | Mod: HCNC,CPTII,S$GLB, | Performed by: SURGERY

## 2023-03-13 PROCEDURE — 3075F SYST BP GE 130 - 139MM HG: CPT | Mod: HCNC,CPTII,S$GLB, | Performed by: SURGERY

## 2023-03-13 PROCEDURE — 99999 PR PBB SHADOW E&M-EST. PATIENT-LVL V: ICD-10-PCS | Mod: PBBFAC,HCNC,, | Performed by: SURGERY

## 2023-03-13 NOTE — PROGRESS NOTES
Breast Surgery  [facility]  Department of Surgery      REFERRING PROVIDER: Viktoria Mckeon MD  [address] [place] [address]    Chief Complaint: Breast Cancer (New Patient left Breast Mass .)      Subjective:      Patient ID: Kanchan Downing is a 82 y.o. female who presents with left breast Ductal Carcinoma in Situ.     She presented for screening mammogram on 22 for yearly scheduled screening. This identified left breast calcifications at the central posterior position. Follow-up mammogram on 23 showed left breast calcifications measuring up to 4.6cm. A stereotactic biopsy was performed on 23 with pathology revealing ductal carcinoma in-situ of the breast.     Findings at that time were the following:   Lesion 1:    Location: 2 o'clock   Clip: X, in 2 o'clock position  Tumor size: ? Calcifications 4.6 cm in size however calcifications not associated with DCIS on pathology  Tumor stgstrstastdstest:st st1st Estrogen Receptor: -   Progesterone Receptor: -       Patient has not noted a change on breast exam.  Patient denies nipple discharge. Patient reports to previous breast biopsy. Patient reports a personal history of breast cancer. History of right breast cancer s/p lumpectomy, sentinel lymph node biopsy, 7 years of endocrine therapy and radiation in . Family history includes two sisters with breast cancer.     GYN History:  Age of menarche was 12. Age of menopause was early 50's.  Patient denies hormonal therapy. Patient is . Age of first live birth was 20. Patient did not breast feed.    Past Medical History:   Diagnosis Date    Anxiety     Arthritis     Dr Schofield    Arthritis of hand 2017    Atherosclerosis of aorta 2016    CXR 2013    Breast cancer     right breast invasive micropapillary CA, Stage !A    Cataract     Controlled type 2 diabetes mellitus with circulatory disorder, without long-term current use of insulin 10/31/2017    Controlled type 2 diabetes  mellitus with circulatory disorder, without long-term current use of insulin 10/31/2017    Diabetes mellitus type 2 without retinopathy 06/12/2014    6% metformin 500 bid, FU+ 2016    DVT (deep venous thrombosis) 2001    R , Dr Chad Golden 04/06/2022     Marques passed 1/2022    Hyperlipidemia     LDL 82    Hyperlipidemia associated with type 2 diabetes mellitus 09/11/2012    Hypertension     Hypertension associated with diabetes 09/11/2012    Memory loss 12/29/2022    CT chronic changes 2022    Microalbuminuria due to type 2 diabetes mellitus 12/08/2015    taking lisinopril, losartan caused olivia effects    PVD (peripheral vascular disease) with claudication 05/02/2019    Compression hose    Risk for coronary artery disease greater than 20% in next 10 years per Gregory score 05/01/2018    Strabismus     Type 2 diabetes mellitus with diabetic polyneuropathy 06/12/2014    Type 2 diabetes mellitus with diabetic polyneuropathy, without long-term current use of insulin 06/12/2014     Past Surgical History:   Procedure Laterality Date    BREAST BIOPSY Right 2011    BREAST LUMPECTOMY Right 2011    [place] REMOVAL OF NAIL BED  6/10/2015    TONSILLECTOMY       Current Outpatient Medications on File Prior to Visit   Medication Sig Dispense Refill    amLODIPine (NORVASC) 5 MG tablet Take 1 tablet (5 mg total) by mouth once daily. 90 tablet 3    blood sugar diagnostic (BLOOD GLUCOSE TEST) Strp 1 strip by Misc.(Non-Drug; Combo Route) route 2 (two) times daily. 100 strip 12    blood sugar diagnostic Strp 1 strip by Misc.(Non-Drug; Combo Route) route 2 (two) times daily. ACCU-CHEK BROOK PLUS 200 strip 3    blood-glucose meter kit TRUE METRIX AIR W/Fannect SMART w/Device  Kit - use as instructed 1 each 0    cetirizine (ZYRTEC) 10 MG tablet Take 10 mg by mouth once daily.      flash glucose scanning reader (FREESTYLE FLOWER 2 READER) [facility] Continuous glucose reader 1 each 12    flash glucose sensor (FREESTYLE FLOWER 2 SENSOR) Kit  Continuous glucose monitor 1 kit 12    FLUoxetine 10 MG capsule Take 1 capsule (10 mg total) by mouth once daily. 30 capsule 11    hydroCHLOROthiazide (MICROZIDE) 12.5 mg capsule TAKE 1 CAPSULE EVERY DAY 90 capsule 3    Lactobacillus rhamnosus GG (CULTURELLE) 10 billion cell capsule Take by mouth. 1 Capsule Oral Every day      lancets Misc Test tid 100 each 12    lisinopriL (PRINIVIL,ZESTRIL) 5 MG tablet Take 1 tablet (5 mg total) by mouth once daily. 90 tablet 3    metFORMIN (GLUCOPHAGE) 500 MG tablet Take 1 tablet (500 mg total) by mouth once daily. 90 tablet 3    metoprolol tartrate (LOPRESSOR) 100 MG tablet Take 1 tablet (100 mg total) by mouth once daily. 90 tablet 3    simvastatin (ZOCOR) 10 MG tablet Take 1 tablet (10 mg total) by mouth every evening. 90 tablet 3    TRUEPLUS LANCETS 33 gauge Misc       aspirin (ECOTRIN) 81 MG EC tablet Take 1 tablet (81 mg total) by mouth once daily.  0    fluticasone (FLONASE) 50 mcg/actuation nasal spray 1 spray by Each Nare route once daily. (Patient not taking: Reported on 3/13/2023) 16 g 12    olopatadine (PATADAY) 0.2 % Drop Place 1 drop into both eyes once daily. 2.5 mL 12    polyethylene glycol (GLYCOLAX) 17 gram/dose powder TAKE 1 CAPFUL (17 GRAMS) DAILY AS DIRECTED 527 g 10     No current facility-administered medications on file prior to visit.     Social History     Socioeconomic History    Marital status:    Tobacco Use    Smoking status: Former     Packs/day: 7.00     Years: 0.50     Pack years: 3.50     Types: Cigarettes    Smokeless tobacco: Never   Substance and Sexual Activity    Alcohol use: No    Drug use: Never    Sexual activity: Never   Social History Narrative     to Marques, 3 children, nondrinker, nonsmoker, she declines colonoscopy     Family History   Problem Relation Age of Onset    Heart disease Mother 58    Cataracts Mother     Heart disease Father 50    Thyroid disease Sister     Breast cancer Sister     No Known Problems Brother   "   No Known Problems Maternal Aunt     No Known Problems Maternal Uncle     No Known Problems Paternal Aunt     No Known Problems Paternal Uncle     No Known Problems Maternal Grandmother     No Known Problems Maternal Grandfather     No Known Problems Paternal Grandmother     No Known Problems Paternal Grandfather     Amblyopia Neg Hx     Blindness Neg Hx     Diabetes Neg Hx     Glaucoma Neg Hx     Hypertension Neg Hx     Macular degeneration Neg Hx     Retinal detachment Neg Hx     Strabismus Neg Hx     Stroke Neg Hx         Review of Systems   All other systems reviewed and are negative.  Objective:   /77 (BP Location: Left arm, Patient Position: Sitting, BP Method: Medium (Manual))   Pulse 68   Temp 97.9 °F (36.6 °C) (Oral)   Ht 5' 6" (1.676 m)   Wt 74.8 kg (165 lb)   LMP  (LMP Unknown)   SpO2 99%   BMI 26.63 kg/m²     Physical Exam   Vitals reviewed.  Constitutional: She is oriented to person, place, and time.   HENT:   Head: Normocephalic.   Cardiovascular:  Normal rate.      Exam reveals no gallop.       No murmur heard.  Pulmonary/Chest: Effort normal. No respiratory distress. She has no wheezes. Right breast exhibits nipple discharge and skin change. Right breast exhibits no inverted nipple, no mass and no tenderness. Left breast exhibits no inverted nipple, no mass, no nipple discharge, no skin change and no tenderness. Breasts are asymmetrical.       Abdominal: Normal appearance.   Musculoskeletal: Lymphadenopathy:      Cervical: No cervical adenopathy.      Upper Body:      Right upper body: No supraclavicular or axillary adenopathy.      Left upper body: No supraclavicular or axillary adenopathy.     Neurological: She is alert and oriented to person, place, and time.   Skin: Skin is warm and dry.     Psychiatric: Her behavior is normal. Mood, judgment and thought content normal.     Radiology review: Images personally reviewed by me in the clinic and shown to the patient during the " consultation.     Diagnostic Mammogram 2/16/23  Findings:  This procedure was performed using tomosynthesis. Computer-aided detection was utilized in the interpretation of this examination.  The left breast is heterogeneously dense, which may obscure small masses.  Additional views confirm fine pleomorphic calcifications in a segmental distribution in the upper-outer quadrant of the left breast at 02:00 o'clock middle and posterior depths. They measure up to 4.6 cm.       Impression:  Calcifications in the left breast at 02:00 o'clock that measure up to 4.6 cm.  This is a suspicious finding.  A stereotactic guided biopsy is recommended.     BI-RADS Category:   Overall: 4 - Suspicious    Screening Mammogram 12/5/22:  Findings:  This procedure was performed using tomosynthesis. Computer-aided detection was utilized in the interpretation of this examination. Patient had difficulty tolerating optimal positioning.  The breasts are heterogeneously dense, which may obscure small masses.      Left  There are calcifications seen in the central region of the left breast in the posterior depth.   Right  There are post-surgical findings from a previous lumpectomy seen in the right breast. There has been no interval development of a suspicious mass, microcalcification, or architectural distortion or other detrimental change on the right.      Impression:  Left  Calcifications: Left breast calcifications at the central posterior position. Assessment: 0 - Incomplete. Special Views: Magnification View is recommended.   Right  There is no mammographic evidence of malignancy in the right breast.     BI-RADS Category:   Overall: 0 - Incomplete: Needs Additional Imaging Evaluation    Assessment:       1. Ductal carcinoma in situ (DCIS) of right breast    2. Pre-op testing        Plan:   Multidisciplinary nature of breast cancer care was discussed in detail at today's visit.    We discussed that surgical options would include a  lumpectomy versus a mastectomy.  We discussed there is no survival benefit to undergoing a mastectomy compared to lumpectomy.  We discussed that it is difficult determine her extent of disease as the calcifications spanning 4.6 cm were dispersed over a wide area.  Also these calcifications did not correspond to the area of DCIS on pathology.  Radiology recommended breast MRI to further evaluate the extent of disease.  I am hopeful that there was a smaller extent of disease in the patient could undergo a lumpectomy.  If the entire almost 5 areas of calcifications are suspicious for DCIS then she would likely require a mastectomy.  Surgical technique and rationale was discussed with the patient.  We discussed that this would be done as a reflector localized excision of the primary tumor along with a surrounding margin of normal breast tissue.  The concept of local control was explained to the patient.  She understands that we would aim to achieve negative margins at the time of initial surgery.  There is however an approximately 15% risk of a positive margin that would require take back for reexcision.  Risks and benefits of the procedure were discussed in detail.  Risks include but are not limited to infection, bleeding, poor cosmesis, positive margins requiring reexcision, need for additional surgery, and local and distant recurrence.  We discussed the option for mastectomy.  We discussed the risks and benefits of mastectomy. Risks include but are not limited to risk of bleeding, infection, poor cosmesis, need for additional surgery, clip placement, scaring, pain including phantom pain, fluid collections, shoulder stiffness, prolonged healing, and recurrence. Reconstruction after mastectomy was discussed.  She is most interested in lumpectomy and would like to avoid mastectomy if all possible.  We will perform the breast MRI to further evaluate the extent of disease and determine if she is good lumpectomy  candidate.    We discussed the sentinel node biopsies is typically performed in the setting of mastectomy for DCIS.  She is very hopeful that she can undergo a lumpectomy at this time.  We will avoid sentinel lymph node biopsy if performing lumpectomy.    Comordities include well-controlled diabetes. Based on her medical history she is a low risk of complications. Materials utilized in the surgery include surgical metal clips, suture material, and skin adhesives. These materials can lead to allergic reactions, skin irration, and other complications. Medications utilized in surgery include but are not limited to antibiotics, isosulfan blue dye, and technetium sulfa colloid.  Given the patient's allergy history of sulfa allergy we will avoid use of Lymphazurin blue dye out of an abundance of caution even though this has been shown to be safe in studies.    The role of adjuvant radiation therapy following breast conservation surgery was also discussed with the patient. We discussed that when looking at all patient populations risk of local recurrence with lumpectomy alone is high and radiation therapy is recommended in most cases. We discussed that final recommendations on radiation would be based on final surgical pathology. The duration and treatment side effects were discussed with  the patient.  She'll be referred to radiation oncology post-operatively for further discussion of her options.     We also discussed the role of systemic therapy in the treatment of early stage breast cancer.  She has hormone receptor negative DCIS.  I do not anticipate any systemic therapy.    She does meet NCCN guidelines for genetic testing based on her family history and personal history of cancer.  We discussed this in detail and she is not interested in proceeding with genetic testing at this time.    Following her discussion today, Kanchan Downing is motivated to undergo breast conservation however this disease needs to be further  classified with breast MRI.  If this disease is found to be amenable to lumpectomy then we will plan for SANG localized partial mastectomy.  If a large area of concern is identified then we will perform a mastectomy without reconstruction and sentinel lymph node biopsy.    Surgery will be scheduled. Follow-up in clinic roughly 14 days after surgery.      Patient was given patient information binder including [facility] breast cancer treatment brochure.  All her questions were answered.    Total time spent with the patient: 60 minutes.  50 minutes of face to face consultation and 10 minutes of chart review and coordination of care.

## 2023-03-14 NOTE — NURSING
Nurse Navigator Note:     Met with patient during her consult with Dr. Hendrickson.  Patient and I reviewed the information she discussed with Dr. Hendrickson, including treatment options, diagnosis, and future plans for workup. Patient and I went through the new patient booklet, explained some of the information and why it is provided.     Also offered patient consults with our other specialty clinics: Integrative Oncology, Survivorship and/or Women's Gynecologic needs, our breast physical therapy department for pre-op and post-operative assessments, Oncologic Psychology for psychological support, and Oncologic Nutrition for nutritional counseling. Explained to patient that all of these support services are completely optional. Discussed that physical therapy may call patient to offer pre-op appt, and what that appt would entail.     Patient was given a copy of her appointments, Dr. Hendrickson's card, and my card. Encouraged her to call me if she has any questions or concerns or would like to schedule any additional appointments. Verbalized understanding of all information.  Patient declined genetics. Referrals for PT and integrative oncology placed. Email added to support group.  Oncology Navigation   Intake  Cancer Type: Breast  Internal / External Referral: Internal  Date of Referral: 03/02/23  Initial Nurse Navigator Contact: 03/02/23  Referral to Initial Contact Timeline (days): 0  Date Worked: 03/13/23  First Appointment Available: 03/13/23  Appointment Date: 03/13/23  First Available Date vs. Scheduled Date (days): 0     Treatment  Current Status: Staging work-up    Surgical Oncologist: Dr. Hendrickson  Type of Surgery: left lumpectomy no node  Consult Date: 03/13/23          Procedures: MRI  Biopsy Schedule Date: 02/27/23  MRI Schedule Date: 03/29/23       ER: Negative  MO: Negative       Support Systems: Children     Acuity      Follow Up  Follow up in about 18 days (around 3/31/2023) for f/u after MRI.

## 2023-03-22 ENCOUNTER — PATIENT MESSAGE (OUTPATIENT)
Dept: HEMATOLOGY/ONCOLOGY | Facility: CLINIC | Age: 83
End: 2023-03-22
Payer: MEDICARE

## 2023-03-24 ENCOUNTER — TELEPHONE (OUTPATIENT)
Dept: HEMATOLOGY/ONCOLOGY | Facility: CLINIC | Age: 83
End: 2023-03-24
Payer: MEDICARE

## 2023-03-29 ENCOUNTER — HOSPITAL ENCOUNTER (OUTPATIENT)
Dept: RADIOLOGY | Facility: HOSPITAL | Age: 83
Discharge: HOME | End: 2023-03-29
Attending: SURGERY
Payer: MEDICARE

## 2023-03-29 DIAGNOSIS — C50.919 BREAST CANCER: ICD-10-CM

## 2023-03-29 PROCEDURE — 25500020 PHARM REV CODE 255: Mod: HCNC | Performed by: SURGERY

## 2023-03-29 PROCEDURE — 77049 MRI BREAST W/WO CONTRAST, W/CAD, BILATERAL: ICD-10-PCS | Mod: 26,HCNC,, | Performed by: RADIOLOGY

## 2023-03-29 PROCEDURE — 77049 MRI BREAST C-+ W/CAD BI: CPT | Mod: 26,HCNC,, | Performed by: RADIOLOGY

## 2023-03-29 PROCEDURE — A9577 INJ MULTIHANCE: HCPCS | Mod: HCNC | Performed by: SURGERY

## 2023-03-29 PROCEDURE — 77049 MRI BREAST C-+ W/CAD BI: CPT | Mod: TC,HCNC

## 2023-03-29 RX ADMIN — GADOBENATE DIMEGLUMINE 16 ML: 529 INJECTION, SOLUTION INTRAVENOUS at 06:03

## 2023-03-31 ENCOUNTER — TELEPHONE (OUTPATIENT)
Dept: SURGERY | Facility: CLINIC | Age: 83
End: 2023-03-31
Payer: MEDICARE

## 2023-03-31 NOTE — TELEPHONE ENCOUNTER
Called and talked to both the patient and her daughter separately per patient request.   We discussed coming in person for the discussion but they were comfortable with a phone conversation.     Discussed the new MRI finding showing a 2nd area of non-mass enhancement in the left breast.  We discussed that this was suspicious for another area of DCIS but could be proven with a biopsy.  We discussed options in this setting included biopsy of the area to determine if it needs to be removed at the time of surgery.  If biopsy was negative then she would remain a candidate for a lumpectomy.  If biopsy was positive she would need to have a mastectomy to remove the full extent of non-mass enhancement.    We discussed that if she wanted to avoid biopsy she could proceed directly to a mastectomy.      We discussed these options in full detail.  The patient does not wish to proceed with either a biopsy or a mastectomy.  We discussed that it would be difficult to proceed with lumpectomy if just the other area without ruling out additional sites of disease as removing only a portion of the cancer would not improve her outcomes.    She inquired about options for other treatments.  She had taken and endocrine therapy in the past for her prior right-sided breast cancer.  Unfortunately her DCIS is estrogen receptor negative and would not respond to this medication.    We did discuss that DCIS overall has a slow time course and progression to invasive cancer and does not always progress to invasive cancer.  We discussed there recently studies to look into observation of DCIS however there are no open studies at this time and the prior studies have not given us results to know if this is a safe option.  We discussed the standard of care remains surgical removal of the DCIS.  She at this time is interested in proceeding with no surgical treatment.  We discussed that this is not unreasonable although is not the standard of care.  I  recommend she meet with Medical Oncology to discuss the implications of proceeding without treatment.  I also recommended I present her at our multidisciplinary tumor board.  If she does not proceed with surgery be reasonable to obtain a mammogram in 6 months, August 2023 to evaluate for increased of the calcifications.  We discussed that I will be on maternity leave at that time that she could follow up with 1 of our other providers in the breast surgery Clinic.    Her daughter going to think about the options more but are considering proceeding with no treatment.

## 2023-04-03 ENCOUNTER — TELEPHONE (OUTPATIENT)
Dept: SURGERY | Facility: CLINIC | Age: 83
End: 2023-04-03
Payer: MEDICARE

## 2023-04-03 NOTE — TELEPHONE ENCOUNTER
Spoke to patient about scheduling surgery per her message.  Patient has decided again that now she doesn't want to have any biopsies or surgery.  Patient states she refuses to get a covid shot to have surgery.  I explained that she doesn't have to receive the covid vaccine to have surgery.  Patient did agree to meet with Medical oncology so a message will be sent to her navigator to reach out and schedule patient for appointment with medical oncology.

## 2023-04-03 NOTE — TELEPHONE ENCOUNTER
----- Message from Beka Stoner LPN sent at 4/3/2023 10:56 AM CDT -----  Regarding: FW: Appt  Contact: @[phone]    ----- Message -----  From: Maycol Glass  Sent: 4/3/2023  10:52 AM CDT  To: DENNIS Hendrickson MD, #  Subject: Appt                                             Patient is calling to reach out  Dr. Roxann Hendrickson. To let her know that she would like to proceed with surgery and for her to reach out as soon as possible for questions and advisement @[phone]

## 2023-04-04 ENCOUNTER — DOCUMENTATION ONLY (OUTPATIENT)
Dept: SURGERY | Facility: CLINIC | Age: 83
End: 2023-04-04
Payer: MEDICARE

## 2023-04-04 NOTE — NURSING
Called patient and scheduled appt with Dr. Mason on 4/5/2023. Reviewed location of appt. Patient verbalized understanding.

## 2023-04-05 ENCOUNTER — OFFICE VISIT (OUTPATIENT)
Dept: HEMATOLOGY/ONCOLOGY | Facility: CLINIC | Age: 83
End: 2023-04-05
Payer: MEDICARE

## 2023-04-05 VITALS
TEMPERATURE: 98 F | SYSTOLIC BLOOD PRESSURE: 157 MMHG | HEIGHT: 66 IN | RESPIRATION RATE: 18 BRPM | DIASTOLIC BLOOD PRESSURE: 70 MMHG | HEART RATE: 60 BPM | OXYGEN SATURATION: 98 % | BODY MASS INDEX: 26.43 KG/M2 | WEIGHT: 164.44 LBS

## 2023-04-05 DIAGNOSIS — D05.12 NEOPLASM OF LEFT BREAST, PRIMARY TUMOR STAGING CATEGORY TIS: DUCTAL CARCINOMA IN SITU (DCIS): Primary | ICD-10-CM

## 2023-04-05 PROCEDURE — 1159F MED LIST DOCD IN RCRD: CPT | Mod: HCNC,CPTII,S$GLB, | Performed by: STUDENT IN AN ORGANIZED HEALTH CARE EDUCATION/TRAINING PROGRAM

## 2023-04-05 PROCEDURE — 3077F PR MOST RECENT SYSTOLIC BLOOD PRESSURE >= 140 MM HG: ICD-10-PCS | Mod: HCNC,CPTII,S$GLB, | Performed by: STUDENT IN AN ORGANIZED HEALTH CARE EDUCATION/TRAINING PROGRAM

## 2023-04-05 PROCEDURE — 3072F PR LOW RISK FOR RETINOPATHY: ICD-10-PCS | Mod: HCNC,CPTII,S$GLB, | Performed by: STUDENT IN AN ORGANIZED HEALTH CARE EDUCATION/TRAINING PROGRAM

## 2023-04-05 PROCEDURE — 1126F PR PAIN SEVERITY QUANTIFIED, NO PAIN PRESENT: ICD-10-PCS | Mod: HCNC,CPTII,S$GLB, | Performed by: STUDENT IN AN ORGANIZED HEALTH CARE EDUCATION/TRAINING PROGRAM

## 2023-04-05 PROCEDURE — 99215 OFFICE O/P EST HI 40 MIN: CPT | Mod: HCNC,S$GLB,, | Performed by: STUDENT IN AN ORGANIZED HEALTH CARE EDUCATION/TRAINING PROGRAM

## 2023-04-05 PROCEDURE — 1101F PT FALLS ASSESS-DOCD LE1/YR: CPT | Mod: HCNC,CPTII,S$GLB, | Performed by: STUDENT IN AN ORGANIZED HEALTH CARE EDUCATION/TRAINING PROGRAM

## 2023-04-05 PROCEDURE — 99215 PR OFFICE/OUTPT VISIT, EST, LEVL V, 40-54 MIN: ICD-10-PCS | Mod: HCNC,S$GLB,, | Performed by: STUDENT IN AN ORGANIZED HEALTH CARE EDUCATION/TRAINING PROGRAM

## 2023-04-05 PROCEDURE — 3078F PR MOST RECENT DIASTOLIC BLOOD PRESSURE < 80 MM HG: ICD-10-PCS | Mod: HCNC,CPTII,S$GLB, | Performed by: STUDENT IN AN ORGANIZED HEALTH CARE EDUCATION/TRAINING PROGRAM

## 2023-04-05 PROCEDURE — 3077F SYST BP >= 140 MM HG: CPT | Mod: HCNC,CPTII,S$GLB, | Performed by: STUDENT IN AN ORGANIZED HEALTH CARE EDUCATION/TRAINING PROGRAM

## 2023-04-05 PROCEDURE — 1159F PR MEDICATION LIST DOCUMENTED IN MEDICAL RECORD: ICD-10-PCS | Mod: HCNC,CPTII,S$GLB, | Performed by: STUDENT IN AN ORGANIZED HEALTH CARE EDUCATION/TRAINING PROGRAM

## 2023-04-05 PROCEDURE — 99999 PR PBB SHADOW E&M-EST. PATIENT-LVL V: ICD-10-PCS | Mod: PBBFAC,HCNC,, | Performed by: STUDENT IN AN ORGANIZED HEALTH CARE EDUCATION/TRAINING PROGRAM

## 2023-04-05 PROCEDURE — 3072F LOW RISK FOR RETINOPATHY: CPT | Mod: HCNC,CPTII,S$GLB, | Performed by: STUDENT IN AN ORGANIZED HEALTH CARE EDUCATION/TRAINING PROGRAM

## 2023-04-05 PROCEDURE — 1126F AMNT PAIN NOTED NONE PRSNT: CPT | Mod: HCNC,CPTII,S$GLB, | Performed by: STUDENT IN AN ORGANIZED HEALTH CARE EDUCATION/TRAINING PROGRAM

## 2023-04-05 PROCEDURE — 3078F DIAST BP <80 MM HG: CPT | Mod: HCNC,CPTII,S$GLB, | Performed by: STUDENT IN AN ORGANIZED HEALTH CARE EDUCATION/TRAINING PROGRAM

## 2023-04-05 PROCEDURE — 1101F PR PT FALLS ASSESS DOC 0-1 FALLS W/OUT INJ PAST YR: ICD-10-PCS | Mod: HCNC,CPTII,S$GLB, | Performed by: STUDENT IN AN ORGANIZED HEALTH CARE EDUCATION/TRAINING PROGRAM

## 2023-04-05 PROCEDURE — 3288F FALL RISK ASSESSMENT DOCD: CPT | Mod: HCNC,CPTII,S$GLB, | Performed by: STUDENT IN AN ORGANIZED HEALTH CARE EDUCATION/TRAINING PROGRAM

## 2023-04-05 PROCEDURE — 99999 PR PBB SHADOW E&M-EST. PATIENT-LVL V: CPT | Mod: PBBFAC,HCNC,, | Performed by: STUDENT IN AN ORGANIZED HEALTH CARE EDUCATION/TRAINING PROGRAM

## 2023-04-05 PROCEDURE — 3288F PR FALLS RISK ASSESSMENT DOCUMENTED: ICD-10-PCS | Mod: HCNC,CPTII,S$GLB, | Performed by: STUDENT IN AN ORGANIZED HEALTH CARE EDUCATION/TRAINING PROGRAM

## 2023-04-06 NOTE — PROGRESS NOTES
[facility]/ [facility] at [facility]      Chief Complaint: DCIS     Cancer Staging   Ductal carcinoma in situ (DCIS) of right breast  Staging form: Breast, AJCC 8th Edition  - Clinical stage from 4/6/2023: Stage 0 (cTis (DCIS), cN0, cM0, G2, ER-, OK-) - Signed by Bella Mason MD on 4/6/2023    HPI:  Kanchan Downing is an 81yo woman with recently diagnosed DCIS who presents today for evaluation. Her oncologic history is as follows:    -12/2010-abnormality noted on MMG; bx confirming IDC  -1/2011 s/p Rt lumpectomy-T1CN0, ER+/Her2- breast cancer  -completed adjuvant radiation and endocrine therapy with anastrozole x 6 years    -12/5/22 Screening MMG noted L breast calcifications   -2/16/23 Diagnostic MMG with L breast calcifications at 2oclock spanning 4.6cm, subsequent biopsy confirming DCIS, intermediate grade. ER-/OK-  -3/29/23 MRI breast: L breast with 17 x 31 x 31mm area of non-mass enhancement, centrally located. Additional 20 x 5 x 16mm area of non-mass enhancement beneath previously biopsied area  -pt met with surgery and recommendation for mastectomy vs biopsy of additional area of non-mass enhancement if considering lumpectomy. She has declined proceed with surgery or biopsy at this time    Ms. Downing presents today for evaluation accompanied by her daughter. She notes that overall she is feeling fairly well; remains hesitant about surgery. Denies any breast changes or concerns. Her medical history is notable for htn, T2DM, arthritis, anxiety/depression after the loss of her  in 1/2022, and recent concern for early dementia. Her family history is significant for two sisters diagnosed with breast cancer- one at 74yo and the other at 59yo. She lives independently in [place]; son and daughter live nearby. She has had some difficulty recently with falls at home; now using a walker for stability.       Gyn History:   Menarche: 11yo  Menopause:  50s    Age at first pregnancy: 21yo  : No  HRT: None    Social History     Tobacco Use    Smoking status: Former     Packs/day: 7.00     Years: 0.50     Pack years: 3.50     Types: Cigarettes    Smokeless tobacco: Never   Substance Use Topics    Alcohol use: No    Drug use: Never     Family History   Problem Relation Age of Onset    Heart disease Mother 58    Cataracts Mother     Heart disease Father 50    Thyroid disease Sister     Breast cancer Sister     No Known Problems Brother     No Known Problems Maternal Aunt     No Known Problems Maternal Uncle     No Known Problems Paternal Aunt     No Known Problems Paternal Uncle     No Known Problems Maternal Grandmother     No Known Problems Maternal Grandfather     No Known Problems Paternal Grandmother     No Known Problems Paternal Grandfather     Amblyopia Neg Hx     Blindness Neg Hx     Diabetes Neg Hx     Glaucoma Neg Hx     Hypertension Neg Hx     Macular degeneration Neg Hx     Retinal detachment Neg Hx     Strabismus Neg Hx     Stroke Neg Hx      Past Medical History:   Diagnosis Date    Anxiety     Arthritis     Dr Schofield    Arthritis of hand 2017    Atherosclerosis of aorta 2016    CXR 2013    Breast cancer 2011    right breast invasive micropapillary CA, Stage !A    Cataract     Controlled type 2 diabetes mellitus with circulatory disorder, without long-term current use of insulin 10/31/2017    Controlled type 2 diabetes mellitus with circulatory disorder, without long-term current use of insulin 10/31/2017    Diabetes mellitus type 2 without retinopathy 2014    6% metformin 500 bid, FU+     DVT (deep venous thrombosis)     R Dr Bonilla    Grief 2022     Marques passed 2022    Hyperlipidemia     LDL 82    Hyperlipidemia associated with type 2 diabetes mellitus 2012    Hypertension     Hypertension associated with diabetes 2012    Memory loss 2022     CT chronic changes 2022    Microalbuminuria due to type 2 diabetes mellitus 12/08/2015    taking lisinopril, losartan caused olivia effects    PVD (peripheral vascular disease) with claudication 05/02/2019    Compression hose    Risk for coronary artery disease greater than 20% in next 10 years per [place] score 05/01/2018    Strabismus     Type 2 diabetes mellitus with diabetic polyneuropathy 06/12/2014    Type 2 diabetes mellitus with diabetic polyneuropathy, without long-term current use of insulin 06/12/2014     Past Surgical History:   Procedure Laterality Date    BREAST BIOPSY Right 2011    BREAST LUMPECTOMY Right 2011    MI REMOVAL OF NAIL BED  6/10/2015    TONSILLECTOMY         Patient Active Problem List   Diagnosis    Hypertension associated with diabetes    Hyperlipidemia associated with type 2 diabetes mellitus    History of breast cancer    Nuclear sclerosis - Both Eyes    Type 2 diabetes mellitus with diabetic polyneuropathy, without long-term current use of insulin    Generalized osteoarthritis    Cervical spondylosis    Osteoarthritis of left knee    Atherosclerosis of aorta    Arthritis of hand    Controlled type 2 diabetes mellitus with circulatory disorder, without long-term current use of insulin    Pain of right lower extremity    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    PVD (peripheral vascular disease) with claudication    Grief    BANDAR (generalized anxiety disorder)    History of fall    Memory loss    Ductal carcinoma in situ (DCIS) of right breast       Current Outpatient Medications   Medication Instructions    amLODIPine (NORVASC) 5 mg, Oral, Daily    aspirin (ECOTRIN) 81 mg, Oral, Daily    blood sugar diagnostic (BLOOD GLUCOSE TEST) Strp 1 strip, Misc.(Non-Drug; Combo Route), 2 times daily    blood sugar diagnostic Strp 1 strip, Misc.(Non-Drug; Combo Route), 2 times daily, ACCU-CHEK BROOK PLUS     "blood-glucose meter kit TRUE METRIX AIR W/BLUETOOTH SMART w/Device  Kit - use as instructed    cetirizine (ZYRTEC) 10 mg, Oral, Daily,      flash glucose scanning reader (FREESTYLE FLOWER 2 READER) Misc Continuous glucose reader    flash glucose sensor (FREESTYLE FLOWER 2 SENSOR) Kit Continuous glucose monitor    FLUoxetine 10 mg, Oral, Daily    fluticasone (FLONASE) 50 mcg/actuation nasal spray 1 spray, Each Nostril, Daily    hydroCHLOROthiazide (MICROZIDE) 12.5 mg capsule TAKE 1 CAPSULE EVERY DAY    Lactobacillus rhamnosus GG (CULTURELLE) 10 billion cell capsule Take by mouth. 1 Capsule Oral Every day    lancets Misc Test tid    lisinopriL (PRINIVIL,ZESTRIL) 5 mg, Oral, Daily    metFORMIN (GLUCOPHAGE) 500 mg, Oral, Daily    metoprolol tartrate (LOPRESSOR) 100 mg, Oral, Daily    olopatadine (PATADAY) 0.2 % Drop 1 drop, Both Eyes, Daily    simvastatin (ZOCOR) 10 mg, Oral, Nightly    TRUEPLUS LANCETS 33 gauge Misc No dose, route, or frequency recorded.       Review of Systems:   12pt ROS negative except as noted in HPI    PHYSICAL EXAM:  BP (!) 157/70   Pulse 60   Temp 97.5 °F (36.4 °C) (Oral)   Resp 18   Ht 5' 6" (1.676 m)   Wt 74.6 kg (164 lb 7.4 oz)   LMP  (LMP Unknown)   SpO2 98%   BMI 26.55 kg/m²     ECOG 1  General: well appearing elderly woman, in no apparent distress  HEENT: Normocephalic, EOMI, anicteric sclerae, MMM  Neck: supple, without cervical or supraclavicular lymphadenopathy.  Heart: regular rate and rhythm, normal S1 and S2, no murmurs, gallops or rubs.  Lungs: Clear to auscultation bilaterally, no increased wob  Breast: palpable L breast calcifications centrally; no appreciable axillary LAD. Rt breast s/p lumpectomy with dimpling and nipple inversion-stable; well-healed incision, no appreciable masses or axillary LAD  Abdomen: Soft, nontender, nondistended with normal bowel sounds. No hepatosplenomegaly.  Extremities: No LE edema or joint effusion  Skin: warm, well-perfused, no " rash; multiple hyperpigmented, dry plaques throughout  Neurologic: Alert and oriented x 4, normal speech and gait   Psychiatric: Conversing appropriately with providers throughout today's encounter.      Pertinent Labs & Imaging:  Reviewed all recent labs, imaging and pathology.     Assessment & Plan:  Ms. Downing is a joey 83yo woman with hx of Stage IA Rt-sided breast cancer s/p lumpectomy, radiation and 5 years of endocrine therapy, now with recently diagnosed DCIS of the L breast, ER-/KS-.    Today we discussed her recent diagnosis and the natural history of DCIS. I explained that DCIS is non-invasive cancer and the goal is not to decrease the risk of distant recurrence as there is no indication of invasive disease. I reviewed that standard of care for DCIS which includes surgery +/- radiation pending type of surgery, as well as consideration for endocrine therapy pending hormone-receptor status. I explained that given that her DCIS was ER-/KS-, there was no indication for endocrine therapy.  We discussed potential options moving forward including proceeding with surgery versus proceeding with biopsy of the additional non-mass enhancement noted on imaging (if this area were to be HR+ could consider ET), versus close monitoring with repeat MMG in August.     At this time she remains hesitant to move ahead with surgery or biopsy. She understands the associated risk of progression to invasive disease. Notes that depending on what her mammogram in August shows, she may be willing to consider biopsy or surgery at that time.    #DCIS:  --wants to avoid surgery and biopsy for now  --plan to repeat MMG in 8/2023; ordered today  --will have her follow up with surgery team in August following MMG to discuss next steps. Happy to see her back in Med Onc clinic pending additional biopsy results or if any other questions come up    Reviewed patients referring notes, imaging and pathology. Discussed diagnosis, staging, and  treatment in detail with patient. All questions were answered to her apparent satisfaction.      Route Chart for Scheduling    Med Onc Chart Routing      Follow up with physician 3 months. Follow up with Dr. Hendrickson in August after MMG; no need for Med Onc follow up for now   Follow up with JOSE    Infusion scheduling note    Injection scheduling note    Labs    Imaging Mammogram   Diagnostic MMG in August   Pharmacy appointment    Other referrals                Total time of this visit, including time spent face to face with patient and/or via video/audio, and also in preparing for today's visit for MDM and documentation. (Medical Decision Making, including consideration of possible diagnoses, management options, complex medical record review, review of diagnostic tests and information, consideration and discussion of significant complications based on comorbidities, and discussion with providers involved with the care of the patient) 60 minutes. Greater than 50% was spent face to face with the patient counseling and coordinating care.    Bella Mason

## 2023-04-11 ENCOUNTER — TELEPHONE (OUTPATIENT)
Dept: SURGERY | Facility: CLINIC | Age: 83
End: 2023-04-11
Payer: MEDICARE

## 2023-04-11 NOTE — TELEPHONE ENCOUNTER
Spoke with patient regarding appts in August for mammogram and breast exam.   Pt verbalized understanding. Appt details to be mailed to patient.

## 2023-04-18 ENCOUNTER — TUMOR BOARD CONFERENCE (OUTPATIENT)
Dept: SURGERY | Facility: CLINIC | Age: 83
End: 2023-04-18
Payer: MEDICARE

## 2023-04-18 NOTE — PROGRESS NOTES
Oncology History   Ductal carcinoma in situ (DCIS) of right breast   2010 Notable Event    History of right breast IDC in 2010 s/p right lumpectomy and adjuvant XRT with endocrine therapy      2/27/2023 Breast Tumor Markers    Estrogen: Negative  Progesterone: Negative       2/27/2023 Biopsy    Left breast, 2 o'clock position, stereotactic core biopsy:   Ductal carcinoma in Situ  Grade 2     3/13/2023 Initial Diagnosis    Ductal carcinoma in situ (DCIS) of left breast     3/13/2023 Genetic Testing    Declined      4/6/2023 Cancer Staged    Staging form: Breast, AJCC 8th Edition  - Clinical stage from 4/6/2023: Stage 0 (cTis (DCIS), cN0, cM0, G2, ER-, MI-)       4/18/2023 Tumor Conference    Patient very resistant to intervention at this time. She has had a few falls and is still grieving her 's death. Will obtain follow up mammogram versus MRI in 6 months or sooner. The team will keep short term follow up with patient. Both the patient and her daughter are agreeable to this.

## 2023-05-15 ENCOUNTER — TELEPHONE (OUTPATIENT)
Dept: PRIMARY CARE CLINIC | Facility: CLINIC | Age: 83
End: 2023-05-15
Payer: MEDICARE

## 2023-05-15 NOTE — TELEPHONE ENCOUNTER
----- Message from Juliane Tam sent at 5/15/2023  3:49 PM CDT -----  Contact: [phone]  Patient would like a doctors note stating that she is not able to fly due to condition of her knees.  Please call and advise.    Thank you and have a great day.

## 2023-05-16 NOTE — TELEPHONE ENCOUNTER
Patient's daughter is requesting a letter stating that the patient is unable to fly due to medical conditions such as severe arthritis and leg swelling.     Company: [employer]   Claim #: [id number]  Policy #: [id number]

## 2023-06-07 ENCOUNTER — PATIENT MESSAGE (OUTPATIENT)
Dept: ADMINISTRATIVE | Facility: HOSPITAL | Age: 83
End: 2023-06-07
Payer: MEDICARE

## 2023-06-22 NOTE — TELEPHONE ENCOUNTER
----- Message from Sharon Carmona sent at 1/5/2023  4:26 PM CST -----  Regarding: Appt  Contact: [phone]  Pt calling to reschedule appt on 01/13 @ 2:30pm. Pt stated daughter will be out of town and wont be available to come with her. Pt would like to know if she can reschedule on the following date 01/09, 01/10, 01/11, and if not she would like to schedule later in the month when daughter come back in town which is the week after 01/11. Please call and adv @ [phone]     
Patient rescheduled mammogram for 2/16/2023.   
normal...

## 2023-07-03 ENCOUNTER — PES CALL (OUTPATIENT)
Dept: ADMINISTRATIVE | Facility: CLINIC | Age: 83
End: 2023-07-03
Payer: MEDICARE

## 2023-08-04 NOTE — DISCHARGE INSTRUCTIONS
You can take the muscle relaxers as prescribed.  You can also take Tylenol or ibuprofen at home as needed for pain.  You should expect to feel sore for the next 24-48 hours.  If you develop pain in any new areas or you have any worsening pain or symptoms or concerns, please return to the emergency department for re-evaluation.  You should also follow-up with your primary care doctor in the next 48 hours for re-evaluation.   Cyclosporine Pregnancy And Lactation Text: This medication is Pregnancy Category C and it isn't know if it is safe during pregnancy. This medication is excreted in breast milk.

## 2023-09-06 ENCOUNTER — PATIENT MESSAGE (OUTPATIENT)
Dept: ADMINISTRATIVE | Facility: HOSPITAL | Age: 83
End: 2023-09-06
Payer: MEDICARE

## 2023-09-27 DIAGNOSIS — Z78.0 MENOPAUSE: ICD-10-CM

## 2023-11-29 ENCOUNTER — TELEPHONE (OUTPATIENT)
Dept: PRIMARY CARE CLINIC | Facility: CLINIC | Age: 83
End: 2023-11-29
Payer: MEDICARE

## 2023-11-29 NOTE — TELEPHONE ENCOUNTER
----- Message from Willma Albarran sent at 11/29/2023 11:56 AM CST -----  Contact: Thuy Guevara/[phone]  [email or web address]     Patient is calling for Medical Advice regarding:       Pt daughter is requesting a phone call to discuss pt medications.

## 2023-12-06 ENCOUNTER — PATIENT MESSAGE (OUTPATIENT)
Dept: ADMINISTRATIVE | Facility: HOSPITAL | Age: 83
End: 2023-12-06
Payer: MEDICARE

## 2024-01-11 ENCOUNTER — TELEPHONE (OUTPATIENT)
Dept: PRIMARY CARE CLINIC | Facility: CLINIC | Age: 84
End: 2024-01-11
Payer: MEDICARE

## 2024-01-13 ENCOUNTER — HOSPITAL ENCOUNTER (INPATIENT)
Facility: HOSPITAL | Age: 84
LOS: 7 days | Discharge: NURSING, REHAB OR LONG-TERM CARE | DRG: 083 | End: 2024-01-20
Attending: STUDENT IN AN ORGANIZED HEALTH CARE EDUCATION/TRAINING PROGRAM | Admitting: PSYCHIATRY & NEUROLOGY
Payer: MEDICARE

## 2024-01-13 DIAGNOSIS — S06.5XAA SDH (SUBDURAL HEMATOMA): Primary | ICD-10-CM

## 2024-01-13 DIAGNOSIS — Z97.8 ENDOTRACHEALLY INTUBATED: ICD-10-CM

## 2024-01-13 DIAGNOSIS — R29.818 ACUTE FOCAL NEUROLOGICAL DEFICIT: ICD-10-CM

## 2024-01-13 LAB
ABO + RH BLD: NORMAL
ALBUMIN SERPL BCP-MCNC: 3.9 G/DL (ref 3.5–5.2)
ALP SERPL-CCNC: 58 U/L (ref 55–135)
ALT SERPL W/O P-5'-P-CCNC: 13 U/L (ref 10–44)
ANION GAP SERPL CALC-SCNC: 12 MMOL/L (ref 8–16)
APTT PPP: 23.7 SEC (ref 21–32)
AST SERPL-CCNC: 20 U/L (ref 10–40)
BACTERIA #/AREA URNS AUTO: NORMAL /HPF
BASOPHILS # BLD AUTO: 0.04 K/UL (ref 0–0.2)
BASOPHILS NFR BLD: 0.5 % (ref 0–1.9)
BILIRUB SERPL-MCNC: 0.6 MG/DL (ref 0.1–1)
BILIRUB UR QL STRIP: NEGATIVE
BILIRUB UR QL STRIP: NEGATIVE
BLD GP AB SCN CELLS X3 SERPL QL: NORMAL
BNP SERPL-MCNC: 120 PG/ML (ref 0–99)
BUN SERPL-MCNC: 15 MG/DL (ref 8–23)
CALCIUM SERPL-MCNC: 9.8 MG/DL (ref 8.7–10.5)
CHLORIDE SERPL-SCNC: 108 MMOL/L (ref 95–110)
CHOLEST SERPL-MCNC: 218 MG/DL (ref 120–199)
CHOLEST/HDLC SERPL: 3.5 {RATIO} (ref 2–5)
CLARITY UR REFRACT.AUTO: CLEAR
CLARITY UR REFRACT.AUTO: CLEAR
CO2 SERPL-SCNC: 21 MMOL/L (ref 23–29)
COLOR UR AUTO: COLORLESS
COLOR UR AUTO: COLORLESS
CREAT SERPL-MCNC: 0.8 MG/DL (ref 0.5–1.4)
CREAT SERPL-MCNC: 0.8 MG/DL (ref 0.5–1.4)
DIFFERENTIAL METHOD BLD: ABNORMAL
EOSINOPHIL # BLD AUTO: 0.1 K/UL (ref 0–0.5)
EOSINOPHIL NFR BLD: 1.4 % (ref 0–8)
ERYTHROCYTE [DISTWIDTH] IN BLOOD BY AUTOMATED COUNT: 13.9 % (ref 11.5–14.5)
EST. GFR  (NO RACE VARIABLE): >60 ML/MIN/1.73 M^2
ESTIMATED AVG GLUCOSE: 117 MG/DL (ref 68–131)
ETHANOL SERPL-MCNC: <10 MG/DL
GLUCOSE SERPL-MCNC: 138 MG/DL (ref 70–110)
GLUCOSE UR QL STRIP: ABNORMAL
GLUCOSE UR QL STRIP: ABNORMAL
HBA1C MFR BLD: 5.7 % (ref 4–5.6)
HCT VFR BLD AUTO: 43.2 % (ref 37–48.5)
HCV AB SERPL QL IA: NORMAL
HDLC SERPL-MCNC: 62 MG/DL (ref 40–75)
HDLC SERPL: 28.4 % (ref 20–50)
HGB BLD-MCNC: 13.9 G/DL (ref 12–16)
HGB UR QL STRIP: ABNORMAL
HGB UR QL STRIP: ABNORMAL
HIV 1+2 AB+HIV1 P24 AG SERPL QL IA: NORMAL
HYALINE CASTS UR QL AUTO: 0 /LPF
IMM GRANULOCYTES # BLD AUTO: 0.02 K/UL (ref 0–0.04)
IMM GRANULOCYTES NFR BLD AUTO: 0.3 % (ref 0–0.5)
INR PPP: 0.9 (ref 0.8–1.2)
INR PPP: 1 (ref 0.8–1.2)
KETONES UR QL STRIP: ABNORMAL
KETONES UR QL STRIP: ABNORMAL
LDLC SERPL CALC-MCNC: 144 MG/DL (ref 63–159)
LEUKOCYTE ESTERASE UR QL STRIP: NEGATIVE
LEUKOCYTE ESTERASE UR QL STRIP: NEGATIVE
LYMPHOCYTES # BLD AUTO: 1 K/UL (ref 1–4.8)
LYMPHOCYTES NFR BLD: 12.5 % (ref 18–48)
MCH RBC QN AUTO: 29.4 PG (ref 27–31)
MCHC RBC AUTO-ENTMCNC: 32.2 G/DL (ref 32–36)
MCV RBC AUTO: 91 FL (ref 82–98)
MICROSCOPIC COMMENT: NORMAL
MONOCYTES # BLD AUTO: 0.4 K/UL (ref 0.3–1)
MONOCYTES NFR BLD: 4.9 % (ref 4–15)
NEUTROPHILS # BLD AUTO: 6.1 K/UL (ref 1.8–7.7)
NEUTROPHILS NFR BLD: 80.4 % (ref 38–73)
NITRITE UR QL STRIP: NEGATIVE
NITRITE UR QL STRIP: NEGATIVE
NONHDLC SERPL-MCNC: 156 MG/DL
NRBC BLD-RTO: 0 /100 WBC
PH UR STRIP: 8 [PH] (ref 5–8)
PH UR STRIP: 8 [PH] (ref 5–8)
PLATELET # BLD AUTO: 171 K/UL (ref 150–450)
PMV BLD AUTO: 9.2 FL (ref 9.2–12.9)
POC PTINR: 1.2 (ref 0.9–1.2)
POC PTWBT: 13.8 SEC (ref 9.7–14.3)
POCT GLUCOSE: 154 MG/DL (ref 70–110)
POCT GLUCOSE: 191 MG/DL (ref 70–110)
POTASSIUM SERPL-SCNC: 3.8 MMOL/L (ref 3.5–5.1)
PROT SERPL-MCNC: 6.9 G/DL (ref 6–8.4)
PROT UR QL STRIP: ABNORMAL
PROT UR QL STRIP: ABNORMAL
PROTHROMBIN TIME: 10.2 SEC (ref 9–12.5)
PROTHROMBIN TIME: 10.4 SEC (ref 9–12.5)
RBC # BLD AUTO: 4.73 M/UL (ref 4–5.4)
RBC #/AREA URNS AUTO: 1 /HPF (ref 0–4)
SAMPLE: NORMAL
SAMPLE: NORMAL
SODIUM SERPL-SCNC: 141 MMOL/L (ref 136–145)
SP GR UR STRIP: 1.01 (ref 1–1.03)
SP GR UR STRIP: 1.01 (ref 1–1.03)
SPECIMEN OUTDATE: NORMAL
SQUAMOUS #/AREA URNS AUTO: 0 /HPF
TRIGL SERPL-MCNC: 60 MG/DL (ref 30–150)
TROPONIN I SERPL DL<=0.01 NG/ML-MCNC: <0.006 NG/ML (ref 0–0.03)
TSH SERPL DL<=0.005 MIU/L-ACNC: 2.48 UIU/ML (ref 0.4–4)
URN SPEC COLLECT METH UR: ABNORMAL
URN SPEC COLLECT METH UR: ABNORMAL
WBC # BLD AUTO: 7.59 K/UL (ref 3.9–12.7)
WBC #/AREA URNS AUTO: 0 /HPF (ref 0–5)

## 2024-01-13 PROCEDURE — 63600175 PHARM REV CODE 636 W HCPCS: Mod: HCNC | Performed by: STUDENT IN AN ORGANIZED HEALTH CARE EDUCATION/TRAINING PROGRAM

## 2024-01-13 PROCEDURE — 85610 PROTHROMBIN TIME: CPT | Mod: HCNC

## 2024-01-13 PROCEDURE — 87389 HIV-1 AG W/HIV-1&-2 AB AG IA: CPT | Mod: HCNC | Performed by: PHYSICIAN ASSISTANT

## 2024-01-13 PROCEDURE — 25000003 PHARM REV CODE 250: Mod: HCNC

## 2024-01-13 PROCEDURE — 85025 COMPLETE CBC W/AUTO DIFF WBC: CPT | Mod: HCNC | Performed by: STUDENT IN AN ORGANIZED HEALTH CARE EDUCATION/TRAINING PROGRAM

## 2024-01-13 PROCEDURE — 5A1935Z RESPIRATORY VENTILATION, LESS THAN 24 CONSECUTIVE HOURS: ICD-10-PCS | Performed by: STUDENT IN AN ORGANIZED HEALTH CARE EDUCATION/TRAINING PROGRAM

## 2024-01-13 PROCEDURE — 96374 THER/PROPH/DIAG INJ IV PUSH: CPT | Mod: HCNC

## 2024-01-13 PROCEDURE — 93005 ELECTROCARDIOGRAM TRACING: CPT | Mod: HCNC

## 2024-01-13 PROCEDURE — 80061 LIPID PANEL: CPT | Mod: HCNC | Performed by: STUDENT IN AN ORGANIZED HEALTH CARE EDUCATION/TRAINING PROGRAM

## 2024-01-13 PROCEDURE — 83880 ASSAY OF NATRIURETIC PEPTIDE: CPT | Mod: HCNC

## 2024-01-13 PROCEDURE — 81001 URINALYSIS AUTO W/SCOPE: CPT | Mod: HCNC | Performed by: STUDENT IN AN ORGANIZED HEALTH CARE EDUCATION/TRAINING PROGRAM

## 2024-01-13 PROCEDURE — 99291 CRITICAL CARE FIRST HOUR: CPT | Mod: HCNC,FS,, | Performed by: PSYCHIATRY & NEUROLOGY

## 2024-01-13 PROCEDURE — 82077 ASSAY SPEC XCP UR&BREATH IA: CPT | Mod: HCNC | Performed by: STUDENT IN AN ORGANIZED HEALTH CARE EDUCATION/TRAINING PROGRAM

## 2024-01-13 PROCEDURE — 84484 ASSAY OF TROPONIN QUANT: CPT | Mod: HCNC | Performed by: STUDENT IN AN ORGANIZED HEALTH CARE EDUCATION/TRAINING PROGRAM

## 2024-01-13 PROCEDURE — 83036 HEMOGLOBIN GLYCOSYLATED A1C: CPT | Mod: HCNC

## 2024-01-13 PROCEDURE — 63600175 PHARM REV CODE 636 W HCPCS: Mod: HCNC | Performed by: PHYSICIAN ASSISTANT

## 2024-01-13 PROCEDURE — 99285 EMERGENCY DEPT VISIT HI MDM: CPT | Mod: 25,HCNC

## 2024-01-13 PROCEDURE — 63600175 PHARM REV CODE 636 W HCPCS: Mod: HCNC

## 2024-01-13 PROCEDURE — 99900035 HC TECH TIME PER 15 MIN (STAT): Mod: HCNC

## 2024-01-13 PROCEDURE — 94002 VENT MGMT INPAT INIT DAY: CPT | Mod: HCNC

## 2024-01-13 PROCEDURE — 84443 ASSAY THYROID STIM HORMONE: CPT | Mod: HCNC | Performed by: STUDENT IN AN ORGANIZED HEALTH CARE EDUCATION/TRAINING PROGRAM

## 2024-01-13 PROCEDURE — 82565 ASSAY OF CREATININE: CPT | Mod: HCNC

## 2024-01-13 PROCEDURE — 36415 COLL VENOUS BLD VENIPUNCTURE: CPT | Mod: HCNC | Performed by: PSYCHIATRY & NEUROLOGY

## 2024-01-13 PROCEDURE — 20000000 HC ICU ROOM: Mod: HCNC

## 2024-01-13 PROCEDURE — 0BH17EZ INSERTION OF ENDOTRACHEAL AIRWAY INTO TRACHEA, VIA NATURAL OR ARTIFICIAL OPENING: ICD-10-PCS | Performed by: STUDENT IN AN ORGANIZED HEALTH CARE EDUCATION/TRAINING PROGRAM

## 2024-01-13 PROCEDURE — 99223 1ST HOSP IP/OBS HIGH 75: CPT | Mod: HCNC,GC,, | Performed by: STUDENT IN AN ORGANIZED HEALTH CARE EDUCATION/TRAINING PROGRAM

## 2024-01-13 PROCEDURE — 80053 COMPREHEN METABOLIC PANEL: CPT | Mod: HCNC | Performed by: STUDENT IN AN ORGANIZED HEALTH CARE EDUCATION/TRAINING PROGRAM

## 2024-01-13 PROCEDURE — 93010 ELECTROCARDIOGRAM REPORT: CPT | Mod: HCNC,,, | Performed by: INTERNAL MEDICINE

## 2024-01-13 PROCEDURE — 85610 PROTHROMBIN TIME: CPT | Mod: 91,HCNC | Performed by: STUDENT IN AN ORGANIZED HEALTH CARE EDUCATION/TRAINING PROGRAM

## 2024-01-13 PROCEDURE — 25000003 PHARM REV CODE 250: Mod: HCNC | Performed by: PSYCHIATRY & NEUROLOGY

## 2024-01-13 PROCEDURE — 86803 HEPATITIS C AB TEST: CPT | Mod: HCNC | Performed by: PHYSICIAN ASSISTANT

## 2024-01-13 PROCEDURE — 86850 RBC ANTIBODY SCREEN: CPT | Mod: HCNC

## 2024-01-13 PROCEDURE — 93010 ELECTROCARDIOGRAM REPORT: CPT | Mod: HCNC,76,, | Performed by: INTERNAL MEDICINE

## 2024-01-13 PROCEDURE — 94761 N-INVAS EAR/PLS OXIMETRY MLT: CPT | Mod: HCNC

## 2024-01-13 PROCEDURE — 25500020 PHARM REV CODE 255: Mod: HCNC | Performed by: PSYCHIATRY & NEUROLOGY

## 2024-01-13 PROCEDURE — 63600175 PHARM REV CODE 636 W HCPCS: Mod: JG,HCNC | Performed by: PSYCHIATRY & NEUROLOGY

## 2024-01-13 PROCEDURE — 85730 THROMBOPLASTIN TIME PARTIAL: CPT | Mod: HCNC

## 2024-01-13 PROCEDURE — 99499 UNLISTED E&M SERVICE: CPT | Mod: HCNC,,,

## 2024-01-13 PROCEDURE — 27100171 HC OXYGEN HIGH FLOW UP TO 24 HOURS: Mod: HCNC

## 2024-01-13 PROCEDURE — 99900026 HC AIRWAY MAINTENANCE (STAT): Mod: HCNC

## 2024-01-13 RX ORDER — LABETALOL HCL 20 MG/4 ML
10 SYRINGE (ML) INTRAVENOUS EVERY 6 HOURS PRN
Status: DISCONTINUED | OUTPATIENT
Start: 2024-01-13 | End: 2024-01-14

## 2024-01-13 RX ORDER — INSULIN ASPART 100 [IU]/ML
0-10 INJECTION, SOLUTION INTRAVENOUS; SUBCUTANEOUS EVERY 6 HOURS PRN
Status: DISCONTINUED | OUTPATIENT
Start: 2024-01-13 | End: 2024-01-20 | Stop reason: HOSPADM

## 2024-01-13 RX ORDER — ETOMIDATE 2 MG/ML
INJECTION INTRAVENOUS
Status: COMPLETED
Start: 2024-01-13 | End: 2024-01-13

## 2024-01-13 RX ORDER — ACETAMINOPHEN 325 MG/1
650 TABLET ORAL EVERY 6 HOURS PRN
Status: DISCONTINUED | OUTPATIENT
Start: 2024-01-13 | End: 2024-01-16

## 2024-01-13 RX ORDER — AMLODIPINE BESYLATE 5 MG/1
5 TABLET ORAL DAILY
Status: DISCONTINUED | OUTPATIENT
Start: 2024-01-14 | End: 2024-01-16

## 2024-01-13 RX ORDER — PROPOFOL 10 MG/ML
0-50 INJECTION, EMULSION INTRAVENOUS CONTINUOUS
Status: DISCONTINUED | OUTPATIENT
Start: 2024-01-13 | End: 2024-01-15

## 2024-01-13 RX ORDER — MUPIROCIN 20 MG/G
OINTMENT TOPICAL 2 TIMES DAILY
Status: COMPLETED | OUTPATIENT
Start: 2024-01-13 | End: 2024-01-18

## 2024-01-13 RX ORDER — HYDRALAZINE HYDROCHLORIDE 20 MG/ML
10 INJECTION INTRAMUSCULAR; INTRAVENOUS ONCE
Status: COMPLETED | OUTPATIENT
Start: 2024-01-13 | End: 2024-01-13

## 2024-01-13 RX ORDER — GLUCAGON 1 MG
1 KIT INJECTION
Status: DISCONTINUED | OUTPATIENT
Start: 2024-01-13 | End: 2024-01-20 | Stop reason: HOSPADM

## 2024-01-13 RX ORDER — PROPOFOL 10 MG/ML
INJECTION, EMULSION INTRAVENOUS
Status: COMPLETED
Start: 2024-01-13 | End: 2024-01-13

## 2024-01-13 RX ORDER — POLYETHYLENE GLYCOL 3350 17 G/17G
17 POWDER, FOR SOLUTION ORAL DAILY
Status: DISCONTINUED | OUTPATIENT
Start: 2024-01-13 | End: 2024-01-16

## 2024-01-13 RX ORDER — HALOPERIDOL 5 MG/ML
INJECTION INTRAMUSCULAR
Status: COMPLETED
Start: 2024-01-13 | End: 2024-01-13

## 2024-01-13 RX ORDER — NICARDIPINE HYDROCHLORIDE 0.2 MG/ML
0-15 INJECTION INTRAVENOUS CONTINUOUS
Status: DISCONTINUED | OUTPATIENT
Start: 2024-01-13 | End: 2024-01-14

## 2024-01-13 RX ORDER — ONDANSETRON HYDROCHLORIDE 2 MG/ML
4 INJECTION, SOLUTION INTRAVENOUS EVERY 8 HOURS PRN
Status: DISCONTINUED | OUTPATIENT
Start: 2024-01-13 | End: 2024-01-20 | Stop reason: HOSPADM

## 2024-01-13 RX ORDER — ROCURONIUM BROMIDE 10 MG/ML
INJECTION, SOLUTION INTRAVENOUS
Status: COMPLETED
Start: 2024-01-13 | End: 2024-01-13

## 2024-01-13 RX ORDER — HYDRALAZINE HYDROCHLORIDE 20 MG/ML
10 INJECTION INTRAMUSCULAR; INTRAVENOUS EVERY 6 HOURS PRN
Status: DISCONTINUED | OUTPATIENT
Start: 2024-01-13 | End: 2024-01-14

## 2024-01-13 RX ORDER — METOPROLOL TARTRATE 50 MG/1
50 TABLET ORAL 2 TIMES DAILY
Status: DISCONTINUED | OUTPATIENT
Start: 2024-01-13 | End: 2024-01-16

## 2024-01-13 RX ORDER — LORAZEPAM 2 MG/ML
2 INJECTION INTRAMUSCULAR ONCE
Status: COMPLETED | OUTPATIENT
Start: 2024-01-13 | End: 2024-01-13

## 2024-01-13 RX ORDER — LISINOPRIL 5 MG/1
5 TABLET ORAL DAILY
Status: DISCONTINUED | OUTPATIENT
Start: 2024-01-14 | End: 2024-01-16

## 2024-01-13 RX ORDER — HALOPERIDOL 5 MG/ML
5 INJECTION INTRAMUSCULAR ONCE
Status: COMPLETED | OUTPATIENT
Start: 2024-01-13 | End: 2024-01-13

## 2024-01-13 RX ORDER — ATORVASTATIN CALCIUM 40 MG/1
40 TABLET, FILM COATED ORAL DAILY
Status: DISCONTINUED | OUTPATIENT
Start: 2024-01-13 | End: 2024-01-16

## 2024-01-13 RX ORDER — FLUTICASONE PROPIONATE 50 MCG
2 SPRAY, SUSPENSION (ML) NASAL DAILY
Status: DISCONTINUED | OUTPATIENT
Start: 2024-01-14 | End: 2024-01-20 | Stop reason: HOSPADM

## 2024-01-13 RX ORDER — FLUOXETINE 10 MG/1
10 CAPSULE ORAL DAILY
Status: DISCONTINUED | OUTPATIENT
Start: 2024-01-14 | End: 2024-01-16

## 2024-01-13 RX ORDER — SODIUM CHLORIDE 0.9 % (FLUSH) 0.9 %
10 SYRINGE (ML) INJECTION
Status: DISCONTINUED | OUTPATIENT
Start: 2024-01-13 | End: 2024-01-16

## 2024-01-13 RX ORDER — FENTANYL CITRATE-0.9 % NACL/PF 10 MCG/ML
0-250 PLASTIC BAG, INJECTION (ML) INTRAVENOUS CONTINUOUS
Status: DISCONTINUED | OUTPATIENT
Start: 2024-01-13 | End: 2024-01-15

## 2024-01-13 RX ADMIN — PROPOFOL 10 MCG/KG/MIN: 10 INJECTION, EMULSION INTRAVENOUS at 12:01

## 2024-01-13 RX ADMIN — Medication 50 MCG/HR: at 02:01

## 2024-01-13 RX ADMIN — HYDRALAZINE HYDROCHLORIDE 10 MG: 20 INJECTION, SOLUTION INTRAMUSCULAR; INTRAVENOUS at 10:01

## 2024-01-13 RX ADMIN — HALOPERIDOL LACTATE 5 MG: 5 INJECTION, SOLUTION INTRAMUSCULAR at 03:01

## 2024-01-13 RX ADMIN — METOPROLOL TARTRATE 50 MG: 50 TABLET, FILM COATED ORAL at 09:01

## 2024-01-13 RX ADMIN — IOHEXOL 100 ML: 350 INJECTION, SOLUTION INTRAVENOUS at 05:01

## 2024-01-13 RX ADMIN — MUPIROCIN: 20 OINTMENT TOPICAL at 08:01

## 2024-01-13 RX ADMIN — PROPOFOL 45 MCG/KG/MIN: 10 INJECTION, EMULSION INTRAVENOUS at 02:01

## 2024-01-13 RX ADMIN — NICARDIPINE HYDROCHLORIDE 5 MG/HR: 0.2 INJECTION, SOLUTION INTRAVENOUS at 12:01

## 2024-01-13 RX ADMIN — ROCURONIUM BROMIDE 70 MG: 10 INJECTION INTRAVENOUS at 12:01

## 2024-01-13 RX ADMIN — ETOMIDATE 20 MG: 2 INJECTION INTRAVENOUS at 12:01

## 2024-01-13 RX ADMIN — HYDRALAZINE HYDROCHLORIDE 10 MG: 20 INJECTION, SOLUTION INTRAMUSCULAR; INTRAVENOUS at 11:01

## 2024-01-13 RX ADMIN — HALOPERIDOL LACTATE 5 MG: 5 INJECTION, SOLUTION INTRAMUSCULAR at 02:01

## 2024-01-13 RX ADMIN — DESMOPRESSIN ACETATE 22.32 MCG: 4 SOLUTION INTRAVENOUS at 08:01

## 2024-01-13 NOTE — ED NOTES
MD and RT at bedside to prepare for intubation. Pt became more restless and not follow commands. Pt yelling and attempting to climb out of bed. 4 point soft restraints placed.

## 2024-01-13 NOTE — NURSING
CT called. CT tech informed RN that a stroke code was arriving and would call back when available.

## 2024-01-13 NOTE — ASSESSMENT & PLAN NOTE
Kanchan Downing is an 83F with history of diabetes, hypertension, and breast cancer who presents with R>L subdural hematoma. NIHSS 5. The patient is confused to place and time. She does not answer questions appropriately. She has mild aphasia and dysarthria.     - Recommend neurosurgery consultation  - Hold antiplatelets   - No additional stroke work up at this time

## 2024-01-13 NOTE — HPI
Kanchan Downing is an 83 year old female with a history of diabetes, hypertension, memory loss, and breast cancer who presents with confusion. She was found down on the floor at 11 AM believed secondary to fall. En route to the ED she is oriented only to person. She continues to be confused. The stroke team was notified at 11:56 AM. CT H concerning for R>L subdural hemorrhage. History obtained from chart and EMS.

## 2024-01-13 NOTE — CONSULTS
[facility] - Emergency Dept  Vascular Neurology  Comprehensive Stroke Center  Consult Note    Inpatient consult to Vascular (Stroke) Neurology  Consult performed by: Idner Shen MD  Consult ordered by: Jaswinder Brown,         Assessment/Plan:     Patient is a 83 y.o. year old female with:    Traumatic subdural hematoma  Kanchan Downing is an 83F with history of diabetes, hypertension, and breast cancer who presents with R>L subdural hematoma. NIHSS 5. The patient is confused to place and time. She does not answer questions appropriately. She has mild aphasia and dysarthria.     - Recommend neurosurgery consultation  - Hold antiplatelets   - No additional stroke work up at this time        STROKE DOCUMENTATION     Acute Stroke Times   Last Known Normal Date: 01/13/24  Last Known Normal Time: 1143    NIH Scale:  1a. Level of Consciousness: 1-->Not alert, but arousable by minor stimulation to obey, answer, or respond  1b. LOC Questions: 2-->Answers neither question correctly  1c. LOC Commands: 0-->Performs both tasks correctly  2. Best Gaze: 0-->Normal  3. Visual: 0-->No visual loss  4. Facial Palsy: 0-->Normal symmetrical movements  5a. Motor Arm, Left: 0-->No drift, limb holds 90 (or 45) degrees for full 10 secs  5b. Motor Arm, Right: 0-->No drift, limb holds 90 (or 45) degrees for full 10 secs  6a. Motor Leg, Left: 0-->No drift, leg holds 30 degree position for full 5 secs  6b. Motor Leg, Right: 0-->No drift, leg holds 30 degree position for full 5 secs  7. Limb Ataxia: 0-->Absent  8. Sensory: 0-->Normal, no sensory loss  9. Best Language: 1-->Mild-to-moderate aphasia, some obvious loss of fluency or facility of comprehension, without significant limitation on ideas expressed or form of expression. Reduction of speech and/or comprehension, however, makes conversation. . . (see row details)  10. Dysarthria: 1-->Mild-to-moderate dysarthria, patient slurs at least some words and, at worst, can be understood with  some difficulty  11. Extinction and Inattention (formerly Neglect): 0-->No abnormality  Total (NIH Stroke Scale): 5    Modified Dillingham Score: 3  [place] Coma Scale:    ABCD2 Score:    [id number] Score:   HAS -BLED Score:   ICH Score:   Hunt & Agarwal Classification:       Thrombolysis Candidate? No, SDH    Delays to Thrombolysis?  Not Applicable    Interventional Revascularization Candidate?   Is the patient eligible for mechanical endovascular reperfusion (KEVIN)?   SDH    Delays to Thrombectomy? Not Applicable    Hemorrhagic change of an Ischemic Stroke: Does this patient have an ischemic stroke with hemorrhagic changes? No     Subjective:     History of Present Illness:  Kanchan Downing is an 83 year old female with a history of diabetes, hypertension, memory loss, and breast cancer who presents with confusion. She was found down on the floor at 11 AM believed secondary to fall. En route to the ED she is oriented only to person. She continues to be confused. The stroke team was notified at 11:56 AM. CT H concerning for R>L subdural hemorrhage. History obtained from chart and EMS.           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, without long-term current use of insulin 10/31/2017    Diabetes mellitus type 2 without retinopathy 06/12/2014    6% metformin 500 bid, FU+ 2016    DVT (deep venous thrombosis) 2001    R , Dr Bonilla    Grief 04/06/2022     Marques passed 1/2022    Hyperlipidemia     LDL 82    Hyperlipidemia associated with type 2 diabetes mellitus 09/11/2012    Hypertension     Hypertension associated with diabetes  09/11/2012    Memory loss 12/29/2022    CT chronic changes 2022    Microalbuminuria due to type 2 diabetes mellitus 12/08/2015    taking lisinopril, losartan caused olivia effects    PVD (peripheral vascular disease) with claudication 05/02/2019    Compression hose    Risk for coronary artery disease greater than 20% in next 10 years per [place] score 05/01/2018    Strabismus     Type 2 diabetes mellitus with diabetic polyneuropathy 06/12/2014    Type 2 diabetes mellitus with diabetic polyneuropathy, without long-term current use of insulin 06/12/2014     Past Surgical History:   Procedure Laterality Date    BREAST BIOPSY Right 2011    BREAST LUMPECTOMY Right 2011    ND REMOVAL OF NAIL BED  6/10/2015    TONSILLECTOMY       Social History     Tobacco Use    Smoking status: Former     Current packs/day: 7.00     Average packs/day: 7.0 packs/day for 0.5 years (3.5 ttl pk-yrs)     Types: Cigarettes    Smokeless tobacco: Never   Substance Use Topics    Alcohol use: No    Drug use: Never     Review of patient's allergies indicates:   Allergen Reactions    Sulfa (sulfonamide antibiotics)      Other reaction(s): Rash       Medications: I have reviewed the current medication administration record.    (Not in a hospital admission)      Review of Systems   Reason unable to perform ROS: Confused.     Objective:     Vital Signs (Most Recent):  Temp: 99.1 °F (37.3 °C) (01/13/24 1201)  Pulse: 87 (01/13/24 1212)  Resp: (!) 34 (01/13/24 1212)  BP: (!) 211/95 (01/13/24 1212)  SpO2: 99 % (01/13/24 1212)    Vital Signs Range (Last 24H):  Temp:  [99.1 °F (37.3 °C)]   Pulse:  [78-87]   Resp:  [18-34]   BP: (178-211)/(87-95)   SpO2:  [98 %-99 %]        Physical Exam  Cardiovascular:      Rate and Rhythm: Normal rate.   Pulmonary:      Effort: Pulmonary effort is normal.   Abdominal:      General: Abdomen is flat.   Neurological:      Mental Status: She is alert.              Neurological Exam:   LOC: alert  Attention Span: poor  Language:  confused  Articulation: No dysarthria  Orientation: Not oriented to place, Not oriented to time  Motor: Intact throughout, 5/5  Sensation: Intact to light touch, temperature and vibration      Laboratory:  Pending    Diagnostic Results:    Brain imaging:  CT H with R>L SDH      Inder Shen MD  [facility]  Department of Vascular Neurology   [facility] - Emergency Dept

## 2024-01-13 NOTE — ED TRIAGE NOTES
Fall (Pt arrives via EMS from home. Had unwitnessed fall at about 0700, +head trauma, hematoma to back of head, unknown LOC. Pt c/o HA. Per EMS, not following commands like previously. )

## 2024-01-13 NOTE — NURSING
Fentanyl set at max rate due to increased agitation and concern for possible self extubation. MD aware and okay.

## 2024-01-13 NOTE — SUBJECTIVE & OBJECTIVE
(Not in a hospital admission)      Review of patient's allergies indicates:   Allergen Reactions    Sulfa (sulfonamide antibiotics)      Other reaction(s): Rash       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, without long-term current use of insulin 10/31/2017    Diabetes mellitus type 2 without retinopathy 06/12/2014    6% metformin 500 bid, FU+ 2016    DVT (deep venous thrombosis) 2001    R , Dr Bonilla    Grief 04/06/2022     Marques passed 1/2022    Hyperlipidemia     LDL 82    Hyperlipidemia associated with type 2 diabetes mellitus 09/11/2012    Hypertension     Hypertension associated with diabetes 09/11/2012    Memory loss 12/29/2022    CT chronic changes 2022    Microalbuminuria due to type 2 diabetes mellitus 12/08/2015    taking lisinopril, losartan caused olivia effects    PVD (peripheral vascular disease) with claudication 05/02/2019    Compression hose    Risk for coronary artery disease greater than 20% in next 10 years per [place] score 05/01/2018    Strabismus     Type 2 diabetes mellitus with diabetic polyneuropathy 06/12/2014    Type 2 diabetes mellitus with diabetic polyneuropathy, without long-term current use of insulin 06/12/2014     Past Surgical History:   Procedure Laterality Date    BREAST BIOPSY Right 2011    BREAST LUMPECTOMY Right 2011    IA REMOVAL OF NAIL BED  6/10/2015    TONSILLECTOMY       Family History       Problem Relation (Age of Onset)    Breast cancer Sister    Cataracts Mother    Heart disease Mother (58), Father (50)    No Known Problems Brother, Maternal Aunt, Maternal Uncle, Paternal  Aunt, Paternal Uncle, Maternal Grandmother, Maternal Grandfather, Paternal Grandmother, Paternal Grandfather    Thyroid disease Sister          Tobacco Use    Smoking status: Former     Current packs/day: 7.00     Average packs/day: 7.0 packs/day for 0.5 years (3.5 ttl pk-yrs)     Types: Cigarettes    Smokeless tobacco: Never   Substance and Sexual Activity    Alcohol use: No    Drug use: Never    Sexual activity: Never     Review of Systems   Reason unable to perform ROS: altered, not answering questions.     Objective:     Weight: 74.4 kg (164 lb)  Body mass index is 26.47 kg/m².  Vital Signs (Most Recent):  Temp: 99.1 °F (37.3 °C) (01/13/24 1201)  Pulse: 87 (01/13/24 1212)  Resp: (!) 34 (01/13/24 1212)  BP: (!) 211/95 (01/13/24 1212)  SpO2: 99 % (01/13/24 1212) Vital Signs (24h Range):  Temp:  [99.1 °F (37.3 °C)] 99.1 °F (37.3 °C)  Pulse:  [78-87] 87  Resp:  [18-34] 34  SpO2:  [98 %-99 %] 99 %  BP: (178-211)/(87-95) 211/95                                 Physical Exam         Neurosurgery Physical Exam  General: no distress  Head: Non-traumatic, normocephalic  Eyes: Pupils equal, EOMi  Neck: Patient unable to participate with full examination  CVS: Normal rate and rhythm, distal pulses present  Pulm: Symmetric expansion, no respiratory distress  GI: Abdomen nondistended, nontender  MSK: Moves all extremities without restriction, atraumatic  Skin: Dry, intact  Psych: Confused, agitated, yelling w/o clear words  Neuro: AOx0, GCS [id number]  CNII-XII: PERRL, will not participate with EOM, no gaze preference  Extremities:  Motor: Unable to participate with full motor exam  Patient in 4 point restraints trying to get out of bed  Moves BUE/BLE spont AG with good strength, at least antigravity, nonfocal    Reflexes:     Deferred, patient trashing in bed, will not participate    Sensory:      Sensorium intact throughout, unable to participate w/sensory level    Coordination:      Moves limbs in coordinated fashion  "spontaneously      Significant Labs:  No results for input(s): "GLU", "NA", "K", "CL", "CO2", "BUN", "CREATININE", "CALCIUM", "MG" in the last 48 hours.  No results for input(s): "WBC", "HGB", "HCT", "PLT" in the last 48 hours.  Recent Labs   Lab 01/13/24  1212   INR 1.2     Microbiology Results (last 7 days)       ** No results found for the last 168 hours. **          All pertinent labs from the last 24 hours have been reviewed.    Significant Diagnostics:  I have reviewed and interpreted all pertinent imaging results/findings within the past 24 hours.    Agree with below R>L acute SDH w/o mass effect or midline shift. CT C appears negative for acute fracture; degenerative disease throughout C spine, fusion across facet joints C2/3 and C4/5.     CT Cervical Spine Without Contrast    Result Date: 1/13/2024  No evidence of acute cervical spine fracture. Electronically signed by: Fortunato Fournier Date:    01/13/2024 Time:    12:38    CT Head Without Contrast    Result Date: 1/13/2024  Acute right greater than left subdural hematomas.  No midline shift with some of effacement of particularly the right lateral ventricle. Electronically signed by: Derrick Gregorio Date:    01/13/2024 Time:    12:14     "

## 2024-01-13 NOTE — HPI
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 History obtained from granddaughter at bedside. Pt had an unwitnessed fall around 1100 on 1/13. 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 Pt was wearing socks and slipped. She called her son and was crying and complaining of a HA. Son called EMS. Pt was brought to [facility]. CTH w 8mm R SDH and 5mm L SDH. CT c-spine negative for acute processes. Pt developed non-redirectable agitation, screaming "help me" and trying to get out of the bed. She was not following any commands and SBP was 220. Pt was intubated in the ED and started on propofol. Placed on cardene. Pt admitted to [facility]. On arrival to [facility], pt was on propofol 50, fentanyl 250, and still requiring 5-point restraints to prevent self-extubation. Gave 5mg IV haldol and 2mg ativan and pt fell asleep. Five-hour interval CTH/CTA w stable bl SDH and no vascular abnormality. Possible ASA use, given DDAVP.            Of note, pt lost her , Marques, in 1/2022 and subsequently developed a progressive decline in functional status. Pt's family had concerns for early dementia as pt will become upset thinking about her  and starts to forget things. This decline is now thought to be related to her depression rather than dementia. Pt does perform her ADLs at home alone, but family started looking into options for assistance.   "

## 2024-01-13 NOTE — ED PROVIDER NOTES
Source of History  family, EMR, and EMS    Chief Complaint    Fall (Pt arrives via EMS from home. Had unwitnessed fall at about 0700, +head trauma, hematoma to back of head, unknown LOC. Pt c/o HA. Per EMS, not following commands like previously. )      History of Present Illness    Kanchan Downing is a 83 y.o. female presenting with altered mental status after a fall.  Found to be confused this morning by her daughter.  Reportedly she had a ground level fall.  Unclear if syncope or loss of consciousness.  Not on blood thinners, not on any daily medications.  History unable to be provided by the patient due to change in mental status.    I spoke with the patient's daughter who states that this is abnormal for the patient.  Typically she is ambulatory, occasionally can get confused with short-term memory loss, but otherwise can have a conversation, ambulate, and is able to live alone and take her medications.  Last known to be at normal baseline status yesterday.    Review of Systems    As per HPI and below:  Review of Systems:    Pertinent information obtained as above.  Otherwise limited due to: acuity and change in mental status     Past History    As per HPI and below:  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, without long-term current use of insulin 10/31/2017    Diabetes mellitus type 2 without retinopathy 06/12/2014    6% metformin 500 bid, FU+ 2016    DVT (deep venous thrombosis) Justo    R Dr Bonilla    Grief 04/06/2022     Marques passed 1/2022    Hyperlipidemia     LDL 82    Hyperlipidemia associated with type 2 diabetes  mellitus 09/11/2012    Hypertension     Hypertension associated with diabetes 09/11/2012    Memory loss 12/29/2022    CT chronic changes 2022    Microalbuminuria due to type 2 diabetes mellitus 12/08/2015    taking lisinopril, losartan caused olivia effects    PVD (peripheral vascular disease) with claudication 05/02/2019    Compression hose    Risk for coronary artery disease greater than 20% in next 10 years per Dunbar score 05/01/2018    Strabismus     Type 2 diabetes mellitus with diabetic polyneuropathy 06/12/2014    Type 2 diabetes mellitus with diabetic polyneuropathy, without long-term current use of insulin 06/12/2014       Past Surgical History:   Procedure Laterality Date    BREAST BIOPSY Right 2011    BREAST LUMPECTOMY Right 2011    [place] REMOVAL OF NAIL BED  6/10/2015    TONSILLECTOMY         Social History     Socioeconomic History    Marital status:    Tobacco Use    Smoking status: Former     Current packs/day: 7.00     Average packs/day: 7.0 packs/day for 0.5 years (3.5 ttl pk-yrs)     Types: Cigarettes    Smokeless tobacco: Never   Substance and Sexual Activity    Alcohol use: No    Drug use: Never    Sexual activity: Never   Social History Narrative     to Marques, 3 children, nondrinker, nonsmoker, she declines colonoscopy       Family History   Problem Relation Age of Onset    Heart disease Mother 58    Cataracts Mother     Heart disease Father 50    Thyroid disease Sister     Breast cancer Sister     No Known Problems Brother     No Known Problems Maternal Aunt     No Known Problems Maternal Uncle     No Known Problems Paternal Aunt     No Known Problems Paternal Uncle     No Known Problems Maternal Grandmother     No Known Problems Maternal Grandfather     No Known Problems Paternal Grandmother     No Known Problems Paternal Grandfather     Amblyopia Neg Hx     Blindness Neg Hx     Diabetes Neg Hx     Glaucoma Neg Hx     Hypertension Neg Hx     Macular degeneration Neg Hx     Retinal  detachment Neg Hx     Strabismus Neg Hx     Stroke Neg Hx        Review of patient's allergies indicates:   Allergen Reactions    Sulfa (sulfonamide antibiotics)      Other reaction(s): Rash       No current facility-administered medications on file prior to encounter.     Current Outpatient Medications on File Prior to Encounter   Medication Sig Dispense Refill    amLODIPine (NORVASC) 5 MG tablet Take 1 tablet (5 mg total) by mouth once daily. 90 tablet 3    aspirin (ECOTRIN) 81 MG EC tablet Take 1 tablet (81 mg total) by mouth once daily.  0    blood sugar diagnostic (BLOOD GLUCOSE TEST) Strp 1 strip by Misc.(Non-Drug; Combo Route) route 2 (two) times daily. 100 strip 12    blood sugar diagnostic Strp 1 strip by Misc.(Non-Drug; Combo Route) route 2 (two) times daily. ACCU-CHEK BROOK PLUS 200 strip 3    blood-glucose meter kit TRUE [employer] AIR W/[employer] SMART w/Device  Kit - use as instructed 1 each 0    cetirizine (ZYRTEC) 10 MG tablet Take 10 mg by mouth once daily.      flash glucose scanning reader (FREESTYLE FLOWER 2 READER) [facility] 1 each 12    flash glucose sensor (FREESTYLE FLOWER 2 SENSOR) Kit Continuous glucose monitor 1 kit 12    FLUoxetine 10 MG capsule Take 1 capsule (10 mg total) by mouth once daily. 30 capsule 11    fluticasone (FLONASE) 50 mcg/actuation nasal spray 1 spray by Each Nare route once daily. (Patient not taking: Reported on 3/13/2023) 16 g 12    hydroCHLOROthiazide (MICROZIDE) 12.5 mg capsule TAKE 1 CAPSULE EVERY DAY 90 capsule 3    Lactobacillus rhamnosus GG (CULTURELLE) 10 billion cell capsule Take by mouth. 1 Capsule Oral Every day      lancets Misc Test tid 100 each 12    lisinopriL (PRINIVIL,ZESTRIL) 5 MG tablet Take 1 tablet (5 mg total) by mouth once daily. 90 tablet 3    metFORMIN (GLUCOPHAGE) 500 MG tablet Take 1 tablet (500 mg total) by mouth once daily. 90 tablet 3    metoprolol tartrate (LOPRESSOR) 100 MG tablet Take 1 tablet (100 mg total) by mouth once  daily. 90 tablet 3    olopatadine (PATADAY) 0.2 % Drop Place 1 drop into both eyes once daily. 2.5 mL 12    simvastatin (ZOCOR) 10 MG tablet Take 1 tablet (10 mg total) by mouth every evening. 90 tablet 3    TRUEPLUS LANCETS 33 gauge [facility]          Physical Exam    Reviewed nursing notes.  Vitals:    01/13/24 1238 01/13/24 1246 01/13/24 1303 01/13/24 1311   BP: (!) 222/122  Comment: Simultaneous filing. User may not have seen previous data. (!) 195/88 (!) 158/76 (!) 164/79   Pulse: (!) 143  Comment: Simultaneous filing. User may not have seen previous data. (!) 150 (!) 137 (!) 134   Resp: (!) 28  Comment: Simultaneous filing. User may not have seen previous data. (!) 27 18 18   Temp:  96.8 °F (36 °C) 96.8 °F (36 °C) 96.4 °F (35.8 °C)   SpO2: 100%  Comment: Simultaneous filing. User may not have seen previous data. 100% 100% 100%   Weight:       Height:         General:  Alert, distress, calling out, agitated and pulling at arms  Skin:  Warm, dry, intact.  No rash.  No ecchymosis.  Head:  Normocephalic, hematoma to the occiput  Neck:  Supple.   HEENT:  Pupils are equal and round, appropriate for room, extraocular movements are intact.  Normal phonation.  Moist mucous membranes.  Cardiovascular:  Regular rate and rhythm, Normal peripheral perfusion, No edema.    Respiratory:  Lungs are clear to auscultation, respirations are non-labored, breath sounds are equal.    Gastrointestinal:  Soft, Nontender, Non distended.   Back:  Nontender.   Musculoskeletal:  Normal range of motion observed.  Neurological:  Alert but disoriented.  Unable to answer questions appropriately.  Moving all extremities, but not purposefully.  Very confused.  Not able to be redirected.  Psychiatric:  Unable to assess    Initial MDM and Data Review    83 y.o. female presenting for evaluation of acute agitation and confusion in the setting of a unwitnessed ground level fall.  Initial concern for traumatic ICH.  I made the patient was stroke alert  when she had rapid decline in her mental status.  She started to gaze towards the left.  She was unable to follow commands.    No blood thinners    Differential includes but is not limited to:  ICH, acute infarct, metabolic encephalopathy, less likely infectious but considered    Work-up includes:  CT head and C-spine stat, Accu-Chek, basic labs and electrolytes, EKG    Interventions include:  Intubation for severe and significant indirect agitation    The patient has significant medical comorbidities that influence decision making for this acute process, such as:  Unknown    I decided to obtain the patient's medical records and review relevant documentation from hospital records.  Pertinent information is noted.  Hypertension, diabetes, PVD, anxiety is all diagnosed on patient's chart, and she should be on medications but I am told she has not taken it in over 1 year    Medications   niCARdipine 40 mg/200 mL (0.2 mg/mL) infusion (12.5 mg/hr Intravenous Rate/Dose Change 1/13/24 1311)   propofol (DIPRIVAN) 10 mg/mL infusion (15 mcg/kg/min × 74.4 kg Intravenous Rate/Dose Change 1/13/24 1247)   sodium chloride 0.9% flush 10 mL (has no administration in time range)   labetalol 20 mg/4 mL (5 mg/mL) IV syring (has no administration in time range)   hydrALAZINE injection 10 mg (has no administration in time range)   atorvastatin tablet 40 mg (has no administration in time range)   acetaminophen tablet 650 mg (has no administration in time range)   ondansetron injection 4 mg (has no administration in time range)   polyethylene glycol packet 17 g (has no administration in time range)   etomidate (AMIDATE) 2 mg/mL injection (20 mg  Given 1/13/24 1232)   rocuronium 10 mg/mL injection (70 mg  Given 1/13/24 1232)       Results and ED Course    Labs Reviewed   CBC W/ AUTO DIFFERENTIAL - Abnormal; Notable for the following components:       Result Value    Gran % 80.4 (*)     Lymph % 12.5 (*)     All other components within normal  limits    Narrative:     Release to patient->Immediate   COMPREHENSIVE METABOLIC PANEL - Abnormal; Notable for the following components:    CO2 21 (*)     Glucose 138 (*)     All other components within normal limits    Narrative:     Release to patient->Immediate   LIPID PANEL - Abnormal; Notable for the following components:    Cholesterol 218 (*)     All other components within normal limits    Narrative:     Release to patient->Immediate   PROTIME-INR    Narrative:     Release to patient->Immediate   TSH    Narrative:     Release to patient->Immediate   HIV 1 / 2 ANTIBODY    Narrative:     Release to patient->Immediate   HEPATITIS C ANTIBODY    Narrative:     Release to patient->Immediate   ALCOHOL,MEDICAL (ETHANOL)    Narrative:     Release to patient->Immediate   TROPONIN I   URINALYSIS, REFLEX TO URINE CULTURE   URINALYSIS, REFLEX TO URINE CULTURE   HEMOGLOBIN A1C   APTT   PROTIME-INR   B-TYPE NATRIURETIC PEPTIDE   POCT GLUCOSE, HAND-HELD DEVICE   TYPE & SCREEN   ISTAT PROCEDURE   ISTAT CREATININE   POCT GLUCOSE MONITORING CONTINUOUS       Imaging Results              X-Ray Chest AP Portable (Final result)  Result time 01/13/24 13:22:10      Final result by Fortunato Fournier MD (01/13/24 13:22:10)                   Impression:      As above.      Electronically signed by: Fortunato Fournier  Date:    01/13/2024  Time:    13:22               Narrative:    EXAMINATION:  XR CHEST AP PORTABLE    CLINICAL HISTORY:  Presence of other specified devices    TECHNIQUE:  Single frontal view of the chest was performed.    COMPARISON:  Chest radiograph performed 10/04/2013.    FINDINGS:  Monitoring leads overlie the chest.    Tip of endotracheal tube projects approximately 35 mm above the level the ashley.    Enteric tube tip and side port project anticipated location of stomach.    Cardiomediastinal contours appear grossly unchanged within normal limits.    Lungs appear grossly clear, a noting some minor degree of left  basilar atelectasis.    No definite pneumothorax or large volume pleural effusion.    No acute findings in the visualized abdomen.    Osseous and soft tissue structures appear without definite acute abnormality.                                       CT Head Without Contrast (Final result)  Result time 01/13/24 12:14:00      Final result by Derrick Gregorio MD (01/13/24 12:14:00)                   Impression:      Acute right greater than left subdural hematomas.  No midline shift with some of effacement of particularly the right lateral ventricle.      Electronically signed by: Derrick Gregorio  Date:    01/13/2024  Time:    12:14               Narrative:    EXAMINATION:  CT HEAD WITHOUT CONTRAST    CLINICAL HISTORY:  Head trauma, minor (Age >= 65y);    TECHNIQUE:  Low dose axial images were obtained through the head.  Coronal and sagittal reformations were also performed. Contrast was not administered.    COMPARISON:  12/27/2022    FINDINGS:  Bilateral acute subdural hematomas measuring 8 mm on the right and 5 mm on the left.  On the right subdural hemorrhage layers on the tentorium.  No midline shift is identified with some effacement of particularly the right lateral ventricle.  The basal cisterns are maintained.    No acute territorial infarct within the limitations of motion artifact.  Decreased attenuation within the subcortical white matter is nonspecific but may reflect mild chronic small vessel ischemic changes, similar to the prior study.    No calvarial fracture is identified.  The visualized sinuses and mastoid air cells are clear.    These findings were communicated to Dr. Brown at 12:09 on 01/13/2024.                                       CT Cervical Spine Without Contrast (Final result)  Result time 01/13/24 12:38:13      Final result by Fortunato Fournier MD (01/13/24 12:38:13)                   Impression:      No evidence of acute cervical spine fracture.      Electronically signed by: Fortunato  [facility]  Date:    01/13/2024  Time:    12:38               Narrative:    EXAMINATION:  CT CERVICAL SPINE WITHOUT CONTRAST    CLINICAL HISTORY:  Neck trauma, intoxicated or obtunded (Age >= 16y);    TECHNIQUE:  Low dose axial images, sagittal and coronal reformations were performed though the cervical spine.  Contrast was not administered.    COMPARISON:  CT cervical spine 12/27/2022.    FINDINGS:  Fractures: No acute fractures    Alignment: There is no significant vertebral subluxation  Atlanto-axial and atlanto-occipital joints: Atlanto-axial and atlanto-occipital intervals are not widened.  Facet joints: There is no traumatic facet joint widening.Multilevel degenerative set arthropathy.  Ankylosis of the left C2-3 and right C4-5 facet joints.  Vertebral bodies: Query osseous demineralization.  Degenerative endplate change.  Discs: Degenerative disc disease.  Spinal canal and foraminal narrowing: Although CT does not optimally evaluate the soft tissue contents of the spinal canal and foramina, no critical stenosis is suggested.    At C5-6, severe right and moderate left foraminal narrowing.      Paraspinal soft tissues: Unremarkable.    Upper Lungs:Calcified granuloma right upper lobe.    Additional comments: Nonspecific frothy debris within the left sphenoid sinus.                                      ED Course as of 01/13/24 1333   Sat Jan 13, 2024   1211 CT Head Without Contrast  I reviewed the images myself and with the radiologist.  Bilateral subdurals.  Vascular neurology to bedside, signed off, neurosurgery consulted, recommending Charleen, will see patient at bedside [AC]   1216 Patient having acute decompensation.  She was hypertensive.  She was continuously confused.  She was no longer verbal.  She was getting extremely agitated.  Neurosurgery is at bedside, but I am anticipating that we will need to intubate for altered mental status. [AC]   1236 Due to rapidly declining mental status, I touched base with  the patient's daughter.  We agreed to attempt to pursue measures 2 sedate the patient, however the best way that I found that we could control her change in mental status, confusion, agitation was intubation in order to facilitate blood pressure management due to the bilateral subdurals.  The patient was successfully intubated.  Bilateral breath sounds with color change. [AC]   1305 Sodium: 141 [AC]   1305 INR: 0.9 [AC]   1305 Potassium: 3.8 [AC]   1305 Glucose(!): 138 [AC]   1305 BUN: 15 [AC]   1305 Creatinine: 0.8 [AC]   1305 WBC: 7.59 [AC]   1305 Hemoglobin: 13.9 [AC]   1305 Impression:     No evidence of acute cervical spine fracture.        Electronically signed by: Fortunato Fournier  Date:                                            01/13/2024  Time:                                           12:38   [AC]   1305 I spoke to the patient's daughter.  I brought her to bedside. [AC]   1319 Alcohol, Serum: <10 [AC]      ED Course User Index  [AC] Jaswinder Brown,            EKG interpreted by myself    EKG  Performed: 01/13/2024   Rate/Rhythm/Axis: 150 bpm, irregularly irregular rhythm, nml axis  QRS 72 ms  Qtc 502 ms  Impression:  Atrial fibrillation, RVR, ST depressions diffusely perhaps related to rate.  QT appears grossly prolonged.    Relevant imaging interpreted by myself  Bilateral subdural hematomas noted on CT scan, artifact is present     Impression and Plan    83 y.o. female with findings of bilateral subdural hematomas with change in mental status requiring intubation based on the work up in the emergency department as above.    Important lab/imaging findings include:  Bilateral subdural hematomas    I consulted with:  Neurosurgery -no acute surgical intervention, close monitoring.  Close control of blood pressure with nicardipine.  Neurocritical care- will admit the patient and continue evaluation    I was notified that the patient was tachycardic and what seemed to be atrial fibrillation.  I reviewed  EKG.  Given recent intubation, I will continue to monitor closely but not perform any intervention as the patient is on nicardipine,.  She was on propofol.    I updated the patient's family in the waiting room.    All tests, treatment options and disposition options were discussed with the patient.  The decision was made to admit the patient to the hospital.    The patient was admitted in critical condition and all further questions/concerns by patient and/or family were addressed.    Critical Care:  Date: 01/13/2024  Performed by: Dr. Jaswinder Brown  Authorized by: Dr. Jaswinder Brown   Total critical care time (exclusive of procedural time) : 45 minutes  Upon my evaluation, this patient had a high probability of imminent or life-threatening deterioration due to [ bilateral SDH / AMS ] which required my direct attention, intervention and personal management.  Critical care was necessary to treat or prevent imminent or life-threatening deterioration.  This may include review of laboratory data, radiology results, discussion with consultants and family, and monitoring for potential decompensations. Interventions were performed as documented.                 Final diagnoses:  [R29.818] Acute focal neurological deficit  [Z97.8] Endotracheally intubated        ED Disposition Condition    Admit Serious                  Jaswinder Brown, DO  01/13/24 1342

## 2024-01-13 NOTE — ED NOTES
Nurses Note -- 4 Eyes      1/13/2024   1:04 PM      Skin assessed during: Admit      [x] No Altered Skin Integrity Present    []Prevention Measures Documented      [] Yes- Altered Skin Integrity Present or Discovered   [] LDA Added if Not in Epic (Describe Wound)   [] New Altered Skin Integrity was Present on Admit and Documented in LDA   [] Wound Image Taken    Wound Care Consulted? No    Attending Nurse:  Nena Ruiz RN/Staff Member:   Aiden

## 2024-01-13 NOTE — SUBJECTIVE & OBJECTIVE
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, without long-term current use of insulin 10/31/2017    Diabetes mellitus type 2 without retinopathy 06/12/2014    6% metformin 500 bid, FU+ 2016    DVT (deep venous thrombosis) 2001    R , Dr Bonilla    Grstephanie 04/06/2022     Marques passed 1/2022    Hyperlipidemia     LDL 82    Hyperlipidemia associated with type 2 diabetes mellitus 09/11/2012    Hypertension     Hypertension associated with diabetes 09/11/2012    Memory loss 12/29/2022    CT chronic changes 2022    Microalbuminuria due to type 2 diabetes mellitus 12/08/2015    taking lisinopril, losartan caused olivia effects    PVD (peripheral vascular disease) with claudication 05/02/2019    Compression hose    Risk for coronary artery disease greater than 20% in next 10 years per Eads score 05/01/2018    Strabismus     Type 2 diabetes mellitus with diabetic polyneuropathy 06/12/2014    Type 2 diabetes mellitus with diabetic polyneuropathy, without long-term current use of insulin 06/12/2014     Past Surgical History:   Procedure Laterality Date    BREAST BIOPSY Right 2011    BREAST LUMPECTOMY Right 2011    [place] REMOVAL OF NAIL BED  6/10/2015    TONSILLECTOMY       Social History     Tobacco Use    Smoking status: Former     Current packs/day: 7.00     Average packs/day: 7.0 packs/day for 0.5 years (3.5 ttl pk-yrs)     Types: Cigarettes    Smokeless tobacco: Never   Substance Use Topics    Alcohol use: No    Drug use: Never     Review of patient's allergies indicates:   Allergen Reactions    Sulfa (sulfonamide antibiotics)       Other reaction(s): Rash       Medications: I have reviewed the current medication administration record.    (Not in a hospital admission)      Review of Systems   Reason unable to perform ROS: Confused.     Objective:     Vital Signs (Most Recent):  Temp: 99.1 °F (37.3 °C) (01/13/24 1201)  Pulse: 87 (01/13/24 1212)  Resp: (!) 34 (01/13/24 1212)  BP: (!) 211/95 (01/13/24 1212)  SpO2: 99 % (01/13/24 1212)    Vital Signs Range (Last 24H):  Temp:  [99.1 °F (37.3 °C)]   Pulse:  [78-87]   Resp:  [18-34]   BP: (178-211)/(87-95)   SpO2:  [98 %-99 %]        Physical Exam  Cardiovascular:      Rate and Rhythm: Normal rate.   Pulmonary:      Effort: Pulmonary effort is normal.   Abdominal:      General: Abdomen is flat.   Neurological:      Mental Status: She is alert.              Neurological Exam:   LOC: alert  Attention Span: poor  Language: confused  Articulation: No dysarthria  Orientation: Not oriented to place, Not oriented to time  Motor: Intact throughout, 5/5  Sensation: Intact to light touch, temperature and vibration      Laboratory:  Pending    Diagnostic Results:    Brain imaging:  CT H with R>L SDH

## 2024-01-13 NOTE — NURSING
Patient arrived to [facility] from ER >> [id number]    Report received from: ER RN    Type of stroke/diagnosis:  SDH    Current symptoms: intubated on propofol, STINSON spont    Skin Assessment done: Yes  Wounds noted: none  *If wounds noted, was Wound Care consulted? Y/N  *If wounds noted, LDA placed? Y/N  Skin Assessment Verified by:  Igor Buenrostro Completed? No intubated    Patient Belongings on Admit: none    NCC notified: FRANCIS Ovalle

## 2024-01-13 NOTE — CONSULTS
[facility] Emergency Dept  Neurosurgery  Consult Note    Inpatient consult to Neurosurgery  Consult performed by: Palak Archuleta MD  Consult ordered by: Jaswinder Brown DO  Reason for consult: SDH        Subjective:     Chief Complaint/Reason for Admission: Fall    History of Present Illness: 83 F with unknown ASA/AC BIBEMS s/p unwitnessed fall at home at approximately 0700. Unknown LOC with + scalp hematoma. Patient c/o headache on arrival. Following commands per EMS however more agitated in ED, not answering questions and not following commands. Patient yelling and trying to get out of stretcher, placed in restraints by ED. No external evidence of trauma.Patient unable to provide medical history.    ASA81 prescribed in 2020 unclear if patient is taking at this time. No family bedside for collateral hx.     (Not in a hospital admission)      Review of patient's allergies indicates:   Allergen Reactions    Sulfa (sulfonamide antibiotics)      Other reaction(s): Rash       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, without long-term current use of insulin 10/31/2017    Diabetes mellitus type 2 without retinopathy 06/12/2014    6% metformin 500 bid, FU+ 2016    DVT (deep venous thrombosis) 2001    R Dr Chad 04/06/2022     Marques passed 1/2022    Hyperlipidemia     LDL 82    Hyperlipidemia associated with type 2 diabetes mellitus 09/11/2012    Hypertension     Hypertension associated with diabetes 09/11/2012    Memory loss 12/29/2022    CT chronic changes 2022    Microalbuminuria due to type 2 diabetes mellitus  12/08/2015    taking lisinopril, losartan caused olivia effects    PVD (peripheral vascular disease) with claudication 05/02/2019    Compression hose    Risk for coronary artery disease greater than 20% in next 10 years per [place] score 05/01/2018    Strabismus     Type 2 diabetes mellitus with diabetic polyneuropathy 06/12/2014    Type 2 diabetes mellitus with diabetic polyneuropathy, without long-term current use of insulin 06/12/2014     Past Surgical History:   Procedure Laterality Date    BREAST BIOPSY Right 2011    BREAST LUMPECTOMY Right 2011    [place] REMOVAL OF NAIL BED  6/10/2015    TONSILLECTOMY       Family History       Problem Relation (Age of Onset)    Breast cancer Sister    Cataracts Mother    Heart disease Mother (58), Father (50)    No Known Problems Brother, Maternal Aunt, Maternal Uncle, Paternal Aunt, Paternal Uncle, Maternal Grandmother, Maternal Grandfather, Paternal Grandmother, Paternal Grandfather    Thyroid disease Sister          Tobacco Use    Smoking status: Former     Current packs/day: 7.00     Average packs/day: 7.0 packs/day for 0.5 years (3.5 ttl pk-yrs)     Types: Cigarettes    Smokeless tobacco: Never   Substance and Sexual Activity    Alcohol use: No    Drug use: Never    Sexual activity: Never     Review of Systems   Reason unable to perform ROS: altered, not answering questions.     Objective:     Weight: 74.4 kg (164 lb)  Body mass index is 26.47 kg/m².  Vital Signs (Most Recent):  Temp: 99.1 °F (37.3 °C) (01/13/24 1201)  Pulse: 87 (01/13/24 1212)  Resp: (!) 34 (01/13/24 1212)  BP: (!) 211/95 (01/13/24 1212)  SpO2: 99 % (01/13/24 1212) Vital Signs (24h Range):  Temp:  [99.1 °F (37.3 °C)] 99.1 °F (37.3 °C)  Pulse:  [78-87] 87  Resp:  [18-34] 34  SpO2:  [98 %-99 %] 99 %  BP: (178-211)/(87-95) 211/95                                 Physical Exam         Neurosurgery Physical Exam  General: no distress  Head: Non-traumatic, normocephalic  Eyes: Pupils equal, EOMi  Neck: Patient  "unable to participate with full examination  CVS: Normal rate and rhythm, distal pulses present  Pulm: Symmetric expansion, no respiratory distress  GI: Abdomen nondistended, nontender  MSK: Moves all extremities without restriction, atraumatic  Skin: Dry, intact  Psych: Confused, agitated, yelling w/o clear words  Neuro: AOx0, GCS E4V2M5  CNII-XII: PERRL, will not participate with EOM, no gaze preference  Extremities:  Motor: Unable to participate with full motor exam  Patient in 4 point restraints trying to get out of bed  Moves BUE/BLE spont AG with good strength, at least antigravity, nonfocal    Reflexes:     Deferred, patient trashing in bed, will not participate    Sensory:      Sensorium intact throughout, unable to participate w/sensory level    Coordination:      Moves limbs in coordinated fashion spontaneously      Significant Labs:  No results for input(s): "GLU", "NA", "K", "CL", "CO2", "BUN", "CREATININE", "CALCIUM", "MG" in the last 48 hours.  No results for input(s): "WBC", "HGB", "HCT", "PLT" in the last 48 hours.  Recent Labs   Lab 01/13/24  1212   INR 1.2     Microbiology Results (last 7 days)       ** No results found for the last 168 hours. **          All pertinent labs from the last 24 hours have been reviewed.    Significant Diagnostics:  I have reviewed and interpreted all pertinent imaging results/findings within the past 24 hours.    Agree with below R>L acute SDH w/o mass effect or midline shift. CT C appears negative for acute fracture; degenerative disease throughout C spine, fusion across facet joints C2/3 and C4/5.     CT Cervical Spine Without Contrast    Result Date: 1/13/2024  No evidence of acute cervical spine fracture. Electronically signed by: Fortunato Fournier Date:    01/13/2024 Time:    12:38    CT Head Without Contrast    Result Date: 1/13/2024  Acute right greater than left subdural hematomas.  No midline shift with some of effacement of particularly the right lateral " ventricle. Electronically signed by: Derrick Griffithes Date:    01/13/2024 Time:    12:14     Assessment/Plan:     Traumatic subdural hematoma  83 F with possible ASA81 use, DM, HTN, presenting from home s/p fall with confusion, agitation, AMS found to have R>L acute SDH w/o midline shift and with minimal mass effect:    --Patient admitted to be admitted to [facility], d/w NCC provider      -q1h neurochecks in ICU  --Follow-up CTH and 4h scan for stability  --Reverse anti-plt/coag medications - possible home ASA81 use, recommend DDAVP  --SBP <140 -  in ED, will require Cardene gtt  --Na >135  --Keppra 500 BID x7 days  --HOB >30  --Follow-up full pre-op labs (CBC/CMP/PT-INR/PTT/T&S)  --NPO  pending stablity scan, AMS improvement  --Continue to monitor clinically, notify NSGY immediately with any changes in neuro status  --Remainder of AMS work up/trauma work up per ED/NCC    Dispo: ongoing    Will d/w Dr. Nichols          Thank you for your consult. I will follow-up with patient. Please contact us if you have any additional questions.    Palak Sapp MD  Neurosurgery  [facility] - [facility] Dept

## 2024-01-13 NOTE — PROCEDURES
Intubation    Date/Time: 1/13/2024 1:20 PM  Location procedure was performed: [facility] EMERGENCY DEPARTMENT    Performed by: Ebenezer Valdivia MD  Authorized by: Nikunj Patton DO  Indications: airway protection  Intubation method: direct  Patient status: paralyzed (RSI)  Preoxygenation: none  Sedatives: etomidate  Paralytic: rocuronium  Laryngoscope size: Mac 3  Tube size: 7.0 mm  Tube type: cuffed  Number of attempts: 1  Cricoid pressure: no  Cords visualized: yes  Post-procedure assessment: chest rise, ETCO2 monitor and CO2 detector  Breath sounds: clear and equal  Cuff inflated: yes  ETT to lip: 22 cm  Tube secured with: ETT sanon  Chest x-ray interpreted by me.  Chest x-ray findings: endotracheal tube in appropriate position  Patient tolerance: Patient tolerated the procedure well with no immediate complications  Complications: No

## 2024-01-13 NOTE — ASSESSMENT & PLAN NOTE
83 F with possible ASA81 use, DM, HTN, presenting from home s/p fall with confusion, agitation, AMS found to have R>L acute SDH w/o midline shift and with minimal mass effect:    --Patient admitted to be admitted to NCC, d/w NCC provider      -q1h neurochecks in ICU  --Follow-up CTH and 4h scan for stability  --Reverse anti-plt/coag medications - possible home ASA81 use, recommend DDAVP  --SBP <140 -  in ED, will require Cardene gtt  --Na >135  --Keppra 500 BID x7 days  --HOB >30  --Follow-up full pre-op labs (CBC/CMP/PT-INR/PTT/T&S)  --NPO  pending stablity scan, AMS improvement  --Continue to monitor clinically, notify NSGY immediately with any changes in neuro status  --Remainder of AMS work up/trauma work up per ED/NCC    Dispo: ongoing    Will d/w Dr. Nichols

## 2024-01-13 NOTE — HPI
83 F with unknown ASA/AC BIBEMS s/p unwitnessed fall at home at approximately 0700. Unknown LOC with + scalp hematoma. Patient c/o headache on arrival. Following commands per EMS however more agitated in ED, not answering questions and not following commands. Patient yelling and trying to get out of stretcher, placed in restraints by ED. No external evidence of trauma.Patient unable to provide medical history.    ASA81 prescribed in 2020 unclear if patient is taking at this time. No family bedside for collateral hx.

## 2024-01-14 LAB
ALBUMIN SERPL BCP-MCNC: 3.8 G/DL (ref 3.5–5.2)
ALLENS TEST: ABNORMAL
ALP SERPL-CCNC: 55 U/L (ref 55–135)
ALT SERPL W/O P-5'-P-CCNC: 11 U/L (ref 10–44)
ANION GAP SERPL CALC-SCNC: 11 MMOL/L (ref 8–16)
ASCENDING AORTA: 3.11 CM
AST SERPL-CCNC: 17 U/L (ref 10–40)
AV INDEX (PROSTH): 1.01
AV MEAN GRADIENT: 2 MMHG
AV PEAK GRADIENT: 4 MMHG
AV VALVE AREA BY VELOCITY RATIO: 2.54 CM²
AV VALVE AREA: 2.81 CM²
AV VELOCITY RATIO: 0.92
BASOPHILS # BLD AUTO: 0.03 K/UL (ref 0–0.2)
BASOPHILS NFR BLD: 0.3 % (ref 0–1.9)
BILIRUB SERPL-MCNC: 0.7 MG/DL (ref 0.1–1)
BSA FOR ECHO PROCEDURE: 1.86 M2
BUN SERPL-MCNC: 13 MG/DL (ref 8–23)
CALCIUM SERPL-MCNC: 9.6 MG/DL (ref 8.7–10.5)
CHLORIDE SERPL-SCNC: 106 MMOL/L (ref 95–110)
CO2 SERPL-SCNC: 23 MMOL/L (ref 23–29)
CREAT SERPL-MCNC: 0.7 MG/DL (ref 0.5–1.4)
CV ECHO LV RWT: 0.6 CM
DELSYS: ABNORMAL
DIFFERENTIAL METHOD BLD: ABNORMAL
DOP CALC AO PEAK VEL: 0.95 M/S
DOP CALC AO VTI: 20.94 CM
DOP CALC LVOT AREA: 2.8 CM2
DOP CALC LVOT DIAMETER: 1.88 CM
DOP CALC LVOT PEAK VEL: 0.87 M/S
DOP CALC LVOT STROKE VOLUME: 58.76 CM3
DOP CALCLVOT PEAK VEL VTI: 21.18 CM
E WAVE DECELERATION TIME: 161.89 MSEC
E/A RATIO: 0.82
E/E' RATIO: 10.67 M/S
ECHO LV POSTERIOR WALL: 0.86 CM (ref 0.6–1.1)
EOSINOPHIL # BLD AUTO: 0 K/UL (ref 0–0.5)
EOSINOPHIL NFR BLD: 0 % (ref 0–8)
ERYTHROCYTE [DISTWIDTH] IN BLOOD BY AUTOMATED COUNT: 13.9 % (ref 11.5–14.5)
ERYTHROCYTE [SEDIMENTATION RATE] IN BLOOD BY WESTERGREN METHOD: 18 MM/H
EST. GFR  (NO RACE VARIABLE): >60 ML/MIN/1.73 M^2
FIO2: 40
FRACTIONAL SHORTENING: 28 % (ref 28–44)
GLUCOSE SERPL-MCNC: 168 MG/DL (ref 70–110)
HCO3 UR-SCNC: 24.5 MMOL/L (ref 24–28)
HCT VFR BLD AUTO: 41.8 % (ref 37–48.5)
HGB BLD-MCNC: 13.3 G/DL (ref 12–16)
IMM GRANULOCYTES # BLD AUTO: 0.04 K/UL (ref 0–0.04)
IMM GRANULOCYTES NFR BLD AUTO: 0.4 % (ref 0–0.5)
INTERVENTRICULAR SEPTUM: 1.12 CM (ref 0.6–1.1)
IVRT: 156.99 MSEC
LA MAJOR: 4.85 CM
LA MINOR: 5.03 CM
LA WIDTH: 3.54 CM
LEFT ATRIUM SIZE: 2.71 CM
LEFT ATRIUM VOLUME INDEX MOD: 25.2 ML/M2
LEFT ATRIUM VOLUME INDEX: 21.9 ML/M2
LEFT ATRIUM VOLUME MOD: 46.42 CM3
LEFT ATRIUM VOLUME: 40.27 CM3
LEFT INTERNAL DIMENSION IN SYSTOLE: 2.05 CM (ref 2.1–4)
LEFT VENTRICLE DIASTOLIC VOLUME INDEX: 16.73 ML/M2
LEFT VENTRICLE DIASTOLIC VOLUME: 30.79 ML
LEFT VENTRICLE MASS INDEX: 41 G/M2
LEFT VENTRICLE SYSTOLIC VOLUME INDEX: 7.4 ML/M2
LEFT VENTRICLE SYSTOLIC VOLUME: 13.6 ML
LEFT VENTRICULAR INTERNAL DIMENSION IN DIASTOLE: 2.85 CM (ref 3.5–6)
LEFT VENTRICULAR MASS: 75.09 G
LV LATERAL E/E' RATIO: 8 M/S
LV SEPTAL E/E' RATIO: 16 M/S
LYMPHOCYTES # BLD AUTO: 0.6 K/UL (ref 1–4.8)
LYMPHOCYTES NFR BLD: 5.8 % (ref 18–48)
MAGNESIUM SERPL-MCNC: 1.7 MG/DL (ref 1.6–2.6)
MCH RBC QN AUTO: 29.4 PG (ref 27–31)
MCHC RBC AUTO-ENTMCNC: 31.8 G/DL (ref 32–36)
MCV RBC AUTO: 92 FL (ref 82–98)
MIN VOL: 7.6
MODE: ABNORMAL
MONOCYTES # BLD AUTO: 0.8 K/UL (ref 0.3–1)
MONOCYTES NFR BLD: 6.9 % (ref 4–15)
MV PEAK A VEL: 0.78 M/S
MV PEAK E VEL: 0.64 M/S
MV STENOSIS PRESSURE HALF TIME: 46.95 MS
MV VALVE AREA P 1/2 METHOD: 4.69 CM2
NEUTROPHILS # BLD AUTO: 9.6 K/UL (ref 1.8–7.7)
NEUTROPHILS NFR BLD: 86.6 % (ref 38–73)
NRBC BLD-RTO: 0 /100 WBC
PCO2 BLDA: 31.9 MMHG (ref 35–45)
PEEP: 5
PH SMN: 7.49 [PH] (ref 7.35–7.45)
PHOSPHATE SERPL-MCNC: 2.6 MG/DL (ref 2.7–4.5)
PIP: 22
PISA TR MAX VEL: 2.07 M/S
PLATELET # BLD AUTO: 160 K/UL (ref 150–450)
PMV BLD AUTO: 9.7 FL (ref 9.2–12.9)
PO2 BLDA: 204 MMHG (ref 80–100)
POC BE: 1 MMOL/L
POC SATURATED O2: 100 % (ref 95–100)
POC TCO2: 25 MMOL/L (ref 23–27)
POCT GLUCOSE: 156 MG/DL (ref 70–110)
POTASSIUM SERPL-SCNC: 3.3 MMOL/L (ref 3.5–5.1)
PROT SERPL-MCNC: 6.5 G/DL (ref 6–8.4)
RA MAJOR: 3.94 CM
RA PRESSURE ESTIMATED: 3 MMHG
RA WIDTH: 3.54 CM
RBC # BLD AUTO: 4.53 M/UL (ref 4–5.4)
RIGHT VENTRICULAR END-DIASTOLIC DIMENSION: 3.08 CM
RV TB RVSP: 5 MMHG
SAMPLE: ABNORMAL
SINUS: 3.29 CM
SITE: ABNORMAL
SODIUM SERPL-SCNC: 140 MMOL/L (ref 136–145)
SP02: 100
STJ: 2.69 CM
TDI LATERAL: 0.08 M/S
TDI SEPTAL: 0.04 M/S
TDI: 0.06 M/S
TR MAX PG: 17 MMHG
TRICUSPID ANNULAR PLANE SYSTOLIC EXCURSION: 1.51 CM
TROPONIN I SERPL DL<=0.01 NG/ML-MCNC: 0.11 NG/ML (ref 0–0.03)
TV REST PULMONARY ARTERY PRESSURE: 20 MMHG
VT: 410
WBC # BLD AUTO: 11.08 K/UL (ref 3.9–12.7)
Z-SCORE OF LEFT VENTRICULAR DIMENSION IN END DIASTOLE: -5.83
Z-SCORE OF LEFT VENTRICULAR DIMENSION IN END SYSTOLE: -3.4

## 2024-01-14 PROCEDURE — 84100 ASSAY OF PHOSPHORUS: CPT | Mod: HCNC

## 2024-01-14 PROCEDURE — 99499 UNLISTED E&M SERVICE: CPT | Mod: HCNC,GC,, | Performed by: PSYCHIATRY & NEUROLOGY

## 2024-01-14 PROCEDURE — 25000003 PHARM REV CODE 250: Mod: HCNC | Performed by: PHYSICIAN ASSISTANT

## 2024-01-14 PROCEDURE — 20000000 HC ICU ROOM: Mod: HCNC

## 2024-01-14 PROCEDURE — 82803 BLOOD GASES ANY COMBINATION: CPT | Mod: HCNC

## 2024-01-14 PROCEDURE — 25000003 PHARM REV CODE 250: Mod: HCNC | Performed by: REGISTERED NURSE

## 2024-01-14 PROCEDURE — 99900026 HC AIRWAY MAINTENANCE (STAT): Mod: HCNC

## 2024-01-14 PROCEDURE — 36600 WITHDRAWAL OF ARTERIAL BLOOD: CPT | Mod: HCNC

## 2024-01-14 PROCEDURE — 80053 COMPREHEN METABOLIC PANEL: CPT | Mod: HCNC

## 2024-01-14 PROCEDURE — 25000003 PHARM REV CODE 250: Mod: HCNC

## 2024-01-14 PROCEDURE — 94003 VENT MGMT INPAT SUBQ DAY: CPT | Mod: HCNC

## 2024-01-14 PROCEDURE — 83735 ASSAY OF MAGNESIUM: CPT | Mod: HCNC

## 2024-01-14 PROCEDURE — 63600175 PHARM REV CODE 636 W HCPCS: Mod: HCNC | Performed by: STUDENT IN AN ORGANIZED HEALTH CARE EDUCATION/TRAINING PROGRAM

## 2024-01-14 PROCEDURE — 99291 CRITICAL CARE FIRST HOUR: CPT | Mod: HCNC,,, | Performed by: REGISTERED NURSE

## 2024-01-14 PROCEDURE — 27100171 HC OXYGEN HIGH FLOW UP TO 24 HOURS: Mod: HCNC

## 2024-01-14 PROCEDURE — 99900035 HC TECH TIME PER 15 MIN (STAT): Mod: HCNC

## 2024-01-14 PROCEDURE — 84484 ASSAY OF TROPONIN QUANT: CPT | Mod: HCNC

## 2024-01-14 PROCEDURE — 85025 COMPLETE CBC W/AUTO DIFF WBC: CPT | Mod: HCNC

## 2024-01-14 PROCEDURE — 94761 N-INVAS EAR/PLS OXIMETRY MLT: CPT | Mod: HCNC

## 2024-01-14 PROCEDURE — 63600175 PHARM REV CODE 636 W HCPCS: Mod: HCNC

## 2024-01-14 PROCEDURE — 63600175 PHARM REV CODE 636 W HCPCS: Mod: HCNC | Performed by: REGISTERED NURSE

## 2024-01-14 RX ORDER — NICARDIPINE HYDROCHLORIDE 0.2 MG/ML
0-15 INJECTION INTRAVENOUS CONTINUOUS
Status: DISCONTINUED | OUTPATIENT
Start: 2024-01-14 | End: 2024-01-14

## 2024-01-14 RX ORDER — LABETALOL HCL 20 MG/4 ML
10 SYRINGE (ML) INTRAVENOUS EVERY 6 HOURS PRN
Status: DISCONTINUED | OUTPATIENT
Start: 2024-01-14 | End: 2024-01-20 | Stop reason: HOSPADM

## 2024-01-14 RX ORDER — LANOLIN ALCOHOL/MO/W.PET/CERES
800 CREAM (GRAM) TOPICAL
Status: DISCONTINUED | OUTPATIENT
Start: 2024-01-14 | End: 2024-01-16

## 2024-01-14 RX ORDER — SODIUM,POTASSIUM PHOSPHATES 280-250MG
2 POWDER IN PACKET (EA) ORAL
Status: DISCONTINUED | OUTPATIENT
Start: 2024-01-14 | End: 2024-01-16

## 2024-01-14 RX ORDER — HYDRALAZINE HYDROCHLORIDE 20 MG/ML
10 INJECTION INTRAMUSCULAR; INTRAVENOUS EVERY 6 HOURS PRN
Status: DISCONTINUED | OUTPATIENT
Start: 2024-01-14 | End: 2024-01-17

## 2024-01-14 RX ORDER — LEVETIRACETAM 500 MG/5ML
500 INJECTION, SOLUTION, CONCENTRATE INTRAVENOUS EVERY 12 HOURS
Status: DISCONTINUED | OUTPATIENT
Start: 2024-01-14 | End: 2024-01-16

## 2024-01-14 RX ADMIN — POLYETHYLENE GLYCOL 3350 17 G: 17 POWDER, FOR SOLUTION ORAL at 09:01

## 2024-01-14 RX ADMIN — Medication 150 MCG/HR: at 03:01

## 2024-01-14 RX ADMIN — MUPIROCIN: 20 OINTMENT TOPICAL at 09:01

## 2024-01-14 RX ADMIN — PROPOFOL 30 MCG/KG/MIN: 10 INJECTION, EMULSION INTRAVENOUS at 02:01

## 2024-01-14 RX ADMIN — ATORVASTATIN CALCIUM 40 MG: 40 TABLET, FILM COATED ORAL at 09:01

## 2024-01-14 RX ADMIN — POTASSIUM BICARBONATE 35 MEQ: 391 TABLET, EFFERVESCENT ORAL at 09:01

## 2024-01-14 RX ADMIN — PROPOFOL 15 MCG/KG/MIN: 10 INJECTION, EMULSION INTRAVENOUS at 09:01

## 2024-01-14 RX ADMIN — INSULIN ASPART 1 UNITS: 100 INJECTION, SOLUTION INTRAVENOUS; SUBCUTANEOUS at 12:01

## 2024-01-14 RX ADMIN — SODIUM CHLORIDE 500 ML: 9 INJECTION, SOLUTION INTRAVENOUS at 10:01

## 2024-01-14 RX ADMIN — LEVETIRACETAM 500 MG: 100 INJECTION INTRAVENOUS at 10:01

## 2024-01-14 RX ADMIN — INSULIN ASPART 2 UNITS: 100 INJECTION, SOLUTION INTRAVENOUS; SUBCUTANEOUS at 05:01

## 2024-01-14 RX ADMIN — MUPIROCIN: 20 OINTMENT TOPICAL at 08:01

## 2024-01-14 RX ADMIN — LEVETIRACETAM 500 MG: 100 INJECTION INTRAVENOUS at 08:01

## 2024-01-14 RX ADMIN — Medication 800 MG: at 09:01

## 2024-01-14 NOTE — SUBJECTIVE & OBJECTIVE
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, without long-term current use of insulin 10/31/2017    Diabetes mellitus type 2 without retinopathy 06/12/2014    6% metformin 500 bid, FU+ 2016    DVT (deep venous thrombosis) 2001    R , Dr Chad Golden 04/06/2022     Marques passed 1/2022    Hyperlipidemia     LDL 82    Hyperlipidemia associated with type 2 diabetes mellitus 09/11/2012    Hypertension     Hypertension associated with diabetes 09/11/2012    Memory loss 12/29/2022    CT chronic changes 2022    Microalbuminuria due to type 2 diabetes mellitus 12/08/2015    taking lisinopril, losartan caused olivia effects    PVD (peripheral vascular disease) with claudication 05/02/2019    Compression hose    Risk for coronary artery disease greater than 20% in next 10 years per [place] score 05/01/2018    Strabismus     Type 2 diabetes mellitus with diabetic polyneuropathy 06/12/2014    Type 2 diabetes mellitus with diabetic polyneuropathy, without long-term current use of insulin 06/12/2014     Past Surgical History:   Procedure Laterality Date    BREAST BIOPSY Right 2011    BREAST LUMPECTOMY Right 2011    [place] REMOVAL OF NAIL BED  6/10/2015    TONSILLECTOMY        No current facility-administered medications on file prior to encounter.     Current Outpatient Medications on File Prior to Encounter   Medication Sig Dispense Refill    amLODIPine (NORVASC) 5 MG tablet Take 1 tablet (5 mg total) by mouth once daily. 90 tablet 3    aspirin (ECOTRIN) 81 MG EC tablet Take 1 tablet (81 mg total) by mouth once daily.  0    blood sugar  diagnostic (BLOOD GLUCOSE TEST) Strp 1 strip by Misc.(Non-Drug; Combo Route) route 2 (two) times daily. 100 strip 12    blood sugar diagnostic Strp 1 strip by Misc.(Non-Drug; Combo Route) route 2 (two) times daily. ACCU-CHEK BROOK PLUS 200 strip 3    blood-glucose meter kit TRUE METRIX AIR W/BLUETOOTH SMART w/Device  Kit - use as instructed 1 each 0    cetirizine (ZYRTEC) 10 MG tablet Take 10 mg by mouth once daily.      flash glucose scanning reader (FREESTYLE FLOWER 2 READER) [facility] Continuous glucose reader 1 each 12    flash glucose sensor (FREESTYLE FLOWER 2 SENSOR) Kit Continuous glucose monitor 1 kit 12    FLUoxetine 10 MG capsule Take 1 capsule (10 mg total) by mouth once daily. 30 capsule 11    fluticasone (FLONASE) 50 mcg/actuation nasal spray 1 spray by Each Nare route once daily. (Patient not taking: Reported on 3/13/2023) 16 g 12    hydroCHLOROthiazide (MICROZIDE) 12.5 mg capsule TAKE 1 CAPSULE EVERY DAY 90 capsule 3    Lactobacillus rhamnosus GG (CULTURELLE) 10 billion cell capsule Take by mouth. 1 Capsule Oral Every day      lancets Misc Test tid 100 each 12    lisinopriL (PRINIVIL,ZESTRIL) 5 MG tablet Take 1 tablet (5 mg total) by mouth once daily. 90 tablet 3    metFORMIN (GLUCOPHAGE) 500 MG tablet Take 1 tablet (500 mg total) by mouth once daily. 90 tablet 3    metoprolol tartrate (LOPRESSOR) 100 MG tablet Take 1 tablet (100 mg total) by mouth once daily. 90 tablet 3    olopatadine (PATADAY) 0.2 % Drop Place 1 drop into both eyes once daily. 2.5 mL 12    simvastatin (ZOCOR) 10 MG tablet Take 1 tablet (10 mg total) by mouth every evening. 90 tablet 3    TRUEPLUS LANCETS 33 gauge Misc         Allergies: Sulfa (sulfonamide antibiotics)  Family History   Problem Relation Age of Onset    Heart disease Mother 58    Cataracts Mother     Heart disease Father 50    Thyroid disease Sister     Breast cancer Sister     No Known Problems Brother     No Known Problems Maternal Aunt     No Known Problems Maternal  Uncle     No Known Problems Paternal Aunt     No Known Problems Paternal Uncle     No Known Problems Maternal Grandmother     No Known Problems Maternal Grandfather     No Known Problems Paternal Grandmother     No Known Problems Paternal Grandfather     Amblyopia Neg Hx     Blindness Neg Hx     Diabetes Neg Hx     Glaucoma Neg Hx     Hypertension Neg Hx     Macular degeneration Neg Hx     Retinal detachment Neg Hx     Strabismus Neg Hx     Stroke Neg Hx      Social History     Tobacco Use    Smoking status: Former     Current packs/day: 7.00     Average packs/day: 7.0 packs/day for 0.5 years (3.5 ttl pk-yrs)     Types: Cigarettes    Smokeless tobacco: Never   Substance Use Topics    Alcohol use: No    Drug use: Never     Review of Systems   Unable to perform ROS: Mental status change     Objective:     Vitals:    Temp: 96.3 °F (35.7 °C)  Pulse: (!) 57  Rhythm: atrial rhythm  BP: (!) 106/53  MAP (mmHg): 76  Resp: 11  SpO2: 100 %  Oxygen Concentration (%): 40  Vent Mode: A/C  Set Rate: 18 BPM  Vt Set: 410 mL  PEEP/CPAP: 5 cmH20  Peak Airway Pressure: 26 cmH20  Mean Airway Pressure: 9 cmH20  Plateau Pressure: 0 cmH20    Temp  Min: 96.1 °F (35.6 °C)  Max: 99.1 °F (37.3 °C)  Pulse  Min: 57  Max: 150  BP  Min: 106/53  Max: 222/122  MAP (mmHg)  Min: 75  Max: 166  Resp  Min: 11  Max: 48  SpO2  Min: 97 %  Max: 100 %  Oxygen Concentration (%)  Min: 40  Max: 40    No intake/output data recorded.            Physical Exam  Vitals and nursing note reviewed.   Constitutional:       General: She is in acute distress.      Appearance: She is normal weight.   HENT:      Head: Normocephalic.      Comments: Scalp hematoma     Right Ear: External ear normal.      Left Ear: External ear normal.      Nose: Nose normal.      Mouth/Throat:      Mouth: Mucous membranes are dry.      Pharynx: Oropharynx is clear.   Eyes:      Pupils: Pupils are equal, round, and reactive to light.   Cardiovascular:      Rate and Rhythm: Normal rate and  "regular rhythm.   Pulmonary:      Effort: Pulmonary effort is normal. No respiratory distress.   Abdominal:      General: There is no distension.      Palpations: Abdomen is soft.   Musculoskeletal:      Cervical back: Normal range of motion.      Right lower leg: No edema.      Left lower leg: No edema.   Skin:     General: Skin is warm and dry.   Neurological:      Mental Status: She is alert.      Comments: E4 V4 M5  Alert. Unable to assess orientation. Pt screaming "help me" repeatedly and not redirectable. Not following commands or answering questions  CN II-XII grossly intact, specifically:  PERRL  No facial asymmetry. Facial sensation intact in V1-V3.  Motor:  BECKY and purposefully trying to get out of bed   AG x4   Psychiatric:         Behavior: Behavior is uncooperative and agitated.      Comments: Pt appears anxious, agitated, and fearful, screaming "help me" repeatedly  Not able to redirect pt        Unable to test orientation, language, memory, judgment, insight, fund of knowledge, hearing, shoulder shrug, tongue protrusion, coordination, gait due to level of consciousness.       Today I personally reviewed pertinent medications, lines/drains/airways, imaging, cardiology results, laboratory results, microbiology results, notably:  CXR  Lungs appear grossly clear, a noting some minor degree of left basilar atelectasis.     CTH   Acute right greater than left subdural hematomas.  No midline shift with some of effacement of particularly the right lateral ventricle.     CTA  Bilateral acute subdural hematomas, stable from earlier same day CT head.  No high-grade stenosis, aneurysm or major vessel occlusion.  Brain parenchyma unchanged from prior study earlier same day.  "

## 2024-01-14 NOTE — CONSULTS
"[facility] - [facility] Critical Care  Adult Nutrition  Consult Note    SUMMARY     Recommendations    When medically able, ADAT Diabetic diet with texture per SLP    If TF warranted while intubated: Impact peptide 1.5 @ 1260 kcal, 79g protein, 647 ml FW    If pt extubated and required TF: Glucerna 1.5 @ 45ml/hr to provide 1620 kcal, 89g protein, 820ml FW    RD to monitor and follow    Goals: Meet % EEN, EPN by RD f/u  Nutrition Goal Status: new  Communication of RD Recs:  (POC)    Assessment and Plan    Nutrition Problem:  Inadequate energy intake     Related to (etiology):   Inability to consume sufficient energy      Signs and Symptoms (as evidenced by):   NPO     Interventions/Recommendations (treatment strategy):  Collaboration with providers  EN     Nutrition Diagnosis Status:   New      Reason for Assessment    Reason For Assessment: consult  Diagnosis:  (traumatic subdural hematoma)  Relevant Medical History: HTN, T2DM, breast cancer, T2DM, PVD  Interdisciplinary Rounds: did not attend    General Information Comments:   RD consulted for dietary needs assessment. Pt with presented following fall at home with development of acute bilateral subdural hematomas. Pt is intubated at this time, sedation paused for extubation. Unable to obtain nutrition hx at this time. Wt stable per chart. NFPE not warranted. RD following.     Nutrition Discharge Planning: Pending medical course    Nutrition Risk Screen    Nutrition Risk Screen: tube feeding or parenteral nutrition    Nutrition/Diet History    Factors Affecting Nutritional Intake: on mechanical ventilation, NPO    Anthropometrics    Temp: 97.9 °F (36.6 °C)  Height: 5' 6" (167.6 cm)  Height (inches): 66 in  Weight: 74.4 kg (164 lb)  Weight (lb): 164 lb  Ideal Body Weight (IBW), Female: 130 lb  % Ideal Body Weight, Female (lb): 126.15 %  BMI (Calculated): 26.5       Lab/Procedures/Meds    Pertinent Labs Reviewed: reviewed  Pertinent Labs Comments: phos 2.6, K 3.3, " glucose 168  Pertinent Medications Reviewed: reviewed  Pertinent Medications Comments: atorvastatin, polyethylene glycol    Physical Findings/Assessment         Estimated/Assessed Needs    Weight Used For Calorie Calculations: 74.4 kg (164 lb)  Energy Calorie Requirements (kcal): 1245 kcal  Energy Need Method: [facility]  Protein Requirements: 71-89g (1.2-1.5g/kg IBW)  Weight Used For Protein Calculations: 59 kg (130 lb) (IBW)  Fluid Requirements (mL): 1ml/kcal or per MD  Estimated Fluid Requirement Method: RDA Method  RDA Method (mL): 1245     Nutrition Prescription Ordered    Current Diet Order: NPO    Evaluation of Received Nutrient/Fluid Intake    Energy Calories Required: not meeting needs  Protein Required: not meeting needs  Comments: LBM 1/12    Nutrition Risk    Level of Risk/Frequency of Follow-up:  (1x/week)     Monitor and Evaluation    Food and Nutrient Intake: energy intake, food and beverage intake, enteral nutrition intake  Food and Nutrient Adminstration: diet order, enteral and parenteral nutrition administration  Physical Activity and Function: nutrition-related ADLs and IADLs  Anthropometric Measurements: height/length, weight, weight change, body mass index  Biochemical Data, Medical Tests and Procedures: electrolyte and renal panel, glucose/endocrine profile, gastrointestinal profile, inflammatory profile, lipid profile  Nutrition-Focused Physical Findings: overall appearance     Nutrition Follow-Up    RD Follow-up?: Yes

## 2024-01-14 NOTE — HOSPITAL COURSE
01/14/2024 Sedated overnight d/t extreme agitation and inability to be redirected. CTH this AM stable. Extubated w/ no issues.   01/15/2024 No issues overnight. Discussed w/ NSGY, stable for stepdown to Hospital Medicine.  01/16/2024: Boarding for , awaiting bed, SW helping with placement

## 2024-01-14 NOTE — PLAN OF CARE
[facility] Care Plan    POC reviewed with Kanchan Downing and family at 1400. P Will continue to monitor. See below and flowsheets for full assessment and VS info.       -on Fentanyl and Propofol gtt.   -Cardene gtt off.   -CTA obtained.       Is this a stroke patient? yes- Stroke booklet reviewed with patient and family, risk factors identified for patient and stroke booklet remains at bedside for ongoing education.     Neuro:  [place] Coma Scale  Best Eye Response: 4-->(E4) spontaneous  Best Motor Response: 5-->(M5) localizes pain  Best Verbal Response: 1-->(V1) none  [place] Coma Scale Score: 10  Assessment Qualifiers: patient intubated  Pupil PERRLA: yes     24 hr Temp:  [96.1 °F (35.6 °C)-99.1 °F (37.3 °C)]     CV:   Rhythm: atrial rhythm  BP goals:   SBP < 140  MAP > 65    Resp:      Vent Mode: A/C  Set Rate: 18 BPM  Oxygen Concentration (%): 40  Vt Set: 410 mL  PEEP/CPAP: 5 cmH20    Plan: wean to extubate    GI/:        Last Bowel Movement: 01/12/24  Voiding Characteristics: urethral catheter (bladder)    Intake/Output Summary (Last 24 hours) at 1/13/2024 1826  Last data filed at 1/13/2024 1801  Gross per 24 hour   Intake 287.34 ml   Output 400 ml   Net -112.66 ml          Labs/Accuchecks:  Recent Labs   Lab 01/13/24  1210   WBC 7.59   RBC 4.73   HGB 13.9   HCT 43.2         Recent Labs   Lab 01/13/24  1210      K 3.8   CO2 21*      BUN 15   CREATININE 0.8   ALKPHOS 58   ALT 13   AST 20   BILITOT 0.6      Recent Labs   Lab 01/13/24  1249   INR 1.0   APTT 23.7      Recent Labs   Lab 01/13/24  1249   TROPONINI <0.006       Electrolytes: N/A - electrolytes WDL  Accuchecks: none    Gtts:   fentanyl 175 mcg/hr (01/13/24 1801)    ketamine 5 mg/mL infusion (titrating)      nicardipine 15 mg/hr (01/13/24 1701)    propofoL 30 mcg/kg/min (01/13/24 1801)       LDA/Wounds:  Lines/Drains/Airways       Drain  Duration                  NG/OG Tube 01/13/24 1257 14 Fr. Center mouth <1 day         Urethral  Catheter 01/13/24 1246 Temperature probe 16 Fr. <1 day              Airway  Duration                  Airway - Non-Surgical 01/13/24 1233 Endotracheal Tube <1 day              Peripheral Intravenous Line  Duration                  Peripheral IV - Single Lumen 01/13/24 1311 20 G Posterior;Right Hand <1 day         Peripheral IV - Single Lumen 01/13/24 1516 20 G Anterior;Left Forearm <1 day         Peripheral IV - Single Lumen 01/13/24 1516 20 G Anterior;Right Forearm <1 day                  Wounds: No  Wound care consulted: No

## 2024-01-14 NOTE — PLAN OF CARE
Recommendations    When medically able, ADAT Diabetic diet with texture per SLP    If TF warranted while intubated: Impact peptide 1.5 @ 1260 kcal, 79g protein, 647 ml FW    If pt extubated and required TF: Glucerna 1.5 @ 45ml/hr to provide 1620 kcal, 89g protein, 820ml FW    RD to monitor and follow    Goals: Meet % EEN, EPN by RD f/u  Nutrition Goal Status: new  Communication of RL Recs:  (POC)

## 2024-01-14 NOTE — PT/OT/SLP PROGRESS
Speech Language Pathology  Discharge    Kanchan Downing  MRN: [id number]    Patient not seen today secondary to pt intubated at this time. Please re-consult when medically appropriate. No further ST warranted at this time.   1/14/2024

## 2024-01-14 NOTE — PROGRESS NOTES
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 History obtained from granddaughter at bedside. Pt had an unwitnessed fall around 1100 on 1/13. 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 Pt was wearing socks and slipped. She called her son and was crying and complaining of a HA. Son called EMS. [place] [facility] [place] w 8mm R SDH and 5mm L SDH. CT c-spine negative for acute processes. Pt developed non-redirectable agitation, screaming "help me" and trying to get out of the bed. She was not following any commands and SBP was 220. Pt was intubated in the ED and started on propofol. Placed on cardene. Pt admitted to NCC. On arrival to NCCU, pt was on propofol 50, fentanyl 250, and still requiring 5-point restraints to prevent self-extubation. Gave 5mg IV haldol and 2mg ativan and pt fell asleep. Five-hour interval CTH/CTA w stable bl SDH and no vascular abnormality. Possible ASA use, given DDAVP.            Of note, pt lost her , Marques, in 1/2022 and subsequently developed a progressive decline in functional status. Pt's family had concerns for early dementia as pt will become upset thinking about her  and starts to forget things. This decline is now thought to be related to her depression rather than dementia. Pt does perform her ADLs at home alone, but family started looking into options for assistance.     Hospital Course: 01/14/2024 Sedated overnight d/t extreme agitation and " inability to be redirected. [facility] this AM stable. Extubated w/ no issues.     Interval History:  See hospital course.     Review of Systems   Respiratory:  Negative for cough and shortness of breath.    Gastrointestinal:  Negative for abdominal pain, nausea and vomiting.   Neurological:  Positive for headaches.     Objective:     Vitals:  Temp: 98.1 °F (36.7 °C)  Pulse: 86  Rhythm: atrial rhythm  BP: (!) 121/56  MAP (mmHg): 81  Resp: 18  SpO2: 96 %  Oxygen Concentration (%): 40  Vent Mode: SIMV  Set Rate: 12 BPM  Vt Set: 410 mL  Pressure Support: 10 cmH20  PEEP/CPAP: 5 cmH20  Peak Airway Pressure: 23 cmH20  Mean Airway Pressure: 7.7 cmH20    Temp  Min: 96.1 °F (35.6 °C)  Max: 98.1 °F (36.7 °C)  Pulse  Min: 53  Max: 86  BP  Min: 78/46  Max: 191/82  MAP (mmHg)  Min: 58  Max: 117  Resp  Min: 9  Max: 20  SpO2  Min: 96 %  Max: 100 %  Oxygen Concentration (%)  Min: 40  Max: 40    01/13 0701 - 01/14 0700  In: 771.6 [I.V.:680.8]  Out: 1800 [Urine:1800]            Physical Exam  Vitals and nursing note reviewed.   Eyes:      Pupils: Pupils are equal, round, and reactive to light.   Cardiovascular:      Rate and Rhythm: Bradycardia present. Rhythm irregular.   Pulmonary:      Effort: Pulmonary effort is normal.   Abdominal:      Palpations: Abdomen is soft.   Musculoskeletal:         General: Normal range of motion.   Skin:     General: Skin is warm and dry.      Capillary Refill: Capillary refill takes less than 2 seconds.      Coloration: Skin is pale.   Neurological:      GCS: GCS eye subscore is 3. GCS verbal subscore is 4. GCS motor subscore is 6.      Sensory: Sensation is intact.      Motor: Motor function is intact.      Comments:   -E3 V4 M6  -PERRL  -Oriented to person  -Follows commands w/ all extremities  -STINSON equally/purposefully  -4/5 strength throughout            Medications:  Continuousfentanyl, Last Rate: Stopped (01/14/24 1047)  ketamine 5 mg/mL infusion (titrating)  nicardipine, Last Rate: 15 mg/hr (01/14/24  0717)  propofoL, Last Rate: Stopped (01/14/24 1046)    ScheduledamLODIPine, 5 mg, Daily  atorvastatin, 40 mg, Daily  FLUoxetine, 10 mg, Daily  fluticasone propionate, 2 spray, Daily  levETIRAcetam (Keppra) IV (PEDS and ADULTS), 500 mg, Q12H  lisinopriL, 5 mg, Daily  metoprolol tartrate, 50 mg, BID  mupirocin, , BID  polyethylene glycol, 17 g, Daily    PRNacetaminophen, 650 mg, Q6H PRN  dextrose 10%, 12.5 g, PRN  dextrose 10%, 25 g, PRN  fentanyl, 50 mcg, Q1H PRN  glucagon (human recombinant), 1 mg, PRN  hydrALAZINE, 10 mg, Q6H PRN  insulin aspart U-100, 0-10 Units, Q6H PRN  labetalol, 10 mg, Q6H PRN  magnesium oxide, 800 mg, PRN  magnesium oxide, 800 mg, PRN  ondansetron, 4 mg, Q8H PRN  potassium bicarbonate, 35 mEq, PRN  potassium bicarbonate, 50 mEq, PRN  potassium bicarbonate, 60 mEq, PRN  potassium, sodium phosphates, 2 packet, PRN  potassium, sodium phosphates, 2 packet, PRN  potassium, sodium phosphates, 2 packet, PRN  sodium chloride 0.9%, 10 mL, PRN      Today I personally reviewed pertinent medications, lines/drains/airways, imaging, cardiology results, laboratory results, notably: CTH; CBC, CMP; ABGs    Diet  Diet NPO Except for: Medication  Diet NPO Except for: Medication      Assessment/Plan:     Neuro  * Traumatic subdural hematoma  83F w PMHx of DM2, HTN, HLD, PVD w claudication, DVT (2001), 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 Intubated for non-redirectable agitation. Given DDAVP for possible ASA use.    1/13: CTH w 8mm R SDH and 5mm L SDH   1/13: Interval 5h CTA stable; no vascular abnormality     -Admitted to [facility]  -VS/Neuro checks q1  -NSGY following  -SBP goal < 160  -Continue home amlo/lisinopril/metoprolol  -PRN labetalol/hydralazine/cardene  -Keppra BID x 7 days  -CBC, CMP, Mag, Phos daily  -HOB >/=  30  -Hold DVT PPX for now  -PT/OT/SLP    Cardiac/Vascular  Hyperlipidemia associated with type 2 diabetes mellitus  -Atorvastatin daily    Hypertension associated with diabetes  -SBP goal < 160  -Continue home amlo/lisinopril/metoprolol  -PRN hydralazine/labetalol/cardene    Hematology  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  -Hx of    Endocrine  Type 2 diabetes mellitus with diabetic polyneuropathy, without long-term current use of insulin  -Hgb A1C 5.7  -Accuchecks q6 w/ mod dose SSI          The patient is being Prophylaxed for:  Venous Thromboembolism with: Mechanical  Stress Ulcer with: Not Applicable   Ventilator Pneumonia with: not applicable    Activity Orders            Turn patient starting at 01/13 1400    Elevate HOB starting at 01/13 1237    Diet NPO Except for: Medication: NPO starting at 01/13 1237          Full Code    Critical care time spent on the evaluation and treatment of severe organ dysfunction, review of pertinent labs and imaging studies, discussions with consulting providers and discussions with patient/family: 40 minutes.      Palak Mccoy NP  Neurocritical Care  [facility]

## 2024-01-14 NOTE — NURSING
Sedation held per Dr. Spence for extubation. Placed on spontaneous for 10mins. MD at bedside for extubation. Pt tolerated well. Sating at 100% on 2L NC.

## 2024-01-14 NOTE — SUBJECTIVE & OBJECTIVE
Interval History:  See hospital course.     Review of Systems   Respiratory:  Negative for cough and shortness of breath.    Gastrointestinal:  Negative for abdominal pain, nausea and vomiting.   Neurological:  Positive for headaches.     Objective:     Vitals:  Temp: 98.1 °F (36.7 °C)  Pulse: 86  Rhythm: atrial rhythm  BP: (!) 121/56  MAP (mmHg): 81  Resp: 18  SpO2: 96 %  Oxygen Concentration (%): 40  Vent Mode: SIMV  Set Rate: 12 BPM  Vt Set: 410 mL  Pressure Support: 10 cmH20  PEEP/CPAP: 5 cmH20  Peak Airway Pressure: 23 cmH20  Mean Airway Pressure: 7.7 cmH20    Temp  Min: 96.1 °F (35.6 °C)  Max: 98.1 °F (36.7 °C)  Pulse  Min: 53  Max: 86  BP  Min: 78/46  Max: 191/82  MAP (mmHg)  Min: 58  Max: 117  Resp  Min: 9  Max: 20  SpO2  Min: 96 %  Max: 100 %  Oxygen Concentration (%)  Min: 40  Max: 40    01/13 0701 - 01/14 0700  In: 771.6 [I.V.:680.8]  Out: 1800 [Urine:1800]            Physical Exam  Vitals and nursing note reviewed.   Eyes:      Pupils: Pupils are equal, round, and reactive to light.   Cardiovascular:      Rate and Rhythm: Bradycardia present. Rhythm irregular.   Pulmonary:      Effort: Pulmonary effort is normal.   Abdominal:      Palpations: Abdomen is soft.   Musculoskeletal:         General: Normal range of motion.   Skin:     General: Skin is warm and dry.      Capillary Refill: Capillary refill takes less than 2 seconds.      Coloration: Skin is pale.   Neurological:      GCS: GCS eye subscore is 3. GCS verbal subscore is 4. GCS motor subscore is 6.      Sensory: Sensation is intact.      Motor: Motor function is intact.      Comments:   -E3 V4 M6  -PERRL  -Oriented to person  -Follows commands w/ all extremities  -STINSON equally/purposefully  -4/5 strength throughout            Medications:  Continuousfentanyl, Last Rate: Stopped (01/14/24 1047)  ketamine 5 mg/mL infusion (titrating)  nicardipine, Last Rate: 15 mg/hr (01/14/24 0709)  propofoL, Last Rate: Stopped (01/14/24 1046)    ScheduledamLODIPine,  5 mg, Daily  atorvastatin, 40 mg, Daily  FLUoxetine, 10 mg, Daily  fluticasone propionate, 2 spray, Daily  levETIRAcetam (Keppra) IV (PEDS and ADULTS), 500 mg, Q12H  lisinopriL, 5 mg, Daily  metoprolol tartrate, 50 mg, BID  mupirocin, , BID  polyethylene glycol, 17 g, Daily    PRNacetaminophen, 650 mg, Q6H PRN  dextrose 10%, 12.5 g, PRN  dextrose 10%, 25 g, PRN  fentanyl, 50 mcg, Q1H PRN  glucagon (human recombinant), 1 mg, PRN  hydrALAZINE, 10 mg, Q6H PRN  insulin aspart U-100, 0-10 Units, Q6H PRN  labetalol, 10 mg, Q6H PRN  magnesium oxide, 800 mg, PRN  magnesium oxide, 800 mg, PRN  ondansetron, 4 mg, Q8H PRN  potassium bicarbonate, 35 mEq, PRN  potassium bicarbonate, 50 mEq, PRN  potassium bicarbonate, 60 mEq, PRN  potassium, sodium phosphates, 2 packet, PRN  potassium, sodium phosphates, 2 packet, PRN  potassium, sodium phosphates, 2 packet, PRN  sodium chloride 0.9%, 10 mL, PRN      Today I personally reviewed pertinent medications, lines/drains/airways, imaging, cardiology results, laboratory results, notably: CTH; CBC, CMP; ABGs    Diet  Diet NPO Except for: Medication  Diet NPO Except for: Medication

## 2024-01-14 NOTE — ASSESSMENT & PLAN NOTE
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 Intubated for non-redirectable agitation. Given DDAVP for possible ASA use.    1/13: CTH w 8mm R SDH and 5mm L SDH   1/13: Interval 5h CTA stable and no vascular abnormality     Admit to NCC  q1h neuro checks and vitals   CBC, CMP, mag, and phos daily   Coags, TSH, A1c, lipid panel, UA  Echo, EKG, CXR  SBP <160    Cardene   prn labetalol and hydralazine   Keppra 500 BID ppx x 7d   Neurosurgery consulted   Intubated on propofol and fentanyl, wean to ketamine for better exam  NPO pending nsgy plan and possible extubation   Consider extubating when family is present to calm pt    SCDs; hold chemical VTE ppx in acute setting   Hold AC/AP  PT/OT/SLP

## 2024-01-14 NOTE — PLAN OF CARE
Problem: Fall Injury Risk  Goal: Absence of Fall and Fall-Related Injury  Outcome: Ongoing, Progressing     Problem: Restraint, Nonbehavioral (Nonviolent)  Goal: Absence of Harm or Injury  Outcome: Ongoing, Progressing     Problem: Adult Inpatient Plan of Care  Goal: Plan of Care Review  Outcome: Ongoing, Progressing     Problem: Infection  Goal: Absence of Infection Signs and Symptoms  Outcome: Ongoing, Progressing

## 2024-01-14 NOTE — PLAN OF CARE
[facility] Care Plan    POC reviewed with Kanchan Downing and family at 1400. Pt progressing toward goals. Will continue to monitor. See below and flowsheets for full assessment and VS info.     -Extubated today, tolerated well. On 2L NC.    -Restraints removed.   -Mag and phos replaced.             Is this a stroke patient? yes- Stroke booklet reviewed with patient and family, risk factors identified for patient and stroke booklet remains at bedside for ongoing education.     Neuro:  Ana Laura Coma Scale  Best Eye Response: 3-->(E3) to speech  Best Motor Response: 6-->(M6) obeys commands  Best Verbal Response: 4-->(V4) confused  Norman Coma Scale Score: 13  Assessment Qualifiers: patient chemically sedated or paralyzed  Pupil PERRLA: yes     24 hr Temp:  [96.1 °F (35.6 °C)-98.1 °F (36.7 °C)]     CV:   Rhythm: atrial rhythm  BP goals:   SBP < 160  MAP > 65    Resp:      Vent Mode: SIMV  Set Rate: 12 BPM  Oxygen Concentration (%): 40  Vt Set: 410 mL  PEEP/CPAP: 5 cmH20  Pressure Support: 10 cmH20    Plan: N/A    GI/:        Last Bowel Movement: 01/12/24  Voiding Characteristics: urethral catheter (bladder)    Intake/Output Summary (Last 24 hours) at 1/14/2024 1656  Last data filed at 1/14/2024 1301  Gross per 24 hour   Intake 673.5 ml   Output 1480 ml   Net -806.5 ml          Labs/Accuchecks:  Recent Labs   Lab 01/14/24  0308   WBC 11.08   RBC 4.53   HGB 13.3   HCT 41.8         Recent Labs   Lab 01/14/24  0308      K 3.3*   CO2 23      BUN 13   CREATININE 0.7   ALKPHOS 55   ALT 11   AST 17   BILITOT 0.7      Recent Labs   Lab 01/13/24  1249   INR 1.0   APTT 23.7      Recent Labs   Lab 01/14/24  0308   TROPONINI 0.108*       Electrolytes: Electrolytes replaced  Accuchecks: none    Gtts:   fentanyl Stopped (01/14/24 1047)    ketamine 5 mg/mL infusion (titrating)      nicardipine 15 mg/hr (01/14/24 0709)    propofoL Stopped (01/14/24 1046)       LDA/Wounds:  Lines/Drains/Airways       Drain  Duration                   Urethral Catheter 01/13/24 1246 Temperature probe 16 Fr. 1 day              Peripheral Intravenous Line  Duration                  Peripheral IV - Single Lumen 01/13/24 1311 20 G Posterior;Right Hand 1 day         Peripheral IV - Single Lumen 01/13/24 1516 20 G Anterior;Left Forearm 1 day         Peripheral IV - Single Lumen 01/13/24 1516 20 G Anterior;Right Forearm 1 day                  Wounds:        Wound care consulted: No

## 2024-01-14 NOTE — H&P
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 History obtained from granddaughter at bedside. Pt had an unwitnessed fall around 1100 on 1/13. 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 Pt was wearing socks and slipped. She called her son and was crying and complaining of a HA. Son called EMS. Pt was brought to [facility]. CTH w 8mm R SDH and 5mm L SDH. CT c-spine negative for acute processes. Pt developed non-redirectable agitation, screaming "help me" and trying to get out of the bed. She was not following any commands and SBP was 220. Pt was intubated in the ED and started on propofol. Placed on cardene. Pt admitted to NCC. On arrival to NCCU, pt was on propofol 50, fentanyl 250, and still requiring 5-point restraints to prevent self-extubation. Gave 5mg IV haldol and 2mg ativan and pt fell asleep. Five-hour interval CTH/CTA w stable bl SDH and no vascular abnormality. Possible ASA use, given DDAVP.            Of note, pt lost her , Marques, in 1/2022 and subsequently developed a progressive decline in functional status. Pt's family had concerns for early dementia as pt will become upset thinking about her  and starts to forget things. This decline is now thought to be related to her depression rather than dementia. Pt does perform her ADLs at home alone, but family started looking into options for assistance.       Past Medical History:   Diagnosis Date    Anxiety     Arthritis  "    Dr Schofield    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    Atherosclerosis of aorta 06/08/2016    CXR 2013    Breast cancer 2011    right breast invasive micropapillary CA, Stage !A    Cataract     Controlled type 2 diabetes mellitus with circulatory disorder, without long-term current use of insulin 10/31/2017    Controlled type 2 diabetes mellitus with circulatory disorder, without long-term current use of insulin 10/31/2017    Diabetes mellitus type 2 without retinopathy 06/12/2014    6% metformin 500 bid, FU+ 2016    DVT (deep venous thrombosis) 2001    R , Dr Bonilla    Grief 04/06/2022     Marques passed 1/2022    Hyperlipidemia     LDL 82    Hyperlipidemia associated with type 2 diabetes mellitus 09/11/2012    Hypertension     Hypertension associated with diabetes 09/11/2012    Memory loss 12/29/2022    CT chronic changes 2022    Microalbuminuria due to type 2 diabetes mellitus 12/08/2015    taking lisinopril, losartan caused olivia effects    PVD (peripheral vascular disease) with claudication 05/02/2019    Compression hose    Risk for coronary artery disease greater than 20% in next 10 years per [place] score 05/01/2018    Strabismus     Type 2 diabetes mellitus with diabetic polyneuropathy 06/12/2014    Type 2 diabetes mellitus with diabetic polyneuropathy, without long-term current use of insulin 06/12/2014     Past Surgical History:   Procedure Laterality Date    BREAST BIOPSY Right 2011    BREAST LUMPECTOMY Right 2011    GA REMOVAL OF NAIL BED  6/10/2015    TONSILLECTOMY        No current facility-administered medications on file prior to encounter.     Current Outpatient Medications on File Prior to Encounter   Medication Sig Dispense Refill    amLODIPine (NORVASC) 5 MG tablet Take 1 tablet (5 mg total) by mouth once daily. 90 tablet 3    aspirin (ECOTRIN) 81 MG EC tablet Take 1 tablet (81 mg total) by mouth once daily.  0    blood sugar diagnostic (BLOOD GLUCOSE TEST) Strp 1 strip by Misc.(Non-Drug; Combo Route)  route 2 (two) times daily. 100 strip 12    blood sugar diagnostic Strp 1 strip by Misc.(Non-Drug; Combo Route) route 2 (two) times daily. ACCU-CHEK BROOK PLUS 200 strip 3    blood-glucose meter kit TRUE METRIX AIR W/BLUETOOTH SMART w/Device  Kit - use as instructed 1 each 0    cetirizine (ZYRTEC) 10 MG tablet Take 10 mg by mouth once daily.      flash glucose scanning reader (FREESTYLE FLOWER 2 READER) [facility] Continuous glucose reader 1 each 12    flash glucose sensor (FREESTYLE FLOWER 2 SENSOR) Kit Continuous glucose monitor 1 kit 12    FLUoxetine 10 MG capsule Take 1 capsule (10 mg total) by mouth once daily. 30 capsule 11    fluticasone (FLONASE) 50 mcg/actuation nasal spray 1 spray by Each Nare route once daily. (Patient not taking: Reported on 3/13/2023) 16 g 12    hydroCHLOROthiazide (MICROZIDE) 12.5 mg capsule TAKE 1 CAPSULE EVERY DAY 90 capsule 3    Lactobacillus rhamnosus GG (CULTURELLE) 10 billion cell capsule Take by mouth. 1 Capsule Oral Every day      lancets Misc Test tid 100 each 12    lisinopriL (PRINIVIL,ZESTRIL) 5 MG tablet Take 1 tablet (5 mg total) by mouth once daily. 90 tablet 3    metFORMIN (GLUCOPHAGE) 500 MG tablet Take 1 tablet (500 mg total) by mouth once daily. 90 tablet 3    metoprolol tartrate (LOPRESSOR) 100 MG tablet Take 1 tablet (100 mg total) by mouth once daily. 90 tablet 3    olopatadine (PATADAY) 0.2 % Drop Place 1 drop into both eyes once daily. 2.5 mL 12    simvastatin (ZOCOR) 10 MG tablet Take 1 tablet (10 mg total) by mouth every evening. 90 tablet 3    TRUEPLUS LANCETS 33 gauge Misc         Allergies: Sulfa (sulfonamide antibiotics)  Family History   Problem Relation Age of Onset    Heart disease Mother 58    Cataracts Mother     Heart disease Father 50    Thyroid disease Sister     Breast cancer Sister     No Known Problems Brother     No Known Problems Maternal Aunt     No Known Problems Maternal Uncle     No Known Problems Paternal Aunt     No Known Problems Paternal Uncle      No Known Problems Maternal Grandmother     No Known Problems Maternal Grandfather     No Known Problems Paternal Grandmother     No Known Problems Paternal Grandfather     Amblyopia Neg Hx     Blindness Neg Hx     Diabetes Neg Hx     Glaucoma Neg Hx     Hypertension Neg Hx     Macular degeneration Neg Hx     Retinal detachment Neg Hx     Strabismus Neg Hx     Stroke Neg Hx      Social History     Tobacco Use    Smoking status: Former     Current packs/day: 7.00     Average packs/day: 7.0 packs/day for 0.5 years (3.5 ttl pk-yrs)     Types: Cigarettes    Smokeless tobacco: Never   Substance Use Topics    Alcohol use: No    Drug use: Never     Review of Systems   Unable to perform ROS: Mental status change     Objective:     Vitals:    Temp: 96.3 °F (35.7 °C)  Pulse: (!) 57  Rhythm: atrial rhythm  BP: (!) 106/53  MAP (mmHg): 76  Resp: 11  SpO2: 100 %  Oxygen Concentration (%): 40  Vent Mode: A/C  Set Rate: 18 BPM  Vt Set: 410 mL  PEEP/CPAP: 5 cmH20  Peak Airway Pressure: 26 cmH20  Mean Airway Pressure: 9 cmH20  Plateau Pressure: 0 cmH20    Temp  Min: 96.1 °F (35.6 °C)  Max: 99.1 °F (37.3 °C)  Pulse  Min: 57  Max: 150  BP  Min: 106/53  Max: 222/122  MAP (mmHg)  Min: 75  Max: 166  Resp  Min: 11  Max: 48  SpO2  Min: 97 %  Max: 100 %  Oxygen Concentration (%)  Min: 40  Max: 40    No intake/output data recorded.            Physical Exam  Vitals and nursing note reviewed.   Constitutional:       General: She is in acute distress.      Appearance: She is normal weight.   HENT:      Head: Normocephalic.      Comments: Scalp hematoma     Right Ear: External ear normal.      Left Ear: External ear normal.      Nose: Nose normal.      Mouth/Throat:      Mouth: Mucous membranes are dry.      Pharynx: Oropharynx is clear.   Eyes:      Pupils: Pupils are equal, round, and reactive to light.   Cardiovascular:      Rate and Rhythm: Normal rate and regular rhythm.   Pulmonary:      Effort: Pulmonary effort is normal. No respiratory  "distress.   Abdominal:      General: There is no distension.      Palpations: Abdomen is soft.   Musculoskeletal:      Cervical back: Normal range of motion.      Right lower leg: No edema.      Left lower leg: No edema.   Skin:     General: Skin is warm and dry.   Neurological:      Mental Status: She is alert.      Comments: E4 V4 M5  Alert. Unable to assess orientation. Pt screaming "help me" repeatedly and not redirectable. Not following commands or answering questions  CN II-XII grossly intact, specifically:  PERRL  No facial asymmetry. Facial sensation intact in V1-V3.  Motor:  BECKY and purposefully trying to get out of bed   AG x4   Psychiatric:         Behavior: Behavior is uncooperative and agitated.      Comments: Pt appears anxious, agitated, and fearful, screaming "help me" repeatedly  Not able to redirect pt        Unable to test orientation, language, memory, judgment, insight, fund of knowledge, hearing, shoulder shrug, tongue protrusion, coordination, gait due to level of consciousness.       Today I personally reviewed pertinent medications, lines/drains/airways, imaging, cardiology results, laboratory results, microbiology results, notably:  CXR  Lungs appear grossly clear, a noting some minor degree of left basilar atelectasis.     CTH   Acute right greater than left subdural hematomas.  No midline shift with some of effacement of particularly the right lateral ventricle.     CTA  Bilateral acute subdural hematomas, stable from earlier same day CT head.  No high-grade stenosis, aneurysm or major vessel occlusion.  Brain parenchyma unchanged from prior study earlier same day.  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 Intubated for non-redirectable agitation. Given DDAVP for possible ASA use.    1/13: CTH w 8mm R SDH and 5mm L SDH   1/13: Interval 5h CTA stable and no vascular abnormality     Admit to NCC  q1h neuro checks and vitals   CBC, CMP, mag, and phos daily   Coags, TSH, A1c, lipid panel, UA  Echo, EKG, CXR  SBP <160    Cardene   prn labetalol and hydralazine   Keppra 500 BID ppx x 7d   Neurosurgery consulted   Intubated on propofol and fentanyl, wean to ketamine for better exam  NPO pending nsgy plan and possible extubation   Consider extubating when family is present to calm pt    SCDs; hold chemical VTE ppx in acute setting   Hold AC/AP  PT/OT/SLP      Cardiac/Vascular  Hyperlipidemia associated with type 2 diabetes mellitus  Statin     Hypertension associated with diabetes  SBP < 160  Cardene, wean  Amlodipine 5  Lisinopril 5  Metop 50 BID    Hematology  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  Hx of    Endocrine  Type 2 diabetes mellitus with diabetic polyneuropathy, without long-term current use of insulin  A1c   SSI          The patient is being Prophylaxed for:  Venous Thromboembolism with: Mechanical  Stress Ulcer with: Not Applicable   Ventilator Pneumonia with: not applicable    Activity Orders            Turn patient starting at 01/13 1400    Elevate HOB starting at 01/13 1237    Diet NPO Except for: Medication: NPO starting at 01/13 1237          Full Code    Critical care time spent on the evaluation and treatment of severe organ dysfunction, review of pertinent labs and imaging studies, discussions with consulting providers and discussions with patient/family: 45 minutes.      Geronimo Rain PA-C  Neurocritical Care  [facility]

## 2024-01-14 NOTE — PT/OT/SLP PROGRESS
Physical Therapy      Patient Name:  Kanchan Downing   MRN:  [id number]    Patient not seen today secondary to pt is intubated and not appropriate for OOB mobility at this time. Will follow-up pending extubation.

## 2024-01-14 NOTE — PT/OT/SLP PROGRESS
Occupational Therapy      Patient Name:  Kanchan Downing   MRN:  [id number]    Patient not seen today secondary to patient not seen today secondary to pt intubated at this time. Will follow-up 1/15/24 or as appropriate.    1/14/2024

## 2024-01-14 NOTE — ASSESSMENT & PLAN NOTE
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 Intubated for non-redirectable agitation. Given DDAVP for possible ASA use.    1/13: CTH w 8mm R SDH and 5mm L SDH   1/13: Interval 5h CTA stable; no vascular abnormality     -Admitted to [facility]  -VS/Neuro checks q1  -NSGY following  -SBP goal < 140  -Continue home amlo/lisinopril/metoprolol  -PRN labetalol/hydralazine/cardene  -Keppra BID x 7 days  -CBC, CMP, Mag, Phos daily  -HOB >/= 30  -Hold DVT PPX for now  -PT/OT/SLP

## 2024-01-15 PROBLEM — F43.21 GRIEF: Status: RESOLVED | Noted: 2022-04-06 | Resolved: 2024-01-15

## 2024-01-15 LAB
ALBUMIN SERPL BCP-MCNC: 3.5 G/DL (ref 3.5–5.2)
ALP SERPL-CCNC: 61 U/L (ref 55–135)
ALT SERPL W/O P-5'-P-CCNC: 14 U/L (ref 10–44)
ANION GAP SERPL CALC-SCNC: 11 MMOL/L (ref 8–16)
AST SERPL-CCNC: 22 U/L (ref 10–40)
BASOPHILS # BLD AUTO: 0.05 K/UL (ref 0–0.2)
BASOPHILS NFR BLD: 0.3 % (ref 0–1.9)
BILIRUB SERPL-MCNC: 0.9 MG/DL (ref 0.1–1)
BUN SERPL-MCNC: 16 MG/DL (ref 8–23)
CALCIUM SERPL-MCNC: 10.1 MG/DL (ref 8.7–10.5)
CHLORIDE SERPL-SCNC: 105 MMOL/L (ref 95–110)
CO2 SERPL-SCNC: 23 MMOL/L (ref 23–29)
CREAT SERPL-MCNC: 0.8 MG/DL (ref 0.5–1.4)
DIFFERENTIAL METHOD BLD: ABNORMAL
EOSINOPHIL # BLD AUTO: 0 K/UL (ref 0–0.5)
EOSINOPHIL NFR BLD: 0.1 % (ref 0–8)
ERYTHROCYTE [DISTWIDTH] IN BLOOD BY AUTOMATED COUNT: 14.2 % (ref 11.5–14.5)
EST. GFR  (NO RACE VARIABLE): >60 ML/MIN/1.73 M^2
GLUCOSE SERPL-MCNC: 134 MG/DL (ref 70–110)
HCT VFR BLD AUTO: 42.8 % (ref 37–48.5)
HGB BLD-MCNC: 13.9 G/DL (ref 12–16)
IMM GRANULOCYTES # BLD AUTO: 0.07 K/UL (ref 0–0.04)
IMM GRANULOCYTES NFR BLD AUTO: 0.5 % (ref 0–0.5)
LYMPHOCYTES # BLD AUTO: 1.3 K/UL (ref 1–4.8)
LYMPHOCYTES NFR BLD: 8.2 % (ref 18–48)
MAGNESIUM SERPL-MCNC: 2 MG/DL (ref 1.6–2.6)
MCH RBC QN AUTO: 29.4 PG (ref 27–31)
MCHC RBC AUTO-ENTMCNC: 32.5 G/DL (ref 32–36)
MCV RBC AUTO: 91 FL (ref 82–98)
MONOCYTES # BLD AUTO: 0.8 K/UL (ref 0.3–1)
MONOCYTES NFR BLD: 5.1 % (ref 4–15)
NEUTROPHILS # BLD AUTO: 13.3 K/UL (ref 1.8–7.7)
NEUTROPHILS NFR BLD: 85.8 % (ref 38–73)
NRBC BLD-RTO: 0 /100 WBC
PHOSPHATE SERPL-MCNC: 2.8 MG/DL (ref 2.7–4.5)
PLATELET # BLD AUTO: 154 K/UL (ref 150–450)
PMV BLD AUTO: 9.9 FL (ref 9.2–12.9)
POCT GLUCOSE: 132 MG/DL (ref 70–110)
POCT GLUCOSE: 145 MG/DL (ref 70–110)
POCT GLUCOSE: 148 MG/DL (ref 70–110)
POCT GLUCOSE: 167 MG/DL (ref 70–110)
POTASSIUM SERPL-SCNC: 4.9 MMOL/L (ref 3.5–5.1)
PROT SERPL-MCNC: 6.9 G/DL (ref 6–8.4)
RBC # BLD AUTO: 4.72 M/UL (ref 4–5.4)
SODIUM SERPL-SCNC: 139 MMOL/L (ref 136–145)
WBC # BLD AUTO: 15.52 K/UL (ref 3.9–12.7)

## 2024-01-15 PROCEDURE — 97535 SELF CARE MNGMENT TRAINING: CPT | Mod: HCNC

## 2024-01-15 PROCEDURE — 97162 PT EVAL MOD COMPLEX 30 MIN: CPT | Mod: HCNC

## 2024-01-15 PROCEDURE — 97530 THERAPEUTIC ACTIVITIES: CPT | Mod: HCNC

## 2024-01-15 PROCEDURE — 99233 SBSQ HOSP IP/OBS HIGH 50: CPT | Mod: HCNC,,, | Performed by: REGISTERED NURSE

## 2024-01-15 PROCEDURE — 51701 INSERT BLADDER CATHETER: CPT | Mod: HCNC

## 2024-01-15 PROCEDURE — 63600175 PHARM REV CODE 636 W HCPCS: Mod: HCNC | Performed by: REGISTERED NURSE

## 2024-01-15 PROCEDURE — 80053 COMPREHEN METABOLIC PANEL: CPT | Mod: HCNC

## 2024-01-15 PROCEDURE — 25000003 PHARM REV CODE 250: Mod: HCNC

## 2024-01-15 PROCEDURE — 83735 ASSAY OF MAGNESIUM: CPT | Mod: HCNC

## 2024-01-15 PROCEDURE — 25000242 PHARM REV CODE 250 ALT 637 W/ HCPCS: Mod: HCNC

## 2024-01-15 PROCEDURE — 92610 EVALUATE SWALLOWING FUNCTION: CPT | Mod: HCNC

## 2024-01-15 PROCEDURE — 94761 N-INVAS EAR/PLS OXIMETRY MLT: CPT | Mod: HCNC

## 2024-01-15 PROCEDURE — 84100 ASSAY OF PHOSPHORUS: CPT | Mod: HCNC

## 2024-01-15 PROCEDURE — 97165 OT EVAL LOW COMPLEX 30 MIN: CPT | Mod: HCNC

## 2024-01-15 PROCEDURE — 85025 COMPLETE CBC W/AUTO DIFF WBC: CPT | Mod: HCNC

## 2024-01-15 PROCEDURE — 20600001 HC STEP DOWN PRIVATE ROOM: Mod: HCNC

## 2024-01-15 PROCEDURE — 51798 US URINE CAPACITY MEASURE: CPT | Mod: HCNC

## 2024-01-15 RX ORDER — HEPARIN SODIUM 5000 [USP'U]/ML
5000 INJECTION, SOLUTION INTRAVENOUS; SUBCUTANEOUS EVERY 8 HOURS
Status: DISCONTINUED | OUTPATIENT
Start: 2024-01-15 | End: 2024-01-20 | Stop reason: HOSPADM

## 2024-01-15 RX ADMIN — ATORVASTATIN CALCIUM 40 MG: 40 TABLET, FILM COATED ORAL at 08:01

## 2024-01-15 RX ADMIN — HEPARIN SODIUM 5000 UNITS: 5000 INJECTION INTRAVENOUS; SUBCUTANEOUS at 09:01

## 2024-01-15 RX ADMIN — MUPIROCIN: 20 OINTMENT TOPICAL at 08:01

## 2024-01-15 RX ADMIN — LEVETIRACETAM 500 MG: 100 INJECTION INTRAVENOUS at 08:01

## 2024-01-15 RX ADMIN — INSULIN ASPART 2 UNITS: 100 INJECTION, SOLUTION INTRAVENOUS; SUBCUTANEOUS at 04:01

## 2024-01-15 RX ADMIN — POLYETHYLENE GLYCOL 3350 17 G: 17 POWDER, FOR SOLUTION ORAL at 08:01

## 2024-01-15 RX ADMIN — FLUOXETINE 10 MG: 10 CAPSULE ORAL at 08:01

## 2024-01-15 RX ADMIN — HEPARIN SODIUM 5000 UNITS: 5000 INJECTION INTRAVENOUS; SUBCUTANEOUS at 02:01

## 2024-01-15 RX ADMIN — METOPROLOL TARTRATE 50 MG: 50 TABLET, FILM COATED ORAL at 08:01

## 2024-01-15 RX ADMIN — LISINOPRIL 5 MG: 5 TABLET ORAL at 08:01

## 2024-01-15 RX ADMIN — FLUTICASONE PROPIONATE 100 MCG: 50 SPRAY, METERED NASAL at 08:01

## 2024-01-15 RX ADMIN — AMLODIPINE BESYLATE 5 MG: 5 TABLET ORAL at 08:01

## 2024-01-15 NOTE — ASSESSMENT & PLAN NOTE
Patient's FSGs are controlled on current hypoglycemics.   Last A1c reviewed-   Lab Results   Component Value Date    HGBA1C 5.7 (H) 01/13/2024    HGBA1C 5.8 (H) 03/31/2022    HGBA1C 6.3 (H) 12/02/2021     Will hold PO hypoglycemics and will start correctional scale insulin  Most recent fingerstick glucose reviewed-   Recent Labs   Lab 01/16/24  1130 01/16/24  1734 01/16/24  2146 01/17/24  0538   POCTGLUCOSE 104 180* 138* 125*     currently on   Antihyperglycemics (From admission, onward)      Start     Stop Route Frequency Ordered    01/13/24 2117  insulin aspart U-100 pen 0-10 Units         -- SubQ Every 6 hours PRN 01/13/24 2018

## 2024-01-15 NOTE — ASSESSMENT & PLAN NOTE
83 F with possible ASA81 use, DM, HTN, presenting from home s/p fall with confusion, agitation, AMS found to have R>L acute SDH w/o midline shift and with minimal mass effect.    Interval CTH 1/14: stable    --Patient admitted to be admitted to [facility], d/w [facility] provider      -q1h neurochecks in ICU  --Reverse anti-plt/coag medications - possible home ASA81 use, s/p DDAVP  --SBP <160  --Na >135  --Keppra 500 BID x7 days  --Step down to floor status today  --SLP, PT/OT  --Continue to monitor clinically, notify NSGY immediately with any changes in neuro status  --AMS work-up per primary    Dispo: ongoing    Will d/w Dr. Nichols

## 2024-01-15 NOTE — PROGRESS NOTES
[facility]  Neurosurgery  Progress Note    Subjective:     History of Present Illness: 83 F with unknown ASA/AC BIBEMS s/p unwitnessed fall at home at approximately 0700. Unknown LOC with + scalp hematoma. Patient c/o headache on arrival. Following commands per EMS however more agitated in ED, not answering questions and not following commands. Patient yelling and trying to get out of stretcher, placed in restraints by ED. No external evidence of trauma.Patient unable to provide medical history.    ASA81 prescribed in 2020 unclear if patient is taking at this time. No family bedside for collateral hx.     Post-Op Info:  * No surgery found *       Interval History: 1/14: stable CTH. Agitated on exam, nonfocal    Medications:  Continuous Infusions:   fentanyl Stopped (01/14/24 1047)    propofoL Stopped (01/14/24 1046)     Scheduled Meds:   amLODIPine  5 mg Per NG tube Daily    atorvastatin  40 mg Per NG tube Daily    FLUoxetine  10 mg Per NG tube Daily    fluticasone propionate  2 spray Each Nostril Daily    levETIRAcetam (Keppra) IV (PEDS and ADULTS)  500 mg Intravenous Q12H    lisinopriL  5 mg Per NG tube Daily    metoprolol tartrate  50 mg Per NG tube BID    mupirocin   Nasal BID    polyethylene glycol  17 g Per NG tube Daily     PRN Meds:acetaminophen, dextrose 10%, dextrose 10%, fentanyl, glucagon (human recombinant), hydrALAZINE, insulin aspart U-100, labetalol, magnesium oxide, magnesium oxide, ondansetron, potassium bicarbonate, potassium bicarbonate, potassium bicarbonate, potassium, sodium phosphates, potassium, sodium phosphates, potassium, sodium phosphates, sodium chloride 0.9%     Review of Systems  Objective:     Weight: 74.4 kg (164 lb)  Body mass index is 26.47 kg/m².  Vital Signs (Most Recent):  Temp: 98.5 °F (36.9 °C) (01/14/24 2301)  Pulse: 68 (01/15/24 0101)  Resp: 11 (01/15/24 0101)  BP: 123/60 (01/15/24 0101)  SpO2: 100 % (01/15/24 0101) Vital Signs (24h Range):  Temp:  [96.8  "°F (36 °C)-100.4 °F (38 °C)] 98.5 °F (36.9 °C)  Pulse:  [53-98] 68  Resp:  [10-20] 11  SpO2:  [93 %-100 %] 100 %  BP: ()/(46-75) 123/60                Vent Mode: SIMV  Oxygen Concentration (%):  [40] 40  Resp Rate Total:  [12 br/min-18 br/min] 12 br/min  Vt Set:  [410 mL] 410 mL  PEEP/CPAP:  [5 cmH20] 5 cmH20  Pressure Support:  [10 cmH20] 10 cmH20  Mean Airway Pressure:  [7.7 cmH20-9 cmH20] 7.7 cmH20             Urethral Catheter 01/13/24 1246 Temperature probe 16 Fr. (Active)   Site Assessment Clean;Intact 01/15/24 0101   Collection Container Urimeter 01/15/24 0101   Securement Method secured to top of thigh w/ adhesive device 01/15/24 0101   Catheter Care Performed yes 01/15/24 0101   Reason for Continuing Urinary Catheterization Critically ill in ICU and requiring hourly monitoring of intake/output 01/15/24 0101   CAUTI Prevention Bundle Securement Device in place with 1" slack;Intact seal between catheter & drainage tubing;Drainage bag/urimeter off the floor;Sheeting clip in use;No dependent loops or kinks;Drainage bag/urimeter not overfilled (<2/3 full);Drainage bag/urimeter below bladder 01/15/24 0101   Output (mL) 20 mL 01/15/24 0101          Physical Exam         Neurosurgery Physical Exam    Propofol paused  [id number]  PERRL, tracking  Spont x4 antigravity, nonfocal    Significant Labs:  Recent Labs   Lab 01/13/24  1210 01/14/24  0308   * 168*    140   K 3.8 3.3*    106   CO2 21* 23   BUN 15 13   CREATININE 0.8 0.7   CALCIUM 9.8 9.6   MG  --  1.7     Recent Labs   Lab 01/13/24  1210 01/14/24  0308   WBC 7.59 11.08   HGB 13.9 13.3   HCT 43.2 41.8    160     Recent Labs   Lab 01/13/24  1210 01/13/24  1212 01/13/24  1249   INR 0.9 1.2 1.0   APTT  --   --  23.7     Microbiology Results (last 7 days)       ** No results found for the last 168 hours. **          All pertinent labs from the last 24 hours have been reviewed.    Significant Diagnostics:  CT: CT Head Without " Contrast    Result Date: 1/14/2024  Stable bilateral subdural hematomas.  No acute territorial infarct or new hemorrhage. Electronically signed by: Derrick Gregorio Date:    01/14/2024 Time:    08:12   MRI: No results found in the last 24 hours.  Assessment/Plan:     * Traumatic subdural hematoma  83 F with possible ASA81 use, DM, HTN, presenting from home s/p fall with confusion, agitation, AMS found to have R>L acute SDH w/o midline shift and with minimal mass effect.    Interval CTH 1/14: stable    --Patient admitted to be admitted to NCC, d/w NCC provider      -q1h neurochecks in ICU  --Reverse anti-plt/coag medications - possible home ASA81 use, s/p DDAVP  --SBP <140 -  in ED, will require Cardene gtt  --Na >135  --Keppra 500 BID x7 days  --HOB >30  --Continue to monitor clinically, notify NSGY immediately with any changes in neuro status  --Remainder of AMS work up/trauma work up per ED/NCC    Dispo: ongoing    Will d/w Dr. Magdalena Guzman MD  Neurosurgery  [facility]

## 2024-01-15 NOTE — PT/OT/SLP EVAL
Physical Therapy Co-Evaluation and Co-Treatment    Patient Name:  Kanchan Downing   MRN:  [id number]    Co-evaluation and co-treatment performed for this visit due to suspected patient need for two skilled therapists to ensure patient and staff safety and to accommodate for patient activity tolerance/pain management   Recommendations:     Discharge Recommendations: Moderate Intensity Therapy   Discharge Equipment Recommendations: bedside commode, wheelchair   Barriers to discharge: Increased level of assist    Assessment:     Kanchan Downing is a 83 y.o. female admitted with a medical diagnosis of Traumatic subdural hematoma. She presents with the following impairments/functional limitations: weakness, impaired endurance, impaired self care skills, impaired functional mobility, gait instability, impaired balance, impaired cognition, decreased upper extremity function, decreased lower extremity function, decreased safety awareness, impaired coordination, impaired fine motor, impaired cardiopulmonary response to activity. Pt with fair tolerance to therapy evaluation on this date. Pt presents with decreased arousal and requiring repeated instructions/questions before following verbal command. Pt requiring increased assistance to perform functional mobility 2/2 poor activity tolerance, lethargy, impaired postural stability, and decreased motor activation in BLE. Pt with c/o dizziness while standing at EOB; however, VSS. Recommend moderate intensity therapy following discharge once medically stable in order to reduce fall risk, reduce caregiver burden, improve quality of life, and return to PLOF.  Pt would continue to benefit from skilled acute PT in order to address current deficits and progress functional mobility.     Rehab Prognosis: Good; patient would benefit from acute skilled PT services 4 x/week to address these deficits and reach maximum level of function.  Recent Surgery: * No surgery found *      Plan:  "    During this hospitalization, patient to be seen 4 x/week to address the identified rehab impairments via gait training, therapeutic activities, therapeutic exercises, neuromuscular re-education and progress toward the following goals:    Plan of Care Expires:  02/15/24    Subjective     Chief Complaint: None verbalized  Patient/Family Comments/Goals: "I am usually independent"  Pain/Comfort:  Pain Rating 1: 0/10    Patients cultural, spiritual, Confucianism conflicts given the current situation: no    Living Environment:  Living Environment: Patient lives alone in a single story house with number of outside stair(s): 0 and walk-in shower.  Prior Level of Function: Prior to admission, patient  independent with ADLs and modified independent for mobility using rollator .  Equipment Used at Home: shower chair, rollator.  DME owned (not currently used): none  Assistance Upon Discharge: family; assistance will be intermittent as most of family works during day    Objective:     Communicated with nursing prior to session. Patient found HOB elevated with bed alarm, blood pressure cuff, telemetry, pulse ox (continuous), syed catheter upon PT entry to room.    General Precautions: Standard, aspiration, fall, nectar thick  Orthopedic Precautions:N/A    Braces: N/A    Exams:  Cognitive Exam:  Patient is oriented to Person, Place, Situation, Not oriented to time, follows commands 100% of the time  RLE ROM: WFL  RLE Strength: grossly 3/5  LLE ROM: WFL  LLE Strength: grossly 3/5  Sensation: Intact light touch to BLEs    Functional Mobility:  Bed Mobility:  Verbal cues for sequencing and technique  Supine to Sit: moderate assistance for LE management and trunk management  Sit to Supine: moderate assistance for LE management and trunk management  Transfers:    Sit to Stand: x2 reps from EOB; minimum assistance with rolling walker with cues for hand placement and foot placement  Gait: Patient ambulated 4 R lateral steps with " rolling walker and moderate assistance.  Patient demonstrates unsteady gait, decreased step length, narrow base of support, decreased weight shift, decreased foot clearance, ambulates outside LISET of RW, flexed posture, and decreased candy.  Patient required cues for upright posture, gluteal activation, sequencing, rolling walker management, obstacle navigation, safe rolling walker usage, to ambulate within LISET of RW, increased step size, and increased foot clearance.  All lines remained intact throughout ambulation trial, gait belt utilized.  Balance:   Static Sitting: Fair, able to maintain for 6 minute(s) with SBA(with BUE support)-moderate assistance  Dynamic Sitting: Poor: Patient unable to accept challenge or move without loss of balance, moderate assistance  Static Standing: Fair, able to maintain for 2 minute(s) with minimum assistance  Dynamic Standing: Poor: Patient unable to accept challenge or move without loss of balance, moderate assistance    Therapeutic Activities and Exercises:  Patient educated on role of acute care PT and PT POC, safety while in hospital including calling nurse for mobility, and call light usage  Pt educated on importance of maximal participation in therapy session in order to reduce negative effects of prolonged sedentary positioning.   Answered all questions within PT scope of practice and addressed functional mobility concerns.    AM-PAC 6 CLICK MOBILITY  Total Score:12     Patient left HOB elevated with all lines intact, call button in reach, RN notified, and son present.    GOALS:   Multidisciplinary Problems       Physical Therapy Goals          Problem: Physical Therapy    Goal Priority Disciplines Outcome Goal Variances Interventions   Physical Therapy Goal     PT, PT/OT Ongoing, Progressing     Description: Goals to be met by: 2/15/2024     Patient will increase functional independence with mobility by performin. Supine to sit with Contact Guard Assistance  2. Sit  to supine with Contact Guard Assistance  3. Sit to stand transfer with Stand-by Assistance  4. Bed to chair transfer with Minimal Assistance using LRAD  5. Gait  x 100 feet with Minimal Assistance using LRAD.   6. Lower extremity exercise program x15 reps per handout, with supervision                         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, without long-term current use of insulin 10/31/2017    Diabetes mellitus type 2 without retinopathy 06/12/2014    6% metformin 500 bid, FU+ 2016    DVT (deep venous thrombosis) 2001    R , Dr Chad Golden 04/06/2022     Marques passed 1/2022    Hyperlipidemia     LDL 82    Hyperlipidemia associated with type 2 diabetes mellitus 09/11/2012    Hypertension     Hypertension associated with diabetes 09/11/2012    Memory loss 12/29/2022    CT chronic changes 2022    Microalbuminuria due to type 2 diabetes mellitus 12/08/2015    taking lisinopril, losartan caused olivia effects    PVD (peripheral vascular disease) with claudication 05/02/2019    Compression hose    Risk for coronary artery disease greater than 20% in next 10 years per [place] score 05/01/2018    Strabismus     Type 2 diabetes mellitus with diabetic polyneuropathy 06/12/2014    Type 2 diabetes mellitus with diabetic polyneuropathy, without long-term current use of insulin 06/12/2014       Past Surgical History:   Procedure Laterality Date    BREAST BIOPSY Right 2011    BREAST LUMPECTOMY Right 2011    [place] REMOVAL OF NAIL BED  6/10/2015    TONSILLECTOMY         Time Tracking:     PT Received On: 01/15/24  PT Start  Time: 0951     PT Stop Time: 1016  PT Total Time (min): 25 min     Billable Minutes: Evaluation 10 Therapeutic Activity 15      01/15/2024

## 2024-01-15 NOTE — PLAN OF CARE
PT evaluation complete and appropriate goals established.    Problem: Physical Therapy  Goal: Physical Therapy Goal  Description: Goals to be met by: 2/15/2024     Patient will increase functional independence with mobility by performin. Supine to sit with Contact Guard Assistance  2. Sit to supine with Contact Guard Assistance  3. Sit to stand transfer with Stand-by Assistance  4. Bed to chair transfer with Minimal Assistance using LRAD  5. Gait  x 100 feet with Minimal Assistance using LRAD.   6. Lower extremity exercise program x15 reps per handout, with supervision    Outcome: Ongoing, Progressing     1/15/2024

## 2024-01-15 NOTE — PT/OT/SLP EVAL
Occupational Therapy  Co - Evaluation with PT    Co-evaluation/treatment performed due to patient's multiple deficits requiring two skilled therapists to appropriately and safely assess patient's strength and endurance while facilitating functional tasks in addition to accommodating for patient's activity tolerance.       Name: Kanchan Downing  MRN: [id number]  Admitting Diagnosis: Traumatic subdural hematoma  Recent Surgery: * No surgery found *      Recommendations:     Discharge Recommendations:    Discharge Equipment Recommendations:  bedside commode, wheelchair  Barriers to discharge:  Decreased caregiver support (increased skilled A needed)    Assessment:     Kanchan Downing is a 83 y.o. female with a medical diagnosis of Traumatic subdural hematoma.  She presents with performance deficits affecting function: weakness, impaired endurance, impaired self care skills, impaired functional mobility, gait instability, impaired balance, impaired cognition, decreased coordination, decreased upper extremity function, decreased lower extremity function, decreased safety awareness, pain, decreased ROM, impaired coordination, impaired skin, impaired cardiopulmonary response to activity.  Pt engaged well in therapy session, though limited in OOB by dizziness this date, BP measured and WNL. Pt AOx3, not oriented to time. Pt able to complete bed mobility with significant assistance, sitting EOB with mod A-SBA with posterior lateral lean, and transfer OOB with significant assistance and low step clearance. Pt needs RW to ambulate, as their mobility limitation significantly impairs their ability to participate in one or more activities of daily living. The use of a RW will significantly improve the patient's ability to participate in ADLs and the patient will use it on a regular basis in the home. A cane is not sufficient for the patient's safe functional mobility.  Patient currently demonstrates a need for moderate intensity  "therapy on a daily basis post acute secondary to a decline in functional status due to illness.        Rehab Prognosis: Good; patient would benefit from acute skilled OT services to address these deficits and reach maximum level of function.       Plan:     Patient to be seen 4 x/week to address the above listed problems via self-care/home management, therapeutic activities, therapeutic exercises, neuromuscular re-education  Plan of Care Expires: 02/15/24  Plan of Care Reviewed with: patient, son    Subjective     Chief Complaint: "I'm feeling better today"   Patient/Family Comments/goals: Get better, returned home    Occupational Profile:  Living Environment: Pt lives alone in [facility], 0 [address], WIS with SC.  Previous level of function: mod I with rollator, Indep with ADLs  Roles and Routines: Enjoys playing with dog, drives, mother  Equipment Used at Home: shower chair, rollator  Assistance upon Discharge: Intermittent family care    Pain/Comfort:  Pain Rating 1: 0/10    Patients cultural, spiritual, Protestant conflicts given the current situation: no    Objective:     Communicated with: RN prior to session.  Patient found HOB elevated with bed alarm, blood pressure cuff, telemetry, pulse ox (continuous), syed catheter upon OT entry to room.    General Precautions: Standard, aspiration, fall, nectar thick  Orthopedic Precautions: N/A  Braces: N/A  Respiratory Status: Nasal cannula, flow 2 L/min    Occupational Performance:    Bed Mobility:    Patient completed Rolling/Turning to Left with  moderate assistance  Patient completed Rolling/Turning to Right with moderate assistance  Patient completed Scooting/Bridging with moderate assistance  Patient completed Supine to Sit with moderate assistance  Patient completed Sit to Supine with moderate assistance  Pt able to sit EOB ~10min with mod A-SBA for posterior and lateral lean, required multiple cues to place bilateral hands on bed to assist with upright " sitting    Functional Mobility/Transfers:  Patient completed Sit <> Stand Transfer from EOB across 2 trials  1st trial: with minimum assistance  with  rolling walker   2nd trial: min A/RW  Functional Mobility: Pt able to take 4 R steps towards HOB with mod A/RW with low foot clearance, taking small steps with maximum cues, posterior lean.     Activities of Daily Living:  Upper Body Dressing: maximal assistance donning second gown as robe  Lower Body Dressing: total assistance donning bilateral socks while pt sat EOB, as pt had poor balance  Toileting: total assistance managing syed catheter    Cognitive/Visual Perceptual:  Cognitive/Psychosocial Skills:     -       Oriented to: Aox3, not time   -       Follows Commands/attention:Follows two-step commands  -       Communication: clear/fluent  -       Memory: Poor immediate recall  -       Safety awareness/insight to disability: impaired   -       Mood/Affect/Coping skills/emotional control: Appropriate to situation and Pleasant  Visual/Perceptual:      -Intact      Physical Exam:  Balance:    -       Sitting: Poor to Fair.  Standing: Poor  Postural examination/scapula alignment: -       Rounded shoulders  -       Forward head  Skin integrity: Visible skin intact  Edema:  None noted  Sensation:    -       Intact  Motor Planning:    -       Impaired  Dominant hand:    -       Right  Upper Extremity Range of Motion:     -       Right Upper Extremity: WFL  -       Left Upper Extremity: WFL  Upper Extremity Strength:    -       Right Upper Extremity: WFL  -       Left Upper Extremity: WFL   Strength:    -       Right Upper Extremity: WFL  -       Left Upper Extremity: WFL  Fine Motor Coordination:    -       Intact  Neurological:    -       Impaired    AMPAC 6 Click ADL:  AMPAC Total Score: 15    Treatment & Education:  Pt educated on role of OT, POC, and goals for therapy.    POC was dicussed with patient/caregiver, who was included in its development and is in  agreement with the identified goals and treatment plan.   Patient and family aware of patient's deficits and therapy progression.   Time provided for therapeutic counseling and discussion of health disposition.   Educated on importance of EOB/OOB mobility, maintaining routine, sitting up in chair, and maximizing independence with ADLs during admission   Pt completed ADLs and functional mobility for treatment session as noted above   Pt/caregiver verbalized understanding and expressed no further concerns/questions.  Updated communication board with level of assist required      Patient left HOB elevated with all lines intact, call button in reach, bed alarm on, RN notified, and son present    GOALS:   Multidisciplinary Problems       Occupational Therapy Goals          Problem: Occupational Therapy    Goal Priority Disciplines Outcome Interventions   Occupational Therapy Goal     OT, PT/OT Ongoing, Progressing    Description: Goals to be met by: 2/15/24     Patient will increase functional independence with ADLs by performing:    UE Dressing with Minimal Assistance.  LE Dressing with Minimal Assistance.  Grooming while EOB with Contact Guard Assistance.  Toileting from bedside commode with Minimal Assistance for hygiene and clothing management.   Sitting at edge of bed x10 minutes with Contact Guard Assistance.  Supine to sit with Minimal Assistance.  Step transfer with Minimal Assistance  Toilet transfer to bedside commode with Minimal Assistance.                         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, without  long-term current use of insulin 10/31/2017    Diabetes mellitus type 2 without retinopathy 06/12/2014    6% metformin 500 bid, FU+ 2016    DVT (deep venous thrombosis) 2001    R , Dr Chad Golden 04/06/2022     Marques passed 1/2022    Hyperlipidemia     LDL 82    Hyperlipidemia associated with type 2 diabetes mellitus 09/11/2012    Hypertension     Hypertension associated with diabetes 09/11/2012    Memory loss 12/29/2022    CT chronic changes 2022    Microalbuminuria due to type 2 diabetes mellitus 12/08/2015    taking lisinopril, losartan caused olivia effects    PVD (peripheral vascular disease) with claudication 05/02/2019    Compression hose    Risk for coronary artery disease greater than 20% in next 10 years per [place] score 05/01/2018    Strabismus     Type 2 diabetes mellitus with diabetic polyneuropathy 06/12/2014    Type 2 diabetes mellitus with diabetic polyneuropathy, without long-term current use of insulin 06/12/2014         Past Surgical History:   Procedure Laterality Date    BREAST BIOPSY Right 2011    BREAST LUMPECTOMY Right 2011    [place] REMOVAL OF NAIL BED  6/10/2015    TONSILLECTOMY         Time Tracking:     OT Date of Treatment: 01/15/24  OT Start Time: 0951  OT Stop Time: 1016  OT Total Time (min): 25 min    Billable Minutes:Evaluation 8  Self Care/Home Management 17    1/15/2024

## 2024-01-15 NOTE — HPI
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 History obtained from granddaughter at bedside. Pt had an unwitnessed fall around 1100 on 1/13. 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 Pt was wearing socks and slipped. She called her son and was crying and complaining of a HA. Son called EMS. Pt was brought to [facility]. CTH w 8mm R SDH and 5mm L SDH. CT c-spine negative for acute processes. Pt developed non-redirectable agitation, screaming "help me" and trying to get out of the bed. She was not following any commands and SBP was 220. Pt was intubated in the ED and started on propofol. Placed on cardene. Pt admitted to [facility]. On arrival to [facility], pt was on propofol 50, fentanyl 250, and still requiring 5-point restraints to prevent self-extubation. Gave 5mg IV haldol and 2mg ativan and pt fell asleep. Five-hour interval CTH/CTA w stable bl SDH and no vascular abnormality. Possible ASA use, given DDAVP.            Of note, pt lost her , Marques, in 1/2022 and subsequently developed a progressive decline in functional status. Pt's family had concerns for early dementia as pt will become upset thinking about her  and starts to forget things. This decline is now thought to be related to her depression rather than dementia. Pt does perform her ADLs at home alone, but family started looking into options for assistance.       "

## 2024-01-15 NOTE — PROGRESS NOTES
[facility] - Neuro Critical Care  Neurosurgery  Progress Note    Subjective:     History of Present Illness: 83 F with unknown ASA/AC BIBEMS s/p unwitnessed fall at home at approximately 0700. Unknown LOC with + scalp hematoma. Patient c/o headache on arrival. Following commands per EMS however more agitated in ED, not answering questions and not following commands. Patient yelling and trying to get out of stretcher, placed in restraints by ED. No external evidence of trauma.Patient unable to provide medical history.    ASA81 prescribed in 2020 unclear if patient is taking at this time. No family bedside for collateral hx.     Post-Op Info:  * No surgery found *       Interval History: 1/15: ASH. SLP eval, extubated.     Medications:  Continuous Infusions:   fentanyl Stopped (01/14/24 1047)    propofoL Stopped (01/14/24 1046)     Scheduled Meds:   amLODIPine  5 mg Per NG tube Daily    atorvastatin  40 mg Per NG tube Daily    FLUoxetine  10 mg Per NG tube Daily    fluticasone propionate  2 spray Each Nostril Daily    levETIRAcetam (Keppra) IV (PEDS and ADULTS)  500 mg Intravenous Q12H    lisinopriL  5 mg Per NG tube Daily    metoprolol tartrate  50 mg Per NG tube BID    mupirocin   Nasal BID    polyethylene glycol  17 g Per NG tube Daily     PRN Meds:acetaminophen, dextrose 10%, dextrose 10%, fentanyl, glucagon (human recombinant), hydrALAZINE, insulin aspart U-100, labetalol, magnesium oxide, magnesium oxide, ondansetron, potassium bicarbonate, potassium bicarbonate, potassium bicarbonate, potassium, sodium phosphates, potassium, sodium phosphates, potassium, sodium phosphates, sodium chloride 0.9%     Review of Systems  Objective:     Weight: 74.4 kg (164 lb)  Body mass index is 26.47 kg/m².  Vital Signs (Most Recent):  Temp: 98.3 °F (36.8 °C) (01/15/24 0701)  Pulse: 83 (01/15/24 0901)  Resp: 19 (01/15/24 0901)  BP: 136/66 (01/15/24 0901)  SpO2: 97 % (01/15/24 0901) Vital Signs (24h Range):  Temp:  [97.9 °F (36.6  "°C)-100.4 °F (38 °C)] 98.3 °F (36.8 °C)  Pulse:  [53-98] 83  Resp:  [10-23] 19  SpO2:  [93 %-100 %] 97 %  BP: (120-184)/(56-75) 136/66                Vent Mode: SIMV  Oxygen Concentration (%):  [40] 40  Resp Rate Total:  [12 br/min] 12 br/min  Vt Set:  [410 mL] 410 mL  PEEP/CPAP:  [5 cmH20] 5 cmH20  Pressure Support:  [10 cmH20] 10 cmH20  Mean Airway Pressure:  [7.7 cmH20] 7.7 cmH20             Urethral Catheter 01/13/24 1246 Temperature probe 16 Fr. (Active)   Site Assessment Clean;Intact 01/15/24 0701   Collection Container Urimeter 01/15/24 0701   Securement Method secured to top of thigh w/ adhesive device 01/15/24 0701   Catheter Care Performed yes 01/15/24 0701   Reason for Continuing Urinary Catheterization Critically ill in ICU and requiring hourly monitoring of intake/output 01/15/24 0701   CAUTI Prevention Bundle Securement Device in place with 1" slack;Intact seal between catheter & drainage tubing;Drainage bag/urimeter off the floor;Sheeting clip in use;No dependent loops or kinks;Drainage bag/urimeter not overfilled (<2/3 full);Drainage bag/urimeter below bladder 01/15/24 0701   Output (mL) 20 mL 01/15/24 0601          Physical Exam         Neurosurgery Physical Exam  GCS 15  Aox3  FC x 4  No drift  PERRL, EOMI  NAD    Gen: well nourished, normocephalic, atraumatic  CV: skin warm and dry, distal pulses present  Pulm: chest rise symmetric, no increased work of breathing  GI: abdomen soft, non-distended, non-tender  : patient voiding independently  MSK: no bony abnormalities noted  Psych: patient interacting with appropriate mood and affect             Significant Labs:  Recent Labs   Lab 01/13/24  1210 01/14/24  0308 01/15/24  0318   * 168* 134*    140 139   K 3.8 3.3* 4.9    106 105   CO2 21* 23 23   BUN 15 13 16   CREATININE 0.8 0.7 0.8   CALCIUM 9.8 9.6 10.1   MG  --  1.7 2.0     Recent Labs   Lab 01/13/24  1210 01/14/24  0308 01/15/24  0318   WBC 7.59 11.08 15.52*   HGB 13.9 13.3 " 13.9   HCT 43.2 41.8 42.8    160 154     Recent Labs   Lab 01/13/24  1210 01/13/24  1212 01/13/24  1249   INR 0.9 1.2 1.0   APTT  --   --  23.7     Microbiology Results (last 7 days)       ** No results found for the last 168 hours. **          All pertinent labs from the last 24 hours have been reviewed.    Significant Diagnostics:  I have reviewed all pertinent imaging results/findings within the past 24 hours.  Assessment/Plan:     * Traumatic subdural hematoma  83 F with possible ASA81 use, DM, HTN, presenting from home s/p fall with confusion, agitation, AMS found to have R>L acute SDH w/o midline shift and with minimal mass effect.    Interval CTH 1/14: stable    --Patient admitted to be admitted to [facility], d/w [facility] provider      -q1h neurochecks in ICU  --Reverse anti-plt/coag medications - possible home ASA81 use, s/p DDAVP  --SBP <160  --Na >135  --Keppra 500 BID x7 days  --Step down to floor status today  --SLP, PT/OT  --Continue to monitor clinically, notify NSGY immediately with any changes in neuro status  --AMS work-up per primary    Dispo: ongoing    Will d/w Dr. Magdalena Díaz MD  Neurosurgery  [facility] - Neuro Critical Care

## 2024-01-15 NOTE — ASSESSMENT & PLAN NOTE
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

## 2024-01-15 NOTE — PLAN OF CARE
[facility] Care Plan    POC reviewed with Kanhcan Downing and family at 0300. Pt verbalized understanding. Questions and concerns addressed. No acute events overnight. Right calf swelling and tenderness noted, ultrasound pending. Decreased UO, 500 ml bolus given with no change. Patient net positive and labs stable. Pt progressing toward goals. Will continue to monitor. See below and flowsheets for full assessment and VS info.           Is this a stroke patient? no    Neuro:  [place] Coma Scale  Best Eye Response: 3-->(E3) to speech  Best Motor Response: 6-->(M6) obeys commands  Best Verbal Response: 5-->(V5) oriented  [place] Coma Scale Score: 14  Assessment Qualifiers: patient not sedated/intubated, no eye obstruction present  Pupil PERRLA: yes     24hr Temp:  [96.8 °F (36 °C)-100.4 °F (38 °C)]     CV:   Rhythm: sinus arrhythmia, atrial rhythm, other (see comments) (jumping back and forth)  BP goals:   SBP < 160  MAP > 65    Resp:    Extubated 1/14, now on room air    Vent Mode: SIMV  Set Rate: 12 BPM  Oxygen Concentration (%): 40  Vt Set: 410 mL  PEEP/CPAP: 5 cmH20  Pressure Support: 10 cmH20    Plan: N/A    GI/:   Failed sol, Pending speech consult    Diet/Nutrition Received: NPO  Last Bowel Movement: 01/12/24  Voiding Characteristics: ureteral catheter    Intake/Output Summary (Last 24 hours) at 1/15/2024 0533  Last data filed at 1/15/2024 0501  Gross per 24 hour   Intake 645.12 ml   Output 360 ml   Net 285.12 ml          Labs/Accuchecks:  Recent Labs   Lab 01/15/24  0318   WBC 15.52*   RBC 4.72   HGB 13.9   HCT 42.8         Recent Labs   Lab 01/15/24  0318      K 4.9   CO2 23      BUN 16   CREATININE 0.8   ALKPHOS 61   ALT 14   AST 22   BILITOT 0.9      Recent Labs   Lab 01/13/24  1249   INR 1.0   APTT 23.7      Recent Labs   Lab 01/14/24  0308   TROPONINI 0.108*       Electrolytes: N/A - electrolytes WDL  Accuchecks: Q6H    Gtts:   fentanyl Stopped (01/14/24 1047)    propofoL Stopped  (01/14/24 1046)       LDA/Wounds:  Lines/Drains/Airways       Drain  Duration                  Urethral Catheter 01/13/24 1246 Temperature probe 16 Fr. 1 day              Peripheral Intravenous Line  Duration                  Peripheral IV - Single Lumen 01/13/24 1516 20 G Anterior;Left Forearm 1 day         Peripheral IV - Single Lumen 01/13/24 1516 20 G Anterior;Right Forearm 1 day                  Wounds: No  Wound care consulted: No

## 2024-01-15 NOTE — HOSPITAL COURSE
01/14/2024 Sedated overnight d/t extreme agitation and inability to be redirected. CTH this AM stable. Extubated w/ no issues.   01/15/2024 No issues overnight. Discussed w/ NSGY, stable for stepdown to Hospital Medicine.    01/17/2024 Transfer to hospital medicine.   s/p neurosurgery eval - H/o  possible ASA81 use, DM, HTN, presenting from home s/p fall with confusion, agitation, AMS found to have Right >Left acute SDH w/o midline shift and with minimal mass effect. Interval CTH 1/14: stable, s/p DDAVP. continue Keppra 500 BID x7 days.  Extubated 1/14  with no issues. sats 95% on RA. Oriented to person and time, moving extremities and follow commands . low grad temp of 100.4F on 1/14. leucocytosis resolved.  afebrile. monitor . SLP Recs Soft & Bite Sized Diet - IDDSI Level 6, Thin liquids - IDDSI Level 0 . needs SNF placement. patient agreeable for SNF placement after discussion with son . per neurosurgery, hold ASA for 2 weeks and follow up in Neurosurgery clinic with repeat CT head     1/18- did not make it to SNF yesterday..  CM placed calls to family and waiting on response. All is stable. BP  148/68 & VSS. Continue supportive care.  On short course of keppra.    1/19- MRDC to SNF, needs assistance w ADLs, PT/OT for Deconditioning after illness.   1/20- waiting on transportation. Flu and covid neg. May dc to SNF

## 2024-01-15 NOTE — ASSESSMENT & PLAN NOTE
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 Intubated for non-redirectable agitation. Given DDAVP for possible ASA use.    1/13: CTH w 8mm R SDH and 5mm L SDH   1/13: Interval 5h CTA stable; no vascular abnormality   1/14: Follow-up CTH stable    -Stable for stepdown to Hospital Medicine per NSGY  -VS/Neuro checks q4  -NSGY following  -SBP goal < 160  -Continue home amlo/lisinopril/metoprolol  -PRN labetalol/hydralazine  -Keppra BID x 7 days  -CBC, CMP, Mag, Phos daily  -HOB >/= 30  -SQH for SVT PPX  -PT/OT/SLP

## 2024-01-15 NOTE — HOSPITAL COURSE
1/15: ASH. SLP yunier, extubated.   1/16: NAALEXANDRA. Pt doing well this morning still mildly confused  1/17: ASH. AFVSS. Stepped down to floor under  service. Pt doing well today, denies any HA, nausea, or other complaints. AAOx4.

## 2024-01-15 NOTE — PROVIDER TRANSFER
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 History obtained from granddaughter at bedside. Pt had an unwitnessed fall around 1100 on 1/13. 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 Pt was wearing socks and slipped. She called her son and was crying and complaining of a HA. Son called EMS. Pt was brought to [facility]. CTH w 8mm R SDH and 5mm L SDH. CT c-spine negative for acute processes. Pt developed non-redirectable agitation, screaming "help me" and trying to get out of the bed. She was not following any commands and SBP was 220. Pt was intubated in the ED and started on propofol. Placed on cardene. Pt admitted to NCC. On arrival to NCCU, pt was on propofol 50, fentanyl 250, and still requiring 5-point restraints to prevent self-extubation. Gave 5mg IV haldol and 2mg ativan and pt fell asleep. Five-hour interval CTH/CTA w stable bl SDH and no vascular abnormality. Possible ASA use, given DDAVP.            Of note, pt lost her , Marques, in 1/2022 and subsequently developed a progressive decline in functional status. Pt's family had concerns for early dementia as pt will become upset thinking about her  and starts to forget things. This decline is now thought to be related to her depression rather than dementia. Pt does perform her ADLs at home alone, but family started looking into options for assistance.      Hospital Course: 01/14/2024 Sedated overnight d/t extreme agitation and inability to be redirected. CTH this AM stable. Extubated w/ no issues. "   01/15/2024 No issues overnight. Discussed w/ NSGY, stable for stepdown to Hospital Medicine.    Hospital Course By Problem with Pertinent Findings:     1. 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 Intubated for non-redirectable agitation. Given DDAVP for possible ASA use.     1/13: CTH w 8mm R SDH and 5mm L SDH   1/13: Interval 5h CTA stable; no vascular abnormality   1/14: Follow-up CTH stable     -Stable for stepdown to Hospital Medicine per NSGY  -VS/Neuro checks q4  -NSGY following  -SBP goal < 160  -Continue home amlo/lisinopril/metoprolol  -PRN labetalol/hydralazine  -Keppra BID x 7 days  -CBC, CMP, Mag, Phos daily  -HOB >/= 30  -SQH for SVT PPX  -PT/OT/SLP    2. Cardiac/Vascular  Hyperlipidemia associated with type 2 diabetes mellitus  -Atorvastatin daily    3. Hypertension associated with diabetes  -SBP goal < 160  -Continue home amlo/lisinopril/metoprolol  -PRN hydralazine/labetalol    4. 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 Endocrine  Type 2 diabetes mellitus with diabetic polyneuropathy, without long-term current use of insulin  -Hgb A1C 5.7  -Accuchecks AC/HS w/ mod dose SSI    Consultants and Procedures:     Consultants:  NSGY    Procedures:    None    Transfer Information:     Diet:  Soft & Bite Sized (IDDSI Level 6)    Physical Activity:  PT/OT    To Do / Pending Studies / Follow ups:  N/A    Palak Mccoy  Neuro Crtical Care

## 2024-01-15 NOTE — PLAN OF CARE
Problem: SLP  Goal: SLP Goal  Description: Speech Language Pathology Goals  Goals expected to be met by 1/29  1. Pt will participate in ongoing assessment of swallow.   2. Pt will complete speech, language, cognitive evaluation to determine need for tx.     Outcome: Ongoing, Progressing    Bedside swallow assessment completed.  Rec level 6 soft and bite sized diet with nectar thick liquids with strict aspiration precautions, meds crushed, no straws.

## 2024-01-15 NOTE — SUBJECTIVE & OBJECTIVE
Interval History: 1/15: SYBILON. SLP yunier, extubated.     Medications:  Continuous Infusions:   fentanyl Stopped (01/14/24 1047)    propofoL Stopped (01/14/24 1046)     Scheduled Meds:   amLODIPine  5 mg Per NG tube Daily    atorvastatin  40 mg Per NG tube Daily    FLUoxetine  10 mg Per NG tube Daily    fluticasone propionate  2 spray Each Nostril Daily    levETIRAcetam (Keppra) IV (PEDS and ADULTS)  500 mg Intravenous Q12H    lisinopriL  5 mg Per NG tube Daily    metoprolol tartrate  50 mg Per NG tube BID    mupirocin   Nasal BID    polyethylene glycol  17 g Per NG tube Daily     PRN Meds:acetaminophen, dextrose 10%, dextrose 10%, fentanyl, glucagon (human recombinant), hydrALAZINE, insulin aspart U-100, labetalol, magnesium oxide, magnesium oxide, ondansetron, potassium bicarbonate, potassium bicarbonate, potassium bicarbonate, potassium, sodium phosphates, potassium, sodium phosphates, potassium, sodium phosphates, sodium chloride 0.9%     Review of Systems  Objective:     Weight: 74.4 kg (164 lb)  Body mass index is 26.47 kg/m².  Vital Signs (Most Recent):  Temp: 98.3 °F (36.8 °C) (01/15/24 0701)  Pulse: 83 (01/15/24 0901)  Resp: 19 (01/15/24 0901)  BP: 136/66 (01/15/24 0901)  SpO2: 97 % (01/15/24 0901) Vital Signs (24h Range):  Temp:  [97.9 °F (36.6 °C)-100.4 °F (38 °C)] 98.3 °F (36.8 °C)  Pulse:  [53-98] 83  Resp:  [10-23] 19  SpO2:  [93 %-100 %] 97 %  BP: (120-184)/(56-75) 136/66                Vent Mode: SIMV  Oxygen Concentration (%):  [40] 40  Resp Rate Total:  [12 br/min] 12 br/min  Vt Set:  [410 mL] 410 mL  PEEP/CPAP:  [5 cmH20] 5 cmH20  Pressure Support:  [10 cmH20] 10 cmH20  Mean Airway Pressure:  [7.7 cmH20] 7.7 cmH20             Urethral Catheter 01/13/24 1246 Temperature probe 16 Fr. (Active)   Site Assessment Clean;Intact 01/15/24 0701   Collection Container Urimeter 01/15/24 0701   Securement Method secured to top of thigh w/ adhesive device 01/15/24 0701   Catheter Care Performed yes 01/15/24 0701  "  Reason for Continuing Urinary Catheterization Critically ill in ICU and requiring hourly monitoring of intake/output 01/15/24 0701   CAUTI Prevention Bundle Securement Device in place with 1" slack;Intact seal between catheter & drainage tubing;Drainage bag/urimeter off the floor;Sheeting clip in use;No dependent loops or kinks;Drainage bag/urimeter not overfilled (<2/3 full);Drainage bag/urimeter below bladder 01/15/24 0701   Output (mL) 20 mL 01/15/24 0601          Physical Exam         Neurosurgery Physical Exam  GCS 15  Aox3  FC x 4  No drift  PERRL, EOMI  NAD    Gen: well nourished, normocephalic, atraumatic  CV: skin warm and dry, distal pulses present  Pulm: chest rise symmetric, no increased work of breathing  GI: abdomen soft, non-distended, non-tender  : patient voiding independently  MSK: no bony abnormalities noted  Psych: patient interacting with appropriate mood and affect             Significant Labs:  Recent Labs   Lab 01/13/24  1210 01/14/24  0308 01/15/24  0318   * 168* 134*    140 139   K 3.8 3.3* 4.9    106 105   CO2 21* 23 23   BUN 15 13 16   CREATININE 0.8 0.7 0.8   CALCIUM 9.8 9.6 10.1   MG  --  1.7 2.0     Recent Labs   Lab 01/13/24  1210 01/14/24  0308 01/15/24  0318   WBC 7.59 11.08 15.52*   HGB 13.9 13.3 13.9   HCT 43.2 41.8 42.8    160 154     Recent Labs   Lab 01/13/24  1210 01/13/24  1212 01/13/24  1249   INR 0.9 1.2 1.0   APTT  --   --  23.7     Microbiology Results (last 7 days)       ** No results found for the last 168 hours. **          All pertinent labs from the last 24 hours have been reviewed.    Significant Diagnostics:  I have reviewed all pertinent imaging results/findings within the past 24 hours.  "

## 2024-01-15 NOTE — PROGRESS NOTES
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 History obtained from granddaughter at bedside. Pt had an unwitnessed fall around 1100 on 1/13. 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 Pt was wearing socks and slipped. She called her son and was crying and complaining of a HA. Son called EMS. Pt was brought to [facility]. CTH w 8mm R SDH and 5mm L SDH. CT c-spine negative for acute processes. Pt developed non-redirectable agitation, screaming "help me" and trying to get out of the bed. She was not following any commands and SBP was 220. Pt was intubated in the ED and started on propofol. Placed on cardene. Pt admitted to NCC. On arrival to NCCU, pt was on propofol 50, fentanyl 250, and still requiring 5-point restraints to prevent self-extubation. Gave 5mg IV haldol and 2mg ativan and pt fell asleep. Five-hour interval CTH/CTA w stable bl SDH and no vascular abnormality. Possible ASA use, given DDAVP.            Of note, pt lost her , Marques, in 1/2022 and subsequently developed a progressive decline in functional status. Pt's family had concerns for early dementia as pt will become upset thinking about her  and starts to forget things. This decline is now thought to be related to her depression rather than dementia. Pt does perform her ADLs at home alone, but family started looking into options for assistance.     Hospital Course: 01/14/2024 Sedated overnight d/t extreme agitation and " inability to be redirected. [facility] this AM stable. Extubated w/ no issues.   01/15/2024 No issues overnight. Discussed w/ NSGY, stable for stepdown to Hospital Medicine.    Interval History:  See hospital course.     Review of Systems   Respiratory:  Negative for cough and shortness of breath.    Gastrointestinal:  Negative for abdominal pain, nausea and vomiting.   Neurological:  Negative for headaches.     Objective:     Vitals:  Temp: 97.8 °F (36.6 °C)  Pulse: 68  Rhythm: atrial rhythm  BP: (!) 111/53  MAP (mmHg): 77  Resp: 17  SpO2: (!) 94 %    Temp  Min: 97.8 °F (36.6 °C)  Max: 100.4 °F (38 °C)  Pulse  Min: 67  Max: 98  BP  Min: 111/53  Max: 184/70  MAP (mmHg)  Min: 77  Max: 116  Resp  Min: 10  Max: 23  SpO2  Min: 93 %  Max: 100 %    01/14 0701 - 01/15 0700  In: 616.6 [I.V.:130.3]  Out: 330 [Urine:330]            Physical Exam  Vitals and nursing note reviewed.   Eyes:      Pupils: Pupils are equal, round, and reactive to light.   Cardiovascular:      Rate and Rhythm: Normal rate. Rhythm irregular.   Pulmonary:      Effort: Pulmonary effort is normal.   Abdominal:      Palpations: Abdomen is soft.   Musculoskeletal:         General: Normal range of motion.   Skin:     General: Skin is warm and dry.      Capillary Refill: Capillary refill takes less than 2 seconds.      Coloration: Skin is pale.   Neurological:      Mental Status: She is alert.      GCS: GCS eye subscore is 3. GCS verbal subscore is 4. GCS motor subscore is 6.      Sensory: Sensation is intact.      Motor: Motor function is intact.      Comments:   -E3 V4 M6  -PERRL  -Oriented to person and time  -Follows commands w/ all extremities  -STINSON equally/purposefully  -4/5 strength throughout            Medications:  Continuous   ScheduledamLODIPine, 5 mg, Daily  atorvastatin, 40 mg, Daily  FLUoxetine, 10 mg, Daily  fluticasone propionate, 2 spray, Daily  heparin (porcine), 5,000 Units, Q8H  levETIRAcetam (Keppra) IV (PEDS and ADULTS), 500 mg,  Q12H  lisinopriL, 5 mg, Daily  metoprolol tartrate, 50 mg, BID  mupirocin, , BID  polyethylene glycol, 17 g, Daily    PRNacetaminophen, 650 mg, Q6H PRN  dextrose 10%, 12.5 g, PRN  dextrose 10%, 25 g, PRN  glucagon (human recombinant), 1 mg, PRN  hydrALAZINE, 10 mg, Q6H PRN  insulin aspart U-100, 0-10 Units, Q6H PRN  labetalol, 10 mg, Q6H PRN  magnesium oxide, 800 mg, PRN  magnesium oxide, 800 mg, PRN  ondansetron, 4 mg, Q8H PRN  potassium bicarbonate, 35 mEq, PRN  potassium bicarbonate, 50 mEq, PRN  potassium bicarbonate, 60 mEq, PRN  potassium, sodium phosphates, 2 packet, PRN  potassium, sodium phosphates, 2 packet, PRN  potassium, sodium phosphates, 2 packet, PRN  sodium chloride 0.9%, 10 mL, PRN      Today I personally reviewed pertinent medications, lines/drains/airways, imaging, cardiology results, laboratory results, notably: CBC, CMP, Mag, Phos    Diet  Diet Soft & Bite Sized (IDDSI Level 6) Nectar Thick (Mildly Thick); Standard Tray  Diet Soft & Bite Sized (IDDSI Level 6) Nectar Thick (Mildly Thick); Standard Tray      Assessment/Plan:     Neuro  * Traumatic subdural hematoma  83F w PMHx of DM2, HTN, HLD, PVD w claudication, DVT (2001), 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 Intubated for non-redirectable agitation. Given DDAVP for possible ASA use.    1/13: CTH w 8mm R SDH and 5mm L SDH   1/13: Interval 5h CTA stable; no vascular abnormality   1/14: Follow-up CTH stable    -Stable for stepdown to Hospital Medicine per NSGY  -VS/Neuro checks q4  -NSGY following  -SBP goal < 160  -Continue home amlo/lisinopril/metoprolol  -PRN labetalol/hydralazine  -Keppra BID x 7 days  -CBC, CMP, Mag, Phos daily  -HOB >/= 30  -SQH for SVT PPX  -PT/OT/SLP    Cardiac/Vascular  Hyperlipidemia associated with type 2 diabetes  mellitus  -Atorvastatin daily    Hypertension associated with diabetes  -SBP goal < 160  -Continue home amlo/lisinopril/metoprolol  -PRN hydralazine/labetalol    Hematology  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  -Hx of    Endocrine  Type 2 diabetes mellitus with diabetic polyneuropathy, without long-term current use of insulin  -Hgb A1C 5.7  -Accuchecks AC/HS w/ mod dose SSI          The patient is being Prophylaxed for:  Venous Thromboembolism with: Mechanical or Chemical  Stress Ulcer with: Not Applicable   Ventilator Pneumonia with: not applicable    Activity Orders            Diet Soft & Bite Sized (IDDSI Level 6) Nectar Thick (Mildly Thick); Standard Tray: Soft & Bite Sized starting at 01/15 0750    Turn patient starting at 01/13 1400    Elevate HOB starting at 01/13 1237          Full Code    Level III    Palak Mccoy NP  Neurocritical Care  [facility] Critical [facility]       no

## 2024-01-15 NOTE — SUBJECTIVE & OBJECTIVE
Interval History: 1/14: stable CTH. Agitated on exam, nonfocal    Medications:  Continuous Infusions:   fentanyl Stopped (01/14/24 1047)    propofoL Stopped (01/14/24 1046)     Scheduled Meds:   amLODIPine  5 mg Per NG tube Daily    atorvastatin  40 mg Per NG tube Daily    FLUoxetine  10 mg Per NG tube Daily    fluticasone propionate  2 spray Each Nostril Daily    levETIRAcetam (Keppra) IV (PEDS and ADULTS)  500 mg Intravenous Q12H    lisinopriL  5 mg Per NG tube Daily    metoprolol tartrate  50 mg Per NG tube BID    mupirocin   Nasal BID    polyethylene glycol  17 g Per NG tube Daily     PRN Meds:acetaminophen, dextrose 10%, dextrose 10%, fentanyl, glucagon (human recombinant), hydrALAZINE, insulin aspart U-100, labetalol, magnesium oxide, magnesium oxide, ondansetron, potassium bicarbonate, potassium bicarbonate, potassium bicarbonate, potassium, sodium phosphates, potassium, sodium phosphates, potassium, sodium phosphates, sodium chloride 0.9%     Review of Systems  Objective:     Weight: 74.4 kg (164 lb)  Body mass index is 26.47 kg/m².  Vital Signs (Most Recent):  Temp: 98.5 °F (36.9 °C) (01/14/24 2301)  Pulse: 68 (01/15/24 0101)  Resp: 11 (01/15/24 0101)  BP: 123/60 (01/15/24 0101)  SpO2: 100 % (01/15/24 0101) Vital Signs (24h Range):  Temp:  [96.8 °F (36 °C)-100.4 °F (38 °C)] 98.5 °F (36.9 °C)  Pulse:  [53-98] 68  Resp:  [10-20] 11  SpO2:  [93 %-100 %] 100 %  BP: ()/(46-75) 123/60                Vent Mode: SIMV  Oxygen Concentration (%):  [40] 40  Resp Rate Total:  [12 br/min-18 br/min] 12 br/min  Vt Set:  [410 mL] 410 mL  PEEP/CPAP:  [5 cmH20] 5 cmH20  Pressure Support:  [10 cmH20] 10 cmH20  Mean Airway Pressure:  [7.7 cmH20-9 cmH20] 7.7 cmH20             Urethral Catheter 01/13/24 1246 Temperature probe 16 Fr. (Active)   Site Assessment Clean;Intact 01/15/24 0101   Collection Container Urimeter 01/15/24 0101   Securement Method secured to top of thigh w/ adhesive device 01/15/24 0101   Catheter Care  "Performed yes 01/15/24 0101   Reason for Continuing Urinary Catheterization Critically ill in ICU and requiring hourly monitoring of intake/output 01/15/24 0101   CAUTI Prevention Bundle Securement Device in place with 1" slack;Intact seal between catheter & drainage tubing;Drainage bag/urimeter off the floor;Sheeting clip in use;No dependent loops or kinks;Drainage bag/urimeter not overfilled (<2/3 full);Drainage bag/urimeter below bladder 01/15/24 0101   Output (mL) 20 mL 01/15/24 0101          Physical Exam         Neurosurgery Physical Exam    Propofol paused  [id number]  PERRL, tracking  Spont x4 antigravity, nonfocal    Significant Labs:  Recent Labs   Lab 01/13/24  1210 01/14/24  0308   * 168*    140   K 3.8 3.3*    106   CO2 21* 23   BUN 15 13   CREATININE 0.8 0.7   CALCIUM 9.8 9.6   MG  --  1.7     Recent Labs   Lab 01/13/24  1210 01/14/24  0308   WBC 7.59 11.08   HGB 13.9 13.3   HCT 43.2 41.8    160     Recent Labs   Lab 01/13/24  1210 01/13/24  1212 01/13/24  1249   INR 0.9 1.2 1.0   APTT  --   --  23.7     Microbiology Results (last 7 days)       ** No results found for the last 168 hours. **          All pertinent labs from the last 24 hours have been reviewed.    Significant Diagnostics:  CT: CT Head Without Contrast    Result Date: 1/14/2024  Stable bilateral subdural hematomas.  No acute territorial infarct or new hemorrhage. Electronically signed by: Derrick Gregorio Date:    01/14/2024 Time:    08:12   MRI: No results found in the last 24 hours.  "

## 2024-01-15 NOTE — ASSESSMENT & PLAN NOTE
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 Intubated for non-redirectable agitation. Given DDAVP for possible ASA use.     1/13: CTH w 8mm R SDH and 5mm L SDH   1/13: Interval 5h CTA stable; no vascular abnormality   1/14: Follow-up CTH stable     -Stable for stepdown to Hospital Medicine per NSGY  -VS/Neuro checks q4  -NSGY following  -SBP goal < 160  -Continue home amlo/lisinopril/metoprolol  -PRN labetalol/hydralazine  -Keppra BID x 7 days  -CBC, CMP, Mag, Phos daily  -HOB >/= 30  -SQH for SVT PPX  -PT/OT/SLP    01/17/2024 Transfer to hospital medicine.   s/p neurosurgery eval - H/o  possible ASA81 use, DM, HTN, presenting from home s/p fall with confusion, agitation, AMS found to have Right >Left acute SDH w/o midline shift and with minimal mass effect. Interval CTH 1/14: stable, s/p DDAVP. continue Keppra 500 BID x7 days.  Extubated 1/14  with no issues. sats 95% on RA. Oriented to person and time, moving extremities and follow commands . low grad temp of 100.4F on 1/14. leucocytosis resolved.  afebrile. monitor . SLP Recs Soft & Bite Sized Diet - IDDSI Level 6, Thin liquids - IDDSI Level 0 . needs SNF placement

## 2024-01-15 NOTE — PLAN OF CARE
Pt engaged well in evaluative session, limited by dizziness this date.     Problem: Occupational Therapy  Goal: Occupational Therapy Goal  Description: Goals to be met by: 2/15/24     Patient will increase functional independence with ADLs by performing:    UE Dressing with Minimal Assistance.  LE Dressing with Minimal Assistance.  Grooming while EOB with Contact Guard Assistance.  Toileting from bedside commode with Minimal Assistance for hygiene and clothing management.   Sitting at edge of bed x10 minutes with Contact Guard Assistance.  Supine to sit with Minimal Assistance.  Step transfer with Minimal Assistance  Toilet transfer to bedside commode with Minimal Assistance.    Outcome: Ongoing, Progressing

## 2024-01-15 NOTE — PLAN OF CARE
[facility] Care Plan    POC reviewed with Kanchan WALTER Salazaron and family at 1400. Pt verbalized understanding. Questions and concerns addressed. No acute events today. Pt progressing toward goals. Will continue to monitor. See below and flowsheets for full assessment and VS info.     -Stepdown orders in. Awaiting room.         Is this a stroke patient? yes- Stroke booklet reviewed with patient and family, risk factors identified for patient and stroke booklet remains at bedside for ongoing education.     Neuro:  Ana Laura Coma Scale  Best Eye Response: 4-->(E4) spontaneous  Best Motor Response: 6-->(M6) obeys commands  Best Verbal Response: 4-->(V4) confused  Ana Laura Coma Scale Score: 14  Assessment Qualifiers: patient not sedated/intubated  Pupil PERRLA: yes     24 hr Temp:  [97.8 °F (36.6 °C)-100.4 °F (38 °C)]     CV:   Rhythm: atrial rhythm  BP goals:   SBP < 160  MAP > 65    Resp:      Vent Mode: SIMV  Set Rate: 12 BPM  Oxygen Concentration (%): 40  Vt Set: 410 mL  PEEP/CPAP: 5 cmH20  Pressure Support: 10 cmH20    Plan: N/A    GI/:     Diet/Nutrition Received: NPO  Last Bowel Movement: 01/12/24  Voiding Characteristics: urethral catheter (bladder)    Intake/Output Summary (Last 24 hours) at 1/15/2024 1632  Last data filed at 1/15/2024 0901  Gross per 24 hour   Intake 486.3 ml   Output 375 ml   Net 111.3 ml          Labs/Accuchecks:  Recent Labs   Lab 01/15/24  0318   WBC 15.52*   RBC 4.72   HGB 13.9   HCT 42.8         Recent Labs   Lab 01/15/24  0318      K 4.9   CO2 23      BUN 16   CREATININE 0.8   ALKPHOS 61   ALT 14   AST 22   BILITOT 0.9      Recent Labs   Lab 01/13/24  1249   INR 1.0   APTT 23.7      Recent Labs   Lab 01/14/24  0308   TROPONINI 0.108*       Electrolytes: N/A - electrolytes WDL  Accuchecks: AC/HS    Gtts:      LDA/Wounds:  Lines/Drains/Airways       Drain  Duration             Female External Urinary Catheter w/ Suction 01/15/24 1035 <1 day              Peripheral Intravenous Line   Duration                  Peripheral IV - Single Lumen 01/13/24 1516 20 G Anterior;Left Forearm 2 days         Peripheral IV - Single Lumen 01/13/24 1516 20 G Anterior;Right Forearm 2 days                  Wounds: no       Wound care consulted: No

## 2024-01-15 NOTE — SUBJECTIVE & OBJECTIVE
Interval History:  See hospital course.     Review of Systems   Respiratory:  Negative for cough and shortness of breath.    Gastrointestinal:  Negative for abdominal pain, nausea and vomiting.   Neurological:  Negative for headaches.     Objective:     Vitals:  Temp: 97.8 °F (36.6 °C)  Pulse: 68  Rhythm: atrial rhythm  BP: (!) 111/53  MAP (mmHg): 77  Resp: 17  SpO2: (!) 94 %    Temp  Min: 97.8 °F (36.6 °C)  Max: 100.4 °F (38 °C)  Pulse  Min: 67  Max: 98  BP  Min: 111/53  Max: 184/70  MAP (mmHg)  Min: 77  Max: 116  Resp  Min: 10  Max: 23  SpO2  Min: 93 %  Max: 100 %    01/14 0701 - 01/15 0700  In: 616.6 [I.V.:130.3]  Out: 330 [Urine:330]            Physical Exam  Vitals and nursing note reviewed.   Eyes:      Pupils: Pupils are equal, round, and reactive to light.   Cardiovascular:      Rate and Rhythm: Normal rate. Rhythm irregular.   Pulmonary:      Effort: Pulmonary effort is normal.   Abdominal:      Palpations: Abdomen is soft.   Musculoskeletal:         General: Normal range of motion.   Skin:     General: Skin is warm and dry.      Capillary Refill: Capillary refill takes less than 2 seconds.      Coloration: Skin is pale.   Neurological:      Mental Status: She is alert.      GCS: GCS eye subscore is 3. GCS verbal subscore is 4. GCS motor subscore is 6.      Sensory: Sensation is intact.      Motor: Motor function is intact.      Comments:   -E3 V4 M6  -PERRL  -Oriented to person and time  -Follows commands w/ all extremities  -STINSON equally/purposefully  -4/5 strength throughout            Medications:  Continuous   ScheduledamLODIPine, 5 mg, Daily  atorvastatin, 40 mg, Daily  FLUoxetine, 10 mg, Daily  fluticasone propionate, 2 spray, Daily  heparin (porcine), 5,000 Units, Q8H  levETIRAcetam (Keppra) IV (PEDS and ADULTS), 500 mg, Q12H  lisinopriL, 5 mg, Daily  metoprolol tartrate, 50 mg, BID  mupirocin, , BID  polyethylene glycol, 17 g, Daily    PRNacetaminophen, 650 mg, Q6H PRN  dextrose 10%, 12.5 g,  PRN  dextrose 10%, 25 g, PRN  glucagon (human recombinant), 1 mg, PRN  hydrALAZINE, 10 mg, Q6H PRN  insulin aspart U-100, 0-10 Units, Q6H PRN  labetalol, 10 mg, Q6H PRN  magnesium oxide, 800 mg, PRN  magnesium oxide, 800 mg, PRN  ondansetron, 4 mg, Q8H PRN  potassium bicarbonate, 35 mEq, PRN  potassium bicarbonate, 50 mEq, PRN  potassium bicarbonate, 60 mEq, PRN  potassium, sodium phosphates, 2 packet, PRN  potassium, sodium phosphates, 2 packet, PRN  potassium, sodium phosphates, 2 packet, PRN  sodium chloride 0.9%, 10 mL, PRN      Today I personally reviewed pertinent medications, lines/drains/airways, imaging, cardiology results, laboratory results, notably: CBC, CMP, Mag, Phos    Diet  Diet Soft & Bite Sized (IDDSI Level 6) Nectar Thick (Mildly Thick); Standard Tray  Diet Soft & Bite Sized (IDDSI Level 6) Nectar Thick (Mildly Thick); Standard Tray

## 2024-01-15 NOTE — ASSESSMENT & PLAN NOTE
83 F with possible ASA81 use, DM, HTN, presenting from home s/p fall with confusion, agitation, AMS found to have R>L acute SDH w/o midline shift and with minimal mass effect.    Interval CTH 1/14: stable    --Patient admitted to be admitted to [facility], d/w [facility] provider      -q1h neurochecks in ICU  --Reverse anti-plt/coag medications - possible home ASA81 use, s/p DDAVP  --SBP <140 -  in ED, will require Cardene gtt  --Na >135  --Keppra 500 BID x7 days  --HOB >30  --Continue to monitor clinically, notify NSGY immediately with any changes in neuro status  --Remainder of AMS work up/trauma work up per ED/NCC    Dispo: ongoing    Will d/w Dr. Nichols

## 2024-01-15 NOTE — CONSULTS
Inpatient consult to Physical Medicine Rehab  Consult performed by: Zhanna Campbell NP  Consult ordered by: Geronimo Rain PA-C  Reason for consult: Assess rehab needs      Reviewed patient history and current admission.  Rehab team following.  Full consult to follow.    GERMAN Mcgowan, FNP-C  Physical Medicine & Rehabilitation   01/15/2024

## 2024-01-15 NOTE — PT/OT/SLP EVAL
Speech Language Pathology Evaluation  Bedside Swallow    Patient Name:  Kanchan Downing   MRN:  [id number]  Admitting Diagnosis: Traumatic subdural hematoma    Recommendations:                 General Recommendations:  Dysphagia therapy, Speech language evaluation, and Cognitive-linguistic evaluation  Diet recommendations:  Soft & Bite Sized Diet - IDDSI Level 6, Mildly thick/Nectar thick liquids - IDDSI Level 2   Aspiration Precautions: 1 bite/sip at a time, Alternating bites/sips, Assistance with meals and Assistance with thickening liquids, Avoid talking while eating, Eliminate distractions, Feed only when awake/alert, HOB to 90 degrees, Meds crushed in puree, No straws, Small bites/sips, and Strict aspiration precautions   General Precautions: Standard, aspiration, fall, nectar thick  Communication strategies:  provide increased time to answer and go to room if call light pushed    Assessment:     Kanchan Downing is a 83 y.o. female with an SLP diagnosis of Dysphagia and Dysphonia.     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, without long-term current use of insulin 10/31/2017    Diabetes mellitus type 2 without retinopathy 06/12/2014    6% metformin 500 bid, FU+ 2016    DVT (deep venous thrombosis) Justo PETIT Dr Chi    Grief 04/06/2022     Marques passed 1/2022    Hyperlipidemia     LDL 82    Hyperlipidemia associated with type 2 diabetes mellitus 09/11/2012    Hypertension     Hypertension associated with diabetes 09/11/2012    Memory loss 12/29/2022    CT chronic changes 2022    Microalbuminuria due to type 2  "diabetes mellitus 12/08/2015    taking lisinopril, losartan caused olivia effects    PVD (peripheral vascular disease) with claudication 05/02/2019    Compression hose    Risk for coronary artery disease greater than 20% in next 10 years per [place] score 05/01/2018    Strabismus     Type 2 diabetes mellitus with diabetic polyneuropathy 06/12/2014    Type 2 diabetes mellitus with diabetic polyneuropathy, without long-term current use of insulin 06/12/2014       Past Surgical History:   Procedure Laterality Date    BREAST BIOPSY Right 2011    BREAST LUMPECTOMY Right 2011    AR REMOVAL OF NAIL BED  6/10/2015    TONSILLECTOMY         Social History: Patient lives alone with check ins from family.  Reg diet/thin liquids at baseline.     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 History obtained from granddaughter at bedside. Pt had an unwitnessed fall around 1100 on 1/13. 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 Pt was wearing socks and slipped. She called her son and was crying and complaining of a HA. Son called EMS. [place] [facility] [place] 8mm R SDH and 5mm L SDH. CT c-spine negative for acute processes. Pt developed non-redirectable agitation, screaming "help me" and trying to get out of the bed. She was not following any commands and SBP was 220. Pt was intubated in the ED and started on propofol. Placed on cardene. Pt admitted to NCC. On arrival to NCCU, pt was on propofol 50, fentanyl 250, and still requiring 5-point restraints to prevent self-extubation. Gave 5mg IV haldol and 2mg ativan and pt fell asleep. Five-hour interval CTH/CTA w stable bl SDH and no vascular " "abnormality. Possible ASA use, given DDAVP.            Of note, pt lost her , Marques, in 1/2022 and subsequently developed a progressive decline in functional status. Pt's family had concerns for early dementia as pt will become upset thinking about her  and starts to forget things. This decline is now thought to be related to her depression rather than dementia. Pt does perform her ADLs at home alone, but family started looking into options for assistance."    Prior Intubation HX:  1/13-1/14    Modified Barium Swallow: none reported    Chest X-Rays: 1/14: "No interval detrimental change."      Subjective     "It tastes good."     Nurse cleared for evaluation. Pt with congested coughing prior to trials.     Pain/Comfort:  Pain Rating 1: 0/10  Pain Rating Post-Intervention 1: 0/10    Respiratory Status: Nasal cannula, flow 3 L/min    Objective:     Oral Musculature Evaluation  Oral Musculature: general weakness  Dentition: present and adequate  Secretion Management: adequate  Mucosal Quality: dry  Mandibular Strength and Mobility: WFL  Oral Labial Strength and Mobility: WFL  Lingual Strength and Mobility: impaired strength  Volitional Cough: decreased strength  Volitional Swallow: somewhat reduced hyolaryngeal rise  Voice Prior to PO Intake: hoarse, harsh, low intensity    Bedside Swallow Eval:   Consistencies Assessed:  Thin liquids    Nectar thick liquids    Puree    Soft solids    Solids        Oral Phase:   Pt c/o jaw pain during mastication   Excess chewing    Pharyngeal Phase:   Coughing immediately following thin, delayed x1 following puree-consistent with congested cough prior to PO trials, vocal quality remained clear  decreased hyolaryngeal excursion to palpation  delayed swallow initation  multiple spontaneous swallows  throat clearing with thin liquids     Compensatory Strategies  None    Treatment: Education provided re: role of SLP, dysphonia, swallowing mechanism, diet recs, swallow precs, " s/s aspiration, and POC.  Pt verbalized understanding though would benefit from reinforcement.        Goals:   Multidisciplinary Problems       SLP Goals          Problem: SLP    Goal Priority Disciplines Outcome   SLP Goal     SLP Ongoing, Progressing   Description: Speech Language Pathology Goals  Goals expected to be met by 1/29  1. Pt will participate in ongoing assessment of swallow.   2. Pt will complete speech, language, cognitive evaluation to determine need for tx.                                  Plan:     Patient to be seen:  4 x/week   Plan of Care expires:  02/14/24  Plan of Care reviewed with:  patient, family   SLP Follow-Up:  Yes       Discharge recommendations:  Moderate Intensity Therapy   Barriers to Discharge:  Level of Skilled Assistance Needed      Time Tracking:     SLP Treatment Date:   01/15/24  Speech Start Time:  0628  Speech Stop Time:  0655     Speech Total Time (min):  27 min    Billable Minutes: Eval Swallow and Oral Function 17 and Self Care/Home Management Training 10    01/15/2024

## 2024-01-16 LAB
ALBUMIN SERPL BCP-MCNC: 2.9 G/DL (ref 3.5–5.2)
ALP SERPL-CCNC: 59 U/L (ref 55–135)
ALT SERPL W/O P-5'-P-CCNC: 14 U/L (ref 10–44)
ANION GAP SERPL CALC-SCNC: 6 MMOL/L (ref 8–16)
AST SERPL-CCNC: 18 U/L (ref 10–40)
BASOPHILS # BLD AUTO: 0.07 K/UL (ref 0–0.2)
BASOPHILS NFR BLD: 0.7 % (ref 0–1.9)
BILIRUB SERPL-MCNC: 0.7 MG/DL (ref 0.1–1)
BUN SERPL-MCNC: 13 MG/DL (ref 8–23)
CALCIUM SERPL-MCNC: 9.7 MG/DL (ref 8.7–10.5)
CHLORIDE SERPL-SCNC: 107 MMOL/L (ref 95–110)
CO2 SERPL-SCNC: 26 MMOL/L (ref 23–29)
CREAT SERPL-MCNC: 0.7 MG/DL (ref 0.5–1.4)
DIFFERENTIAL METHOD BLD: ABNORMAL
EOSINOPHIL # BLD AUTO: 0.1 K/UL (ref 0–0.5)
EOSINOPHIL NFR BLD: 0.8 % (ref 0–8)
ERYTHROCYTE [DISTWIDTH] IN BLOOD BY AUTOMATED COUNT: 14.4 % (ref 11.5–14.5)
EST. GFR  (NO RACE VARIABLE): >60 ML/MIN/1.73 M^2
GLUCOSE SERPL-MCNC: 134 MG/DL (ref 70–110)
HCT VFR BLD AUTO: 38.1 % (ref 37–48.5)
HGB BLD-MCNC: 12.3 G/DL (ref 12–16)
IMM GRANULOCYTES # BLD AUTO: 0.03 K/UL (ref 0–0.04)
IMM GRANULOCYTES NFR BLD AUTO: 0.3 % (ref 0–0.5)
LYMPHOCYTES # BLD AUTO: 1.4 K/UL (ref 1–4.8)
LYMPHOCYTES NFR BLD: 13.9 % (ref 18–48)
MAGNESIUM SERPL-MCNC: 2 MG/DL (ref 1.6–2.6)
MCH RBC QN AUTO: 29.4 PG (ref 27–31)
MCHC RBC AUTO-ENTMCNC: 32.3 G/DL (ref 32–36)
MCV RBC AUTO: 91 FL (ref 82–98)
MONOCYTES # BLD AUTO: 0.6 K/UL (ref 0.3–1)
MONOCYTES NFR BLD: 6.4 % (ref 4–15)
NEUTROPHILS # BLD AUTO: 7.8 K/UL (ref 1.8–7.7)
NEUTROPHILS NFR BLD: 77.9 % (ref 38–73)
NRBC BLD-RTO: 0 /100 WBC
PHOSPHATE SERPL-MCNC: 1.5 MG/DL (ref 2.7–4.5)
PLATELET # BLD AUTO: 144 K/UL (ref 150–450)
PMV BLD AUTO: 10.3 FL (ref 9.2–12.9)
POCT GLUCOSE: 104 MG/DL (ref 70–110)
POCT GLUCOSE: 138 MG/DL (ref 70–110)
POCT GLUCOSE: 170 MG/DL (ref 70–110)
POCT GLUCOSE: 180 MG/DL (ref 70–110)
POTASSIUM SERPL-SCNC: 3.8 MMOL/L (ref 3.5–5.1)
PROT SERPL-MCNC: 6 G/DL (ref 6–8.4)
RBC # BLD AUTO: 4.19 M/UL (ref 4–5.4)
SODIUM SERPL-SCNC: 139 MMOL/L (ref 136–145)
WBC # BLD AUTO: 9.95 K/UL (ref 3.9–12.7)

## 2024-01-16 PROCEDURE — 63600175 PHARM REV CODE 636 W HCPCS: Mod: HCNC | Performed by: REGISTERED NURSE

## 2024-01-16 PROCEDURE — 84100 ASSAY OF PHOSPHORUS: CPT | Mod: HCNC

## 2024-01-16 PROCEDURE — 25000003 PHARM REV CODE 250: Mod: HCNC | Performed by: PSYCHIATRY & NEUROLOGY

## 2024-01-16 PROCEDURE — 20600001 HC STEP DOWN PRIVATE ROOM: Mod: HCNC

## 2024-01-16 PROCEDURE — 83735 ASSAY OF MAGNESIUM: CPT | Mod: HCNC

## 2024-01-16 PROCEDURE — 51798 US URINE CAPACITY MEASURE: CPT | Mod: HCNC

## 2024-01-16 PROCEDURE — 97530 THERAPEUTIC ACTIVITIES: CPT | Mod: HCNC

## 2024-01-16 PROCEDURE — 80053 COMPREHEN METABOLIC PANEL: CPT | Mod: HCNC

## 2024-01-16 PROCEDURE — 25000003 PHARM REV CODE 250: Mod: HCNC | Performed by: INTERNAL MEDICINE

## 2024-01-16 PROCEDURE — 92526 ORAL FUNCTION THERAPY: CPT | Mod: HCNC

## 2024-01-16 PROCEDURE — 99233 SBSQ HOSP IP/OBS HIGH 50: CPT | Mod: HCNC,,, | Performed by: PHYSICIAN ASSISTANT

## 2024-01-16 PROCEDURE — 92523 SPEECH SOUND LANG COMPREHEN: CPT | Mod: HCNC

## 2024-01-16 PROCEDURE — 94761 N-INVAS EAR/PLS OXIMETRY MLT: CPT | Mod: HCNC

## 2024-01-16 PROCEDURE — 97112 NEUROMUSCULAR REEDUCATION: CPT | Mod: HCNC

## 2024-01-16 PROCEDURE — 25000003 PHARM REV CODE 250: Mod: HCNC | Performed by: REGISTERED NURSE

## 2024-01-16 PROCEDURE — 85025 COMPLETE CBC W/AUTO DIFF WBC: CPT | Mod: HCNC

## 2024-01-16 PROCEDURE — 25000003 PHARM REV CODE 250: Mod: HCNC

## 2024-01-16 PROCEDURE — 97535 SELF CARE MNGMENT TRAINING: CPT | Mod: HCNC

## 2024-01-16 PROCEDURE — 20000000 HC ICU ROOM: Mod: HCNC

## 2024-01-16 RX ORDER — LISINOPRIL 5 MG/1
5 TABLET ORAL DAILY
Status: DISCONTINUED | OUTPATIENT
Start: 2024-01-17 | End: 2024-01-20 | Stop reason: HOSPADM

## 2024-01-16 RX ORDER — ACETAMINOPHEN 325 MG/1
650 TABLET ORAL EVERY 6 HOURS PRN
Status: DISCONTINUED | OUTPATIENT
Start: 2024-01-16 | End: 2024-01-20 | Stop reason: HOSPADM

## 2024-01-16 RX ORDER — POLYETHYLENE GLYCOL 3350 17 G/17G
17 POWDER, FOR SOLUTION ORAL 2 TIMES DAILY
Status: DISCONTINUED | OUTPATIENT
Start: 2024-01-16 | End: 2024-01-20 | Stop reason: HOSPADM

## 2024-01-16 RX ORDER — AMOXICILLIN 250 MG
2 CAPSULE ORAL DAILY
Status: DISCONTINUED | OUTPATIENT
Start: 2024-01-16 | End: 2024-01-20 | Stop reason: HOSPADM

## 2024-01-16 RX ORDER — LEVETIRACETAM 500 MG/1
500 TABLET ORAL 2 TIMES DAILY
Status: DISCONTINUED | OUTPATIENT
Start: 2024-01-16 | End: 2024-01-20 | Stop reason: HOSPADM

## 2024-01-16 RX ORDER — LANOLIN ALCOHOL/MO/W.PET/CERES
800 CREAM (GRAM) TOPICAL
Status: DISCONTINUED | OUTPATIENT
Start: 2024-01-16 | End: 2024-01-18

## 2024-01-16 RX ORDER — FLUOXETINE 10 MG/1
10 CAPSULE ORAL DAILY
Status: DISCONTINUED | OUTPATIENT
Start: 2024-01-17 | End: 2024-01-20 | Stop reason: HOSPADM

## 2024-01-16 RX ORDER — AMLODIPINE BESYLATE 5 MG/1
5 TABLET ORAL DAILY
Status: DISCONTINUED | OUTPATIENT
Start: 2024-01-17 | End: 2024-01-20 | Stop reason: HOSPADM

## 2024-01-16 RX ORDER — SODIUM,POTASSIUM PHOSPHATES 280-250MG
2 POWDER IN PACKET (EA) ORAL
Status: DISCONTINUED | OUTPATIENT
Start: 2024-01-16 | End: 2024-01-18

## 2024-01-16 RX ORDER — METOPROLOL TARTRATE 50 MG/1
50 TABLET ORAL 2 TIMES DAILY
Status: DISCONTINUED | OUTPATIENT
Start: 2024-01-16 | End: 2024-01-20 | Stop reason: HOSPADM

## 2024-01-16 RX ORDER — ATORVASTATIN CALCIUM 40 MG/1
40 TABLET, FILM COATED ORAL DAILY
Status: DISCONTINUED | OUTPATIENT
Start: 2024-01-17 | End: 2024-01-20 | Stop reason: HOSPADM

## 2024-01-16 RX ADMIN — METOPROLOL TARTRATE 50 MG: 50 TABLET, FILM COATED ORAL at 09:01

## 2024-01-16 RX ADMIN — POLYETHYLENE GLYCOL 3350 17 G: 17 POWDER, FOR SOLUTION ORAL at 08:01

## 2024-01-16 RX ADMIN — HEPARIN SODIUM 5000 UNITS: 5000 INJECTION INTRAVENOUS; SUBCUTANEOUS at 01:01

## 2024-01-16 RX ADMIN — INSULIN ASPART 2 UNITS: 100 INJECTION, SOLUTION INTRAVENOUS; SUBCUTANEOUS at 08:01

## 2024-01-16 RX ADMIN — MUPIROCIN: 20 OINTMENT TOPICAL at 09:01

## 2024-01-16 RX ADMIN — HEPARIN SODIUM 5000 UNITS: 5000 INJECTION INTRAVENOUS; SUBCUTANEOUS at 05:01

## 2024-01-16 RX ADMIN — LEVETIRACETAM 500 MG: 500 TABLET, FILM COATED ORAL at 09:01

## 2024-01-16 RX ADMIN — INSULIN ASPART 2 UNITS: 100 INJECTION, SOLUTION INTRAVENOUS; SUBCUTANEOUS at 05:01

## 2024-01-16 RX ADMIN — AMLODIPINE BESYLATE 5 MG: 5 TABLET ORAL at 08:01

## 2024-01-16 RX ADMIN — HEPARIN SODIUM 5000 UNITS: 5000 INJECTION INTRAVENOUS; SUBCUTANEOUS at 09:01

## 2024-01-16 RX ADMIN — POTASSIUM & SODIUM PHOSPHATES POWDER PACK 280-160-250 MG 2 PACKET: 280-160-250 PACK at 05:01

## 2024-01-16 RX ADMIN — FLUTICASONE PROPIONATE 100 MCG: 50 SPRAY, METERED NASAL at 08:01

## 2024-01-16 RX ADMIN — FLUOXETINE 10 MG: 10 CAPSULE ORAL at 08:01

## 2024-01-16 RX ADMIN — METOPROLOL TARTRATE 50 MG: 50 TABLET, FILM COATED ORAL at 08:01

## 2024-01-16 RX ADMIN — ACETAMINOPHEN 650 MG: 325 TABLET ORAL at 01:01

## 2024-01-16 RX ADMIN — POTASSIUM BICARBONATE 50 MEQ: 978 TABLET, EFFERVESCENT ORAL at 05:01

## 2024-01-16 RX ADMIN — MUPIROCIN: 20 OINTMENT TOPICAL at 08:01

## 2024-01-16 RX ADMIN — LISINOPRIL 5 MG: 5 TABLET ORAL at 08:01

## 2024-01-16 RX ADMIN — POLYETHYLENE GLYCOL 3350 17 G: 17 POWDER, FOR SOLUTION ORAL at 09:01

## 2024-01-16 RX ADMIN — ATORVASTATIN CALCIUM 40 MG: 40 TABLET, FILM COATED ORAL at 08:01

## 2024-01-16 NOTE — PLAN OF CARE
OT POC reviewed, goals remain appropriate  Problem: Occupational Therapy  Goal: Occupational Therapy Goal  Description: Goals to be met by: 2/15/24     Patient will increase functional independence with ADLs by performing:    UE Dressing with Minimal Assistance.  LE Dressing with Minimal Assistance.  Grooming while EOB with Contact Guard Assistance.  Toileting from bedside commode with Minimal Assistance for hygiene and clothing management.   Sitting at edge of bed x10 minutes with Contact Guard Assistance.  Supine to sit with Minimal Assistance.  Step transfer with Minimal Assistance  Toilet transfer to bedside commode with Minimal Assistance.    Outcome: Ongoing, Progressing

## 2024-01-16 NOTE — PLAN OF CARE
[facility] - Neuro Critical Care  Initial Discharge Assessment       Primary Care Provider: Viktoria Mckeon MD    Admission Diagnosis: SDH (subdural hematoma) [S06.5XAA]  Acute focal neurological deficit [R29.818]  Endotracheally intubated [Z97.8]    Admission Date: 1/13/2024  Expected Discharge Date:     Transition of Care Barriers: None    Payor: [employer] MANAGED MEDICARE / Plan: [employer] MEDICARE HMO / Product Type: Capitation /     Extended Emergency Contact Information  Primary Emergency Contact: Eusebia Giles  Mobile Phone: [phone]  Relation: Daughter  Preferred language: English  Secondary Emergency Contact: luis enrique leo   [place]  Mobile Phone: [phone]  Relation: Son  Preferred language: English   needed? No    Discharge Plan A: Skilled Nursing Facility  Discharge Plan B: [facility] - [place], [place] - [address]  [address] [address]  Phone: [phone] Fax: [phone]    [id number] [place], [place] - [address] [address]  Phone: [phone] Fax: [phone]      Initial Assessment (most recent)       Adult Discharge Assessment - 01/16/24 1151          Discharge Assessment    Assessment Type Discharge Planning Assessment     Confirmed/corrected address, phone number and insurance Yes     Confirmed Demographics Correct on Facesheet     Source of Information patient;family     When was your last doctors appointment? --   over a year    Does patient/caregiver understand observation status Yes     Communicated BRIDGETTE with patient/caregiver Yes;Date not available/Unable to determine     Reason For Admission Traumatic subdural hematoma     People in Home alone     Do you expect to return to your current living situation? Yes     Do you have help at home or someone to help you manage your care at home? No     Prior to hospitilization cognitive status:  Alert/Oriented;No Deficits     Current cognitive status: Alert/Oriented;No Deficits     Walking or Climbing Stairs Difficulty yes     Walking or Climbing Stairs ambulation difficulty, requires equipment;stair climbing difficulty, requires equipment     Mobility Management Rollator     Dressing/Bathing Difficulty yes     Dressing/Bathing bathing difficulty, requires equipment     Dressing/Bathing Management shower seat     Do you have any problems with: --   Drives self    Home Accessibility stairs to enter home     Number of Stairs, Main Entrance two     Stair Railings, Main Entrance none     Home Layout Able to live on 1st floor     Equipment Currently Used at Home shower chair;rollator     Readmission within 30 days? No     Patient currently being followed by outpatient case management? No     Do you currently have service(s) that help you manage your care at home? No     Do you take prescription medications? No   I take vitamins    Do you have prescription coverage? Yes     Coverage [employer] MANAGED MEDICARE/[employer] MEDICARE HMO     Do you have any problems affording any of your prescribed medications? No     Is the patient taking medications as prescribed? yes     Who is going to help you get home at discharge? dtr     How do you get to doctors appointments? car, drives self     Are you on dialysis? No     Do you take coumadin? No     Discharge Plan A Skilled Nursing Facility     Discharge Plan B Home Health     DME Needed Upon Discharge  other (see comments)     Discharge Plan discussed with: Patient;Adult children     Transition of Care Barriers None        Physical Activity    On average, how many days per week do you engage in moderate to strenuous exercise (like a brisk walk)? 0 days     On average, how many minutes do you engage in exercise at this level? 0 min        Financial Resource Strain    How hard is it for you to pay for the very basics like food, housing, medical care, and heating? Not hard at all         Housing Stability    In the last 12 months, was there a time when you were not able to pay the mortgage or rent on time? No     In the last 12 months, how many places have you lived? 1     In the last 12 months, was there a time when you did not have a steady place to sleep or slept in a shelter (including now)? No        Transportation Needs    In the past 12 months, has lack of transportation kept you from medical appointments or from getting medications? No     In the past 12 months, has lack of transportation kept you from meetings, work, or from getting things needed for daily living? No        Food Insecurity    Within the past 12 months, you worried that your food would run out before you got the money to buy more. Never true     Within the past 12 months, the food you bought just didn't last and you didn't have money to get more. Never true        Stress    Do you feel stress - tense, restless, nervous, or anxious, or unable to sleep at night because your mind is troubled all the time - these days? Not at all        Social Connections    In a typical week, how many times do you talk on the phone with family, friends, or neighbors? More than three times a week     How often do you get together with friends or relatives? Three times a week     How often do you attend Muslim or Buddhist services? Never     Do you belong to any clubs or organizations such as Muslim groups, unions, fraternal or athletic groups, or school groups? No     How often do you attend meetings of the clubs or organizations you belong to? Never     Are you , , , , never , or living with a partner?         Alcohol Use    Q1: How often do you have a drink containing alcohol? Never     Q2: How many drinks containing alcohol do you have on a typical day when you are drinking? Patient does not drink     Q3: How often do you have six or more drinks on one occasion? Never                   SW  met with Pt and Eusebia Giles (Daughter) [phone] bedside. Pt stated she lives alone.  Pt uses a rollator and a cane for mobility.  Pt is otherwise independent with ADLs.  Pt reports that she drives for errands and groceries. Pt denies any current HH, HD, or coumadin use.  Pt's family is available for transpo at [place].     Discharge Plan A Skilled Nursing Facility    Discharge Plan B Home Health      No other discharge needs identified at this time.    Discharge Plan A and Plan B have been determined by review of patient's clinical status, future medical and therapeutic needs, and coverage/benefits for post-acute care in coordination with multidisciplinary team members.     Debby Fisher LMSW  Case Management [facility]  [id number]

## 2024-01-16 NOTE — PLAN OF CARE
[facility] Care Plan    POC reviewed with Kanchan WATSON Downing and family at 0300. Pt verbalized understanding. Questions and concerns addressed. No acute events overnight. Pt progressing toward goals. Will continue to monitor. See below and flowsheets for full assessment and VS info.     -transfer orders, awaiting room availability. Urinary retention-in and out cath x1 overnight      Is this a stroke patient? no    Neuro:  Ana Laura Coma Scale  Best Eye Response: 3-->(E3) to speech  Best Motor Response: 6-->(M6) obeys commands  Best Verbal Response: 5-->(V5) oriented  Ana Laura Coma Scale Score: 14  Assessment Qualifiers: patient not sedated/intubated  Pupil PERRLA: yes     24hr Temp:  [97.7 °F (36.5 °C)-98.8 °F (37.1 °C)]     CV:   Rhythm: sinus arrhythmia, atrial rhythm  BP goals:   SBP < 160  MAP > 65    Resp:      Vent Mode: SIMV  Set Rate: 12 BPM  Oxygen Concentration (%): 40  Vt Set: 410 mL  PEEP/CPAP: 5 cmH20  Pressure Support: 10 cmH20    Plan: N/A    GI/:     Diet/Nutrition Received: mechanical/dental soft  Last Bowel Movement: 01/12/24  Voiding Characteristics: due to void, external catheter (due to void 2200)    Intake/Output Summary (Last 24 hours) at 1/16/2024 0438  Last data filed at 1/15/2024 2301  Gross per 24 hour   Intake 130 ml   Output 405 ml   Net -275 ml          Labs/Accuchecks:  Recent Labs   Lab 01/16/24  0304   WBC 9.95   RBC 4.19   HGB 12.3   HCT 38.1   *      Recent Labs   Lab 01/16/24  0304      K 3.8   CO2 26      BUN 13   CREATININE 0.7   ALKPHOS 59   ALT 14   AST 18   BILITOT 0.7      Recent Labs   Lab 01/13/24  1249   INR 1.0   APTT 23.7      Recent Labs   Lab 01/14/24  0308   TROPONINI 0.108*       Electrolytes: Electrolytes replaced  Accuchecks: ACHS    Gtts:      LDA/Wounds:  Lines/Drains/Airways       Drain  Duration             Female External Urinary Catheter w/ Suction 01/15/24 1035 <1 day              Peripheral Intravenous Line  Duration                  Peripheral IV  - Single Lumen 01/13/24 1516 20 G Anterior;Left Forearm 2 days         Peripheral IV - Single Lumen 01/13/24 1516 20 G Anterior;Right Forearm 2 days                  Wounds: No  Wound care consulted: No

## 2024-01-16 NOTE — PT/OT/SLP PROGRESS
Occupational Therapy   Treatment    Name: Kanchan Downing  MRN: [id number]  Admitting Diagnosis:  Traumatic subdural hematoma       Recommendations:     Discharge Recommendations: Moderate Intensity Therapy  Discharge Equipment Recommendations:  bedside commode, wheelchair  DME Justification: Patient has a mobility limitation that significantly impairs their ability to participate in one or more mobility related activities of daily living in customary locations in the home. The mobility limitation cannot be sufficiently resolved by the use of a cane or walker. The use of a manual wheelchair will greatly improve the patient's ability to participate in MRADLs. The patient will use the wheelchair on a regular basis at home. They have expressed their willingness to use a manual wheelchair in the home, and have a caregiver who is available and willing to assist with the wheelchair if needed.  Patient has a mobility limitation that significantly impairs their ability to participate in one or more mobility related activities of daily living, including toileting. This deficit can be resolved by using a bedside commode. Patient demonstrates mobility limitations that will cause them to be confined to one room at home, and a bathroom will not be accessible. Using a bedside commode will greatly improve the patient's ability to participate in MRADLs.  Barriers to discharge:  Other (Comment) (increased skilled assist needed)    Assessment:     Kanchan Downing is a 83 y.o. female with a medical diagnosis of Traumatic subdural hematoma.  She presents with the following performance deficits affecting function: weakness, impaired endurance, impaired self care skills, impaired functional mobility, decreased coordination, impaired cardiopulmonary response to activity, gait instability, decreased upper extremity function, decreased lower extremity function, impaired balance. Pt is mostly limited by weakness, but improved today from chart  review. Pt is progressing well and required less assist for sitting balance today, able to complete steps to the bedside chair with light assistance of 2 persons 2/2 line management. Patient continues to demonstrate the need for moderate intensity therapy on a daily basis post acute exhibited by decreased independence with self-care and functional mobility      Rehab Prognosis:  Good; patient would benefit from acute skilled OT services to address these deficits and reach maximum level of function.       Plan:     Patient to be seen 4 x/week to address the above listed problems via self-care/home management, therapeutic activities, therapeutic exercises, neuromuscular re-education  Plan of Care Expires: 02/15/24  Plan of Care Reviewed with: patient, daughter    Subjective     Chief Complaint: none verbalized  Patient/Family Comments/goals: to get better  Pain/Comfort:  Pain Rating 1: 0/10  Pain Rating Post-Intervention 1: 0/10    Objective:     Communicated with: RN prior to session.  Patient found HOB elevated with blood pressure cuff, pulse ox (continuous), telemetry upon OT entry to room.    General Precautions: Standard, aspiration, fall    Orthopedic Precautions:N/A  Braces: N/A  Respiratory Status: Room air     Occupational Performance:     Bed Mobility:    Patient completed Scooting/Bridging with minimum assistance  Patient completed Supine to Sit with minimum assistance and and HOB elevated    Functional Mobility/Transfers:  Patient completed Sit <> Stand Transfer with minimum assistance with hand-held assist   Patient completed Bed <> Chair Transfer using Step Transfer technique with minimum assistance and of 2 persons with hand-held assist      Activities of Daily Living:  Grooming: stand by assistance to complete face hygiene  Upper Body Dressing: minimum assistance to don gown as robe  Lower Body Dressing: total assistance to adjust socks      AMPAC 6 Click ADL: 18    Treatment & Education:  Pt  participated in neuromuscular re-education to promote orientation to midline - performed Wb'ing on L forearm to orient to midline. Pt with improved orientation to midline following this Wb'ing ax - tactile and verbal cues required for proper muscle activation. Pt progressed from requiring mod A for sitting balance at EOB due to R lean to requiring SBA at end of session. .  Pt educated on the following:  - role of OT and OT POC  - use of call light to request for assistance with all functional mobility to ensure safety during hospital stay  - importance of continued mobilization  - Safe transfer techniques and proper body mechanics for fall prevention and improved independence with functional transfers   - Importance of OOB activities to increase endurance and tolerance for increased participation in daily ADLs.    - Various therex pt can perform outside of therapy session to increase functional endurance and strength for overall improved independence in occupations of choice.   - benefits of continued participation in therapy.   - Pt educated on importance of calling for staff assist for functional mobility/transfers.  - All pt questions within OT scope of practice addressed, pt verbalized understanding.        Patient left up in chair with all lines intact, call button in reach, chair alarm on, RN notified, and daughter present    GOALS:   Multidisciplinary Problems       Occupational Therapy Goals          Problem: Occupational Therapy    Goal Priority Disciplines Outcome Interventions   Occupational Therapy Goal     OT, PT/OT Ongoing, Progressing    Description: Goals to be met by: 2/15/24     Patient will increase functional independence with ADLs by performing:    UE Dressing with Minimal Assistance.  LE Dressing with Minimal Assistance.  Grooming while EOB with Contact Guard Assistance.  Toileting from bedside commode with Minimal Assistance for hygiene and clothing management.   Sitting at edge of bed x10  minutes with Contact Guard Assistance.  Supine to sit with Minimal Assistance.  Step transfer with Minimal Assistance  Toilet transfer to bedside commode with Minimal Assistance.                         Time Tracking:     OT Date of Treatment: 01/16/24  OT Start Time: 1003  OT Stop Time: 1022  OT Total Time (min): 19 min    Billable Minutes:Neuromuscular Re-education 19    OT/WANDA: OT          1/16/2024

## 2024-01-16 NOTE — PLAN OF CARE
Problem: SLP  Goal: SLP Goal  Description: Speech Language Pathology Goals  Goals expected to be met by 1/29  1. Pt will participate in ongoing assessment of swallow.   2. Pt will complete assessment of reading, writing, visual spatial skills to determine need for tx.     Outcome: Ongoing, Progressing    Evaluation completed.  Daughter reports pt at cognitive baseline.  Improved tolerance of PO this am/  Rec level 6 soft and bite sized diet with thin liquids with strict aspiration precautions.  Pt safe to advance to regular solids as she feels comfortable.  Soft solids for comfort only 2/2 jaw pain.

## 2024-01-16 NOTE — PT/OT/SLP PROGRESS
Physical Therapy Treatment    Patient Name:  Kanchan Downing   MRN:  [id number]  Admitting Diagnosis: Traumatic subdural hematoma  Recent Surgery: * No surgery found *      Recommendations:     Discharge Recommendations:  Moderate intensity therapy at discharge   Discharge Equipment Recommendations: bedside commode, wheelchair   DME Justification: Patient has a mobility limitation that significantly impairs their ability to participate in one or more mobility related activities of daily living in customary locations in the home. The mobility limitation cannot be sufficiently resolved by the use of a cane or walker. The use of a manual wheelchair will greatly improve the patient's ability to participate in MRADLs. The patient will use the wheelchair on a regular basis at home. They have expressed their willingness to use a manual wheelchair in the home, and have a caregiver who is available and willing to assist with the wheelchair if needed.  Patient has a mobility limitation that significantly impairs their ability to participate in one or more mobility related activities of daily living, including toileting. This deficit can be resolved by using a bedside commode. Patient demonstrates mobility limitations that will cause them to be confined to one room at home, and a bathroom will not be accessible. Using a bedside commode will greatly improve the patient's ability to participate in MRADLs.  Barriers to discharge: None    Highest Level of Mobility: steps to chair  Assistance Needed: minimal assistance of 2 people    Assessment:     Kanchan Downing is a 83 y.o. female admitted with a medical diagnosis of Traumatic subdural hematoma. Patient tolerated PT treatment well today. She is most limited today by weakness.  She was able to practice bed mobility, standing, transfers, and took a few steps. Focus of treatment was improving oveall functional mobility and strength. Patient is progressing well. Patient continues to  demonstrate progression to warrant moderate intensity therapy evidenced by objectives noted below.  See detailed treatment note below:    Problem List: weakness, impaired endurance, impaired self care skills, impaired functional mobility, gait instability, impaired balance, decreased coordination, decreased upper extremity function, decreased lower extremity function, decreased safety awareness, decreased ROM, impaired cardiopulmonary response to activity. weakness, impaired endurance, impaired self care skills, impaired functional mobility, gait instability, impaired balance, decreased coordination, decreased upper extremity function, decreased lower extremity function, decreased safety awareness, decreased ROM, impaired cardiopulmonary response to activity  Rehab Prognosis: Good     GOALS:   Multidisciplinary Problems       Physical Therapy Goals          Problem: Physical Therapy    Goal Priority Disciplines Outcome Goal Variances Interventions   Physical Therapy Goal     PT, PT/OT Ongoing, Progressing     Description: Goals to be met by: 2/15/2024     Patient will increase functional independence with mobility by performin. Supine to sit with Contact Guard Assistance  2. Sit to supine with Contact Guard Assistance  3. Sit to stand transfer with Stand-by Assistance  4. Bed to chair transfer with Minimal Assistance using LRAD  5. Gait  x 100 feet with Minimal Assistance using LRAD.   6. Lower extremity exercise program x15 reps per handout, with supervision                       Plan:     Goals for next session: increase gait distance and quality    During this hospitalization, patient to be seen 4 x/week to address the listed problems via gait training, therapeutic activities, therapeutic exercises, neuromuscular re-education  Plan of Care Expires:  02/15/24  Plan of Care Reviewed with: patient, daughter    Subjective     Communicated with RN prior to session.  Patient found resting in bed upon PT entry to  "room.   "I feel weak"    Pain/Comfort:  Pain Rating 1: 0/10  Pain Rating Post-Intervention 1: 0/10    Objective:     Patient found with: telemetry, pulse ox (continuous), blood pressure cuff, peripheral IV   Mental Status: Patient is Alert and Cooperative during session.    General Precautions: Standard, fall, aspiration   Orthopedic Precautions:N/A   Braces: N/A   Respiratory Status: room air  Vital Signs (Most Recent):    Temp: 97.8 °F (36.6 °C) (01/16/24 1101)  Pulse: (!) 58 (01/16/24 1101)  Resp: 17 (01/16/24 1101)  BP: (!) 126/58 (01/16/24 1101)  SpO2: 100 % (01/16/24 1101)    Functional Mobility:  Bed Mobility:   Supine to Sit: minimum assistance  Scooting anteriorly to EOB to have both feet planted on floor: minimum assistance    Sitting Balance at Edge of Bed:  Assistance Level Required: mod - SBA  Postural deviations noted: rounded shoulders, forward head, posterior pelvic tilt, and posterior lateral lean R  Cueing provided for: upright and midline sitting posture, correcting pelvic tilt, and hands in lap to prevent pushing    Transfers:   Sit <> Stand Transfer: minimum assistance with hand-held assist   Bed <> Chair Transfer: Step Transfer technique with minimum assistance and of 2 persons with hand-held assist      Gait:  Patient ambulated: steps to chair   Patient required: minimal assist and of 2 persons  Patient used:  No Assistive Device  Gait Deviation(s): decreased speed, decreased step length, shuffling pattern, generalized instability, and narrow base of support  Cueing provided for: step sequencing, upright posture, and adequate step length    Neuro Re-Education:   Patient provided with the following neuro based interventions: verbal cueing and education for optimal posture, manual cueing and education for optimal posture, joint approximation to increase sensory input and joint integrity, muscle tapping to improve activation, and reaching outside base of support to challenge " balance    Education:  Patient was educated on the following:  Progress of PT goals and plan of care  In room safety and use of call button  Importance of continued upright mobility and exercise    Patient left up in chair with all lines intact, call button in reach, and RN notified.    AM-PAC 6 CLICK MOBILITY  Turning over in bed (including adjusting bedclothes, sheets and blankets)?: 3  Sitting down on and standing up from a chair with arms (e.g., wheelchair, bedside commode, etc.): 3  Moving from lying on back to sitting on the side of the bed?: 3  Moving to and from a bed to a chair (including a wheelchair)?: 2  Need to walk in hospital room?: 2  Climbing 3-5 steps with a railing?: 1  Basic Mobility Total Score: 14       Time Tracking:     PT Received On: 01/16/24  PT Start Time: 1002     PT Stop Time: 1019  PT Total Time (min): 17 min     Additional staff present: OT - Co-treatment with OT due to patient complexity and need for two skilled therapists to ensure safe mobilization.     Billable Minutes:   Therapeutic Activity 17    Treatment Type: Treatment  PT/PTA: PT       Jaycee Hui, PT, DPT  01/16/2024

## 2024-01-16 NOTE — PLAN OF CARE
[facility] Care Plan    POC reviewed with Kanchan WATSON Downing and family at 1400. Pt verbalized understanding. Questions and concerns addressed. No acute events today. Pt progressing toward goals. Will continue to monitor. See below and flowsheets for full assessment and VS info.     -Stepdown orders in. Awaiting room placement.         Is this a stroke patient? yes- Stroke booklet reviewed with patient and family, risk factors identified for patient and stroke booklet remains at bedside for ongoing education.     Neuro:  [place] Coma Scale  Best Eye Response: 3-->(E3) to speech  Best Motor Response: 6-->(M6) obeys commands  Best Verbal Response: 4-->(V4) confused  [place] Coma Scale Score: 13  Assessment Qualifiers: patient not sedated/intubated  Pupil PERRLA: yes     24 hr Temp:  [97.4 °F (36.3 °C)-98.8 °F (37.1 °C)]     CV:   Rhythm: sinus arrhythmia, atrial rhythm  BP goals:   SBP < 160  MAP > 65    Resp:      Vent Mode: SIMV  Set Rate: 12 BPM  Oxygen Concentration (%): 40  Vt Set: 410 mL  PEEP/CPAP: 5 cmH20  Pressure Support: 10 cmH20    Plan: N/A    GI/:     Diet/Nutrition Received: mechanical/dental soft  Last Bowel Movement: 01/12/24  Voiding Characteristics: voids spontaneously without difficulty    Intake/Output Summary (Last 24 hours) at 1/16/2024 1626  Last data filed at 1/15/2024 2301  Gross per 24 hour   Intake 130 ml   Output 250 ml   Net -120 ml     Unmeasured Output  Urine Occurrence: 1    Labs/Accuchecks:  Recent Labs   Lab 01/16/24  0304   WBC 9.95   RBC 4.19   HGB 12.3   HCT 38.1   *      Recent Labs   Lab 01/16/24  0304      K 3.8   CO2 26      BUN 13   CREATININE 0.7   ALKPHOS 59   ALT 14   AST 18   BILITOT 0.7      Recent Labs   Lab 01/13/24  1249   INR 1.0   APTT 23.7      Recent Labs   Lab 01/14/24  0308   TROPONINI 0.108*       Electrolytes: Electrolytes replaced  Accuchecks: ACHS    Gtts:      LDA/Wounds:  Lines/Drains/Airways       Peripheral Intravenous Line  Duration                   Peripheral IV - Single Lumen 01/13/24 1516 20 G Anterior;Left Forearm 3 days         Peripheral IV - Single Lumen 01/13/24 1516 20 G Anterior;Right Forearm 3 days                  Wounds:        Wound care consulted: No

## 2024-01-16 NOTE — SUBJECTIVE & OBJECTIVE
Interval History: NAEON. Pt doing well this morning still mildly confused    Medications:  Continuous Infusions:  Scheduled Meds:   amLODIPine  5 mg Per NG tube Daily    atorvastatin  40 mg Per NG tube Daily    FLUoxetine  10 mg Per NG tube Daily    fluticasone propionate  2 spray Each Nostril Daily    heparin (porcine)  5,000 Units Subcutaneous Q8H    levETIRAcetam (Keppra) IV (PEDS and ADULTS)  500 mg Intravenous Q12H    lisinopriL  5 mg Per NG tube Daily    metoprolol tartrate  50 mg Per NG tube BID    mupirocin   Nasal BID    polyethylene glycol  17 g Per NG tube Daily     PRN Meds:acetaminophen, dextrose 10%, dextrose 10%, glucagon (human recombinant), hydrALAZINE, insulin aspart U-100, labetalol, magnesium oxide, magnesium oxide, ondansetron, potassium bicarbonate, potassium bicarbonate, potassium bicarbonate, potassium, sodium phosphates, potassium, sodium phosphates, potassium, sodium phosphates, sodium chloride 0.9%     Review of Systems  Objective:     Weight: 74.4 kg (164 lb)  Body mass index is 26.47 kg/m².  Vital Signs (Most Recent):  Temp: 98.1 °F (36.7 °C) (01/16/24 0701)  Pulse: 67 (01/16/24 0701)  Resp: 14 (01/16/24 0701)  BP: (!) 145/65 (01/16/24 0701)  SpO2: 99 % (01/16/24 0701) Vital Signs (24h Range):  Temp:  [97.7 °F (36.5 °C)-98.8 °F (37.1 °C)] 98.1 °F (36.7 °C)  Pulse:  [62-98] 67  Resp:  [10-24] 14  SpO2:  [94 %-100 %] 99 %  BP: (109-173)/() 145/65                         Female External Urinary Catheter w/ Suction 01/15/24 1035 (Active)   Skin no redness;no breakdown 01/16/24 0501   Tolerance no signs/symptoms of discomfort 01/16/24 0501   Suction Continuous suction at 70 mmHg 01/16/24 0501   Date of last wick change 01/16/24 01/16/24 0301   Time of last wick change 0301 01/16/24 0301          Physical Exam         Neurosurgery Physical Exam  [id number]  Aox2  PERRL  EOMI  STINSON spon AG   Significant Labs:  Recent Labs   Lab 01/15/24  0318 01/16/24  0304   * 134*    139   K 4.9  "3.8    107   CO2 23 26   BUN 16 13   CREATININE 0.8 0.7   CALCIUM 10.1 9.7   MG 2.0 2.0     Recent Labs   Lab 01/15/24  0318 01/16/24  0304   WBC 15.52* 9.95   HGB 13.9 12.3   HCT 42.8 38.1    144*     No results for input(s): "LABPT", "INR", "APTT" in the last 48 hours.  Microbiology Results (last 7 days)       ** No results found for the last 168 hours. **          All pertinent labs from the last 24 hours have been reviewed.    Significant Diagnostics:  I have reviewed all pertinent imaging results/findings within the past 24 hours.  "

## 2024-01-16 NOTE — PLAN OF CARE
01/16/24 1256   Post-Acute Status   Post-Acute Authorization Home Health;Placement   Post-Acute Placement Status Patient declined/refused   Home Health Status Set-up Complete/Auth obtained   Coverage [employer] MANAGED MEDICARE/HU[employer] MEDICARE HMO   Hospital Resources/Appts/Education Provided Provided patient/caregiver with written discharge plan information   Discharge Delays None known at this time   Discharge Plan   Discharge Plan A Skilled Nursing Facility   Discharge Plan B Home Health       Pt refused SNF at this time.  Referrals sent for HH.  [facility] is accepting provider. [facility] referral for HH closed.    No other discharge needs identified at this time.    Discharge Plan A and Plan B have been determined by review of patient's clinical status, future medical and therapeutic needs, and coverage/benefits for post-acute care in coordination with multidisciplinary team members.     Debby Fisher LMSW  Case Management [facility]  [phone]

## 2024-01-16 NOTE — PROGRESS NOTES
[facility]  Neurosurgery  Progress Note    Subjective:     History of Present Illness: 83 F with unknown ASA/AC BIBEMS s/p unwitnessed fall at home at approximately 0700. Unknown LOC with + scalp hematoma. Patient c/o headache on arrival. Following commands per EMS however more agitated in ED, not answering questions and not following commands. Patient yelling and trying to get out of stretcher, placed in restraints by ED. No external evidence of trauma.Patient unable to provide medical history.    ASA81 prescribed in 2020 unclear if patient is taking at this time. No family bedside for collateral hx.     Post-Op Info:  * No surgery found *       Interval History: NAEON. Pt doing well this morning still mildly confused    Medications:  Continuous Infusions:  Scheduled Meds:   amLODIPine  5 mg Per NG tube Daily    atorvastatin  40 mg Per NG tube Daily    FLUoxetine  10 mg Per NG tube Daily    fluticasone propionate  2 spray Each Nostril Daily    heparin (porcine)  5,000 Units Subcutaneous Q8H    levETIRAcetam (Keppra) IV (PEDS and ADULTS)  500 mg Intravenous Q12H    lisinopriL  5 mg Per NG tube Daily    metoprolol tartrate  50 mg Per NG tube BID    mupirocin   Nasal BID    polyethylene glycol  17 g Per NG tube Daily     PRN Meds:acetaminophen, dextrose 10%, dextrose 10%, glucagon (human recombinant), hydrALAZINE, insulin aspart U-100, labetalol, magnesium oxide, magnesium oxide, ondansetron, potassium bicarbonate, potassium bicarbonate, potassium bicarbonate, potassium, sodium phosphates, potassium, sodium phosphates, potassium, sodium phosphates, sodium chloride 0.9%     Review of Systems  Objective:     Weight: 74.4 kg (164 lb)  Body mass index is 26.47 kg/m².  Vital Signs (Most Recent):  Temp: 98.1 °F (36.7 °C) (01/16/24 0701)  Pulse: 67 (01/16/24 0701)  Resp: 14 (01/16/24 0701)  BP: (!) 145/65 (01/16/24 0701)  SpO2: 99 % (01/16/24 0701) Vital Signs (24h Range):  Temp:  [97.7 °F (36.5 °C)-98.8  "°F (37.1 °C)] 98.1 °F (36.7 °C)  Pulse:  [62-98] 67  Resp:  [10-24] 14  SpO2:  [94 %-100 %] 99 %  BP: (109-173)/() 145/65                         Female External Urinary Catheter w/ Suction 01/15/24 1035 (Active)   Skin no redness;no breakdown 01/16/24 0501   Tolerance no signs/symptoms of discomfort 01/16/24 0501   Suction Continuous suction at 70 mmHg 01/16/24 0501   Date of last wick change 01/16/24 01/16/24 0301   Time of last wick change 0301 01/16/24 0301          Physical Exam         Neurosurgery Physical Exam  E4V5M6  Aox2  PERRL  EOMI  STINSON spon AG   Significant Labs:  Recent Labs   Lab 01/15/24  0318 01/16/24  0304   * 134*    139   K 4.9 3.8    107   CO2 23 26   BUN 16 13   CREATININE 0.8 0.7   CALCIUM 10.1 9.7   MG 2.0 2.0     Recent Labs   Lab 01/15/24  0318 01/16/24  0304   WBC 15.52* 9.95   HGB 13.9 12.3   HCT 42.8 38.1    144*     No results for input(s): "LABPT", "INR", "APTT" in the last 48 hours.  Microbiology Results (last 7 days)       ** No results found for the last 168 hours. **          All pertinent labs from the last 24 hours have been reviewed.    Significant Diagnostics:  I have reviewed all pertinent imaging results/findings within the past 24 hours.  Assessment/Plan:     * Traumatic subdural hematoma  83 F with possible ASA81 use, DM, HTN, presenting from home s/p fall with confusion, agitation, AMS found to have R>L acute SDH w/o midline shift and with minimal mass effect.    Interval CTH 1/14: stable    --Patient admitted to be admitted to [facility], d/w [facility] provider      -q1h neurochecks in ICU  --Reverse anti-plt/coag medications - possible home ASA81 use, s/p DDAVP  --SBP <160  --Na >135  --Keppra 500 BID x7 days  --Step down to floor status today  --SLP, PT/OT  --Continue to monitor clinically, notify NSGY immediately with any changes in neuro status  --AMS work-up per primary      Dispo: ongoing    Will d/w Dr. Magdalena Jauregui, " MD  Neurosurgery  [facility]

## 2024-01-16 NOTE — PT/OT/SLP EVAL
Speech Language Pathology Evaluation  Cognitive Communication    Patient Name:  Kanchan Downing   MRN:  [id number]  Admitting Diagnosis: Traumatic subdural hematoma    Recommendations:     Recommendations:                General Recommendations:  Dysphagia therapy  Diet recommendations:  Soft & Bite Sized Diet - IDDSI Level 6, Thin liquids - IDDSI Level 0   Aspiration Precautions: Strict aspiration precautions   General Precautions: Standard, aspiration, fall  Communication strategies:  provide increased time to answer and go to room if call light pushed    Assessment:     Kanchan Downing is a 83 y.o. female with an SLP diagnosis of mild Dysphagia and Cognitive-Linguistic Impairment.  She presents with baseline cognitive function per daughter.    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, without long-term current use of insulin 10/31/2017    Diabetes mellitus type 2 without retinopathy 06/12/2014    6% metformin 500 bid, FU+ 2016    DVT (deep venous thrombosis) 2001    R , Dr Bonilla    Grief 04/06/2022     Marques passed 1/2022    Hyperlipidemia     LDL 82    Hyperlipidemia associated with type 2 diabetes mellitus 09/11/2012    Hypertension     Hypertension associated with diabetes 09/11/2012    Memory loss 12/29/2022    CT chronic changes 2022    Microalbuminuria due to type 2 diabetes mellitus 12/08/2015    taking lisinopril, losartan caused olivia effects    PVD (peripheral vascular disease) with claudication 05/02/2019    Compression hose    Risk for coronary artery disease greater than 20% in next 10 years per  "[place] score 05/01/2018    Strabismus     Type 2 diabetes mellitus with diabetic polyneuropathy 06/12/2014    Type 2 diabetes mellitus with diabetic polyneuropathy, without long-term current use of insulin 06/12/2014       Past Surgical History:   Procedure Laterality Date    BREAST BIOPSY Right 2011    BREAST LUMPECTOMY Right 2011    [place] REMOVAL OF NAIL BED  6/10/2015    TONSILLECTOMY         Social History: Patient lives alone.  Per daughter, pt with baseline confusion and short term memory loss.  Daughter manages pts' finances.  Pt does not take nay medications nor does she cook.  Family calls and checks on pt daily.      Prior Intubation HX:  1/13-1/14     Modified Barium Swallow: none reported     Chest X-Rays: 1/14: "No interval detrimental change."      Subjective     "My brain is fine."     Pain/Comfort:  Pain Rating 1: 0/10  Pain Rating Post-Intervention 1: 0/10    Respiratory Status: Room air    Objective:     Cognitive Status:    Orientation Person, Place, and Situation  Memory Immediate Recall impaired for 5 word sets and Delayed impaired recall of recent events   Problem Solving Categories 50%, Solutions 100% with min A, 33% IND, and Compare/contrast 100%       Receptive Language:   Comprehension:   WFL, repetitions required with complex information    Pragmatics:    WFL    Expressive Language:  Verbal:    fluent      Motor Speech:  WFL    Voice:   Improved considerably from yesterday, mild hoarseness only     Visual-Spatial:  tba    Reading:   tba      Written Expression:   tba    Treatment: pt sleeping when SLP entered, roused with min cues.  Improved sustained attention and alertness today though continued decreased eye contact with SLP on left.  Vocal quality significantly improved with clear vocal quality and improved intensity.  Mild hoarseness noted.  Pt readily accepted thin liquids via spoon, cup, and straw with timely swallow intiaition and without overt s/s aspiration.  Multiple swallows " and occasional belching also observed.  Vocal quality remained clear.  Puree and solids tolerated with adequate oral transit and clearance and without overt s/s aspiration.  Pt with continued reports of jaw pain with mastication.  Given review of diet levels, pt requesting to remain on soft solids.  SLP reviewed role of SLP, diet recs, swallow precs, s/s aspiration, results of evaluation, safety considerations for home, and SLP POC.  Daughter verbalized understanding and agreement.     Goals:   Multidisciplinary Problems       SLP Goals          Problem: SLP    Goal Priority Disciplines Outcome   SLP Goal     SLP Ongoing, Progressing   Description: Speech Language Pathology Goals  Goals expected to be met by 1/29  1. Pt will participate in ongoing assessment of swallow.   2. Pt will complete assessment of reading, writing, visual spatial skills to determine need for tx.                                  Plan:   Patient to be seen:  3 x/week   Plan of Care expires:  02/14/24  Plan of Care reviewed with:  patient, daughter   SLP Follow-Up:  Yes       Discharge recommendations:  Therapy Intensity Recommendations at Discharge: Low Intensity Therapy   Barriers to Discharge:  Level of Skilled Assistance Needed      Time Tracking:     SLP Treatment Date:   01/16/24  Speech Start Time:  0740  Speech Stop Time:  0803     Speech Total Time (min):  23 min    Billable Minutes: Eval 7 , Treatment Swallowing Dysfunction 8, and Self Care/Home Management Training 8    01/16/2024

## 2024-01-16 NOTE — ASSESSMENT & PLAN NOTE
83 F with possible ASA81 use, DM, HTN, presenting from home s/p fall with confusion, agitation, AMS found to have R>L acute SDH w/o midline shift and with minimal mass effect.    Interval CTH 1/14: stable    --Patient admitted to be admitted to [facility], d/w [facility] provider      -q1h neurochecks in ICU  --Reverse anti-plt/coag medications - possible home ASA81 use, s/p DDAVP  --SBP <160  --Na >135  --Keppra 500 BID x7 days  --Step down to floor status today  --SLP, PT/OT  --Continue to monitor clinically, notify NSGY immediately with any changes in neuro status  --AMS work-up per primary      Dispo: ongoing    Will d/w Dr. Nichols

## 2024-01-16 NOTE — PLAN OF CARE
01/16/24 1243   Post-Acute Status   Post-Acute Authorization Home Health   Home Health Status Referrals Sent   Coverage [employer] [facility] MEDICARE/[employer] MEDICARE HMO   Hospital Resources/Appts/Education Provided Provided patient/caregiver with written discharge plan information   Discharge Delays None known at this time   Discharge Plan   Discharge Plan A Skilled Nursing Facility   Discharge Plan B Home Health       Pt stated refusal of Dischare Plan A) SNF.  BRANDI sent blast referral for Home Health to 23 companies listed in [employer].  BRANDI also provided Pt and family with a list of PCA companies and the phone number for the waiver program.    No other discharge needs identified at this time.    Discharge Plan A and Plan B have been determined by review of patient's clinical status, future medical and therapeutic needs, and coverage/benefits for post-acute care in coordination with multidisciplinary team members.     Debby Fisher LMSW  Case Management [facility]  [id number]

## 2024-01-17 ENCOUNTER — TELEPHONE (OUTPATIENT)
Dept: NEUROSURGERY | Facility: CLINIC | Age: 84
End: 2024-01-17
Payer: MEDICARE

## 2024-01-17 DIAGNOSIS — S06.5X0D TRAUMATIC SUBDURAL HEMATOMA WITHOUT LOSS OF CONSCIOUSNESS, SUBSEQUENT ENCOUNTER: ICD-10-CM

## 2024-01-17 DIAGNOSIS — R41.3 MEMORY LOSS: ICD-10-CM

## 2024-01-17 DIAGNOSIS — I62.00 NONTRAUMATIC SUBDURAL HEMORRHAGE, UNSPECIFIED: Primary | ICD-10-CM

## 2024-01-17 PROBLEM — R52 PAIN: Status: ACTIVE | Noted: 2024-01-17

## 2024-01-17 PROBLEM — G93.6 VASOGENIC CEREBRAL EDEMA: Status: ACTIVE | Noted: 2024-01-17

## 2024-01-17 PROBLEM — R45.1 AGITATION: Status: ACTIVE | Noted: 2024-01-17

## 2024-01-17 PROBLEM — D68.318 HEMORRHAGIC DISORDER DUE TO ANTITHROMBINEMIA: Status: ACTIVE | Noted: 2024-01-17

## 2024-01-17 LAB
ALBUMIN SERPL BCP-MCNC: 3 G/DL (ref 3.5–5.2)
ALP SERPL-CCNC: 61 U/L (ref 55–135)
ALT SERPL W/O P-5'-P-CCNC: 13 U/L (ref 10–44)
ANION GAP SERPL CALC-SCNC: 7 MMOL/L (ref 8–16)
AST SERPL-CCNC: 16 U/L (ref 10–40)
BACTERIA #/AREA URNS AUTO: ABNORMAL /HPF
BASOPHILS # BLD AUTO: 0.05 K/UL (ref 0–0.2)
BASOPHILS NFR BLD: 0.8 % (ref 0–1.9)
BILIRUB SERPL-MCNC: 0.7 MG/DL (ref 0.1–1)
BILIRUB UR QL STRIP: NEGATIVE
BUN SERPL-MCNC: 10 MG/DL (ref 8–23)
CALCIUM SERPL-MCNC: 9.9 MG/DL (ref 8.7–10.5)
CHLORIDE SERPL-SCNC: 108 MMOL/L (ref 95–110)
CLARITY UR REFRACT.AUTO: ABNORMAL
CO2 SERPL-SCNC: 23 MMOL/L (ref 23–29)
COLOR UR AUTO: YELLOW
CREAT SERPL-MCNC: 0.7 MG/DL (ref 0.5–1.4)
DIFFERENTIAL METHOD BLD: ABNORMAL
EOSINOPHIL # BLD AUTO: 0.1 K/UL (ref 0–0.5)
EOSINOPHIL NFR BLD: 2.1 % (ref 0–8)
ERYTHROCYTE [DISTWIDTH] IN BLOOD BY AUTOMATED COUNT: 13.8 % (ref 11.5–14.5)
EST. GFR  (NO RACE VARIABLE): >60 ML/MIN/1.73 M^2
GLUCOSE SERPL-MCNC: 120 MG/DL (ref 70–110)
GLUCOSE UR QL STRIP: NEGATIVE
HCT VFR BLD AUTO: 39.3 % (ref 37–48.5)
HGB BLD-MCNC: 12.5 G/DL (ref 12–16)
HGB UR QL STRIP: ABNORMAL
HYALINE CASTS UR QL AUTO: 0 /LPF
IMM GRANULOCYTES # BLD AUTO: 0.02 K/UL (ref 0–0.04)
IMM GRANULOCYTES NFR BLD AUTO: 0.3 % (ref 0–0.5)
KETONES UR QL STRIP: NEGATIVE
LEUKOCYTE ESTERASE UR QL STRIP: ABNORMAL
LYMPHOCYTES # BLD AUTO: 1.1 K/UL (ref 1–4.8)
LYMPHOCYTES NFR BLD: 16.6 % (ref 18–48)
MAGNESIUM SERPL-MCNC: 1.8 MG/DL (ref 1.6–2.6)
MCH RBC QN AUTO: 29.3 PG (ref 27–31)
MCHC RBC AUTO-ENTMCNC: 31.8 G/DL (ref 32–36)
MCV RBC AUTO: 92 FL (ref 82–98)
MICROSCOPIC COMMENT: ABNORMAL
MONOCYTES # BLD AUTO: 0.4 K/UL (ref 0.3–1)
MONOCYTES NFR BLD: 6.7 % (ref 4–15)
NEUTROPHILS # BLD AUTO: 4.8 K/UL (ref 1.8–7.7)
NEUTROPHILS NFR BLD: 73.5 % (ref 38–73)
NITRITE UR QL STRIP: NEGATIVE
NRBC BLD-RTO: 0 /100 WBC
PH UR STRIP: 8 [PH] (ref 5–8)
PHOSPHATE SERPL-MCNC: 2.2 MG/DL (ref 2.7–4.5)
PLATELET # BLD AUTO: 166 K/UL (ref 150–450)
PMV BLD AUTO: 10.6 FL (ref 9.2–12.9)
POCT GLUCOSE: 125 MG/DL (ref 70–110)
POCT GLUCOSE: 134 MG/DL (ref 70–110)
POCT GLUCOSE: 165 MG/DL (ref 70–110)
POCT GLUCOSE: 201 MG/DL (ref 70–110)
POTASSIUM SERPL-SCNC: 3.7 MMOL/L (ref 3.5–5.1)
PROT SERPL-MCNC: 6.1 G/DL (ref 6–8.4)
PROT UR QL STRIP: ABNORMAL
RBC # BLD AUTO: 4.27 M/UL (ref 4–5.4)
RBC #/AREA URNS AUTO: >100 /HPF (ref 0–4)
SODIUM SERPL-SCNC: 138 MMOL/L (ref 136–145)
SP GR UR STRIP: 1.02 (ref 1–1.03)
SQUAMOUS #/AREA URNS AUTO: 1 /HPF
URN SPEC COLLECT METH UR: ABNORMAL
WBC # BLD AUTO: 6.57 K/UL (ref 3.9–12.7)
WBC #/AREA URNS AUTO: >100 /HPF (ref 0–5)

## 2024-01-17 PROCEDURE — 84100 ASSAY OF PHOSPHORUS: CPT | Mod: HCNC

## 2024-01-17 PROCEDURE — 87088 URINE BACTERIA CULTURE: CPT | Mod: HCNC | Performed by: HOSPITALIST

## 2024-01-17 PROCEDURE — 87186 SC STD MICRODIL/AGAR DIL: CPT | Mod: HCNC | Performed by: HOSPITALIST

## 2024-01-17 PROCEDURE — 83735 ASSAY OF MAGNESIUM: CPT | Mod: HCNC

## 2024-01-17 PROCEDURE — 97535 SELF CARE MNGMENT TRAINING: CPT | Mod: HCNC

## 2024-01-17 PROCEDURE — 87086 URINE CULTURE/COLONY COUNT: CPT | Mod: HCNC | Performed by: HOSPITALIST

## 2024-01-17 PROCEDURE — 20600001 HC STEP DOWN PRIVATE ROOM: Mod: HCNC

## 2024-01-17 PROCEDURE — 99232 SBSQ HOSP IP/OBS MODERATE 35: CPT | Mod: HCNC,,, | Performed by: PHYSICIAN ASSISTANT

## 2024-01-17 PROCEDURE — 85025 COMPLETE CBC W/AUTO DIFF WBC: CPT | Mod: HCNC

## 2024-01-17 PROCEDURE — 25000003 PHARM REV CODE 250: Mod: HCNC | Performed by: REGISTERED NURSE

## 2024-01-17 PROCEDURE — 81001 URINALYSIS AUTO W/SCOPE: CPT | Mod: HCNC | Performed by: HOSPITALIST

## 2024-01-17 PROCEDURE — 87077 CULTURE AEROBIC IDENTIFY: CPT | Mod: HCNC | Performed by: HOSPITALIST

## 2024-01-17 PROCEDURE — 80053 COMPREHEN METABOLIC PANEL: CPT | Mod: HCNC

## 2024-01-17 PROCEDURE — 36415 COLL VENOUS BLD VENIPUNCTURE: CPT | Mod: HCNC

## 2024-01-17 PROCEDURE — 63600175 PHARM REV CODE 636 W HCPCS: Mod: HCNC | Performed by: REGISTERED NURSE

## 2024-01-17 PROCEDURE — 25000003 PHARM REV CODE 250: Mod: HCNC | Performed by: INTERNAL MEDICINE

## 2024-01-17 PROCEDURE — 25000003 PHARM REV CODE 250: Mod: HCNC | Performed by: HOSPITALIST

## 2024-01-17 RX ORDER — SODIUM,POTASSIUM PHOSPHATES 280-250MG
2 POWDER IN PACKET (EA) ORAL
Status: COMPLETED | OUTPATIENT
Start: 2024-01-17 | End: 2024-01-17

## 2024-01-17 RX ORDER — LEVETIRACETAM 500 MG/1
500 TABLET ORAL 2 TIMES DAILY
Qty: 14 TABLET | Refills: 0 | Status: ON HOLD | OUTPATIENT
Start: 2024-01-16 | End: 2024-01-29 | Stop reason: HOSPADM

## 2024-01-17 RX ORDER — METOPROLOL TARTRATE 50 MG/1
50 TABLET ORAL 2 TIMES DAILY
Qty: 60 TABLET | Refills: 11 | Status: SHIPPED | OUTPATIENT
Start: 2024-01-17 | End: 2024-04-24 | Stop reason: SDUPTHER

## 2024-01-17 RX ADMIN — POLYETHYLENE GLYCOL 3350 17 G: 17 POWDER, FOR SOLUTION ORAL at 08:01

## 2024-01-17 RX ADMIN — ACETAMINOPHEN 650 MG: 325 TABLET ORAL at 01:01

## 2024-01-17 RX ADMIN — FLUOXETINE 10 MG: 10 CAPSULE ORAL at 08:01

## 2024-01-17 RX ADMIN — HEPARIN SODIUM 5000 UNITS: 5000 INJECTION INTRAVENOUS; SUBCUTANEOUS at 01:01

## 2024-01-17 RX ADMIN — LEVETIRACETAM 500 MG: 500 TABLET, FILM COATED ORAL at 08:01

## 2024-01-17 RX ADMIN — METOPROLOL TARTRATE 50 MG: 50 TABLET, FILM COATED ORAL at 08:01

## 2024-01-17 RX ADMIN — INSULIN ASPART 1 UNITS: 100 INJECTION, SOLUTION INTRAVENOUS; SUBCUTANEOUS at 09:01

## 2024-01-17 RX ADMIN — HEPARIN SODIUM 5000 UNITS: 5000 INJECTION INTRAVENOUS; SUBCUTANEOUS at 09:01

## 2024-01-17 RX ADMIN — INSULIN ASPART 4 UNITS: 100 INJECTION, SOLUTION INTRAVENOUS; SUBCUTANEOUS at 04:01

## 2024-01-17 RX ADMIN — LISINOPRIL 5 MG: 5 TABLET ORAL at 08:01

## 2024-01-17 RX ADMIN — AMLODIPINE BESYLATE 5 MG: 5 TABLET ORAL at 08:01

## 2024-01-17 RX ADMIN — HEPARIN SODIUM 5000 UNITS: 5000 INJECTION INTRAVENOUS; SUBCUTANEOUS at 05:01

## 2024-01-17 RX ADMIN — MUPIROCIN: 20 OINTMENT TOPICAL at 08:01

## 2024-01-17 RX ADMIN — FLUTICASONE PROPIONATE 100 MCG: 50 SPRAY, METERED NASAL at 08:01

## 2024-01-17 RX ADMIN — ATORVASTATIN CALCIUM 40 MG: 40 TABLET, FILM COATED ORAL at 08:01

## 2024-01-17 RX ADMIN — SENNOSIDES AND DOCUSATE SODIUM 2 TABLET: 8.6; 5 TABLET ORAL at 08:01

## 2024-01-17 RX ADMIN — POTASSIUM & SODIUM PHOSPHATES POWDER PACK 280-160-250 MG 2 PACKET: 280-160-250 PACK at 04:01

## 2024-01-17 RX ADMIN — POTASSIUM & SODIUM PHOSPHATES POWDER PACK 280-160-250 MG 2 PACKET: 280-160-250 PACK at 08:01

## 2024-01-17 NOTE — NURSING
.Nurses Note -- 4 Eyes      1/17/2024   1:45 AM      Skin assessed during: Transfer      [x] No Altered Skin Integrity Present    No new skin issues; bilateral bruising observed. No open areas or abrasions observed.    Wound Care Consulted? No    Attending Nurse:  Tania Ruiz RN/Staff Member:   HUMAIRA Finch

## 2024-01-17 NOTE — ASSESSMENT & PLAN NOTE
83 F with possible ASA81 use, DM, HTN, presenting from home s/p fall with confusion, agitation, AMS found to have R>L acute SDH w/o midline shift and with minimal mass effect.    Interval CTH 1/14: stable    --Patient stepped down from [employer]--> service      -q4h neurochecks while on floor  --No plan for neurosurgical intervention at this time  --Hold anti-plt/coag medications - possible home ASA81 use, s/p DDAVP   - Continue to hold ASA until f/u with NSGY outpatient  --SBP <160  --Na >135  --Keppra 500 BID x7 days  --SLP, PT/OT  --Okay for discharge from NSGY perspective when pt is ready; will arrange f/u in clinic in about 2 weeks with repeat CTH  --NSGY will sign off; please contact us with any questions/concerns or any acute decline in neurologic status

## 2024-01-17 NOTE — NURSING TRANSFER
Nursing Transfer Note      1/17/2024   1:15AM    Nurse giving handoff: Josette HITCHCOCK RN   Nurse receiving handoff: Harriet LIU RN     Reason patient is being transferred: Stepdown of care    Transfer To: U 9[address] from [facility] 90    Transfer via bed    Transfer with cardiac monitoring    Transported by RN    Transfer Vital Signs:  Blood Pressure:110/53  Heart Rate:59  O2:99  Respirations:19    Telemetry: Box Number [id number]  Order for Tele Monitor? Yes    Additional Lines: n/a    4eyes on Skin: yes    Medicines sent: Insulin pen, mupirocin ointment     Any special needs or follow-up needed: PT    Patient belongings transferred with patient: Yes    Chart send with patient: Yes    Notified: daughter Eusebia PERES    Patient reassessed at: 1/17 0130  Upon arrival to floor: cardiac monitor applied, patient oriented to room, call bell in reach, and bed in lowest position

## 2024-01-17 NOTE — PROGRESS NOTES
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 History obtained from granddaughter at bedside. Pt had an unwitnessed fall around 1100 on 1/13. 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 Pt was wearing socks and slipped. She called her son and was crying and complaining of a HA. Son called EMS. Pt was brought to [facility]. CTH w 8mm R SDH and 5mm L SDH. CT c-spine negative for acute processes. Pt developed non-redirectable agitation, screaming "help me" and trying to get out of the bed. She was not following any commands and SBP was 220. Pt was intubated in the ED and started on propofol. Placed on cardene. Pt admitted to NCC. On arrival to NCCU, pt was on propofol 50, fentanyl 250, and still requiring 5-point restraints to prevent self-extubation. Gave 5mg IV haldol and 2mg ativan and pt fell asleep. Five-hour interval CTH/CTA w stable bl SDH and no vascular abnormality. Possible ASA use, given DDAVP.            Of note, pt lost her , Marques, in 1/2022 and subsequently developed a progressive decline in functional status. Pt's family had concerns for early dementia as pt will become upset thinking about her  and starts to forget things. This decline is now thought to be related to her depression rather than dementia. Pt does perform her ADLs at home alone, but family started looking into options for assistance.     Hospital Course: 01/14/2024 Sedated overnight d/t extreme agitation and " inability to be redirected. [facility] this AM stable. Extubated w/ no issues.   01/15/2024 No issues overnight. Discussed w/ NSGY, stable for stepdown to Hospital Medicine.  01/16/2024: Boarding for , awaiting bed, SW helping with placement     Interval History:  See hospital course.     Review of Systems   Respiratory:  Negative for cough and shortness of breath.    Gastrointestinal:  Negative for abdominal pain, nausea and vomiting.   Neurological:  Negative for headaches.     Objective:     Vitals:  Temp: 97.4 °F (36.3 °C)  Pulse: 62  Rhythm: sinus arrhythmia, atrial rhythm  BP: (!) 123/58  MAP (mmHg): 83  Resp: 17  SpO2: 100 %    Temp  Min: 97.4 °F (36.3 °C)  Max: 98.8 °F (37.1 °C)  Pulse  Min: 58  Max: 85  BP  Min: 110/56  Max: 173/120  MAP (mmHg)  Min: 79  Max: 137  Resp  Min: 10  Max: 27  SpO2  Min: 99 %  Max: 100 %    01/15 0701 - 01/16 0700  In: 130 [P.O.:130]  Out: 375 [Urine:375]   Unmeasured Output  Urine Occurrence: 1        Physical Exam  Vitals and nursing note reviewed.   Eyes:      Pupils: Pupils are equal, round, and reactive to light.   Cardiovascular:      Rate and Rhythm: Normal rate. Rhythm irregular.   Pulmonary:      Effort: Pulmonary effort is normal.   Abdominal:      Palpations: Abdomen is soft.   Musculoskeletal:         General: Normal range of motion.   Skin:     General: Skin is warm and dry.      Capillary Refill: Capillary refill takes less than 2 seconds.   Neurological:      Mental Status: She is alert.      GCS: GCS eye subscore is 3. GCS verbal subscore is 4. GCS motor subscore is 6.      Sensory: Sensation is intact.      Motor: Motor function is intact.      Comments:   -E3 V4 M6  -PERRL  -Oriented to person and time  -Follows commands w/ all extremities  -STINSON equally/purposefully  -4/5 strength throughout            Medications:  Continuous   Scheduled[START ON 1/17/2024] amLODIPine, 5 mg, Daily  [START ON 1/17/2024] atorvastatin, 40 mg, Daily  [START ON 1/17/2024] FLUoxetine, 10  mg, Daily  fluticasone propionate, 2 spray, Daily  heparin (porcine), 5,000 Units, Q8H  levETIRAcetam, 500 mg, BID  [START ON 1/17/2024] lisinopriL, 5 mg, Daily  metoprolol tartrate, 50 mg, BID  mupirocin, , BID  polyethylene glycol, 17 g, BID  senna-docusate 8.6-50 mg, 2 tablet, Daily    PRNacetaminophen, 650 mg, Q6H PRN  dextrose 10%, 12.5 g, PRN  dextrose 10%, 25 g, PRN  glucagon (human recombinant), 1 mg, PRN  hydrALAZINE, 10 mg, Q6H PRN  insulin aspart U-100, 0-10 Units, Q6H PRN  labetalol, 10 mg, Q6H PRN  magnesium oxide, 800 mg, PRN  magnesium oxide, 800 mg, PRN  ondansetron, 4 mg, Q8H PRN  potassium bicarbonate, 35 mEq, PRN  potassium bicarbonate, 50 mEq, PRN  potassium bicarbonate, 60 mEq, PRN  potassium, sodium phosphates, 2 packet, PRN  potassium, sodium phosphates, 2 packet, PRN  potassium, sodium phosphates, 2 packet, PRN      Today I personally reviewed pertinent medications, lines/drains/airways, imaging, cardiology results, laboratory results, notably: CBC, CMP, Mag, Phos    Diet  Diet Adult Regular (IDDSI Level 7) Thin; Standard Tray  Diet Adult Regular (IDDSI Level 7) Thin; Standard Tray      Assessment/Plan:     Neuro  * Traumatic subdural hematoma  83F w PMHx of DM2, HTN, HLD, PVD w claudication, DVT (2001), 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 Intubated for non-redirectable agitation. Given DDAVP for possible ASA use.    1/13: CTH w 8mm R SDH and 5mm L SDH   1/13: Interval 5h CTA stable; no vascular abnormality   1/14: Follow-up CTH stable    -Stable for stepdown to Hospital Medicine per NSGY, awaiting available on NPU   -VS/Neuro checks q4  -NSGY following  -SBP goal < 160  -Continue home amlo/lisinopril/metoprolol  -PRN labetalol/hydralazine  -Keppra BID x 7 days  -CBC, CMP, Mag, Phos daily  -HOB  >/= 30  -SQH for SVT PPX  -PT/OT/SLP    Psychiatric  BANDAR (generalized anxiety disorder)  Continue fluoxetine     Cardiac/Vascular  Hyperlipidemia associated with type 2 diabetes mellitus  -Atorvastatin daily    Hypertension associated with diabetes  -SBP goal < 160  -Continue home amlo/lisinopril/metoprolol  -PRN hydralazine/labetalol    Hematology  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  -Hx of    Endocrine  Type 2 diabetes mellitus with diabetic polyneuropathy, without long-term current use of insulin  -Hgb A1C 5.7  -Accuchecks AC/HS w/ mod dose SSI          The patient is being Prophylaxed for:  Venous Thromboembolism with: Mechanical or Chemical  Stress Ulcer with: None  Ventilator Pneumonia with: not applicable    Activity Orders            Diet Adult Regular (IDDSI Level 7) Thin; Standard Tray: Regular starting at 01/16 1018    Straight Cath starting at 01/16 0435    Turn patient starting at 01/13 1400    Elevate HOB starting at 01/13 1237          Full Code    Level 3     Zhanna Pham PA-C  Neurocritical Care  [facility] Neuro Critical Care

## 2024-01-17 NOTE — PLAN OF CARE
[facility] - Neurosurgery ([facility])      HOME HEALTH ORDERS  FACE TO FACE ENCOUNTER    Patient Name: Kanchan Downing  YOB: 1940    PCP: Viktoria Mckeon MD   PCP Address: [address] [place] [address] [address] / [place] [address]  PCP Phone Number: [phone]  PCP Fax: [phone]    Encounter Date: 1/13/24    Admit to Home Health    Diagnoses:  Active Hospital Problems    Diagnosis  POA    *Traumatic subdural hematoma [S06.5XAA]  Yes    Ductal carcinoma in situ (DCIS) of right breast [D05.11]  Yes    History of fall [Z91.81]  Not Applicable    BANDAR (generalized anxiety disorder) [F41.1]  Yes    PVD (peripheral vascular disease) with claudication [I73.9]  Yes     Compression hose      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 [I82.592]  Yes    Type 2 diabetes mellitus with diabetic polyneuropathy, without long-term current use of insulin [E11.42]  Yes    History of breast cancer [Z85.3]  Not Applicable    Hypertension associated with diabetes [E11.59, I15.2]  Yes    Hyperlipidemia associated with type 2 diabetes mellitus [E11.69, E78.5]  Yes      Resolved Hospital Problems    Diagnosis Date Resolved POA    Grief [F43.21] 01/15/2024 Yes      Marques passed 1/2022         Follow Up Appointments:  Future Appointments   Date Time Provider Department Center   3/14/2024  1:20 PM Viktoria Mckeon MD [facility]       Allergies:  Review of patient's allergies indicates:   Allergen Reactions    Sulfa (sulfonamide antibiotics)      Other reaction(s): Rash       Medications: Review discharge medications with patient and family and provide education.    Current Facility-Administered Medications   Medication Dose Route Frequency Provider Last Rate Last Admin    acetaminophen tablet 650 mg  650 mg Oral Q6H PRN Lavelle Grullon MD   650 mg at 01/16/24 1305    amLODIPine tablet 5 mg  5 mg Oral Daily Lavelle Grullon MD   5 mg at 01/17/24 0842    atorvastatin tablet  40 mg  40 mg Oral Daily Lavelle Grullon MD   40 mg at 01/17/24 0842    dextrose 10% bolus 125 mL 125 mL  12.5 g Intravenous PRN Palak Mccoy NP        dextrose 10% bolus 250 mL 250 mL  25 g Intravenous PRN Palak Mccoy NP        FLUoxetine capsule 10 mg  10 mg Oral Daily Lavelle Grullon MD   10 mg at 01/17/24 0842    fluticasone propionate 50 mcg/actuation nasal spray 100 mcg  2 spray Each Nostril Daily Palak Mccoy NP   100 mcg at 01/17/24 0847    glucagon (human recombinant) injection 1 mg  1 mg Intramuscular PRN Palak Mccoy NP        heparin (porcine) injection 5,000 Units  5,000 Units Subcutaneous Q8H Palak Mccoy NP   5,000 Units at 01/17/24 0540    insulin aspart U-100 pen 0-10 Units  0-10 Units Subcutaneous Q6H PRN Palak Mccoy NP   2 Units at 01/16/24 1735    labetalol 20 mg/4 mL (5 mg/mL) IV syring  10 mg Intravenous Q6H PRN Palak Mccoy NP        levETIRAcetam tablet 500 mg  500 mg Oral BID Lavelle Grullon MD   500 mg at 01/17/24 0842    lisinopriL tablet 5 mg  5 mg Oral Daily Lavelle Grullon MD   5 mg at 01/17/24 0842    magnesium oxide tablet 800 mg  800 mg Oral PRN aLvelle Grullon MD        magnesium oxide tablet 800 mg  800 mg Oral PRN Lavelle Grullon MD        metoprolol tartrate (LOPRESSOR) tablet 50 mg  50 mg Oral BID Lavelle Grullon MD   50 mg at 01/17/24 0842    mupirocin 2 % ointment   Nasal BID Palak Mccoy NP   Given at 01/17/24 0842    ondansetron injection 4 mg  4 mg Intravenous Q8H PRN Palak Mccoy NP        polyethylene glycol packet 17 g  17 g Oral BID Lavelle Grullon MD   17 g at 01/17/24 0842    potassium bicarbonate disintegrating tablet 35 mEq  35 mEq Oral PRN Lavelle Grullon MD        potassium bicarbonate disintegrating tablet 50 mEq  50 mEq Oral PRN Lavelle Grullon MD        potassium bicarbonate disintegrating tablet 60 mEq  60 mEq Oral PRN Lavelle Grullon MD        potassium, sodium phosphates 280-160-250 mg packet 2 packet  2 packet Oral PRLavelle Nguyen MD         potassium, sodium phosphates 280-160-250 mg packet 2 packet  2 packet Oral PRN Lavelle Grullon MD        potassium, sodium phosphates 280-160-250 mg packet 2 packet  2 packet Oral PRN Lavelle Grullon MD        senna-docusate 8.6-50 mg per tablet 2 tablet  2 tablet Oral Daily Lavelle Grullon MD   2 tablet at 01/17/24 0842     Current Discharge Medication List        START taking these medications    Details   levETIRAcetam (KEPPRA) 500 MG Tab Take 1 tablet (500 mg total) by mouth 2 (two) times daily. for 7 days  Qty: 14 tablet, Refills: 0           CONTINUE these medications which have CHANGED    Details   metoprolol tartrate (LOPRESSOR) 50 MG tablet Take 1 tablet (50 mg total) by mouth 2 (two) times daily.  Qty: 60 tablet, Refills: 11    Comments: .           CONTINUE these medications which have NOT CHANGED    Details   amLODIPine (NORVASC) 5 MG tablet Take 1 tablet (5 mg total) by mouth once daily.  Qty: 90 tablet, Refills: 3    Comments: .  Associated Diagnoses: Controlled type 2 diabetes mellitus with diabetic nephropathy, without long-term current use of insulin      blood sugar diagnostic (BLOOD GLUCOSE TEST) Strp 1 strip by Misc.(Non-Drug; Combo Route) route 2 (two) times daily.  Qty: 100 strip, Refills: 12      cetirizine (ZYRTEC) 10 MG tablet Take 10 mg by mouth once daily.      flash glucose scanning reader (FREESTYLE FLOWER 2 READER) [facility] Continuous glucose reader  Qty: 1 each, Refills: 12      flash glucose sensor (FREESTYLE FLOWER 2 SENSOR) Kit Continuous glucose monitor  Qty: 1 kit, Refills: 12      FLUoxetine 10 MG capsule Take 1 capsule (10 mg total) by mouth once daily.  Qty: 30 capsule, Refills: 11      lancets Misc Test tid  Qty: 100 each, Refills: 12      lisinopriL (PRINIVIL,ZESTRIL) 5 MG tablet Take 1 tablet (5 mg total) by mouth once daily.  Qty: 90 tablet, Refills: 3    Comments: .      simvastatin (ZOCOR) 10 MG tablet Take 1 tablet (10 mg total) by mouth every evening.  Qty: 90 tablet, Refills: 3     Associated Diagnoses: Controlled type 2 diabetes mellitus with diabetic nephropathy, without long-term current use of insulin           STOP taking these medications       aspirin (ECOTRIN) 81 MG EC tablet Comments:   Reason for Stopping:         hydroCHLOROthiazide (MICROZIDE) 12.5 mg capsule Comments:   Reason for Stopping:         metFORMIN (GLUCOPHAGE) 500 MG tablet Comments:   Reason for Stopping:                 I have seen and examined this patient within the last 30 days. My clinical findings that support the need for the home health skilled services and home bound status are the following:no   Weakness/numbness causing balance and gait disturbance due to Weakness/Debility making it taxing to leave home.     Diet: Soft & Bite Sized Diet - IDDSI Level 6, Thin liquids - IDDSI Level 0          Referrals/ Consults  Physical Therapy to evaluate and treat. Evaluate for home safety and equipment needs; Establish/upgrade home exercise program. Perform / instruct on therapeutic exercises, gait training, transfer training, and Range of Motion.  Occupational Therapy to evaluate and treat. Evaluate home environment for safety and equipment needs. Perform/Instruct on transfers, ADL training, ROM, and therapeutic exercises.  Speech Therapy  to evaluate and treat for  Swallowing.  Aide to provide assistance with personal care, ADLs, and vital signs.    Activities:   activity as tolerated    Nursing:   Agency to admit patient within 24 hours of hospital discharge unless specified on physician order or at patient request    SN to complete comprehensive assessment including routine vital signs. Instruct on disease process and s/s of complications to report to MD. Review/verify medication list sent home with the patient at time of discharge  and instruct patient/caregiver as needed. Frequency may be adjusted depending on start of care date.     Skilled nurse to perform up to 3 visits PRN for symptoms related to  diagnosis    Notify MD if SBP > 160 or < 90; DBP > 90 or < 50; HR > 120 or < 50; Temp > 101; O2 < 88%;     Ok to schedule additional visits based on staff availability and patient request on consecutive days within the home health episode.    When multiple disciplines ordered:    Start of Care occurs on Sunday - Wednesday schedule remaining discipline evaluations as ordered on separate consecutive days following the start of care.    Thursday SOC -schedule subsequent evaluations Friday and Monday the following week.     Friday - Saturday SOC - schedule subsequent discipline evaluations on consecutive days starting Monday of the following week.    For all post-discharge communication and subsequent orders please contact patient's primary care physician. If unable to reach primary care physician or do not receive response within 30 minutes, please contact [phone] for clinical staff order clarification    Miscellaneous   Routine Skin for Bedridden Patients: Instruct patient/caregiver to apply moisture barrier cream to all skin folds and wet areas in perineal area daily and after baths and all bowel movements.    Home Health Aide:  Nursing every 48 hours, Physical Therapy every 48 hours, Occupational Therapy every 48 hours, Speech Language Pathology every 48 hours, and Home Health Aide every 48 hours    Wound Care Orders  no    I certify that this patient is confined to her home and needs intermittent skilled nursing care, physical therapy, speech therapy, and occupational therapy.

## 2024-01-17 NOTE — PT/OT/SLP PROGRESS
"Occupational Therapy   Treatment    Name: Kanchan Downing  MRN: [id number]  Admitting Diagnosis:  Traumatic subdural hematoma       Recommendations:     Discharge Recommendations: Moderate Intensity Therapy  Discharge Equipment Recommendations:  bedside commode, wheelchair  Barriers to discharge:   (increased skilled assistance)    Assessment:     Kanchan Downing is a 83 y.o. female with a medical diagnosis of Traumatic subdural hematoma.  Performance deficits affecting function are weakness, impaired endurance, impaired self care skills, impaired functional mobility, decreased coordination, impaired cardiopulmonary response to activity, gait instability, decreased upper extremity function, decreased lower extremity function, impaired balance, impaired cognition. Pt agreeable to therapy and tolerated well. Pt pleasantly confused throughout session but oriented to person, place, time, situation. Upon OT arrival, pt soiled 2/2 urinary incontinence. Session focused on improving (I) in ADLs such as bathing/toileting/dressing. Pt needed frequent redirection to task. Pt with improvements in sitting balance at this date.  Pt remains limited in ADLs, functional mobility, and functional transfers. Patient continues to demonstrate the need for moderate intensity therapy on a daily basis post acute exhibited by decreased independence with self-care and functional mobility      Rehab Prognosis:  Good; patient would benefit from acute skilled OT services to address these deficits and reach maximum level of function.       Plan:     Patient to be seen 4 x/week to address the above listed problems via self-care/home management, therapeutic activities, therapeutic exercises, neuromuscular re-education  Plan of Care Expires: 02/15/24  Plan of Care Reviewed with: patient    Subjective     Chief Complaint: being soiled  Patient/Family Comments/goals: "Every time I fell I felt someone push me from behind. It just keeps " "happening"  Pain/Comfort:  Pain Rating 1: 0/10  Pain Rating Post-Intervention 1: 0/10    Objective:     Communicated with: RN prior to session.  Patient found HOB elevated with blood pressure cuff, pulse ox (continuous), telemetry, bed alarm upon OT entry to room.    General Precautions: Standard, aspiration, seizure, fall    Orthopedic Precautions:N/A  Braces: N/A  Respiratory Status: Room air     Occupational Performance:     Bed Mobility:  HOB elevated   Patient completed Scooting/Bridging with stand by assistance  Patient completed Supine to Sit with stand by assistance  Patient completed Sit to Supine with stand by assistance     Functional Mobility/Transfers:  Patient completed Sit <> Stand Transfer with contact guard assistance and minimum assistance  with  hand-held assist   Pt performed 2 trials. 1st trial performed for toileting. Pt Min A and required moderate verbal cueing for safety and posture correction  2nd trial pt performed CGA with moderate verbal cue for posture correction and safety  Functional Mobility: pt performed 2 forward steps HHA Min A. Pt unable to lift RLE completely off ground and noted with fair dynamic standing balance. Pt took 2 steps backward to return back to bed. Pt with heavy reliance using HHA for balance  Pt sat EOB to perform therapy session SBA. Pt noted with improvements in balance and posture correction this session.     Activities of Daily Living:  Bathing: setup pt requested to use wet wipe to clean B thighs sitting EOB    Upper Body Dressing: SBA  pt doffed soiled gown and donned clean gown sitting EOB  Lower Body Dressing: minimum assistance Pt doffed socks in long-sitting in bed. Pt donned socks in figure 4 sitting EOB. Assistance required for pulling B socks over heel once pt donned socks over toes.   Toileting: maximal assistance Pt soiled upon OT arrival. OT cleaned perineal area while pt in stance. Pt able to clean vaginal area sitting EOB and weight shifting " side-to-side sitting EOB without physical assistance       [facility] 6 Click ADL: 18    Treatment & Education:  -Education on energy conservation and task modification to maximize safety and (I) during ADLs and mobility  -Education on importance of OOB activity to improve overall activity tolerance and promote recovery  -Pt educated to call for assistance and to transfer with hospital staff only. Pt reported not understanding how to call for assistance and OT educated pt to utilize the red button on the call light to call. Pt demonstrated good understanding by pressing call button.   -Provided education regarding role of OT, POC, & discharge recommendations with pt verbalizing understanding.  Pt had no further questions & when asked whether there were any concerns pt reported none.      Patient left HOB elevated with all lines intact, call button in reach, bed alarm on, RN notified, and RN present    GOALS:   Multidisciplinary Problems       Occupational Therapy Goals          Problem: Occupational Therapy    Goal Priority Disciplines Outcome Interventions   Occupational Therapy Goal     OT, PT/OT Ongoing, Progressing    Description: Goals to be met by: 2/15/24     Patient will increase functional independence with ADLs by performing:    UE Dressing with Minimal Assistance.  LE Dressing with Minimal Assistance.  Grooming while EOB with Contact Guard Assistance.  Toileting from bedside commode with Minimal Assistance for hygiene and clothing management.   Sitting at edge of bed x10 minutes with Contact Guard Assistance.  Supine to sit with Minimal Assistance.  Step transfer with Minimal Assistance  Toilet transfer to bedside commode with Minimal Assistance.                         Time Tracking:     OT Date of Treatment: 01/17/24  OT Start Time: 1032  OT Stop Time: 1101  OT Total Time (min): 29 min    Billable Minutes:Self Care/Home Management 29 min    OT/WANDA: OT          1/17/2024

## 2024-01-17 NOTE — PROGRESS NOTES
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 History obtained from granddaughter at bedside. Pt had an unwitnessed fall around 1100 on 1/13. 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 Pt was wearing socks and slipped. She called her son and was crying and complaining of a HA. Son called EMS. Pt was brought to [facility]. CTH w 8mm R SDH and 5mm L SDH. CT c-spine negative for acute processes. Pt developed non-redirectable agitation, screaming "help me" and trying to get out of the bed. She was not following any commands and SBP was 220. Pt was intubated in the ED and started on propofol. Placed on cardene. Pt admitted to [facility]. On arrival to [facility], pt was on propofol 50, fentanyl 250, and still requiring 5-point restraints to prevent self-extubation. Gave 5mg IV haldol and 2mg ativan and pt fell asleep. Five-hour interval CTH/CTA w stable bl SDH and no vascular abnormality. Possible ASA use, given DDAVP.            Of note, pt lost her , Marques, in 1/2022 and subsequently developed a progressive decline in functional status. Pt's family had concerns for early dementia as pt will become upset thinking about her  and starts to forget things. This decline is now thought to be related to her depression rather than dementia. Pt " does perform her ADLs at home alone, but family started looking into options for assistance.         Overview/Hospital Course:  01/14/2024 Sedated overnight d/t extreme agitation and inability to be redirected. CTH this AM stable. Extubated w/ no issues.   01/15/2024 No issues overnight. Discussed w/ NSGY, stable for stepdown to Hospital Medicine.  01/17/2024 Transfer to hospital medicine.   s/p neurosurgery eval - H/o  possible ASA81 use, DM, HTN, presenting from home s/p fall with confusion, agitation, AMS found to have Right >Left acute SDH w/o midline shift and with minimal mass effect. Interval CTH 1/14: stable, s/p DDAVP. continue Keppra 500 BID x7 days.  Extubated 1/14  with no issues. sats 95% on RA. Oriented to person and time, moving extremities and follow commands . low grad temp of 100.4F on 1/14. leucocytosis resolved.  afebrile. monitor . SLP Recs Soft & Bite Sized Diet - IDDSI Level 6, Thin liquids - IDDSI Level 0 . needs SNF placement. patient agreeable for SNF placement after discussion with son . per neurosurgery, hold ASA for 2 weeks and follow up in Neurosurgery clinic with repeat CT head         Review of Systems:   Pain scale:   Constitutional:  fever,  chills, headache, vision loss, hearing loss, weight loss, Generalized weakness, falls, loss of smell, loss of taste, poor appetite,  sore throat  Respiratory: cough, shortness of breath.   Cardiovascular: chest pain, dizziness, palpitations, orthopnea, swelling of feet, syncope  Gastrointestinal: nausea, vomiting, abdominal pain, diarrhea, black stool,  blood in stool, change in bowel habits, constipation  Genitourinary: hematuria, dysuria, urgency, frequency  Integument/Breast: rash,  pruritis  Hematologic/Lymphatic: easy bruising, lymphadenopathy  Musculoskeletal: arthralgias , myalgias, back pain, neck pain, knee pain  Neurological: confusion, seizures, tremors, slurred speech  Behavioral/Psych:  depression, anxiety, auditory or visual  hallucinations     OBJECTIVE:     Physical Exam:  Body mass index is 26.47 kg/m².    Constitutional: Appears well-developed and well-nourished.   Head: Normocephalic and atraumatic.   Neck: Normal range of motion. Neck supple.   Cardiovascular: Normal heart rate.  irregular heart rhythm.  Pulmonary/Chest: Effort normal.   Abdominal: No distension.  No tenderness  Musculoskeletal: Normal range of motion. No edema.   Neurological: Alert and oriented to person, place. able to move bilateral upper and lower extremities without limitation Skin: Skin is warm and dry.   Psychiatric: Normal mood and affect. Behavior is normal.                  Vital Signs  Temp: 98.3 °F (36.8 °C) (01/17/24 1132)  Pulse: 109 (01/17/24 1541)  Resp: 18 (01/17/24 1132)  BP: (Abnormal) 119/57 (01/17/24 1132)  SpO2: 96 % (01/17/24 1132)     24 Hour VS Range    Temp:  [98 °F (36.7 °C)-98.6 °F (37 °C)]   Pulse:  []   Resp:  [15-27]   BP: (110-166)/(53-70)   SpO2:  [96 %-100 %]     Intake/Output Summary (Last 24 hours) at 1/17/2024 1612  Last data filed at 1/17/2024 0554  Gross per 24 hour   Intake 360 ml   Output 660 ml   Net -300 ml         I/O This Shift:  No intake/output data recorded.    Wt Readings from Last 3 Encounters:   01/14/24 74.4 kg (164 lb)   04/05/23 74.6 kg (164 lb 7.4 oz)   03/13/23 74.8 kg (165 lb)       I have personally reviewed the vitals and recorded Intake/Output     Laboratory/Diagnostic Data:    CBC/Anemia Labs: Coags:    Recent Labs   Lab 01/15/24  0318 01/16/24  0304 01/17/24  0500   WBC 15.52* 9.95 6.57   HGB 13.9 12.3 12.5   HCT 42.8 38.1 39.3    144* 166   MCV 91 91 92   RDW 14.2 14.4 13.8    Recent Labs   Lab 01/13/24  1210 01/13/24  1212 01/13/24  1249   INR 0.9 1.2 1.0   APTT  --   --  23.7        Chemistries: ABG:   Recent Labs   Lab 01/15/24  0318 01/16/24  0304 01/17/24  0500    139 138   K 4.9 3.8 3.7    107 108   CO2 23 26 23   BUN 16 13 10   CREATININE 0.8 0.7 0.7   CALCIUM 10.1 9.7  "9.9   PROT 6.9 6.0 6.1   BILITOT 0.9 0.7 0.7   ALKPHOS 61 59 61   ALT 14 14 13   AST 22 18 16   MG 2.0 2.0 1.8   PHOS 2.8 1.5* 2.2*    Recent Labs   Lab 01/14/24  1041   PH 7.493*   PCO2 31.9*   PO2 204*   HCO3 24.5   POCSATURATED 100   BE 1        POCT Glucose: HbA1c:    Recent Labs   Lab 01/16/24  0801 01/16/24  1130 01/16/24  1734 01/16/24  2146 01/17/24  0538 01/17/24  1132   POCTGLUCOSE 170* 104 180* 138* 125* 134*    Hemoglobin A1C   Date Value Ref Range Status   01/13/2024 5.7 (H) 4.0 - 5.6 % Final     Comment:     ADA Screening Guidelines:  5.7-6.4%  Consistent with prediabetes  >or=6.5%  Consistent with diabetes    High levels of fetal hemoglobin interfere with the HbA1C  assay. Heterozygous hemoglobin variants (HbS, HgC, etc)do  not significantly interfere with this assay.   However, presence of multiple variants may affect accuracy.     03/31/2022 5.8 (H) 4.0 - 5.6 % Final     Comment:     ADA Screening Guidelines:  5.7-6.4%  Consistent with prediabetes  >or=6.5%  Consistent with diabetes    High levels of fetal hemoglobin interfere with the HbA1C  assay. Heterozygous hemoglobin variants (HbS, HgC, etc)do  not significantly interfere with this assay.   However, presence of multiple variants may affect accuracy.     12/02/2021 6.3 (H) 4.0 - 5.6 % Final     Comment:     ADA Screening Guidelines:  5.7-6.4%  Consistent with prediabetes  >or=6.5%  Consistent with diabetes    High levels of fetal hemoglobin interfere with the HbA1C  assay. Heterozygous hemoglobin variants (HbS, HgC, etc)do  not significantly interfere with this assay.   However, presence of multiple variants may affect accuracy.          Cardiac Enzymes: Ejection Fractions:    No results for input(s): "CPK", "CPKMB", "MB", "TROPONINI" in the last 72 hours. No results found for: "EF"       No results for input(s): "COLORU", "APPEARANCEUA", "PHUR", "SPECGRAV", "PROTEINUA", "GLUCUA", "KETONESU", "BILIRUBINUA", "OCCULTUA", "NITRITE", " ""UROBILINOGEN", "LEUKOCYTESUR", "RBCUA", "WBCUA", "BACTERIA", "SQUAMEPITHEL", "HYALINECASTS" in the last 48 hours.    Invalid input(s): "WRIGHTSUR"    No results found for: "PROCAL", "LACTATE"  BNP (pg/mL)   Date Value   01/13/2024 120 (H)     No results found for: "CRP", "SEDRATE"  No results found for: "DDIMER"  No results found for: "FERRITIN"  No results found for: "LDH"  Troponin I (ng/mL)   Date Value   01/14/2024 0.108 (H)   01/13/2024 <0.006     Results for orders placed or performed in visit on 05/08/14   Vitamin D   Result Value Ref Range    Vit D, 25-Hydroxy 41 30 - 96 ng/mL     No results found for: "[id number]"    Microbiology labs for the last week  Microbiology Results (last 7 days)       ** No results found for the last 168 hours. **            Reviewed and noted in plan where applicable- Please see chart for full lab data.    Lines/Drains:       Peripheral IV - Single Lumen 01/13/24 1516 20 G Anterior;Right Forearm (Active)   Site Assessment Clean;Dry;Intact;No redness;No swelling 01/17/24 0402   Extremity Assessment Distal to IV No abnormal discoloration 01/17/24 0402   Line Status Saline locked 01/17/24 0402   Dressing Status Clean;Dry;Intact 01/17/24 0402   Dressing Intervention Integrity maintained 01/17/24 0402   Dressing Change Due 01/16/24 01/17/24 0101   Site Change Due 01/16/24 01/16/24 1901   Reason Not Rotated Poor venous access 01/17/24 0101   Number of days: 3       Imaging  ECG Results              EKG, 12 - Lead (Final result)  Result time 01/14/24 14:15:37      Final result by Interface, Lab In [facility] (01/14/24 14:15:37)               Narrative:    Test Reason : S06.5XAA,    Vent. Rate : 150 BPM     Atrial Rate : 153 BPM     P-R Int : 000 ms          QRS Dur : 072 ms      QT Int : 318 ms       P-R-T Axes : 000 018 086 degrees     QTc Int : 502 ms    Sinus tachycardia with frequent Premature atrial complexes  Nonspecific ST-t wave abnormality  Abnormal ECG  When compared with ECG " of 13-JAN-2024 12:40,  No significant change was found  Confirmed by TABITHA DELUCA MD ([id number]) on 1/14/2024 2:15:32 PM    Referred By: OSCAR   SELF           Confirmed By:TABITHA DELUCA MD                                   EKG 12-lead (Final result)  Result time 01/14/24 14:16:29      Final result by Interface, Lab In [facility] (01/14/24 14:16:29)               Narrative:    Test Reason : Z97.8,    Vent. Rate : 152 BPM     Atrial Rate : 150 BPM     P-R Int : 000 ms          QRS Dur : 074 ms      QT Int : 328 ms       P-R-T Axes : 000 022 101 degrees     QTc Int : 521 ms    Sinus tachycardia with frequent Premature atrial complexes Now present  Nonspecific ST and T wave abnormality Now present  Abnormal ECG  When compared with ECG of 13-JAN-2024 12:09,  Confirmed by TABITHA DELUCA MD ([id number]) on 1/14/2024 2:16:18 PM    Referred By: OSCAR   SELF           Confirmed By:TABITHA DELUCA MD                                   ECG 12 lead (Final result)  Result time 01/14/24 09:59:33      Final result by Interface, Lab In [facility] (01/14/24 09:59:33)               Narrative:    Test Reason : R29.818,    Vent. Rate : 083 BPM     Atrial Rate : 083 BPM     P-R Int : 164 ms          QRS Dur : 068 ms      QT Int : 378 ms       P-R-T Axes : 075 015 071 degrees     QTc Int : 444 ms    Normal sinus rhythm  Normal ECG  When compared with ECG of 27-DEC-2022 00:35,  No significant change was found  Confirmed by Michael FRANK MD ([id number]) on 1/14/2024 9:59:27 AM    Referred By: OSCAR   SELF           Confirmed By:Michael FRANK MD                                  Results for orders placed during the hospital encounter of 01/13/24    Echo    Interpretation Summary    Left Ventricle: The left ventricle is normal in size. Normal wall thickness. There is concentric remodeling. Normal wall motion. There is normal systolic function with a visually estimated ejection fraction of 55 - 60%. There is normal diastolic function.    Right Ventricle:  Normal right ventricular cavity size. Wall thickness is normal. Right ventricle wall motion  is normal. Systolic function is normal.    Aortic Valve: The aortic valve is a trileaflet valve. There is mild aortic valve sclerosis.    Mitral Valve: There is mild mitral annular calcification present.    Tricuspid Valve: There is mild regurgitation.    Pulmonary Artery: The estimated pulmonary artery systolic pressure is 20 mmHg.    IVC/SVC: Normal venous pressure at 3 mmHg.      US Lower Extremity Veins Bilateral  Narrative: EXAMINATION:  US LOWER EXTREMITY VEINS BILATERAL    CLINICAL HISTORY:  RLE swelling >LLE with tenderness to R calf on palpation;    TECHNIQUE:  Duplex and color flow Doppler and dynamic compression was performed of the bilateral lower extremity veins was performed.    COMPARISON:  None    FINDINGS:  Right thigh veins: The common femoral, femoral, popliteal, upper greater saphenous, and deep femoral veins are patent and free of thrombus. The veins are normally compressible and have normal phasic flow and augmentation response.    Right calf veins: The visualized calf veins are patent.    Left thigh veins: The common femoral, femoral, popliteal, upper greater saphenous, and deep femoral veins are patent and free of thrombus. The veins are normally compressible and have normal phasic flow and augmentation response.    Left calf veins: The visualized calf veins are patent.    Miscellaneous: None  Impression: No evidence of deep venous thrombosis in either lower extremity.    Electronically signed by: Nikko Rousseau MD  Date:    01/15/2024  Time:    05:55      Labs, Imaging, EKG and Diagnostic results from 1/17/2024 were reviewed.    Medications:  Medication list was reviewed and changes noted under Assessment/Plan.  No current facility-administered medications on file prior to encounter.     Current Outpatient Medications on File Prior to Encounter   Medication Sig Dispense Refill    amLODIPine  (NORVASC) 5 MG tablet Take 1 tablet (5 mg total) by mouth once daily. 90 tablet 3    blood sugar diagnostic (BLOOD GLUCOSE TEST) Strp 1 strip by Misc.(Non-Drug; Combo Route) route 2 (two) times daily. 100 strip 12    cetirizine (ZYRTEC) 10 MG tablet Take 10 mg by mouth once daily.      flash glucose scanning reader (FREESTYLE FLOWER 2 READER) [facility] Continuous glucose reader 1 each 12    flash glucose sensor (FREESTYLE FLOWER 2 SENSOR) Kit Continuous glucose monitor 1 kit 12    FLUoxetine 10 MG capsule Take 1 capsule (10 mg total) by mouth once daily. 30 capsule 11    lancets Misc Test tid 100 each 12    lisinopriL (PRINIVIL,ZESTRIL) 5 MG tablet Take 1 tablet (5 mg total) by mouth once daily. 90 tablet 3    simvastatin (ZOCOR) 10 MG tablet Take 1 tablet (10 mg total) by mouth every evening. 90 tablet 3    [DISCONTINUED] aspirin (ECOTRIN) 81 MG EC tablet Take 1 tablet (81 mg total) by mouth once daily.  0    [DISCONTINUED] hydroCHLOROthiazide (MICROZIDE) 12.5 mg capsule TAKE 1 CAPSULE EVERY DAY 90 capsule 3    [DISCONTINUED] metFORMIN (GLUCOPHAGE) 500 MG tablet Take 1 tablet (500 mg total) by mouth once daily. 90 tablet 3    [DISCONTINUED] metoprolol tartrate (LOPRESSOR) 100 MG tablet Take 1 tablet (100 mg total) by mouth once daily. 90 tablet 3     Scheduled Medications:  amLODIPine, 5 mg, Oral, Daily  atorvastatin, 40 mg, Oral, Daily  FLUoxetine, 10 mg, Oral, Daily  fluticasone propionate, 2 spray, Each Nostril, Daily  heparin (porcine), 5,000 Units, Subcutaneous, Q8H  levETIRAcetam, 500 mg, Oral, BID  lisinopriL, 5 mg, Oral, Daily  metoprolol tartrate, 50 mg, Oral, BID  mupirocin, , Nasal, BID  polyethylene glycol, 17 g, Oral, BID  potassium, sodium phosphates, 2 packet, Oral, QID (AC & HS)  senna-docusate 8.6-50 mg, 2 tablet, Oral, Daily      PRN: acetaminophen, dextrose 10%, dextrose 10%, glucagon (human recombinant), insulin aspart U-100, labetalol, magnesium oxide, magnesium oxide, ondansetron, potassium  bicarbonate, potassium bicarbonate, potassium bicarbonate, potassium, sodium phosphates, potassium, sodium phosphates, potassium, sodium phosphates  Infusions:   Estimated Creatinine Clearance: 62.8 mL/min (based on SCr of 0.7 mg/dL).               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 Intubated for non-redirectable agitation. Given DDAVP for possible ASA use.     1/13: CTH w 8mm R SDH and 5mm L SDH   1/13: Interval 5h CTA stable; no vascular abnormality   1/14: Follow-up CTH stable     -Stable for stepdown to Hospital Medicine per NSGY  -VS/Neuro checks q4  -NSGY following  -SBP goal < 160  -Continue home amlo/lisinopril/metoprolol  -PRN labetalol/hydralazine  -Keppra BID x 7 days  -CBC, CMP, Mag, Phos daily  -HOB >/= 30  -SQH for SVT PPX  -PT/OT/SLP    01/17/2024 Transfer to hospital medicine.   s/p neurosurgery eval - H/o  possible ASA81 use, DM, HTN, presenting from home s/p fall with confusion, agitation, AMS found to have Right >Left acute SDH w/o midline shift and with minimal mass effect. Interval CTH 1/14: stable, s/p DDAVP. continue Keppra 500 BID x7 days.  Extubated 1/14  with no issues. sats 95% on RA. Oriented to person and time, moving extremities and follow commands . low grad temp of 100.4F on 1/14. leucocytosis resolved.  afebrile. monitor . SLP Recs Soft & Bite Sized Diet - IDDSI Level 6, Thin liquids - IDDSI Level 0 . 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   Last A1c reviewed-   Lab Results   Component Value Date    HGBA1C 5.7 (H) 01/13/2024    HGBA1C 5.8 (H) 03/31/2022    HGBA1C 6.3 (H) 12/02/2021     Will hold PO hypoglycemics and will start correctional scale insulin  Most recent fingerstick glucose reviewed-   Recent Labs   Lab 01/16/24  1130 01/16/24  1734 01/16/24  2146 01/17/24  0538   POCTGLUCOSE 104 180* 138* 125*     currently on   Antihyperglycemics (From admission, onward)      Start     Stop Route Frequency Ordered    01/13/24 2117  insulin aspart U-100 pen 0-10 Units         -- SubQ Every 6 hours PRN 01/13/24 2018                 History of breast cancer        Hyperlipidemia associated with type 2 diabetes mellitus     -Atorvastatin daily       Hypertension associated with diabetes     -SBP goal < 160  -Continue home amlo/lisinopril/metoprolol  -PRN hydralazine/labetalol            VTE Risk Mitigation (From admission, onward)           Ordered     heparin (porcine) injection 5,000 Units  Every 8 hours         01/15/24 1330     IP VTE HIGH RISK PATIENT  Once         01/13/24 1242     Place sequential compression device  Until discontinued         01/13/24 1242                    Discharge Planning   BRIDGETTE: 1/17/2024     Code Status: Full Code   Is the patient medically ready for discharge?: Yes    Reason for patient still in hospital (select all that apply): Treatment  Discharge Plan A: Skilled Nursing Facility   Discharge Delays: None known at this time              Abiel Flores MD  Department of Hospital Medicine   [facility] - Neurosurgery  ([facility])

## 2024-01-17 NOTE — PLAN OF CARE
Problem: Fall Injury Risk  Goal: Absence of Fall and Fall-Related Injury  Outcome: Ongoing, Progressing     Problem: Adult Inpatient Plan of Care  Goal: Plan of Care Review  Outcome: Ongoing, Progressing     Problem: Infection  Goal: Absence of Infection Signs and Symptoms  Outcome: Ongoing, Progressing     Problem: Cerebral Tissue Perfusion (Stroke, Hemorrhagic)  Goal: Optimal Cerebral Tissue Perfusion  Outcome: Ongoing, Progressing     Problem: Cognitive Impairment (Stroke, Hemorrhagic)  Goal: Optimal Cognitive Function  Outcome: Ongoing, Progressing     Problem: Pain (Stroke, Hemorrhagic)  Goal: Acceptable Pain Control  Outcome: Ongoing, Progressing

## 2024-01-17 NOTE — SUBJECTIVE & OBJECTIVE
Interval History:  See hospital course.     Review of Systems   Respiratory:  Negative for cough and shortness of breath.    Gastrointestinal:  Negative for abdominal pain, nausea and vomiting.   Neurological:  Negative for headaches.     Objective:     Vitals:  Temp: 97.4 °F (36.3 °C)  Pulse: 62  Rhythm: sinus arrhythmia, atrial rhythm  BP: (!) 123/58  MAP (mmHg): 83  Resp: 17  SpO2: 100 %    Temp  Min: 97.4 °F (36.3 °C)  Max: 98.8 °F (37.1 °C)  Pulse  Min: 58  Max: 85  BP  Min: 110/56  Max: 173/120  MAP (mmHg)  Min: 79  Max: 137  Resp  Min: 10  Max: 27  SpO2  Min: 99 %  Max: 100 %    01/15 0701 - 01/16 0700  In: 130 [P.O.:130]  Out: 375 [Urine:375]   Unmeasured Output  Urine Occurrence: 1        Physical Exam  Vitals and nursing note reviewed.   Eyes:      Pupils: Pupils are equal, round, and reactive to light.   Cardiovascular:      Rate and Rhythm: Normal rate. Rhythm irregular.   Pulmonary:      Effort: Pulmonary effort is normal.   Abdominal:      Palpations: Abdomen is soft.   Musculoskeletal:         General: Normal range of motion.   Skin:     General: Skin is warm and dry.      Capillary Refill: Capillary refill takes less than 2 seconds.   Neurological:      Mental Status: She is alert.      GCS: GCS eye subscore is 3. GCS verbal subscore is 4. GCS motor subscore is 6.      Sensory: Sensation is intact.      Motor: Motor function is intact.      Comments:   -E3 V4 M6  -PERRL  -Oriented to person and time  -Follows commands w/ all extremities  -STINSON equally/purposefully  -4/5 strength throughout            Medications:  Continuous   Scheduled[START ON 1/17/2024] amLODIPine, 5 mg, Daily  [START ON 1/17/2024] atorvastatin, 40 mg, Daily  [START ON 1/17/2024] FLUoxetine, 10 mg, Daily  fluticasone propionate, 2 spray, Daily  heparin (porcine), 5,000 Units, Q8H  levETIRAcetam, 500 mg, BID  [START ON 1/17/2024] lisinopriL, 5 mg, Daily  metoprolol tartrate, 50 mg, BID  mupirocin, , BID  polyethylene glycol, 17 g,  BID  senna-docusate 8.6-50 mg, 2 tablet, Daily    PRNacetaminophen, 650 mg, Q6H PRN  dextrose 10%, 12.5 g, PRN  dextrose 10%, 25 g, PRN  glucagon (human recombinant), 1 mg, PRN  hydrALAZINE, 10 mg, Q6H PRN  insulin aspart U-100, 0-10 Units, Q6H PRN  labetalol, 10 mg, Q6H PRN  magnesium oxide, 800 mg, PRN  magnesium oxide, 800 mg, PRN  ondansetron, 4 mg, Q8H PRN  potassium bicarbonate, 35 mEq, PRN  potassium bicarbonate, 50 mEq, PRN  potassium bicarbonate, 60 mEq, PRN  potassium, sodium phosphates, 2 packet, PRN  potassium, sodium phosphates, 2 packet, PRN  potassium, sodium phosphates, 2 packet, PRN      Today I personally reviewed pertinent medications, lines/drains/airways, imaging, cardiology results, laboratory results, notably: CBC, CMP, Mag, Phos    Diet  Diet Adult Regular (IDDSI Level 7) Thin; Standard Tray  Diet Adult Regular (IDDSI Level 7) Thin; Standard Tray

## 2024-01-17 NOTE — TELEPHONE ENCOUNTER
----- Message from Tamika Reyes PA-C sent at 1/17/2024  4:08 PM CST -----  Hey, this pt will need f/u in [facility] clinic in about 2 weeks with CT, for f/u of SDH. She is still admitted right now but should be discharging soon. Thanks.

## 2024-01-17 NOTE — PROGRESS NOTES
Rory Duran - Neurosurgery ([facility])  Neurosurgery  Progress Note    Subjective:     History of Present Illness: 83 F with unknown ASA/AC BIBEMS s/p unwitnessed fall at home at approximately 0700. Unknown LOC with + scalp hematoma. Patient c/o headache on arrival. Following commands per EMS however more agitated in ED, not answering questions and not following commands. Patient yelling and trying to get out of stretcher, placed in restraints by ED. No external evidence of trauma.Patient unable to provide medical history.    ASA81 prescribed in 2020 unclear if patient is taking at this time. No family bedside for collateral hx.     Post-Op Info:  * No surgery found *       Interval History: NAEON. AFVSS. Stepped down to floor under  service. Pt doing well today, denies any HA, nausea, or other complaints. AAOx4.     Medications:  Continuous Infusions:  Scheduled Meds:   amLODIPine  5 mg Oral Daily    atorvastatin  40 mg Oral Daily    FLUoxetine  10 mg Oral Daily    fluticasone propionate  2 spray Each Nostril Daily    heparin (porcine)  5,000 Units Subcutaneous Q8H    levETIRAcetam  500 mg Oral BID    lisinopriL  5 mg Oral Daily    metoprolol tartrate  50 mg Oral BID    mupirocin   Nasal BID    polyethylene glycol  17 g Oral BID    potassium, sodium phosphates  2 packet Oral QID (AC & HS)    senna-docusate 8.6-50 mg  2 tablet Oral Daily     PRN Meds:acetaminophen, dextrose 10%, dextrose 10%, glucagon (human recombinant), insulin aspart U-100, labetalol, magnesium oxide, magnesium oxide, ondansetron, potassium bicarbonate, potassium bicarbonate, potassium bicarbonate, potassium, sodium phosphates, potassium, sodium phosphates, potassium, sodium phosphates     Review of Systems  Objective:     Weight: 74.4 kg (164 lb)  Body mass index is 26.47 kg/m².  Vital Signs (Most Recent):  Temp: 98.3 °F (36.8 °C) (01/17/24 1132)  Pulse: 109 (01/17/24 1541)  Resp: 18 (01/17/24 1132)  BP: (!) 119/57 (01/17/24 1132)  SpO2: 96 %  "(01/17/24 1132) Vital Signs (24h Range):  Temp:  [98 °F (36.7 °C)-98.6 °F (37 °C)] 98.3 °F (36.8 °C)  Pulse:  [] 109  Resp:  [15-27] 18  SpO2:  [96 %-100 %] 96 %  BP: (110-166)/(53-91) 119/57                                 Physical Exam         Neurosurgery Physical Exam    General: well developed, well nourished, no distress.   Head: normocephalic, atraumatic   Neurologic: Alert and oriented. Thought content appropriate.  GCS: E4 V5 M6; Total: 15  Mental Status: Awake, Alert, Oriented x 4  Language: No aphasia  Speech: No dysarthria  Cranial nerves: face symmetric, tongue midline, CN II-XII grossly intact.   Eyes: pupils equal, round, reactive to light with accomodation, EOMI.   Pulmonary: normal respirations, no signs of respiratory distress  Abdomen: soft, non-distended, not tender to palpation  Skin: Skin is warm, dry and intact.    Sensory: intact to light touch throughout  Motor Strength: Moves all extremities spontaneously with good tone. Anti-gravity x 4. No abnormal movements seen.     Finger-to-nose: intact bilaterally   Pronator drift: absent bilaterally      Significant Labs:  Recent Labs   Lab 01/16/24  0304 01/17/24  0500   * 120*    138   K 3.8 3.7    108   CO2 26 23   BUN 13 10   CREATININE 0.7 0.7   CALCIUM 9.7 9.9   MG 2.0 1.8     Recent Labs   Lab 01/16/24  0304 01/17/24  0500   WBC 9.95 6.57   HGB 12.3 12.5   HCT 38.1 39.3   * 166     No results for input(s): "LABPT", "INR", "APTT" in the last 48 hours.  Microbiology Results (last 7 days)       ** No results found for the last 168 hours. **          All pertinent labs from the last 24 hours have been reviewed.    Significant Diagnostics:  I have reviewed and interpreted all pertinent imaging results/findings within the past 24 hours.  Assessment/Plan:     * Traumatic subdural hematoma  83 F with possible ASA81 use, DM, HTN, presenting from home s/p fall with confusion, agitation, AMS found to have R>L acute SDH " w/o midline shift and with minimal mass effect.    Interval CTH 1/14: stable    --Patient stepped down from [facility]--> service      -q4h neurochecks while on floor  --No plan for neurosurgical intervention at this time  --Hold anti-plt/coag medications - possible home ASA81 use, s/p DDAVP   - Continue to hold ASA until f/u with NSGY outpatient  --SBP <160  --Na >135  --Keppra 500 BID x7 days  --SLP, PT/OT  --Okay for discharge from NSGY perspective when pt is ready; will arrange f/u in clinic in about 2 weeks with repeat CTH  --NSGY will sign off; please contact us with any questions/concerns or any acute decline in neurologic status            Tamika Reyes PA-C  Neurosurgery  Rory Duran - Neurosurgery ([facility])

## 2024-01-17 NOTE — SUBJECTIVE & OBJECTIVE
Interval History: NAEON. AFVSS. Stepped down to floor under  service. Pt doing well today, denies any HA, nausea, or other complaints. AAOx4.     Medications:  Continuous Infusions:  Scheduled Meds:   amLODIPine  5 mg Oral Daily    atorvastatin  40 mg Oral Daily    FLUoxetine  10 mg Oral Daily    fluticasone propionate  2 spray Each Nostril Daily    heparin (porcine)  5,000 Units Subcutaneous Q8H    levETIRAcetam  500 mg Oral BID    lisinopriL  5 mg Oral Daily    metoprolol tartrate  50 mg Oral BID    mupirocin   Nasal BID    polyethylene glycol  17 g Oral BID    potassium, sodium phosphates  2 packet Oral QID (AC & HS)    senna-docusate 8.6-50 mg  2 tablet Oral Daily     PRN Meds:acetaminophen, dextrose 10%, dextrose 10%, glucagon (human recombinant), insulin aspart U-100, labetalol, magnesium oxide, magnesium oxide, ondansetron, potassium bicarbonate, potassium bicarbonate, potassium bicarbonate, potassium, sodium phosphates, potassium, sodium phosphates, potassium, sodium phosphates     Review of Systems  Objective:     Weight: 74.4 kg (164 lb)  Body mass index is 26.47 kg/m².  Vital Signs (Most Recent):  Temp: 98.3 °F (36.8 °C) (01/17/24 1132)  Pulse: 109 (01/17/24 1541)  Resp: 18 (01/17/24 1132)  BP: (!) 119/57 (01/17/24 1132)  SpO2: 96 % (01/17/24 1132) Vital Signs (24h Range):  Temp:  [98 °F (36.7 °C)-98.6 °F (37 °C)] 98.3 °F (36.8 °C)  Pulse:  [] 109  Resp:  [15-27] 18  SpO2:  [96 %-100 %] 96 %  BP: (110-166)/(53-91) 119/57                                 Physical Exam         Neurosurgery Physical Exam    General: well developed, well nourished, no distress.   Head: normocephalic, atraumatic   Neurologic: Alert and oriented. Thought content appropriate.  GCS: E4 V5 M6; Total: 15  Mental Status: Awake, Alert, Oriented x 4  Language: No aphasia  Speech: No dysarthria  Cranial nerves: face symmetric, tongue midline, CN II-XII grossly intact.   Eyes: pupils equal, round, reactive to light with  "accomodation, EOMI.   Pulmonary: normal respirations, no signs of respiratory distress  Abdomen: soft, non-distended, not tender to palpation  Skin: Skin is warm, dry and intact.    Sensory: intact to light touch throughout  Motor Strength: Moves all extremities spontaneously with good tone. Anti-gravity x 4. No abnormal movements seen.     Finger-to-nose: intact bilaterally   Pronator drift: absent bilaterally      Significant Labs:  Recent Labs   Lab 01/16/24  0304 01/17/24  0500   * 120*    138   K 3.8 3.7    108   CO2 26 23   BUN 13 10   CREATININE 0.7 0.7   CALCIUM 9.7 9.9   MG 2.0 1.8     Recent Labs   Lab 01/16/24  0304 01/17/24  0500   WBC 9.95 6.57   HGB 12.3 12.5   HCT 38.1 39.3   * 166     No results for input(s): "LABPT", "INR", "APTT" in the last 48 hours.  Microbiology Results (last 7 days)       ** No results found for the last 168 hours. **          All pertinent labs from the last 24 hours have been reviewed.    Significant Diagnostics:  I have reviewed and interpreted all pertinent imaging results/findings within the past 24 hours.  "

## 2024-01-17 NOTE — DISCHARGE SUMMARY
"Rory erika - Neurosurgery ([facility])  [facility] Medicine  Discharge Summary      Patient Name: Kanchan Downing  MRN: [id number]  SEAN: [id number]  Patient Class: IP- Inpatient  Admission Date: 1/13/2024  Hospital Length of Stay: 4 days  Discharge Date and Time:  01/17/2024 10:00 AM  Attending Physician: Abiel Flores MD   Discharging Provider: Abiel Flores MD  Primary Care Provider: Viktoria Mckeon MD  [facility] Medicine Team: [facility] HOSP MED D Abiel Flores MD  Primary Care Team: [place]    HPI:   History of Present Illness: Kanchan Downing is an 83F w PMHx of DM2, HTN, HLD, PVD w claudication, DVT (2001), 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 History obtained from granddaughter at bedside. Pt had an unwitnessed fall around 1100 on 1/13. 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 Pt was wearing socks and slipped. She called her son and was crying and complaining of a HA. Son called EMS. Pt was brought to [facility]. CTH w 8mm R SDH and 5mm L SDH. CT c-spine negative for acute processes. Pt developed non-redirectable agitation, screaming "help me" and trying to get out of the bed. She was not following any commands and SBP was 220. Pt was intubated in the ED and started on propofol. Placed on cardene. Pt admitted to [facility]. On arrival to NCCU, pt was on propofol 50, fentanyl 250, and still requiring 5-point restraints to prevent self-extubation. Gave 5mg IV haldol and 2mg ativan and pt fell asleep. Five-hour interval CTH/CTA w stable bl SDH and no vascular abnormality. Possible ASA use, given DDAVP.            Of note, pt lost her , Marques, in 1/2022 and subsequently developed a progressive decline in functional status. Pt's family had concerns for early dementia as pt will become " upset thinking about her  and starts to forget things. This decline is now thought to be related to her depression rather than dementia. Pt does perform her ADLs at home alone, but family started looking into options for assistance.         * No surgery found *      Hospital Course:   01/14/2024 Sedated overnight d/t extreme agitation and inability to be redirected. CTH this AM stable. Extubated w/ no issues.   01/15/2024 No issues overnight. Discussed w/ NSGY, stable for stepdown to Hospital Medicine.  01/17/2024 Transfer to hospital medicine.   s/p neurosurgery eval - H/o  possible ASA81 use, DM, HTN, presenting from home s/p fall with confusion, agitation, AMS found to have Right >Left acute SDH w/o midline shift and with minimal mass effect. Interval CTH 1/14: stable, s/p DDAVP. continue Keppra 500 BID x7 days.  Extubated 1/14  with no issues. sats 95% on RA. Oriented to person and time, moving extremities and follow commands . low grad temp of 100.4F on 1/14. leucocytosis resolved.  afebrile. monitor . SLP Recs Soft & Bite Sized Diet - IDDSI Level 6, Thin liquids - IDDSI Level 0 . needs SNF placement. refused SNF at this time.  Referrals sent for HH . per neurosurgery, hold ASA for 2 weeks and follow up in Neurosurgery clinic with repeat CT head      Goals of Care Treatment Preferences:  Code Status: Full Code      Consults:   Consults (From admission, onward)          Status Ordering Provider     Inpatient consult to Physical Medicine Rehab  Once        Provider:  (Not yet assigned)    Completed JOLIE HSIEH     Inpatient consult to Registered Dietitian/Nutritionist  Once        Provider:  (Not yet assigned)    Completed JOLIE HSIEH     IP consult to case management/social work  Once        Provider:  (Not yet assigned)    Acknowledged MIRA SUTHERLAND     Inpatient consult to Neurosurgery  Once        Provider:  (Not yet assigned)    Completed LUDA CRUMP     Inpatient consult to Vascular  (Stroke) Neurology  Once        Provider:  (Not yet assigned)    Completed LUDA CRUMP                  Assessment/Plan:      * Traumatic subdural hematoma     83F w PMHx of DM2, HTN, HLD, PVD w claudication, DVT (2001), 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 Intubated for non-redirectable agitation. Given DDAVP for possible ASA use.     1/13: CTH w 8mm R SDH and 5mm L SDH   1/13: Interval 5h CTA stable; no vascular abnormality   1/14: Follow-up CTH stable     -Stable for stepdown to Hospital Medicine per NSGY  -VS/Neuro checks q4  -NSGY following  -SBP goal < 160  -Continue home amlo/lisinopril/metoprolol  -PRN labetalol/hydralazine  -Keppra BID x 7 days  -CBC, CMP, Mag, Phos daily  -HOB >/= 30  -SQH for SVT PPX  -PT/OT/SLP     01/17/2024 Transfer to hospital medicine.   s/p neurosurgery eval - H/o  possible ASA81 use, DM, HTN, presenting from home s/p fall with confusion, agitation, AMS found to have Right >Left acute SDH w/o midline shift and with minimal mass effect. Interval CTH 1/14: stable, s/p DDAVP. continue Keppra 500 BID x7 days.  Extubated 1/14  with no issues. sats 95% on RA. Oriented to person and time, moving extremities and follow commands . low grad temp of 100.4F on 1/14. leucocytosis resolved.  afebrile. monitor . SLP Recs Soft & Bite Sized Diet - IDDSI Level 6, Thin liquids - IDDSI Level 0 . 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   Last A1c reviewed-         Lab Results   Component Value Date     HGBA1C 5.7 (H) 01/13/2024     HGBA1C 5.8 (H) 03/31/2022     HGBA1C 6.3 (H) 12/02/2021      Will hold PO hypoglycemics and will start correctional scale insulin  Most recent fingerstick glucose reviewed-          Recent Labs   Lab 01/16/24  1130 01/16/24  1734 01/16/24  2146 01/17/24  0538   POCTGLUCOSE 104 180* 138* 125*      currently on   Antihyperglycemics (From admission, onward)        Start     Stop Route Frequency Ordered     01/13/24 2117   insulin aspart U-100 pen 0-10 Units         -- SubQ Every 6 hours PRN 01/13/24 2018                      History of breast cancer           Hyperlipidemia associated with type 2 diabetes mellitus     -Atorvastatin daily        Hypertension associated with diabetes     -SBP goal < 160  -Continue home amlo/lisinopril/metoprolol  -PRN hydralazine/labetalol     Final Active Diagnoses:    Diagnosis Date Noted POA    PRINCIPAL PROBLEM:  Traumatic subdural hematoma [S06.5XAA] 01/13/2024 Yes    Ductal carcinoma in situ (DCIS) of right breast [D05.11] 03/13/2023 Yes    History of fall [Z91.81] 05/19/2022 Not Applicable    BANDAR (generalized anxiety disorder) [F41.1] 05/04/2022 Yes    PVD (peripheral vascular disease) with claudication [I73.9] 05/02/2019 Yes    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 [I82.592]  Yes    Type 2 diabetes mellitus with diabetic polyneuropathy, without long-term current use of insulin [E11.42] 06/12/2014 Yes    History of breast cancer [Z85.3] 10/11/2012 Not Applicable    Hypertension associated with diabetes [E11.59, I15.2] 09/11/2012 Yes    Hyperlipidemia associated  "with type 2 diabetes mellitus [E11.69, E78.5] 09/11/2012 Yes      Problems Resolved During this Admission:    Diagnosis Date Noted Date Resolved POA    Grief [F43.21] 04/06/2022 01/15/2024 Yes       Discharged Condition: fair    Disposition: home health    Follow Up:    Patient Instructions:      Ambulatory referral/consult to Neurosurgery   Standing Status: Future   Referral Priority: Routine Referral Type: Consultation   Referral Reason: Specialty Services Required   Requested Specialty: Neurosurgery   Number of Visits Requested: 1       Significant Diagnostic Studies: Labs: BMP:   Recent Labs   Lab 01/16/24  0304 01/17/24  0500   * 120*    138   K 3.8 3.7    108   CO2 26 23   BUN 13 10   CREATININE 0.7 0.7   CALCIUM 9.7 9.9   MG 2.0 1.8   , CMP   Recent Labs   Lab 01/16/24  0304 01/17/24  0500    138   K 3.8 3.7    108   CO2 26 23   * 120*   BUN 13 10   CREATININE 0.7 0.7   CALCIUM 9.7 9.9   PROT 6.0 6.1   ALBUMIN 2.9* 3.0*   BILITOT 0.7 0.7   ALKPHOS 59 61   AST 18 16   ALT 14 13   ANIONGAP 6* 7*   , CBC   Recent Labs   Lab 01/16/24  0304 01/17/24  0500   WBC 9.95 6.57   HGB 12.3 12.5   HCT 38.1 39.3   * 166   , INR   Lab Results   Component Value Date    INR 1.0 01/13/2024    INR 1.2 01/13/2024    INR 0.9 01/13/2024   , Lipid Panel   Lab Results   Component Value Date    CHOL 218 (H) 01/13/2024    HDL 62 01/13/2024    LDLCALC 144.0 01/13/2024    TRIG 60 01/13/2024    CHOLHDL 28.4 01/13/2024   , Troponin   Recent Labs   Lab 01/13/24  1249 01/14/24 0308   TROPONINI <0.006 0.108*   , A1C:   Recent Labs   Lab 01/13/24  1249   HGBA1C 5.7*   , and All labs within the past 24 hours have been reviewed  Microbiology: Blood Culture No results found for: "LABBLOO", Sputum Culture No results found for: "GSRESP", "RESPIRATORYC", and Urine Culture    Lab Results   Component Value Date    LABURIN  06/08/2016     Multiple organisms isolated. None in predominance.  Repeat if    " LABURIN clinically necessary. 06/08/2016       US Lower Extremity Veins Bilateral  Narrative: EXAMINATION:  US LOWER EXTREMITY VEINS BILATERAL    CLINICAL HISTORY:  RLE swelling >LLE with tenderness to R calf on palpation;    TECHNIQUE:  Duplex and color flow Doppler and dynamic compression was performed of the bilateral lower extremity veins was performed.    COMPARISON:  None    FINDINGS:  Right thigh veins: The common femoral, femoral, popliteal, upper greater saphenous, and deep femoral veins are patent and free of thrombus. The veins are normally compressible and have normal phasic flow and augmentation response.    Right calf veins: The visualized calf veins are patent.    Left thigh veins: The common femoral, femoral, popliteal, upper greater saphenous, and deep femoral veins are patent and free of thrombus. The veins are normally compressible and have normal phasic flow and augmentation response.    Left calf veins: The visualized calf veins are patent.    Miscellaneous: None  Impression: No evidence of deep venous thrombosis in either lower extremity.    Electronically signed by: Nikko Rousseau MD  Date:    01/15/2024  Time:    05:55       Pending Diagnostic Studies:       None           Medications:  Reconciled Home Medications:      Medication List        Start taking these medications      levETIRAcetam 500 MG Tab  Commonly known as: KEPPRA  Take 1 tablet (500 mg total) by mouth 2 (two) times daily. for 7 days            Change how you take these medications      metoprolol tartrate 50 MG tablet  Commonly known as: LOPRESSOR  Take 1 tablet (50 mg total) by mouth 2 (two) times daily.  What changed:   medication strength  how much to take  when to take this            Continue taking these medications      amLODIPine 5 MG tablet  Commonly known as: NORVASC  Take 1 tablet (5 mg total) by mouth once daily.     blood sugar diagnostic Strp  Commonly known as: BLOOD GLUCOSE TEST  1 strip by Misc.(Non-Drug;  Combo Route) route 2 (two) times daily.     cetirizine 10 MG tablet  Commonly known as: ZYRTEC  Take 10 mg by mouth once daily.     FLUoxetine 10 MG capsule  Take 1 capsule (10 mg total) by mouth once daily.     FREESTYLE FLOWER 2 READER Misc  Generic drug: flash glucose scanning reader  Continuous glucose reader     FREESTYLE FLOWER 2 SENSOR Kit  Generic drug: flash glucose sensor  Continuous glucose monitor     lancets Misc  Test tid     lisinopriL 5 MG tablet  Commonly known as: PRINIVIL,ZESTRIL  Take 1 tablet (5 mg total) by mouth once daily.     simvastatin 10 MG tablet  Commonly known as: ZOCOR  Take 1 tablet (10 mg total) by mouth every evening.            Stop taking these medications      aspirin 81 MG EC tablet  Commonly known as: ECOTRIN     hydroCHLOROthiazide 12.5 mg capsule  Commonly known as: MICROZIDE     metFORMIN 500 MG tablet  Commonly known as: GLUCOPHAGE              Indwelling Lines/Drains at time of discharge:   Lines/Drains/Airways       None                   Time spent on the discharge of patient: 40 minutes         Abiel Flores MD  Department of Hospital Medicine  [facility] Neurosurgery ([facility])

## 2024-01-17 NOTE — CARE UPDATE
"I have reviewed the chart of Kanchan Downing and participated in the care of the patient who is hospitalized for the following:    Active Hospital Problems    Diagnosis    *Traumatic subdural hematoma    Vasogenic cerebral edema     Due bilateral SDH  Noted some effacement of the ventricles  Monitor with repeat cths  Required ICU care for >3 days with frequent monitoring      Agitation     Pt developed non-redirectable agitation, screaming "help me" and trying to get out of the bed. She was not following any commands    required 5-point restraints to prevent self-extubation.   received 5mg IV haldol and 2mg ativan   Now has resolved      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     Possible ASA use, given DDAVP.   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      Angie Delcid NP  Unit Based [facility]   "

## 2024-01-17 NOTE — ASSESSMENT & PLAN NOTE
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 Intubated for non-redirectable agitation. Given DDAVP for possible ASA use.    1/13: CTH w 8mm R SDH and 5mm L SDH   1/13: Interval 5h CTA stable; no vascular abnormality   1/14: Follow-up CTH stable    -Stable for stepdown to Hospital Medicine per NSGY, awaiting available on NPU   -VS/Neuro checks q4  -NSGY following  -SBP goal < 160  -Continue home amlo/lisinopril/metoprolol  -PRN labetalol/hydralazine  -Keppra BID x 7 days  -CBC, CMP, Mag, Phos daily  -HOB >/= 30  -SQH for SVT PPX  -PT/OT/SLP

## 2024-01-18 LAB
ALBUMIN SERPL BCP-MCNC: 2.7 G/DL (ref 3.5–5.2)
ALP SERPL-CCNC: 51 U/L (ref 55–135)
ALT SERPL W/O P-5'-P-CCNC: 13 U/L (ref 10–44)
ANION GAP SERPL CALC-SCNC: 6 MMOL/L (ref 8–16)
AST SERPL-CCNC: 15 U/L (ref 10–40)
BASOPHILS # BLD AUTO: 0.07 K/UL (ref 0–0.2)
BASOPHILS NFR BLD: 1.3 % (ref 0–1.9)
BILIRUB SERPL-MCNC: 0.6 MG/DL (ref 0.1–1)
BUN SERPL-MCNC: 12 MG/DL (ref 8–23)
CALCIUM SERPL-MCNC: 9.3 MG/DL (ref 8.7–10.5)
CHLORIDE SERPL-SCNC: 109 MMOL/L (ref 95–110)
CO2 SERPL-SCNC: 24 MMOL/L (ref 23–29)
CREAT SERPL-MCNC: 0.7 MG/DL (ref 0.5–1.4)
DIFFERENTIAL METHOD BLD: ABNORMAL
EOSINOPHIL # BLD AUTO: 0.2 K/UL (ref 0–0.5)
EOSINOPHIL NFR BLD: 3.2 % (ref 0–8)
ERYTHROCYTE [DISTWIDTH] IN BLOOD BY AUTOMATED COUNT: 13.9 % (ref 11.5–14.5)
EST. GFR  (NO RACE VARIABLE): >60 ML/MIN/1.73 M^2
GLUCOSE SERPL-MCNC: 134 MG/DL (ref 70–110)
HCT VFR BLD AUTO: 36.7 % (ref 37–48.5)
HGB BLD-MCNC: 11.9 G/DL (ref 12–16)
IMM GRANULOCYTES # BLD AUTO: 0.04 K/UL (ref 0–0.04)
IMM GRANULOCYTES NFR BLD AUTO: 0.7 % (ref 0–0.5)
LYMPHOCYTES # BLD AUTO: 1.2 K/UL (ref 1–4.8)
LYMPHOCYTES NFR BLD: 21.6 % (ref 18–48)
MAGNESIUM SERPL-MCNC: 1.7 MG/DL (ref 1.6–2.6)
MCH RBC QN AUTO: 29.6 PG (ref 27–31)
MCHC RBC AUTO-ENTMCNC: 32.4 G/DL (ref 32–36)
MCV RBC AUTO: 91 FL (ref 82–98)
MONOCYTES # BLD AUTO: 0.4 K/UL (ref 0.3–1)
MONOCYTES NFR BLD: 7.1 % (ref 4–15)
NEUTROPHILS # BLD AUTO: 3.7 K/UL (ref 1.8–7.7)
NEUTROPHILS NFR BLD: 66.1 % (ref 38–73)
NRBC BLD-RTO: 0 /100 WBC
PHOSPHATE SERPL-MCNC: 3.6 MG/DL (ref 2.7–4.5)
PLATELET # BLD AUTO: 158 K/UL (ref 150–450)
PMV BLD AUTO: 10.6 FL (ref 9.2–12.9)
POCT GLUCOSE: 121 MG/DL (ref 70–110)
POCT GLUCOSE: 128 MG/DL (ref 70–110)
POCT GLUCOSE: 146 MG/DL (ref 70–110)
POTASSIUM SERPL-SCNC: 3.7 MMOL/L (ref 3.5–5.1)
PROT SERPL-MCNC: 5.6 G/DL (ref 6–8.4)
RBC # BLD AUTO: 4.02 M/UL (ref 4–5.4)
SODIUM SERPL-SCNC: 139 MMOL/L (ref 136–145)
WBC # BLD AUTO: 5.6 K/UL (ref 3.9–12.7)

## 2024-01-18 PROCEDURE — 97530 THERAPEUTIC ACTIVITIES: CPT | Mod: HCNC

## 2024-01-18 PROCEDURE — 85025 COMPLETE CBC W/AUTO DIFF WBC: CPT | Mod: HCNC | Performed by: REGISTERED NURSE

## 2024-01-18 PROCEDURE — 63600175 PHARM REV CODE 636 W HCPCS: Mod: HCNC | Performed by: REGISTERED NURSE

## 2024-01-18 PROCEDURE — 20600001 HC STEP DOWN PRIVATE ROOM: Mod: HCNC

## 2024-01-18 PROCEDURE — 36415 COLL VENOUS BLD VENIPUNCTURE: CPT | Mod: HCNC | Performed by: REGISTERED NURSE

## 2024-01-18 PROCEDURE — 84100 ASSAY OF PHOSPHORUS: CPT | Mod: HCNC | Performed by: REGISTERED NURSE

## 2024-01-18 PROCEDURE — 25000003 PHARM REV CODE 250: Mod: HCNC | Performed by: INTERNAL MEDICINE

## 2024-01-18 PROCEDURE — 97116 GAIT TRAINING THERAPY: CPT | Mod: HCNC

## 2024-01-18 PROCEDURE — 80053 COMPREHEN METABOLIC PANEL: CPT | Mod: HCNC | Performed by: REGISTERED NURSE

## 2024-01-18 PROCEDURE — 83735 ASSAY OF MAGNESIUM: CPT | Mod: HCNC | Performed by: REGISTERED NURSE

## 2024-01-18 RX ADMIN — FLUTICASONE PROPIONATE 100 MCG: 50 SPRAY, METERED NASAL at 08:01

## 2024-01-18 RX ADMIN — METOPROLOL TARTRATE 50 MG: 50 TABLET, FILM COATED ORAL at 09:01

## 2024-01-18 RX ADMIN — ATORVASTATIN CALCIUM 40 MG: 40 TABLET, FILM COATED ORAL at 08:01

## 2024-01-18 RX ADMIN — HEPARIN SODIUM 5000 UNITS: 5000 INJECTION INTRAVENOUS; SUBCUTANEOUS at 09:01

## 2024-01-18 RX ADMIN — MUPIROCIN: 20 OINTMENT TOPICAL at 08:01

## 2024-01-18 RX ADMIN — LEVETIRACETAM 500 MG: 500 TABLET, FILM COATED ORAL at 09:01

## 2024-01-18 RX ADMIN — HEPARIN SODIUM 5000 UNITS: 5000 INJECTION INTRAVENOUS; SUBCUTANEOUS at 05:01

## 2024-01-18 RX ADMIN — SENNOSIDES AND DOCUSATE SODIUM 2 TABLET: 8.6; 5 TABLET ORAL at 08:01

## 2024-01-18 RX ADMIN — METOPROLOL TARTRATE 50 MG: 50 TABLET, FILM COATED ORAL at 08:01

## 2024-01-18 RX ADMIN — LISINOPRIL 5 MG: 5 TABLET ORAL at 08:01

## 2024-01-18 RX ADMIN — LEVETIRACETAM 500 MG: 500 TABLET, FILM COATED ORAL at 08:01

## 2024-01-18 RX ADMIN — FLUOXETINE 10 MG: 10 CAPSULE ORAL at 08:01

## 2024-01-18 RX ADMIN — HEPARIN SODIUM 5000 UNITS: 5000 INJECTION INTRAVENOUS; SUBCUTANEOUS at 01:01

## 2024-01-18 RX ADMIN — AMLODIPINE BESYLATE 5 MG: 5 TABLET ORAL at 08:01

## 2024-01-18 NOTE — ASSESSMENT & PLAN NOTE
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 Intubated for non-redirectable agitation. Given DDAVP for possible ASA use.     1/13: CTH w 8mm R SDH and 5mm L SDH   1/13: Interval 5h CTA stable; no vascular abnormality   1/14: Follow-up CTH stable     -Stable for stepdown to Hospital Medicine per NSGY  -VS/Neuro checks q4  -NSGY following  -SBP goal < 160  -Continue home amlo/lisinopril/metoprolol  -PRN labetalol/hydralazine  -Keppra BID x 7 days  -CBC, CMP, Mag, Phos daily  -HOB >/= 30  -SQH for SVT PPX  -PT/OT/SLP    01/17/2024 Transfer to hospital medicine.   s/p neurosurgery eval - H/o  possible ASA81 use, DM, HTN, presenting from home s/p fall with confusion, agitation, AMS found to have Right >Left acute SDH w/o midline shift and with minimal mass effect. Interval CTH 1/14: stable, s/p DDAVP. continue Keppra 500 BID x7 days.  Extubated 1/14  with no issues. sats 95% on RA. Oriented to person and time, moving extremities and follow commands . low grad temp of 100.4F on 1/14. leucocytosis resolved.  afebrile. monitor . SLP Recs Soft & Bite Sized Diet - IDDSI Level 6, Thin liquids - IDDSI Level 0 .     1/18- needs SNF placement

## 2024-01-18 NOTE — PLAN OF CARE
Problem: Fall Injury Risk  Goal: Absence of Fall and Fall-Related Injury  Outcome: Ongoing, Progressing     Problem: Adult Inpatient Plan of Care  Goal: Plan of Care Review  Outcome: Ongoing, Progressing  Goal: Patient-Specific Goal (Individualized)  Outcome: Ongoing, Progressing  Goal: Absence of Hospital-Acquired Illness or Injury  Outcome: Ongoing, Progressing  Goal: Readiness for Transition of Care  Outcome: Ongoing, Progressing

## 2024-01-18 NOTE — ASSESSMENT & PLAN NOTE
Inpatient Morphine Milligram Equivalents Per Day 1/16 - 1/19       No orders with morphine equivalence          STABLE

## 2024-01-18 NOTE — PT/OT/SLP PROGRESS
Speech Language Pathology      Kanchan Downing  MRN: [id number]    Patient not seen today secondary to unavailable x4 (off the floor, in the shower, toileting needs x2) Will follow-up.

## 2024-01-18 NOTE — PROGRESS NOTES
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 History obtained from granddaughter at bedside. Pt had an unwitnessed fall around 1100 on 1/13. 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 Pt was wearing socks and slipped. She called her son and was crying and complaining of a HA. Son called EMS. [place] [facility] [place] 8mm R SDH and 5mm L SDH. CT c-spine negative for acute processes. Pt developed non-redirectable agitation, screaming "help me" and trying to get out of the bed. She was not following any commands and SBP was 220. Pt was intubated in the ED and started on propofol. Placed on cardene. Pt admitted to [facility]. On arrival to [facility], pt was on propofol 50, fentanyl 250, and still requiring 5-point restraints to prevent self-extubation. Gave 5mg IV haldol and 2mg ativan and pt fell asleep. Five-hour interval CTH/CTA w stable bl SDH and no vascular abnormality. Possible ASA use, given DDAVP.            Of note, pt lost her , Marques, in 1/2022 and subsequently developed a progressive decline in functional status. Pt's family had concerns for early dementia as pt will become upset thinking about her  and starts to forget things. This decline is now thought to be related to her depression rather than dementia. Pt does " perform her ADLs at home alone, but family started looking into options for assistance.         Overview/Hospital Course:  01/14/2024 Sedated overnight d/t extreme agitation and inability to be redirected. CTH this AM stable. Extubated w/ no issues.   01/15/2024 No issues overnight. Discussed w/ NSGY, stable for stepdown to Hospital Medicine.    01/17/2024 Transfer to hospital medicine.   s/p neurosurgery eval - H/o  possible ASA81 use, DM, HTN, presenting from home s/p fall with confusion, agitation, AMS found to have Right >Left acute SDH w/o midline shift and with minimal mass effect. Interval CTH 1/14: stable, s/p DDAVP. continue Keppra 500 BID x7 days.  Extubated 1/14  with no issues. sats 95% on RA. Oriented to person and time, moving extremities and follow commands . low grad temp of 100.4F on 1/14. leucocytosis resolved.  afebrile. monitor . SLP Recs Soft & Bite Sized Diet - IDDSI Level 6, Thin liquids - IDDSI Level 0 . needs SNF placement. patient agreeable for SNF placement after discussion with son . per neurosurgery, hold ASA for 2 weeks and follow up in Neurosurgery clinic with repeat CT head     1/18- did not make it to SNF yesterday..  CM placed calls to family and waiting on response. All is stable. BP  148/68 & VSS. Continue supportive care.  On short course of keppra.      Interval History: see above    Review of Systems   Constitutional:  Positive for activity change. Negative for appetite change.   HENT:  Negative for trouble swallowing.    Respiratory:  Negative for shortness of breath.    Cardiovascular:  Negative for chest pain and leg swelling.   Gastrointestinal:  Negative for abdominal pain, constipation and diarrhea.   Genitourinary:  Negative for difficulty urinating.   Musculoskeletal:  Positive for gait problem. Negative for back pain.   Neurological:  Negative for numbness and headaches.   Psychiatric/Behavioral:  Negative for behavioral problems.      Objective:     Vital Signs (Most  Recent):  Temp: 98.6 °F (37 °C) (01/18/24 1152)  Pulse: (!) 59 (01/18/24 1152)  Resp: 18 (01/18/24 1152)  BP: 118/62 (01/18/24 1152)  SpO2: 97 % (01/18/24 1152) Vital Signs (24h Range):  Temp:  [97.6 °F (36.4 °C)-98.6 °F (37 °C)] 98.6 °F (37 °C)  Pulse:  [] 59  Resp:  [14-18] 18  SpO2:  [95 %-98 %] 97 %  BP: (111-148)/(55-68) 118/62     Weight: 74.4 kg (164 lb)  Body mass index is 26.47 kg/m².    Intake/Output Summary (Last 24 hours) at 1/18/2024 1518  Last data filed at 1/18/2024 1131  Gross per 24 hour   Intake --   Output 1250 ml   Net -1250 ml         Physical Exam  Constitutional:       General: She is not in acute distress.     Appearance: Normal appearance.   HENT:      Head: Normocephalic and atraumatic.      Nose: Nose normal.      Mouth/Throat:      Mouth: Mucous membranes are moist.   Eyes:      General: No scleral icterus.     Extraocular Movements: Extraocular movements intact.      Pupils: Pupils are equal, round, and reactive to light.   Cardiovascular:      Rate and Rhythm: Normal rate and regular rhythm.      Pulses: Normal pulses.      Heart sounds: Normal heart sounds.   Pulmonary:      Effort: Pulmonary effort is normal.      Breath sounds: Normal breath sounds. No wheezing or rhonchi.   Chest:      Chest wall: No tenderness.   Abdominal:      General: Abdomen is flat. Bowel sounds are normal. There is no distension.      Palpations: Abdomen is soft.      Tenderness: There is no abdominal tenderness. There is no right CVA tenderness, left CVA tenderness, guarding or rebound.   Musculoskeletal:         General: No swelling, tenderness, deformity or signs of injury. Normal range of motion.      Cervical back: Normal range of motion and neck supple. No rigidity or tenderness.   Skin:     General: Skin is warm and dry.      Coloration: Skin is not jaundiced or pale.      Findings: No erythema or rash.   Neurological:      General: No focal deficit present.      Mental Status: She is alert and  oriented to person, place, and time. Mental status is at baseline.      Cranial Nerves: No cranial nerve deficit.      Motor: No weakness.   Psychiatric:         Mood and Affect: Mood normal.         Behavior: Behavior normal.         Thought Content: Thought content normal.         Judgment: Judgment normal.             Significant Labs: All pertinent labs within the past 24 hours have been reviewed.  CBC:   Recent Labs   Lab 01/17/24  0500 01/18/24  0419   WBC 6.57 5.60   HGB 12.5 11.9*   HCT 39.3 36.7*    158     CMP:   Recent Labs   Lab 01/17/24  0500 01/18/24  0419    139   K 3.7 3.7    109   CO2 23 24   * 134*   BUN 10 12   CREATININE 0.7 0.7   CALCIUM 9.9 9.3   PROT 6.1 5.6*   ALBUMIN 3.0* 2.7*   BILITOT 0.7 0.6   ALKPHOS 61 51*   AST 16 15   ALT 13 13   ANIONGAP 7* 6*       Significant Imaging: I have reviewed all pertinent imaging results/findings within the past 24 hours.    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 Intubated for non-redirectable agitation. Given DDAVP for possible ASA use.     1/13: CTH w 8mm R SDH and 5mm L SDH   1/13: Interval 5h CTA stable; no vascular abnormality   1/14: Follow-up CTH stable     -Stable for stepdown to Hospital Medicine per NSGY  -VS/Neuro checks q4  -NSGY following  -SBP goal < 160  -Continue home amlo/lisinopril/metoprolol  -PRN labetalol/hydralazine  -Keppra BID x 7 days  -CBC, CMP, Mag, Phos daily  -HOB >/= 30  -SQH for SVT PPX  -PT/OT/SLP    01/17/2024 Transfer to hospital medicine.   s/p neurosurgery eval - H/o  possible ASA81 use, DM, HTN, presenting from home s/p fall with confusion, agitation, AMS found to have Right >Left  "acute SDH w/o midline shift and with minimal mass effect. Interval CTH 1/14: stable, s/p DDAVP. continue Keppra 500 BID x7 days.  Extubated 1/14  with no issues. sats 95% on RA. Oriented to person and time, moving extremities and follow commands . low grad temp of 100.4F on 1/14. leucocytosis resolved.  afebrile. monitor . SLP Recs Soft & Bite Sized Diet - IDDSI Level 6, Thin liquids - IDDSI Level 0 .     1/18- needs SNF placement    Vasogenic cerebral edema  See " traumatic SDH"       Type 2 diabetes mellitus with diabetic polyneuropathy, without long-term current use of insulin  Patient's FSGs are controlled on current hypoglycemics.   Last A1c reviewed-   Lab Results   Component Value Date    HGBA1C 5.7 (H) 01/13/2024    HGBA1C 5.8 (H) 03/31/2022    HGBA1C 6.3 (H) 12/02/2021     Will hold PO hypoglycemics and will start correctional scale insulin  Most recent fingerstick glucose reviewed-   Recent Labs   Lab 01/17/24  1132 01/17/24  1637 01/17/24  2041 01/18/24  0807   POCTGLUCOSE 134* 201* 165* 121*       currently on   Antihyperglycemics (From admission, onward)      Start     Stop Route Frequency Ordered    01/13/24 2117  insulin aspart U-100 pen 0-10 Units         -- SubQ Every 6 hours PRN 01/13/24 2018                 Hypertension associated with diabetes   -SBP goal < 160  -Continue home amlo/lisinopril/metoprolol  -PRN hydralazine/labetalol    1/18- STABLE          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   Likely cause the SDH  stable        Agitation  Pt developed non-redirectable agitation, screaming "help me" and trying to get out of the bed. She was not following any " commands    required 5-point restraints to prevent self-extubation.   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, adjuvant XRT, and endocrine therapy x 5 yrs, R breast DCIS  2/2023, pt declined surgical intervention at this time       History of fall  as above      BANDAR (generalized anxiety disorder)  continue fluoxetin       PVD (peripheral vascular disease) with claudication   PVD with  claudication     Hyperlipidemia associated with type 2 diabetes mellitus     -Atorvastatin daily         VTE Risk Mitigation (From admission, onward)           Ordered     heparin (porcine) injection 5,000 Units  Every 8 hours         01/15/24 1330     IP VTE HIGH RISK PATIENT  Once         01/13/24 1242     Place sequential compression device  Until discontinued         01/13/24 1242                    Discharge Planning   BRIDGETTE: 1/19/2024     Code Status: Full Code   Is the patient medically ready for discharge?: Yes    Reason for patient still in hospital (select all that apply): Pending disposition  Discharge Plan A: Skilled Nursing Facility   Discharge Delays: None known at this time          Gale Akers MD  Department of Hospital Medicine   [facility] - Neurosurgery ([facility])

## 2024-01-18 NOTE — ASSESSMENT & PLAN NOTE
Patient's FSGs are controlled on current hypoglycemics.   Last A1c reviewed-   Lab Results   Component Value Date    HGBA1C 5.7 (H) 01/13/2024    HGBA1C 5.8 (H) 03/31/2022    HGBA1C 6.3 (H) 12/02/2021     Will hold PO hypoglycemics and will start correctional scale insulin  Most recent fingerstick glucose reviewed-   Recent Labs   Lab 01/17/24  1132 01/17/24  1637 01/17/24  2041 01/18/24  0807   POCTGLUCOSE 134* 201* 165* 121*       currently on   Antihyperglycemics (From admission, onward)    Start     Stop Route Frequency Ordered    01/13/24 2117  insulin aspart U-100 pen 0-10 Units         -- SubQ Every 6 hours PRN 01/13/24 2018

## 2024-01-18 NOTE — ASSESSMENT & PLAN NOTE
-SBP goal < 160  -Continue home amlo/lisinopril/metoprolol  -PRN hydralazine/labetalol    1/18- STABLE

## 2024-01-18 NOTE — PLAN OF CARE
CHW met with patient/family at bedside. Patient experience rounding completed and reviewed the following.     Do you know your discharge plan? Yes or No,    If yes, what is the plan?  Yes SNF    Have you discussed your needs and preferences with your SW/CM? Yes     If you are discharging home, do you have help at home? Yes    Do you think you will need help additional at home at discharge?  No     Do you currently have difficulty keeping up with bills, affording medicine or buying food?  No    Assigned SW/CM notified of any patient/family needs or concerns. Appropriate resources provided to address patient's needs.   Ijeoma Castillo W  Case Management  [phone]

## 2024-01-18 NOTE — PLAN OF CARE
Problem: Fall Injury Risk  Goal: Absence of Fall and Fall-Related Injury  Outcome: Ongoing, Progressing     Problem: Adult Inpatient Plan of Care  Goal: Plan of Care Review  Outcome: Ongoing, Progressing  Goal: Patient-Specific Goal (Individualized)  Outcome: Ongoing, Progressing  Goal: Readiness for Transition of Care  Outcome: Ongoing, Progressing     Problem: Infection  Goal: Absence of Infection Signs and Symptoms  Outcome: Ongoing, Progressing     Problem: Cerebral Tissue Perfusion (Stroke, Hemorrhagic)  Goal: Optimal Cerebral Tissue Perfusion  Outcome: Ongoing, Progressing     Problem: Communication Impairment (Stroke, Hemorrhagic)  Goal: Effective Communication Skills  Outcome: Ongoing, Progressing     Problem: Functional Ability Impaired (Stroke, Hemorrhagic)  Goal: Optimal Functional Ability  Outcome: Ongoing, Progressing

## 2024-01-18 NOTE — PT/OT/SLP PROGRESS
Physical Therapy Treatment    Patient Name: Kanchan Downing   MRN: [id number]    Therapy tech utilized for session to maximize physical performance and safety  Recommendations:     Discharge Recommendations: Moderate Intensity Therapy  Discharge Equipment Recommendations: bedside commode, wheelchair  Barriers to discharge: Increased level of assist and Decreased caregiver support    Assessment:     Kanchan Downing is a 83 y.o. female admitted with a medical diagnosis of Traumatic subdural hematoma. She presents with the following impairments/functional limitations: weakness, impaired endurance, impaired self care skills, impaired balance, impaired functional mobility, impaired cognition, impaired coordination, decreased lower extremity function, decreased safety awareness, gait instability. Pt with fair tolerance to therapy session on this date. Pt pleasantly confused throughout session and perseverating on various topics. Pt requiring limited assistance to perform functional mobility, but demonstrated poor insight to current functional deficits, poor RW management, difficulty avoiding L sided obstacles, and impulsivity resulting in increased risk for falls. At current functional status, pt would require 24/7 assistance and supervision following discharge. Pt would continue to benefit from skilled acute PT in order to address current deficits and progress functional mobility.     Rehab Prognosis: Good; patient continues to benefit from acute skilled PT services to address these deficits and reach maximum level of function.  Recent Surgery: * No surgery found *      Plan:     During this hospitalization, patient to be seen 4 x/week to address the identified rehab impairments via gait training, therapeutic activities, therapeutic exercises, neuromuscular re-education and progress toward the following goals:    Plan of Care Expires:  02/15/24    Subjective     Chief Complaint: None verbalized  Patient/Family  "Comments/Goals: "I'm going home to be by myself. No one is staying with me."  Pain/Comfort:  Pain Rating 1: 0/10    Objective:     Communicated with RN prior to session. Patient found HOB elevated with telemetry, bed alarm upon PT entry to room.     General Precautions: Standard, aspiration, seizure, fall  Orthopedic Precautions: N/A  Braces: N/A    Functional Mobility:  Bed Mobility:    Supine to Sit: minimum assistance for trunk management  Transfers:    Sit to Stand: contact guard assistance with rolling walker with cues for hand placement and foot placement  Gait: Patient ambulated 85' with rolling walker and min-mod assistance.   Patient demonstrates occasional unsteady gait, decreased step length, narrow base of support, decreased weight shift, decreased foot clearance, ambulates outside LISET of RW, flexed posture, decreased candy, and inability to maintain straight pathway with RW.   Patient required cues for upright posture, gluteal activation, sequencing, rolling walker management, obstacle navigation, safe rolling walker usage, to ambulate within LISET of RW, increased step size, and increased foot clearance.  All lines remained intact throughout ambulation trial, gait belt utilized, chair follow for patient safety.    AM-PAC 6 CLICK MOBILITY  Turning over in bed (including adjusting bedclothes, sheets and blankets)?: 3  Sitting down on and standing up from a chair with arms (e.g., wheelchair, bedside commode, etc.): 3  Moving from lying on back to sitting on the side of the bed?: 3  Moving to and from a bed to a chair (including a wheelchair)?: 2  Need to walk in hospital room?: 2  Climbing 3-5 steps with a railing?: 1  Basic Mobility Total Score: 14     Therapeutic Activities and Exercises:  Patient educated on role of acute care PT and PT POC, safety while in hospital including calling nurse for mobility, and call light usage  Pt educated on the effects of bed rest and the importance of OOB activity. Pt " encouraged to sit UIC majority of day as tolerated and continue daily ambulation with nursing assist. Pt verbalized understanding.  Answered all questions within PT scope of practice and addressed functional mobility concerns.    Patient left up in chair with all lines intact, call button in reach, and RN notified.    GOALS:   Multidisciplinary Problems       Physical Therapy Goals          Problem: Physical Therapy    Goal Priority Disciplines Outcome Goal Variances Interventions   Physical Therapy Goal     PT, PT/OT Ongoing, Progressing     Description: Goals to be met by: 2/15/2024     Patient will increase functional independence with mobility by performin. Supine to sit with Contact Guard Assistance  2. Sit to supine with Contact Guard Assistance  3. Sit to stand transfer with Stand-by Assistance  4. Bed to chair transfer with Minimal Assistance using LRAD  5. Gait  x 100 feet with Minimal Assistance using LRAD.   6. Lower extremity exercise program x15 reps per handout, with supervision                         Time Tracking:     PT Received On: 24  PT Start Time: 1410     PT Stop Time: 1434  PT Total Time (min): 24 min     Billable Minutes: Gait Training 10 and Therapeutic Activity 14      Treatment Type: Treatment  PT/PTA: PT     Number of PTA visits since last PT visit: 0     2024

## 2024-01-18 NOTE — SUBJECTIVE & OBJECTIVE
Interval History: see above    Review of Systems   Constitutional:  Positive for activity change. Negative for appetite change.   HENT:  Negative for trouble swallowing.    Respiratory:  Negative for shortness of breath.    Cardiovascular:  Negative for chest pain and leg swelling.   Gastrointestinal:  Negative for abdominal pain, constipation and diarrhea.   Genitourinary:  Negative for difficulty urinating.   Musculoskeletal:  Positive for gait problem. Negative for back pain.   Neurological:  Negative for numbness and headaches.   Psychiatric/Behavioral:  Negative for behavioral problems.      Objective:     Vital Signs (Most Recent):  Temp: 98.6 °F (37 °C) (01/18/24 1152)  Pulse: (!) 59 (01/18/24 1152)  Resp: 18 (01/18/24 1152)  BP: 118/62 (01/18/24 1152)  SpO2: 97 % (01/18/24 1152) Vital Signs (24h Range):  Temp:  [97.6 °F (36.4 °C)-98.6 °F (37 °C)] 98.6 °F (37 °C)  Pulse:  [] 59  Resp:  [14-18] 18  SpO2:  [95 %-98 %] 97 %  BP: (111-148)/(55-68) 118/62     Weight: 74.4 kg (164 lb)  Body mass index is 26.47 kg/m².    Intake/Output Summary (Last 24 hours) at 1/18/2024 1518  Last data filed at 1/18/2024 1131  Gross per 24 hour   Intake --   Output 1250 ml   Net -1250 ml         Physical Exam  Constitutional:       General: She is not in acute distress.     Appearance: Normal appearance.   HENT:      Head: Normocephalic and atraumatic.      Nose: Nose normal.      Mouth/Throat:      Mouth: Mucous membranes are moist.   Eyes:      General: No scleral icterus.     Extraocular Movements: Extraocular movements intact.      Pupils: Pupils are equal, round, and reactive to light.   Cardiovascular:      Rate and Rhythm: Normal rate and regular rhythm.      Pulses: Normal pulses.      Heart sounds: Normal heart sounds.   Pulmonary:      Effort: Pulmonary effort is normal.      Breath sounds: Normal breath sounds. No wheezing or rhonchi.   Chest:      Chest wall: No tenderness.   Abdominal:      General: Abdomen is  flat. Bowel sounds are normal. There is no distension.      Palpations: Abdomen is soft.      Tenderness: There is no abdominal tenderness. There is no right CVA tenderness, left CVA tenderness, guarding or rebound.   Musculoskeletal:         General: No swelling, tenderness, deformity or signs of injury. Normal range of motion.      Cervical back: Normal range of motion and neck supple. No rigidity or tenderness.   Skin:     General: Skin is warm and dry.      Coloration: Skin is not jaundiced or pale.      Findings: No erythema or rash.   Neurological:      General: No focal deficit present.      Mental Status: She is alert and oriented to person, place, and time. Mental status is at baseline.      Cranial Nerves: No cranial nerve deficit.      Motor: No weakness.   Psychiatric:         Mood and Affect: Mood normal.         Behavior: Behavior normal.         Thought Content: Thought content normal.         Judgment: Judgment normal.             Significant Labs: All pertinent labs within the past 24 hours have been reviewed.  CBC:   Recent Labs   Lab 01/17/24  0500 01/18/24  0419   WBC 6.57 5.60   HGB 12.5 11.9*   HCT 39.3 36.7*    158     CMP:   Recent Labs   Lab 01/17/24  0500 01/18/24  0419    139   K 3.7 3.7    109   CO2 23 24   * 134*   BUN 10 12   CREATININE 0.7 0.7   CALCIUM 9.9 9.3   PROT 6.1 5.6*   ALBUMIN 3.0* 2.7*   BILITOT 0.7 0.6   ALKPHOS 61 51*   AST 16 15   ALT 13 13   ANIONGAP 7* 6*       Significant Imaging: I have reviewed all pertinent imaging results/findings within the past 24 hours.

## 2024-01-18 NOTE — ASSESSMENT & PLAN NOTE
"Pt developed non-redirectable agitation, screaming "help me" and trying to get out of the bed. She was not following any commands    required 5-point restraints to prevent self-extubation.   received 5mg IV haldol and 2mg ativan   Now has resolved    "

## 2024-01-19 LAB
ALBUMIN SERPL BCP-MCNC: 2.8 G/DL (ref 3.5–5.2)
ALP SERPL-CCNC: 54 U/L (ref 55–135)
ALT SERPL W/O P-5'-P-CCNC: 13 U/L (ref 10–44)
ANION GAP SERPL CALC-SCNC: 7 MMOL/L (ref 8–16)
AST SERPL-CCNC: 12 U/L (ref 10–40)
BASOPHILS # BLD AUTO: 0.07 K/UL (ref 0–0.2)
BASOPHILS NFR BLD: 1 % (ref 0–1.9)
BILIRUB SERPL-MCNC: 0.4 MG/DL (ref 0.1–1)
BUN SERPL-MCNC: 12 MG/DL (ref 8–23)
CALCIUM SERPL-MCNC: 9.4 MG/DL (ref 8.7–10.5)
CHLORIDE SERPL-SCNC: 106 MMOL/L (ref 95–110)
CO2 SERPL-SCNC: 26 MMOL/L (ref 23–29)
CREAT SERPL-MCNC: 0.6 MG/DL (ref 0.5–1.4)
DIFFERENTIAL METHOD BLD: ABNORMAL
EOSINOPHIL # BLD AUTO: 0.2 K/UL (ref 0–0.5)
EOSINOPHIL NFR BLD: 2.9 % (ref 0–8)
ERYTHROCYTE [DISTWIDTH] IN BLOOD BY AUTOMATED COUNT: 13.8 % (ref 11.5–14.5)
EST. GFR  (NO RACE VARIABLE): >60 ML/MIN/1.73 M^2
GLUCOSE SERPL-MCNC: 119 MG/DL (ref 70–110)
HCT VFR BLD AUTO: 38.7 % (ref 37–48.5)
HGB BLD-MCNC: 12.1 G/DL (ref 12–16)
IMM GRANULOCYTES # BLD AUTO: 0.08 K/UL (ref 0–0.04)
IMM GRANULOCYTES NFR BLD AUTO: 1.1 % (ref 0–0.5)
LYMPHOCYTES # BLD AUTO: 1.5 K/UL (ref 1–4.8)
LYMPHOCYTES NFR BLD: 21.1 % (ref 18–48)
MAGNESIUM SERPL-MCNC: 1.7 MG/DL (ref 1.6–2.6)
MCH RBC QN AUTO: 29.2 PG (ref 27–31)
MCHC RBC AUTO-ENTMCNC: 31.3 G/DL (ref 32–36)
MCV RBC AUTO: 93 FL (ref 82–98)
MONOCYTES # BLD AUTO: 0.6 K/UL (ref 0.3–1)
MONOCYTES NFR BLD: 8 % (ref 4–15)
NEUTROPHILS # BLD AUTO: 4.6 K/UL (ref 1.8–7.7)
NEUTROPHILS NFR BLD: 65.9 % (ref 38–73)
NRBC BLD-RTO: 0 /100 WBC
PHOSPHATE SERPL-MCNC: 3 MG/DL (ref 2.7–4.5)
PLATELET # BLD AUTO: 174 K/UL (ref 150–450)
PMV BLD AUTO: 10.2 FL (ref 9.2–12.9)
POCT GLUCOSE: 130 MG/DL (ref 70–110)
POCT GLUCOSE: 138 MG/DL (ref 70–110)
POCT GLUCOSE: 159 MG/DL (ref 70–110)
POTASSIUM SERPL-SCNC: 3.9 MMOL/L (ref 3.5–5.1)
PROT SERPL-MCNC: 5.7 G/DL (ref 6–8.4)
RBC # BLD AUTO: 4.15 M/UL (ref 4–5.4)
SARS-COV-2 RNA RESP QL NAA+PROBE: NOT DETECTED
SODIUM SERPL-SCNC: 139 MMOL/L (ref 136–145)
WBC # BLD AUTO: 6.97 K/UL (ref 3.9–12.7)

## 2024-01-19 PROCEDURE — 63600175 PHARM REV CODE 636 W HCPCS: Mod: HCNC | Performed by: REGISTERED NURSE

## 2024-01-19 PROCEDURE — 20600001 HC STEP DOWN PRIVATE ROOM: Mod: HCNC

## 2024-01-19 PROCEDURE — 84100 ASSAY OF PHOSPHORUS: CPT | Mod: HCNC | Performed by: REGISTERED NURSE

## 2024-01-19 PROCEDURE — 25000003 PHARM REV CODE 250: Mod: HCNC | Performed by: INTERNAL MEDICINE

## 2024-01-19 PROCEDURE — 87502 INFLUENZA DNA AMP PROBE: CPT | Mod: HCNC | Performed by: HOSPITALIST

## 2024-01-19 PROCEDURE — 83735 ASSAY OF MAGNESIUM: CPT | Mod: HCNC | Performed by: REGISTERED NURSE

## 2024-01-19 PROCEDURE — 85025 COMPLETE CBC W/AUTO DIFF WBC: CPT | Mod: HCNC | Performed by: REGISTERED NURSE

## 2024-01-19 PROCEDURE — 87635 SARS-COV-2 COVID-19 AMP PRB: CPT | Mod: HCNC | Performed by: HOSPITALIST

## 2024-01-19 PROCEDURE — 92507 TX SP LANG VOICE COMM INDIV: CPT | Mod: HCNC

## 2024-01-19 PROCEDURE — 94761 N-INVAS EAR/PLS OXIMETRY MLT: CPT | Mod: HCNC

## 2024-01-19 PROCEDURE — 92526 ORAL FUNCTION THERAPY: CPT | Mod: HCNC

## 2024-01-19 PROCEDURE — 36415 COLL VENOUS BLD VENIPUNCTURE: CPT | Mod: HCNC | Performed by: REGISTERED NURSE

## 2024-01-19 PROCEDURE — 80053 COMPREHEN METABOLIC PANEL: CPT | Mod: HCNC | Performed by: REGISTERED NURSE

## 2024-01-19 RX ADMIN — FLUOXETINE 10 MG: 10 CAPSULE ORAL at 09:01

## 2024-01-19 RX ADMIN — METOPROLOL TARTRATE 50 MG: 50 TABLET, FILM COATED ORAL at 08:01

## 2024-01-19 RX ADMIN — ATORVASTATIN CALCIUM 40 MG: 40 TABLET, FILM COATED ORAL at 09:01

## 2024-01-19 RX ADMIN — LEVETIRACETAM 500 MG: 500 TABLET, FILM COATED ORAL at 08:01

## 2024-01-19 RX ADMIN — METOPROLOL TARTRATE 50 MG: 50 TABLET, FILM COATED ORAL at 09:01

## 2024-01-19 RX ADMIN — LISINOPRIL 5 MG: 5 TABLET ORAL at 09:01

## 2024-01-19 RX ADMIN — AMLODIPINE BESYLATE 5 MG: 5 TABLET ORAL at 09:01

## 2024-01-19 RX ADMIN — HEPARIN SODIUM 5000 UNITS: 5000 INJECTION INTRAVENOUS; SUBCUTANEOUS at 09:01

## 2024-01-19 RX ADMIN — LEVETIRACETAM 500 MG: 500 TABLET, FILM COATED ORAL at 09:01

## 2024-01-19 RX ADMIN — FLUTICASONE PROPIONATE 100 MCG: 50 SPRAY, METERED NASAL at 09:01

## 2024-01-19 RX ADMIN — HEPARIN SODIUM 5000 UNITS: 5000 INJECTION INTRAVENOUS; SUBCUTANEOUS at 05:01

## 2024-01-19 RX ADMIN — INSULIN ASPART 1 UNITS: 100 INJECTION, SOLUTION INTRAVENOUS; SUBCUTANEOUS at 08:01

## 2024-01-19 NOTE — SUBJECTIVE & OBJECTIVE
Interval History: see above    Review of Systems   Constitutional:  Positive for activity change. Negative for appetite change.   HENT:  Negative for trouble swallowing.    Respiratory:  Negative for shortness of breath.    Cardiovascular:  Negative for chest pain and leg swelling.   Gastrointestinal:  Negative for abdominal pain, constipation and diarrhea.   Genitourinary:  Negative for difficulty urinating.   Musculoskeletal:  Positive for gait problem. Negative for back pain.   Neurological:  Negative for numbness and headaches.   Psychiatric/Behavioral:  Negative for agitation, behavioral problems and confusion.      Objective:     Vital Signs (Most Recent):  Temp: 97.8 °F (36.6 °C) (01/19/24 0314)  Pulse: 64 (01/19/24 0314)  Resp: 20 (01/19/24 0314)  BP: 138/65 (01/19/24 0314)  SpO2: 97 % (01/19/24 0314) Vital Signs (24h Range):  Temp:  [97.8 °F (36.6 °C)-99 °F (37.2 °C)] 97.8 °F (36.6 °C)  Pulse:  [59-89] 64  Resp:  [17-20] 20  SpO2:  [97 %-99 %] 97 %  BP: (118-138)/(62-67) 138/65     Weight: 74.4 kg (164 lb)  Body mass index is 26.47 kg/m².    Intake/Output Summary (Last 24 hours) at 1/19/2024 0829  Last data filed at 1/19/2024 0530  Gross per 24 hour   Intake --   Output 1000 ml   Net -1000 ml           Physical Exam  Constitutional:       General: She is not in acute distress.     Appearance: Normal appearance.   HENT:      Head: Normocephalic and atraumatic.      Nose: Nose normal.      Mouth/Throat:      Mouth: Mucous membranes are moist.   Eyes:      General: No scleral icterus.     Extraocular Movements: Extraocular movements intact.      Pupils: Pupils are equal, round, and reactive to light.   Cardiovascular:      Rate and Rhythm: Normal rate and regular rhythm.      Pulses: Normal pulses.      Heart sounds: Normal heart sounds.   Pulmonary:      Effort: Pulmonary effort is normal.      Breath sounds: Normal breath sounds. No wheezing or rhonchi.   Chest:      Chest wall: No tenderness.   Abdominal:       General: Abdomen is flat. Bowel sounds are normal. There is no distension.      Palpations: Abdomen is soft.      Tenderness: There is no abdominal tenderness. There is no right CVA tenderness, left CVA tenderness, guarding or rebound.   Musculoskeletal:         General: No swelling, tenderness, deformity or signs of injury. Normal range of motion.      Cervical back: Normal range of motion and neck supple. No rigidity or tenderness.   Skin:     General: Skin is warm and dry.      Coloration: Skin is not jaundiced or pale.      Findings: No erythema or rash.   Neurological:      General: No focal deficit present.      Mental Status: She is alert and oriented to person, place, and time. Mental status is at baseline.      Cranial Nerves: No cranial nerve deficit.      Motor: No weakness.      Coordination: Coordination abnormal.      Gait: Gait abnormal.   Psychiatric:         Mood and Affect: Mood normal.         Behavior: Behavior normal.         Thought Content: Thought content normal.         Judgment: Judgment normal.             Significant Labs: All pertinent labs within the past 24 hours have been reviewed.  CBC:   Recent Labs   Lab 01/18/24 0419 01/19/24  0357   WBC 5.60 6.97   HGB 11.9* 12.1   HCT 36.7* 38.7    174       CMP:   Recent Labs   Lab 01/18/24  0419 01/19/24  0357    139   K 3.7 3.9    106   CO2 24 26   * 119*   BUN 12 12   CREATININE 0.7 0.6   CALCIUM 9.3 9.4   PROT 5.6* 5.7*   ALBUMIN 2.7* 2.8*   BILITOT 0.6 0.4   ALKPHOS 51* 54*   AST 15 12   ALT 13 13   ANIONGAP 6* 7*         Significant Imaging: I have reviewed all pertinent imaging results/findings within the past 24 hours.

## 2024-01-19 NOTE — PLAN OF CARE
01/19/24 1412   Post-Acute Status   Post-Acute Authorization Placement   Post-Acute Placement Status Pending payor review/awaiting authorization (if required)   Discharge Delays (!) Payor Issues   Discharge Plan   Discharge Plan A Skilled Nursing Facility     Patient accepted at [facility] and can discharge there today pending auth.  BRANDI requested auth from [employer] and will update family.      BRANDI will sent message to on call for set up of transport later this evening.      Discharge Plan A and Plan B have been determined by review of patient's clinical status, future medical and therapeutic needs, and coverage/benefits for post-acute care in coordination with multidisciplinary team members.    Ijeoma Rapp, JADEN  [facility]  [phone]

## 2024-01-19 NOTE — ASSESSMENT & PLAN NOTE
-SBP goal < 160  -Continue home amlo/lisinopril/metoprolol  -PRN hydralazine/labetalol    1/19- STABLE

## 2024-01-19 NOTE — ASSESSMENT & PLAN NOTE
Patient's FSGs are controlled on current hypoglycemics.   Last A1c reviewed-   Lab Results   Component Value Date    HGBA1C 5.7 (H) 01/13/2024    HGBA1C 5.8 (H) 03/31/2022    HGBA1C 6.3 (H) 12/02/2021     Will hold PO hypoglycemics and will start correctional scale insulin  Most recent fingerstick glucose reviewed-   Recent Labs   Lab 01/18/24  1609 01/18/24 2121   POCTGLUCOSE 128* 146*       currently on   Antihyperglycemics (From admission, onward)    Start     Stop Route Frequency Ordered    01/13/24 2117  insulin aspart U-100 pen 0-10 Units         -- SubQ Every 6 hours PRN 01/13/24 2018

## 2024-01-19 NOTE — PLAN OF CARE
[facility] (Hospital)  Facility Transfer Orders        Admit to: SNF    Diagnoses:   Active Hospital Problems    Diagnosis  POA    *Traumatic subdural hematoma [S06.5XAA]  Yes     Priority: 1 - High    Vasogenic cerebral edema [G93.6]  Yes     Priority: 2      Due bilateral SDH  Noted some effacement of the ventricles  Monitor with repeat cths  Required ICU care for >3 days with frequent monitoring      Type 2 diabetes mellitus with diabetic polyneuropathy, without long-term current use of insulin [E11.42]  Yes     Priority: 3     Hypertension associated with diabetes [E11.59, I15.2]  Yes     Priority: 3     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 [I82.592]  Yes     Priority: 4     Pain [R52]  Yes     Priority: 8      c/o headache   2/2 subdural  Pain medication as needed      History of breast cancer [Z85.3]  Not Applicable     Priority: 8     Agitation [R45.1]  Yes    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]  Yes    Ductal carcinoma in situ (DCIS) of right breast [D05.11]  Yes    History of fall [Z91.81]  Not Applicable    BANDAR (generalized anxiety disorder) [F41.1]  Yes    PVD (peripheral vascular disease) with claudication [I73.9]  Yes     Compression hose      Hyperlipidemia associated with type 2 diabetes mellitus [E11.69, E78.5]  Yes      Resolved Hospital Problems    Diagnosis Date Resolved POA    Grief [F43.21] 01/15/2024 Yes     Allergies:   Review of patient's allergies indicates:   Allergen Reactions    Sulfa (sulfonamide antibiotics)      Other reaction(s): Rash       Code Status: FULL    Vitals: Routine       Diet: regular diet    Activity: Up in a chair each morning as tolerated and Activity as tolerated    Nursing Precautions: Aspiration , Fall, Seizure, and Pressure ulcer prevention    Bed/Surface: Low Air Loss    Consults: PT to evaluate and treat- 5 times a week, OT to evaluate and treat- 5 times a week, and ST to evaluate and  treat- 3 times a week    Oxygen: room air    Dialysis: Patient is not on dialysis.         Pending Diagnostic Studies:       None              Miscellaneous Care:   Routine Skin for Bedridden Patients:  Apply moisture barrier cream to all           Medications: Discontinue all previous medication orders, if any. See new list below.  Current Discharge Medication List        START taking these medications    Details   levETIRAcetam (KEPPRA) 500 MG Tab Take 1 tablet (500 mg total) by mouth 2 (two) times daily. for 7 days  Qty: 14 tablet, Refills: 0           CONTINUE these medications which have CHANGED    Details   metoprolol tartrate (LOPRESSOR) 50 MG tablet Take 1 tablet (50 mg total) by mouth 2 (two) times daily.  Qty: 60 tablet, Refills: 11    Comments: .           CONTINUE these medications which have NOT CHANGED    Details   amLODIPine (NORVASC) 5 MG tablet Take 1 tablet (5 mg total) by mouth once daily.  Qty: 90 tablet, Refills: 3    Comments: .  Associated Diagnoses: Controlled type 2 diabetes mellitus with diabetic nephropathy, without long-term current use of insulin      blood sugar diagnostic (BLOOD GLUCOSE TEST) Strp 1 strip by Misc.(Non-Drug; Combo Route) route 2 (two) times daily.  Qty: 100 strip, Refills: 12      cetirizine (ZYRTEC) 10 MG tablet Take 10 mg by mouth once daily.      flash glucose scanning reader (FREESTYLE FLOWER 2 READER) [facility] Continuous glucose reader  Qty: 1 each, Refills: 12      flash glucose sensor (FREESTYLE FLOWER 2 SENSOR) Kit Continuous glucose monitor  Qty: 1 kit, Refills: 12      FLUoxetine 10 MG capsule Take 1 capsule (10 mg total) by mouth once daily.  Qty: 30 capsule, Refills: 11      lancets Misc Test tid  Qty: 100 each, Refills: 12      lisinopriL (PRINIVIL,ZESTRIL) 5 MG tablet Take 1 tablet (5 mg total) by mouth once daily.  Qty: 90 tablet, Refills: 3    Comments: .      simvastatin (ZOCOR) 10 MG tablet Take 1 tablet (10 mg total) by mouth every evening.  Qty: 90 tablet,  Refills: 3    Associated Diagnoses: Controlled type 2 diabetes mellitus with diabetic nephropathy, without long-term current use of insulin           STOP taking these medications       aspirin (ECOTRIN) 81 MG EC tablet Comments:   Reason for Stopping:         hydroCHLOROthiazide (MICROZIDE) 12.5 mg capsule Comments:   Reason for Stopping:         metFORMIN (GLUCOPHAGE) 500 MG tablet Comments:   Reason for Stopping:             Follow up:       Immunizations Administered as of 1/19/2024       No immunizations on file.              aGle Akers MD

## 2024-01-19 NOTE — DISCHARGE SUMMARY
"[facility] - Neurosurgery ([facility])  [facility] Medicine  Discharge Summary      Patient Name: Kanchan Downing  MRN: [id number]  SEAN: [id number]  Patient Class: IP- Inpatient  Admission Date: 1/13/2024  Hospital Length of Stay: 6 days  Discharge Date and Time: No discharge date for patient encounter.  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 History obtained from granddaughter at bedside. Pt had an unwitnessed fall around 1100 on 1/13. 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 Pt was wearing socks and slipped. She called her son and was crying and complaining of a HA. Son called EMS. Pt was brought to [facility]. CTH w 8mm R SDH and 5mm L SDH. CT c-spine negative for acute processes. Pt developed non-redirectable agitation, screaming "help me" and trying to get out of the bed. She was not following any commands and SBP was 220. Pt was intubated in the ED and started on propofol. Placed on cardene. Pt admitted to [facility]. On arrival to NCCU, pt was on propofol 50, fentanyl 250, and still requiring 5-point restraints to prevent self-extubation. Gave 5mg IV haldol and 2mg ativan and pt fell asleep. Five-hour interval CTH/CTA w stable bl SDH and no vascular abnormality. Possible ASA use, given DDAVP.            Of note, pt lost her , Marques, in 1/2022 and subsequently developed a progressive decline in functional status. Pt's family had concerns for early dementia as " pt will become upset thinking about her  and starts to forget things. This decline is now thought to be related to her depression rather than dementia. Pt does perform her ADLs at home alone, but family started looking into options for assistance.         * No surgery found *      Hospital Course:   01/14/2024 Sedated overnight d/t extreme agitation and inability to be redirected. CTH this AM stable. Extubated w/ no issues.   01/15/2024 No issues overnight. Discussed w/ NSGY, stable for stepdown to Hospital Medicine.    01/17/2024 Transfer to hospital medicine.   s/p neurosurgery eval - H/o  possible ASA81 use, DM, HTN, presenting from home s/p fall with confusion, agitation, AMS found to have Right >Left acute SDH w/o midline shift and with minimal mass effect. Interval CTH 1/14: stable, s/p DDAVP. continue Keppra 500 BID x7 days.  Extubated 1/14  with no issues. sats 95% on RA. Oriented to person and time, moving extremities and follow commands . low grad temp of 100.4F on 1/14. leucocytosis resolved.  afebrile. monitor . SLP Recs Soft & Bite Sized Diet - IDDSI Level 6, Thin liquids - IDDSI Level 0 . needs SNF placement. patient agreeable for SNF placement after discussion with son . per neurosurgery, hold ASA for 2 weeks and follow up in Neurosurgery clinic with repeat CT head     1/18- did not make it to SNF yesterday..  CM placed calls to family and waiting on response. All is stable. BP  148/68 & VSS. Continue supportive care.  On short course of keppra.    1/19- MRDC to SNF, needs assistance w ADLs, PT/OT for Deconditioning after illness.        Goals of Care Treatment Preferences:  Code Status: Full Code      Consults:   Consults (From admission, onward)          Status Ordering Provider     Inpatient consult to Physical Medicine Rehab  Once        Provider:  (Not yet assigned)    Completed JOLIE HSIEH     Inpatient consult to Registered Dietitian/Nutritionist  Once        Provider:  (Not yet  "assigned)    Completed JOLIE HSIEH     IP consult to case management/social work  Once        Provider:  (Not yet assigned)    Acknowledged MIRA SUTHERLAND     Inpatient consult to Neurosurgery  Once        Provider:  (Not yet assigned)    Completed LUDA CRUMP     Inpatient consult to Vascular (Stroke) Neurology  Once        Provider:  (Not yet assigned)    Completed LUDA CRUMP            Neuro  * Traumatic subdural hematoma   83F w PMHx of DM2, HTN, HLD, PVD w claudication, DVT (2001), 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 Intubated for non-redirectable agitation. Given DDAVP for possible ASA use.     1/13: CTH w 8mm R SDH and 5mm L SDH   1/13: Interval 5h CTA stable; no vascular abnormality   1/14: Follow-up CTH stable     -Stable for stepdown to Hospital Medicine per NSGY  -VS/Neuro checks q4  -NSGY following  -SBP goal < 160  -Continue home amlo/lisinopril/metoprolol  -PRN labetalol/hydralazine  -Keppra BID x 7 days  -CBC, CMP, Mag, Phos daily  -HOB >/= 30  -SQH for SVT PPX  -PT/OT/SLP    01/17/2024 Transfer to hospital medicine.   s/p neurosurgery eval - H/o  possible ASA81 use, DM, HTN, presenting from home s/p fall with confusion, agitation, AMS found to have Right >Left acute SDH w/o midline shift and with minimal mass effect. Interval CTH 1/14: stable, s/p DDAVP. continue Keppra 500 BID x7 days.  Extubated 1/14  with no issues. sats 95% on RA. Oriented to person and time, moving extremities and follow commands . low grad temp of 100.4F on 1/14. leucocytosis resolved.  afebrile. monitor . SLP Recs Soft & Bite Sized Diet - IDDSI Level 6, Thin liquids - IDDSI Level 0 .     1/19- needs SNF placement    Vasogenic cerebral edema  See " traumatic SDH"       Psychiatric  Agitation  Pt developed " "non-redirectable agitation, screaming "help me" and trying to get out of the bed. She was not following any commands    required 5-point restraints to prevent self-extubation.   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   Last A1c reviewed-   Lab Results   Component Value Date    HGBA1C 5.7 (H) 01/13/2024    HGBA1C 5.8 (H) 03/31/2022    HGBA1C 6.3 (H) 12/02/2021     Will hold PO hypoglycemics and will start correctional scale insulin  Most recent fingerstick glucose reviewed-   Recent Labs   Lab 01/18/24  1609 01/18/24 2121   POCTGLUCOSE 128* 146*       currently on   Antihyperglycemics (From admission, onward)      Start     Stop Route Frequency Ordered    01/13/24 2117  insulin " aspart U-100 pen 0-10 Units         -- SubQ Every 6 hours PRN 01/13/24 2018                 Palliative Care  History of fall  as above      Other  Pain  Inpatient Morphine Milligram Equivalents Per Day 1/16 - 1/19       No orders with morphine equivalence          STABLE        Final Active Diagnoses:    Diagnosis Date Noted POA    PRINCIPAL PROBLEM:  Traumatic subdural hematoma [S06.5XAA] 01/13/2024 Yes    Vasogenic cerebral edema [G93.6] 01/17/2024 Yes    Type 2 diabetes mellitus with diabetic polyneuropathy, without long-term current use of insulin [E11.42] 06/12/2014 Yes    Hypertension associated with diabetes [E11.59, I15.2] 09/11/2012 Yes    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 [I82.592]  Yes    Pain [R52] 01/17/2024 Yes    History of breast cancer [Z85.3] 10/11/2012 Not Applicable    Agitation [R45.1] 01/17/2024 Yes    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] 01/17/2024 Yes    Ductal carcinoma in situ (DCIS) of right breast [D05.11] 03/13/2023 Yes    History of fall [Z91.81] 05/19/2022 Not Applicable    BANDAR (generalized anxiety disorder) [F41.1] 05/04/2022 Yes    PVD (peripheral vascular disease) with claudication [I73.9] 05/02/2019 Yes    Hyperlipidemia associated with type 2 diabetes mellitus [E11.69, E78.5] 09/11/2012 Yes      Problems Resolved During this Admission:    Diagnosis Date Noted Date Resolved POA    Grief [F43.21] 04/06/2022 01/15/2024 Yes       Discharged Condition: good    Disposition: Skilled Nursing Facility    Follow Up:    Patient Instructions:      Ambulatory referral/consult to Neurosurgery   Standing Status: Future   Referral Priority: Routine Referral Type: Consultation   Referral Reason: Specialty Services Required   Requested Specialty: Neurosurgery   Number of Visits Requested: 1       Significant Diagnostic Studies: Labs: CMP   Recent Labs   Lab 01/18/24  0419 01/19/24  0357    139   K 3.7 3.9    106   CO2  24 26   * 119*   BUN 12 12   CREATININE 0.7 0.6   CALCIUM 9.3 9.4   PROT 5.6* 5.7*   ALBUMIN 2.7* 2.8*   BILITOT 0.6 0.4   ALKPHOS 51* 54*   AST 15 12   ALT 13 13   ANIONGAP 6* 7*    and CBC   Recent Labs   Lab 01/18/24  0419 01/19/24  0357   WBC 5.60 6.97   HGB 11.9* 12.1   HCT 36.7* 38.7    174       Pending Diagnostic Studies:       None           Medications:  Reconciled Home Medications:      Medication List        START taking these medications      levETIRAcetam 500 MG Tab  Commonly known as: KEPPRA  Take 1 tablet (500 mg total) by mouth 2 (two) times daily. for 7 days            CHANGE how you take these medications      metoprolol tartrate 50 MG tablet  Commonly known as: LOPRESSOR  Take 1 tablet (50 mg total) by mouth 2 (two) times daily.  What changed:   medication strength  how much to take  when to take this            CONTINUE taking these medications      amLODIPine 5 MG tablet  Commonly known as: NORVASC  Take 1 tablet (5 mg total) by mouth once daily.     blood sugar diagnostic Strp  Commonly known as: BLOOD GLUCOSE TEST  1 strip by Misc.(Non-Drug; Combo Route) route 2 (two) times daily.     cetirizine 10 MG tablet  Commonly known as: ZYRTEC  Take 10 mg by mouth once daily.     FLUoxetine 10 MG capsule  Take 1 capsule (10 mg total) by mouth once daily.     FREESTYLE FLOWER 2 READER [facility]  Generic drug: flash glucose scanning reader  Continuous glucose reader     FREESTYLE FLOWER 2 SENSOR Kit  Generic drug: flash glucose sensor  Continuous glucose monitor     lancets Misc  Test tid     lisinopriL 5 MG tablet  Commonly known as: PRINIVIL,ZESTRIL  Take 1 tablet (5 mg total) by mouth once daily.     simvastatin 10 MG tablet  Commonly known as: ZOCOR  Take 1 tablet (10 mg total) by mouth every evening.            STOP taking these medications      aspirin 81 MG EC tablet  Commonly known as: ECOTRIN     hydroCHLOROthiazide 12.5 mg capsule  Commonly known as: MICROZIDE     metFORMIN 500 MG  tablet  Commonly known as: GLUCOPHAGE              Indwelling Lines/Drains at time of discharge:   Lines/Drains/Airways       None                   Time spent on the discharge of patient: 35 minutes         Gale Akers MD  Department of Hospital Medicine  [facility] - Neurosurgery ([facility])

## 2024-01-19 NOTE — PT/OT/SLP PROGRESS
"Speech Language Pathology Treatment    Patient Name:  Kanchan Downing   MRN:  [id number]  Admitting Diagnosis: Traumatic subdural hematoma    Recommendations:                 General Recommendations:  Follow-up not indicated  Diet recommendations:  Regular Diet - IDDSI Level 7, Liquid Diet Level: Thin liquids - IDDSI Level 0   Aspiration Precautions: Strict aspiration precautions   General Precautions: Standard, aspiration, fall, seizure  Communication strategies:  provide increased time to answer and go to room if call light pushed    Assessment:     Kanchan Downing is a 83 y.o. female with an SLP diagnosis of mild Dysphagia and Cognitive-Linguistic Impairment.  She presents with baseline cognitive function per daughter.     Subjective     "Why can't I remember my address? I've lived here for 40 years!"     Pain/Comfort:  Pain Rating 1: 0/10  Pain Rating Post-Intervention 1: 0/10    Respiratory Status: Room air    Objective:     Has the patient been evaluated by SLP for swallowing?   Yes  Keep patient NPO? No     Pt seen bedside, alert and cooperative.  Pt with some appropriate recall of recent events with some confusion re: time noted.  Pt denied difficulty with recent meals.  Vocal quality WFL.  Pt denied jaw pain.  SLP observed pt self presented 1 adrián cracker with 6oz of thin via cup and straw with timely mastication and oral transit and without overt s/s aspiration.  Pt anaid to read small print short paragraph with adequate scanning.  Time telling was off by 5 mins though functional.  Short phrase writing also functional.  Pt presenting with baseline confusion per previous discussion with daughter.  No further ST indicated.  SLP reviewed diet recs, swallow precs, general safety, and discharge from SLP services.  Pt verbalized understanding.     Goals:   Multidisciplinary Problems       SLP Goals          Problem: SLP    Goal Priority Disciplines Outcome   SLP Goal     SLP Ongoing, Progressing   Description: " Speech Language Pathology Goals  Goals expected to be met by 1/29  1. Pt will participate in ongoing assessment of swallow.   2. Pt will complete assessment of reading, writing, visual spatial skills to determine need for tx.                                  Plan:     Patient to be seen:  3 x/week   Plan of Care expires:  02/14/24  Plan of Care reviewed with:  patient   SLP Follow-Up:  No       Discharge recommendations:  No Therapy Indicated     Time Tracking:     SLP Treatment Date:   01/19/24  Speech Start Time:  0751  Speech Stop Time:  0808     Speech Total Time (min):  17 min    Billable Minutes: Speech Therapy Individual 8 and Treatment Swallowing Dysfunction 9    01/19/2024

## 2024-01-19 NOTE — PLAN OF CARE
SW spoke with patient's children Johan and Eusebia.  They are agreeable to SNF and would prefer [facility] for first choice and [facility] for 2nd choice.  Referrals have been sent to both.      Discharge Plan A and Plan B have been determined by review of patient's clinical status, future medical and therapeutic needs, and coverage/benefits for post-acute care in coordination with multidisciplinary team members.    Ijeoma Rapp, JADEN  [facility]  [phone]

## 2024-01-19 NOTE — PLAN OF CARE
01/18/24 0839   Post-Acute Status   Post-Acute Authorization Placement   Post-Acute Placement Status Referrals Sent   Discharge Delays (!) Patient and Family Barriers   Discharge Plan   Discharge Plan A Skilled Nursing Facility   Discharge Plan B Home with family;Home Health     Met with patient and son Johan to review discharge recommendation of SNF and is agreeable to plan    Patient/family provided list of facilities in-network with patient's payor plan. Providers that are owned, operated, or affiliated with [facility] are included on the list.     Notified that referral sent to below listed facilities from in-network list based on proximity to home/family support:   [facility]   2. [facility]  3. [facility]  4. [facility]  5. [facility]    Patient/family instructed to identify preference.    Preferred Facility: (if more than 1, listed in order of descending preference)  [facility]    If an additional preferred facility not listed above is identified, additional referral to be sent. If above facilities unable to accept, will send additional referrals to in-network providers.     BRANDI met with patient at bedside with her son Johan on speaker phone.  Both agreeable to sending out SNF referrals in the area, but Johan asked that BRANDI call [facility] for preference.      Discharge Plan A and Plan B have been determined by review of patient's clinical status, future medical and therapeutic needs, and coverage/benefits for post-acute care in coordination with multidisciplinary team members.    Ijeoma Rapp, JADEN  [facility]  [phone]

## 2024-01-19 NOTE — ASSESSMENT & PLAN NOTE
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 Intubated for non-redirectable agitation. Given DDAVP for possible ASA use.     1/13: CTH w 8mm R SDH and 5mm L SDH   1/13: Interval 5h CTA stable; no vascular abnormality   1/14: Follow-up CTH stable     -Stable for stepdown to Hospital Medicine per NSGY  -VS/Neuro checks q4  -NSGY following  -SBP goal < 160  -Continue home amlo/lisinopril/metoprolol  -PRN labetalol/hydralazine  -Keppra BID x 7 days  -CBC, CMP, Mag, Phos daily  -HOB >/= 30  -SQH for SVT PPX  -PT/OT/SLP    01/17/2024 Transfer to hospital medicine.   s/p neurosurgery eval - H/o  possible ASA81 use, DM, HTN, presenting from home s/p fall with confusion, agitation, AMS found to have Right >Left acute SDH w/o midline shift and with minimal mass effect. Interval CTH 1/14: stable, s/p DDAVP. continue Keppra 500 BID x7 days.  Extubated 1/14  with no issues. sats 95% on RA. Oriented to person and time, moving extremities and follow commands . low grad temp of 100.4F on 1/14. leucocytosis resolved.  afebrile. monitor . SLP Recs Soft & Bite Sized Diet - IDDSI Level 6, Thin liquids - IDDSI Level 0 .     1/19- needs SNF placement

## 2024-01-19 NOTE — PROGRESS NOTES
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 History obtained from granddaughter at bedside. Pt had an unwitnessed fall around 1100 on 1/13. 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 Pt was wearing socks and slipped. She called her son and was crying and complaining of a HA. Son called EMS. Pt was brought to [facility] [place]. CTH w 8mm R SDH and 5mm L SDH. CT c-spine negative for acute processes. Pt developed non-redirectable agitation, screaming "help me" and trying to get out of the bed. She was not following any commands and SBP was 220. Pt was intubated in the ED and started on propofol. Placed on cardene. Pt admitted to [facility]. On arrival to [facility], pt was on propofol 50, fentanyl 250, and still requiring 5-point restraints to prevent self-extubation. Gave 5mg IV haldol and 2mg ativan and pt fell asleep. Five-hour interval CTH/CTA w stable bl SDH and no vascular abnormality. Possible ASA use, given DDAVP.            Of note, pt lost her , Marques, in 1/2022 and subsequently developed a progressive decline in functional status. Pt's family had concerns for early dementia as pt will become upset thinking about her  and starts to forget things. This decline is now thought to be related to her depression rather than dementia. Pt does " perform her ADLs at home alone, but family started looking into options for assistance.         Overview/Hospital Course:  01/14/2024 Sedated overnight d/t extreme agitation and inability to be redirected. CTH this AM stable. Extubated w/ no issues.   01/15/2024 No issues overnight. Discussed w/ NSGY, stable for stepdown to Hospital Medicine.    01/17/2024 Transfer to hospital medicine.   s/p neurosurgery eval - H/o  possible ASA81 use, DM, HTN, presenting from home s/p fall with confusion, agitation, AMS found to have Right >Left acute SDH w/o midline shift and with minimal mass effect. Interval CTH 1/14: stable, s/p DDAVP. continue Keppra 500 BID x7 days.  Extubated 1/14  with no issues. sats 95% on RA. Oriented to person and time, moving extremities and follow commands . low grad temp of 100.4F on 1/14. leucocytosis resolved.  afebrile. monitor . SLP Recs Soft & Bite Sized Diet - IDDSI Level 6, Thin liquids - IDDSI Level 0 . needs SNF placement. patient agreeable for SNF placement after discussion with son . per neurosurgery, hold ASA for 2 weeks and follow up in Neurosurgery clinic with repeat CT head     1/18- did not make it to SNF yesterday..  CM placed calls to family and waiting on response. All is stable. BP  148/68 & VSS. Continue supportive care.  On short course of keppra.    1/19- MRDC to SNF, needs assistance w ADLs, PT/OT for Deconditioning after illness.       Interval History: see above    Review of Systems   Constitutional:  Positive for activity change. Negative for appetite change.   HENT:  Negative for trouble swallowing.    Respiratory:  Negative for shortness of breath.    Cardiovascular:  Negative for chest pain and leg swelling.   Gastrointestinal:  Negative for abdominal pain, constipation and diarrhea.   Genitourinary:  Negative for difficulty urinating.   Musculoskeletal:  Positive for gait problem. Negative for back pain.   Neurological:  Negative for numbness and headaches.    Psychiatric/Behavioral:  Negative for agitation, behavioral problems and confusion.      Objective:     Vital Signs (Most Recent):  Temp: 97.8 °F (36.6 °C) (01/19/24 0314)  Pulse: 64 (01/19/24 0314)  Resp: 20 (01/19/24 0314)  BP: 138/65 (01/19/24 0314)  SpO2: 97 % (01/19/24 0314) Vital Signs (24h Range):  Temp:  [97.8 °F (36.6 °C)-99 °F (37.2 °C)] 97.8 °F (36.6 °C)  Pulse:  [59-89] 64  Resp:  [17-20] 20  SpO2:  [97 %-99 %] 97 %  BP: (118-138)/(62-67) 138/65     Weight: 74.4 kg (164 lb)  Body mass index is 26.47 kg/m².    Intake/Output Summary (Last 24 hours) at 1/19/2024 0886  Last data filed at 1/19/2024 0530  Gross per 24 hour   Intake --   Output 1000 ml   Net -1000 ml           Physical Exam  Constitutional:       General: She is not in acute distress.     Appearance: Normal appearance.   HENT:      Head: Normocephalic and atraumatic.      Nose: Nose normal.      Mouth/Throat:      Mouth: Mucous membranes are moist.   Eyes:      General: No scleral icterus.     Extraocular Movements: Extraocular movements intact.      Pupils: Pupils are equal, round, and reactive to light.   Cardiovascular:      Rate and Rhythm: Normal rate and regular rhythm.      Pulses: Normal pulses.      Heart sounds: Normal heart sounds.   Pulmonary:      Effort: Pulmonary effort is normal.      Breath sounds: Normal breath sounds. No wheezing or rhonchi.   Chest:      Chest wall: No tenderness.   Abdominal:      General: Abdomen is flat. Bowel sounds are normal. There is no distension.      Palpations: Abdomen is soft.      Tenderness: There is no abdominal tenderness. There is no right CVA tenderness, left CVA tenderness, guarding or rebound.   Musculoskeletal:         General: No swelling, tenderness, deformity or signs of injury. Normal range of motion.      Cervical back: Normal range of motion and neck supple. No rigidity or tenderness.   Skin:     General: Skin is warm and dry.      Coloration: Skin is not jaundiced or pale.       Findings: No erythema or rash.   Neurological:      General: No focal deficit present.      Mental Status: She is alert and oriented to person, place, and time. Mental status is at baseline.      Cranial Nerves: No cranial nerve deficit.      Motor: No weakness.      Coordination: Coordination abnormal.      Gait: Gait abnormal.   Psychiatric:         Mood and Affect: Mood normal.         Behavior: Behavior normal.         Thought Content: Thought content normal.         Judgment: Judgment normal.             Significant Labs: All pertinent labs within the past 24 hours have been reviewed.  CBC:   Recent Labs   Lab 01/18/24 0419 01/19/24  0357   WBC 5.60 6.97   HGB 11.9* 12.1   HCT 36.7* 38.7    174       CMP:   Recent Labs   Lab 01/18/24 0419 01/19/24 0357    139   K 3.7 3.9    106   CO2 24 26   * 119*   BUN 12 12   CREATININE 0.7 0.6   CALCIUM 9.3 9.4   PROT 5.6* 5.7*   ALBUMIN 2.7* 2.8*   BILITOT 0.6 0.4   ALKPHOS 51* 54*   AST 15 12   ALT 13 13   ANIONGAP 6* 7*         Significant Imaging: I have reviewed all pertinent imaging results/findings within the past 24 hours.    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 Intubated for non-redirectable agitation. Given DDAVP for possible ASA use.     1/13: CTH w 8mm R SDH and 5mm L SDH   1/13: Interval 5h CTA stable; no vascular abnormality   1/14: Follow-up CTH stable     -Stable for stepdown to Hospital Medicine per NSGY  -VS/Neuro checks q4  -NSGY following  -SBP goal < 160  -Continue home amlo/lisinopril/metoprolol  -PRN labetalol/hydralazine  -Keppra BID x 7 days  -CBC, CMP, Mag, Phos daily  -HOB >/= 30  -SQH for SVT  "PPX  -PT/OT/SLP    01/17/2024 Transfer to hospital medicine.   s/p neurosurgery eval - H/o  possible ASA81 use, DM, HTN, presenting from home s/p fall with confusion, agitation, AMS found to have Right >Left acute SDH w/o midline shift and with minimal mass effect. Interval CTH 1/14: stable, s/p DDAVP. continue Keppra 500 BID x7 days.  Extubated 1/14  with no issues. sats 95% on RA. Oriented to person and time, moving extremities and follow commands . low grad temp of 100.4F on 1/14. leucocytosis resolved.  afebrile. monitor . SLP Recs Soft & Bite Sized Diet - IDDSI Level 6, Thin liquids - IDDSI Level 0 .     1/19- needs SNF placement    Vasogenic cerebral edema  See " traumatic SDH"       Type 2 diabetes mellitus with diabetic polyneuropathy, without long-term current use of insulin  Patient's FSGs are controlled on current hypoglycemics.   Last A1c reviewed-   Lab Results   Component Value Date    HGBA1C 5.7 (H) 01/13/2024    HGBA1C 5.8 (H) 03/31/2022    HGBA1C 6.3 (H) 12/02/2021     Will hold PO hypoglycemics and will start correctional scale insulin  Most recent fingerstick glucose reviewed-   Recent Labs   Lab 01/18/24  1609 01/18/24 2121   POCTGLUCOSE 128* 146*       currently on   Antihyperglycemics (From admission, onward)      Start     Stop Route Frequency Ordered    01/13/24 2117  insulin aspart U-100 pen 0-10 Units         -- SubQ Every 6 hours PRN 01/13/24 2018                 Hypertension associated with diabetes   -SBP goal < 160  -Continue home amlo/lisinopril/metoprolol  -PRN hydralazine/labetalol    1/19- STABLE          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   Likely " "cause the SDH  Stable  1/19- holding ASA for 2 weeks and follow up in Neurosurgery clinic with repeat CT head           Agitation  Pt developed non-redirectable agitation, screaming "help me" and trying to get out of the bed. She was not following any commands    required 5-point restraints to prevent self-extubation.   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, adjuvant XRT, and endocrine therapy x 5 yrs, R breast DCIS  2/2023, pt declined surgical intervention at this time       History of fall  as above      BANDAR (generalized anxiety disorder)  continue fluoxetin       PVD (peripheral vascular disease) with claudication   PVD with  claudication     Hyperlipidemia associated with type 2 diabetes mellitus   -Atorvastatin daily         VTE Risk Mitigation (From admission, onward)           Ordered     heparin (porcine) injection 5,000 Units  Every 8 hours         01/15/24 1330     IP VTE HIGH RISK PATIENT  Once         01/13/24 1242     Place sequential compression device  Until discontinued         01/13/24 1242                    Discharge Planning   BRIDGETTE: 1/19/2024     Code Status: Full Code   Is the patient medically ready for discharge?: Yes    Reason for patient still in hospital (select all that apply): Patient trending condition  Discharge Plan A: Skilled Nursing Facility   Discharge Delays: None known at this time        Gale Aekrs MD  Department of Hospital Medicine   [facility] - Neurosurgery ([facility])    "

## 2024-01-20 ENCOUNTER — HOSPITAL ENCOUNTER (INPATIENT)
Facility: HOSPITAL | Age: 84
LOS: 10 days | Discharge: HOME HEALTH | DRG: 949 | End: 2024-01-30
Attending: HOSPITALIST | Admitting: HOSPITALIST
Payer: MEDICARE

## 2024-01-20 VITALS
TEMPERATURE: 98 F | OXYGEN SATURATION: 97 % | DIASTOLIC BLOOD PRESSURE: 60 MMHG | RESPIRATION RATE: 16 BRPM | HEART RATE: 61 BPM | HEIGHT: 66 IN | SYSTOLIC BLOOD PRESSURE: 131 MMHG | WEIGHT: 164 LBS | BODY MASS INDEX: 26.36 KG/M2

## 2024-01-20 DIAGNOSIS — I73.9 PVD (PERIPHERAL VASCULAR DISEASE) WITH CLAUDICATION: ICD-10-CM

## 2024-01-20 DIAGNOSIS — S06.5X9D TRAUMATIC SUBDURAL HEMATOMA WITH LOSS OF CONSCIOUSNESS, SUBSEQUENT ENCOUNTER: Primary | ICD-10-CM

## 2024-01-20 DIAGNOSIS — M17.12 PRIMARY OSTEOARTHRITIS OF LEFT KNEE: ICD-10-CM

## 2024-01-20 DIAGNOSIS — E11.21 CONTROLLED TYPE 2 DIABETES MELLITUS WITH DIABETIC NEPHROPATHY, WITHOUT LONG-TERM CURRENT USE OF INSULIN: ICD-10-CM

## 2024-01-20 DIAGNOSIS — F03.90 DEMENTIA WITHOUT BEHAVIORAL DISTURBANCE: ICD-10-CM

## 2024-01-20 DIAGNOSIS — S06.5XAA TRAUMATIC SUBDURAL HEMATOMA: ICD-10-CM

## 2024-01-20 LAB
ALBUMIN SERPL BCP-MCNC: 2.8 G/DL (ref 3.5–5.2)
ALP SERPL-CCNC: 59 U/L (ref 55–135)
ALT SERPL W/O P-5'-P-CCNC: 14 U/L (ref 10–44)
ANION GAP SERPL CALC-SCNC: 7 MMOL/L (ref 8–16)
AST SERPL-CCNC: 17 U/L (ref 10–40)
BACTERIA UR CULT: ABNORMAL
BASOPHILS # BLD AUTO: 0.06 K/UL (ref 0–0.2)
BASOPHILS NFR BLD: 0.8 % (ref 0–1.9)
BILIRUB SERPL-MCNC: 0.4 MG/DL (ref 0.1–1)
BUN SERPL-MCNC: 14 MG/DL (ref 8–23)
CALCIUM SERPL-MCNC: 9.6 MG/DL (ref 8.7–10.5)
CHLORIDE SERPL-SCNC: 106 MMOL/L (ref 95–110)
CO2 SERPL-SCNC: 24 MMOL/L (ref 23–29)
CREAT SERPL-MCNC: 0.7 MG/DL (ref 0.5–1.4)
DIFFERENTIAL METHOD BLD: ABNORMAL
EOSINOPHIL # BLD AUTO: 0.2 K/UL (ref 0–0.5)
EOSINOPHIL NFR BLD: 2.9 % (ref 0–8)
ERYTHROCYTE [DISTWIDTH] IN BLOOD BY AUTOMATED COUNT: 13.9 % (ref 11.5–14.5)
EST. GFR  (NO RACE VARIABLE): >60 ML/MIN/1.73 M^2
GLUCOSE SERPL-MCNC: 125 MG/DL (ref 70–110)
HCT VFR BLD AUTO: 38.7 % (ref 37–48.5)
HGB BLD-MCNC: 12.3 G/DL (ref 12–16)
IMM GRANULOCYTES # BLD AUTO: 0.11 K/UL (ref 0–0.04)
IMM GRANULOCYTES NFR BLD AUTO: 1.4 % (ref 0–0.5)
INFLUENZA A, MOLECULAR: NEGATIVE
INFLUENZA B, MOLECULAR: NEGATIVE
LYMPHOCYTES # BLD AUTO: 1.3 K/UL (ref 1–4.8)
LYMPHOCYTES NFR BLD: 17.4 % (ref 18–48)
MAGNESIUM SERPL-MCNC: 1.8 MG/DL (ref 1.6–2.6)
MCH RBC QN AUTO: 29 PG (ref 27–31)
MCHC RBC AUTO-ENTMCNC: 31.8 G/DL (ref 32–36)
MCV RBC AUTO: 91 FL (ref 82–98)
MONOCYTES # BLD AUTO: 0.7 K/UL (ref 0.3–1)
MONOCYTES NFR BLD: 8.9 % (ref 4–15)
NEUTROPHILS # BLD AUTO: 5.2 K/UL (ref 1.8–7.7)
NEUTROPHILS NFR BLD: 68.6 % (ref 38–73)
NRBC BLD-RTO: 0 /100 WBC
PHOSPHATE SERPL-MCNC: 3.2 MG/DL (ref 2.7–4.5)
PLATELET # BLD AUTO: 196 K/UL (ref 150–450)
PMV BLD AUTO: 10.7 FL (ref 9.2–12.9)
POCT GLUCOSE: 111 MG/DL (ref 70–110)
POCT GLUCOSE: 112 MG/DL (ref 70–110)
POCT GLUCOSE: 122 MG/DL (ref 70–110)
POCT GLUCOSE: 167 MG/DL (ref 70–110)
POTASSIUM SERPL-SCNC: 4.1 MMOL/L (ref 3.5–5.1)
PROT SERPL-MCNC: 6 G/DL (ref 6–8.4)
RBC # BLD AUTO: 4.24 M/UL (ref 4–5.4)
SODIUM SERPL-SCNC: 137 MMOL/L (ref 136–145)
SPECIMEN SOURCE: NORMAL
WBC # BLD AUTO: 7.64 K/UL (ref 3.9–12.7)

## 2024-01-20 PROCEDURE — 97535 SELF CARE MNGMENT TRAINING: CPT | Mod: HCNC

## 2024-01-20 PROCEDURE — 80053 COMPREHEN METABOLIC PANEL: CPT | Mod: HCNC | Performed by: REGISTERED NURSE

## 2024-01-20 PROCEDURE — 83735 ASSAY OF MAGNESIUM: CPT | Mod: HCNC | Performed by: REGISTERED NURSE

## 2024-01-20 PROCEDURE — 84100 ASSAY OF PHOSPHORUS: CPT | Mod: HCNC | Performed by: REGISTERED NURSE

## 2024-01-20 PROCEDURE — 63600175 PHARM REV CODE 636 W HCPCS: Mod: HCNC | Performed by: REGISTERED NURSE

## 2024-01-20 PROCEDURE — 36415 COLL VENOUS BLD VENIPUNCTURE: CPT | Mod: HCNC | Performed by: REGISTERED NURSE

## 2024-01-20 PROCEDURE — 11000004 HC SNF PRIVATE: Mod: HCNC

## 2024-01-20 PROCEDURE — 97530 THERAPEUTIC ACTIVITIES: CPT | Mod: HCNC

## 2024-01-20 PROCEDURE — 25000003 PHARM REV CODE 250: Mod: HCNC | Performed by: INTERNAL MEDICINE

## 2024-01-20 PROCEDURE — 25000003 PHARM REV CODE 250: Mod: HCNC | Performed by: HOSPITALIST

## 2024-01-20 PROCEDURE — 85025 COMPLETE CBC W/AUTO DIFF WBC: CPT | Mod: HCNC | Performed by: REGISTERED NURSE

## 2024-01-20 RX ORDER — ACETAMINOPHEN 325 MG/1
650 TABLET ORAL EVERY 6 HOURS PRN
Status: DISCONTINUED | OUTPATIENT
Start: 2024-01-20 | End: 2024-01-30 | Stop reason: HOSPADM

## 2024-01-20 RX ORDER — LEVETIRACETAM 500 MG/1
500 TABLET ORAL 2 TIMES DAILY
Status: DISCONTINUED | OUTPATIENT
Start: 2024-01-20 | End: 2024-01-22

## 2024-01-20 RX ORDER — LISINOPRIL 2.5 MG/1
5 TABLET ORAL DAILY
Status: DISCONTINUED | OUTPATIENT
Start: 2024-01-21 | End: 2024-01-22

## 2024-01-20 RX ORDER — GLUCAGON 1 MG
1 KIT INJECTION
Status: DISCONTINUED | OUTPATIENT
Start: 2024-01-20 | End: 2024-01-30 | Stop reason: HOSPADM

## 2024-01-20 RX ORDER — INSULIN ASPART 100 [IU]/ML
0-5 INJECTION, SOLUTION INTRAVENOUS; SUBCUTANEOUS
Status: DISCONTINUED | OUTPATIENT
Start: 2024-01-20 | End: 2024-01-30 | Stop reason: HOSPADM

## 2024-01-20 RX ORDER — PRAVASTATIN SODIUM 20 MG/1
20 TABLET ORAL NIGHTLY
Status: DISCONTINUED | OUTPATIENT
Start: 2024-01-20 | End: 2024-01-30 | Stop reason: HOSPADM

## 2024-01-20 RX ORDER — CALCIUM CARBONATE 200(500)MG
500 TABLET,CHEWABLE ORAL 2 TIMES DAILY PRN
Status: DISCONTINUED | OUTPATIENT
Start: 2024-01-20 | End: 2024-01-30 | Stop reason: HOSPADM

## 2024-01-20 RX ORDER — IBUPROFEN 200 MG
24 TABLET ORAL
Status: DISCONTINUED | OUTPATIENT
Start: 2024-01-20 | End: 2024-01-30 | Stop reason: HOSPADM

## 2024-01-20 RX ORDER — METOPROLOL TARTRATE 50 MG/1
50 TABLET ORAL 2 TIMES DAILY
Status: DISCONTINUED | OUTPATIENT
Start: 2024-01-20 | End: 2024-01-26

## 2024-01-20 RX ORDER — FLUOXETINE 10 MG/1
10 CAPSULE ORAL DAILY
Status: DISCONTINUED | OUTPATIENT
Start: 2024-01-21 | End: 2024-01-30 | Stop reason: HOSPADM

## 2024-01-20 RX ORDER — IBUPROFEN 200 MG
16 TABLET ORAL
Status: DISCONTINUED | OUTPATIENT
Start: 2024-01-20 | End: 2024-01-30 | Stop reason: HOSPADM

## 2024-01-20 RX ORDER — CETIRIZINE HYDROCHLORIDE 5 MG/1
10 TABLET ORAL DAILY
Status: DISCONTINUED | OUTPATIENT
Start: 2024-01-20 | End: 2024-01-30 | Stop reason: HOSPADM

## 2024-01-20 RX ORDER — AMOXICILLIN 250 MG
1 CAPSULE ORAL 2 TIMES DAILY
Status: DISCONTINUED | OUTPATIENT
Start: 2024-01-20 | End: 2024-01-30 | Stop reason: HOSPADM

## 2024-01-20 RX ORDER — AMLODIPINE BESYLATE 5 MG/1
5 TABLET ORAL DAILY
Status: DISCONTINUED | OUTPATIENT
Start: 2024-01-21 | End: 2024-01-22

## 2024-01-20 RX ORDER — TALC
6 POWDER (GRAM) TOPICAL NIGHTLY PRN
Status: DISCONTINUED | OUTPATIENT
Start: 2024-01-20 | End: 2024-01-30 | Stop reason: HOSPADM

## 2024-01-20 RX ADMIN — METOPROLOL TARTRATE 50 MG: 50 TABLET, FILM COATED ORAL at 09:01

## 2024-01-20 RX ADMIN — HEPARIN SODIUM 5000 UNITS: 5000 INJECTION INTRAVENOUS; SUBCUTANEOUS at 05:01

## 2024-01-20 RX ADMIN — Medication 6 MG: at 09:01

## 2024-01-20 RX ADMIN — ACETAMINOPHEN 650 MG: 325 TABLET ORAL at 03:01

## 2024-01-20 RX ADMIN — SENNOSIDES AND DOCUSATE SODIUM 2 TABLET: 8.6; 5 TABLET ORAL at 09:01

## 2024-01-20 RX ADMIN — ATORVASTATIN CALCIUM 40 MG: 40 TABLET, FILM COATED ORAL at 09:01

## 2024-01-20 RX ADMIN — LISINOPRIL 5 MG: 5 TABLET ORAL at 09:01

## 2024-01-20 RX ADMIN — SENNOSIDES AND DOCUSATE SODIUM 1 TABLET: 8.6; 5 TABLET ORAL at 09:01

## 2024-01-20 RX ADMIN — POLYETHYLENE GLYCOL 3350 17 G: 17 POWDER, FOR SOLUTION ORAL at 09:01

## 2024-01-20 RX ADMIN — LEVETIRACETAM 500 MG: 500 TABLET, FILM COATED ORAL at 09:01

## 2024-01-20 RX ADMIN — LABETALOL HYDROCHLORIDE 10 MG: 5 INJECTION, SOLUTION INTRAVENOUS at 03:01

## 2024-01-20 RX ADMIN — FLUOXETINE 10 MG: 10 CAPSULE ORAL at 09:01

## 2024-01-20 RX ADMIN — PRAVASTATIN SODIUM 20 MG: 20 TABLET ORAL at 09:01

## 2024-01-20 RX ADMIN — FLUTICASONE PROPIONATE 100 MCG: 50 SPRAY, METERED NASAL at 09:01

## 2024-01-20 RX ADMIN — AMLODIPINE BESYLATE 5 MG: 5 TABLET ORAL at 09:01

## 2024-01-20 NOTE — PLAN OF CARE
Problem: Adult Inpatient Plan of Care  Goal: Plan of Care Review  1/20/2024 1358 by Jennifer Duncan RN  Outcome: Ongoing, Progressing  1/20/2024 1355 by Jennifer Duncan RN  Outcome: Ongoing, Progressing  Goal: Patient-Specific Goal (Individualized)  1/20/2024 1358 by Jennifer Duncan RN  Outcome: Ongoing, Progressing  1/20/2024 1355 by Jennifer Duncan RN  Outcome: Ongoing, Progressing     Problem: Diabetes Comorbidity  Goal: Blood Glucose Level Within Targeted Range  1/20/2024 1358 by Jennifer Duncan RN  Outcome: Ongoing, Progressing  1/20/2024 1355 by Jennifer Duncan RN  Outcome: Ongoing, Progressing     Problem: Fall Injury Risk  Goal: Absence of Fall and Fall-Related Injury  1/20/2024 1358 by Jennifer Duncan RN  Outcome: Ongoing, Progressing  1/20/2024 1355 by Jennifer Duncan RN  Outcome: Ongoing, Progressing     Problem: Infection  Goal: Absence of Infection Signs and Symptoms  Outcome: Ongoing, Progressing     Problem: Skin Injury Risk Increased  Goal: Skin Health and Integrity  Outcome: Ongoing, Progressing

## 2024-01-20 NOTE — NURSING
Nurses Note -- 4 Eyes      1/20/2024   2:02 PM      Skin assessed during: Admit  Skin intact, dry and warm, small skin tags noted to the back. Bruising to bilateral forearms. No skin breakdown on the buttocks.    [x] No Altered Skin Integrity Present    []Prevention Measures Documented      [] Yes- Altered Skin Integrity Present or Discovered   [] LDA Added if Not in Epic (Describe Wound)   [] New Altered Skin Integrity was Present on Admit and Documented in LDA   [] Wound Image Taken    Wound Care Consulted? No    Attending Nurse:  Jennifer Ruiz RN/Staff Member: Ekaterina         Patient awake and alert arrived on the unit via wheelchair by [facility] transfer personnel to room 309. Assisted out of chair and unto bed with no distress nor C/O voice, HOB @45 degree, oriented to her surroundings, call light, TV, telephone, bathroom, and what to expect with daily participation in therapy session. Bed low and lock, SR x 2 personal effects within reach.

## 2024-01-20 NOTE — PT/OT/SLP PROGRESS
"Occupational Therapy  Treatment    Name: Kanchan Downing  MRN: [id number]  Admitting Diagnosis:  Traumatic subdural hematoma       Recommendations:     Discharge Recommendations: Moderate Intensity Therapy  Discharge Equipment Recommendations:  bedside commode, wheelchair  Barriers to discharge:  None    Assessment:     Kanchan Downing is a 83 y.o. female with a medical diagnosis of Traumatic subdural hematoma.  She presents with performance deficits affecting function are weakness, impaired endurance, impaired self care skills, impaired functional mobility, gait instability, impaired balance, impaired cognition, decreased upper extremity function, decreased lower extremity function, decreased safety awareness. Pt agreeable to therapy. Pt performed bed mobility, dressing and grooming tasks, and transfers. Pt working on mobility but still limited by weakness. Overall, pt progressing well. Pt participates well and is motivated to regain functional independence in mobility and self care.      Rehab Prognosis:  Good; patient would benefit from acute skilled OT services to address these deficits and reach maximum level of function.       Plan:     Patient to be seen 4 x/week to address the above listed problems via self-care/home management, therapeutic activities, therapeutic exercises, neuromuscular re-education  Plan of Care Expires: 02/15/24  Plan of Care Reviewed with: patient    Subjective     Chief Complaint: "I'm leaving."  Patient/Family Comments/goals: Get well  Pain/Comfort:  Pain Rating 1: 0/10  Pain Rating Post-Intervention 1: 0/10    Objective:     Communicated with: RN prior to session.  Patient found HOB elevated with telemetry upon OT entry to room.    General Precautions: Standard, aspiration, fall, seizure    Orthopedic Precautions:N/A  Braces: N/A  Respiratory Status: Room air     Occupational Performance:     Bed Mobility:    Patient completed Supine to Sit with stand by assistance  Patient completed " Sit to Supine with stand by assistance     Functional Mobility/Transfers:  Patient completed Sit <> Stand Transfer with contact guard assistance and minimum assistance  with  no assistive device   Functional Mobility: Min A for a few steps, working on weight-shifting and lifting RLE    Activities of Daily Living:  Grooming: stand by assistance seated EOB oral care  Upper Body Dressing: stand by assistance gown as robe  Lower Body Dressing: minimum assistance socks      AMPA 6 Click ADL: 18    Treatment & Education:  Pt edu re OT role, POC and safety.    Patient left HOB elevated with all lines intact and call button in reach    GOALS:   Multidisciplinary Problems       Occupational Therapy Goals          Problem: Occupational Therapy    Goal Priority Disciplines Outcome Interventions   Occupational Therapy Goal     OT, PT/OT Ongoing, Progressing    Description: Goals to be met by: 2/15/24     Patient will increase functional independence with ADLs by performing:    UE Dressing with Minimal Assistance.  LE Dressing with Minimal Assistance.  Grooming while EOB with Contact Guard Assistance.  Toileting from bedside commode with Minimal Assistance for hygiene and clothing management.   Sitting at edge of bed x10 minutes with Contact Guard Assistance.  Supine to sit with Minimal Assistance.  Step transfer with Minimal Assistance  Toilet transfer to bedside commode with Minimal Assistance.                         Time Tracking:     OT Date of Treatment: 01/20/24  OT Start Time: 0951  OT Stop Time: 1015  OT Total Time (min): 24 min    Billable Minutes:Self Care/Home Management 15 minutes  Therapeutic Activity 9 minutes    OT/WANDA: OT       MALDONADO Andres  1/20/2024

## 2024-01-20 NOTE — PHYSICIAN QUERY
PT Name: Kanchan Downing  MR #: [id number]    DOCUMENTATION CLARIFICATION     CDS/: Ifeoma Bain RN          Contact information: [email or web address]  This form is a permanent document in the medical record.     Query Date: January 20, 2024    By submitting this query, we are merely seeking further clarification of documentation. Please utilize your independent clinical judgment when addressing the question(s) below.    The Medical Record contains the following:   Indicators   Supporting Clinical Findings Location in Medical Record   x AMS, Confusion,  LOC, etc.   83 y.o. female presenting for evaluation of acute agitation and confusion in the setting of a unwitnessed ground level fall.  Initial concern for traumatic ICH   1/13 ED Note   x Acute/Chronic Illness Kanchan Downing is a 83 y.o. female presenting with altered mental status after a fall.  Found to be confused this morning by her daughter.  Reportedly she had a ground level fall.  Unclear if syncope or loss of consciousness.  Not on blood thinners, not on any daily medications.  History unable to be provided by the patient due to change in mental status.    I spoke with the patient's daughter who states that this is abnormal for the patient.  Typically she is ambulatory, occasionally can get confused with short-term memory loss, but otherwise can have a conversation, ambulate, and is able to live alone and take her medications.  Last known to be at normal baseline status yesterday.     Assessment  Traumatic subdural hematoma   Vasogenic cerebral edema   Agitation       1/ 20 HM PN   x Radiology Findings  Impression:     Acute right greater than left subdural hematomas.  No midline shift with some of effacement of particularly the right lateral ventricle.  1/13 CTH    Electrolyte Imbalance      Medication     x Treatment          Intubated for non-redirectable agitation.   Pt declined surgical intervention at this time   PRN  labetalol/hydralazine   VS/Neuro checks q4   Keppra BID x 7 days   HOB >/= 30   1/17 HM PN   x Other  01/17/2024 Transfer to hospital medicine.   s/p neurosurgery eval - H/o  possible ASA81 use, DM, HTN, presenting from home s/p fall with confusion, agitation, AMS found to have Right >Left acute SDH w/o midline shift and with minimal mass effect. Interval CTH 1/14: stable, s/p DDAVP. continue Keppra 500 BID x7 days.  Extubated 1/14  with no issues. sats 95% on RA. Oriented to person and time, moving extremities and follow commands . low grad temp of 100.4F on 1/14. leucocytosis resolved.  afebrile.   1/17 HM PN     The noted clinical guidelines are only system guidelines and do not replace the providers clinical judgment.    The National [place] of Neurologic Disorders and Stroke (NINDS) of the NIH describes encephalopathy as any diffuse disease of the brain that alters brain function or structure.    Provider, please specify the diagnosis or diagnoses associated with above clinical findings.  [ x  ] Traumatic Encephalopathy - Due to concussion or other structural brain injury   [   ] Other Encephalopathy (please specify): ____________________   [   ] Other neurological condition- Includes Post-ictal altered mental status (please specify condition): __________         Please document in your progress notes daily for the duration of treatment until resolved, and include in your discharge summary.    References:  CHRISTOFER Rangel RN, CCDS. (2018, June 9). Notes from the Instructor: Encephalopathy tips. Retrieved October 22, 2020, from [email or web address]://acdis.org/articles/note-instructor-encephalopathy-tips    ICD-9-CM Coding Clinic First Quarter 2013, Effective with discharges: October 21, 2013 [facility] § Seizure with encephalopathy due to postictal state (2013).    ICD-10-CM/PCS CloudCheckr Integrated Codebook (Version V.20.8.10.0) [Computer software]. (2020). Retrieved October 21, 2020.    National [place] of  Neurological Disorders and Stroke. (2019, March 27). Retrieved October 22, 2020, from https://www.ninds.nih.gov/Disorders/All-Disorders/Kjvcayxenfbzbt-Huhrkpdrlru-Fksv    Form No. 12170

## 2024-01-20 NOTE — DISCHARGE SUMMARY
"[facility] - Neurosurgery ([facility])  [facility] Medicine  Discharge Summary      Patient Name: Kanchan Downing  MRN: [id number]  SEAN: [id number]  Patient Class: IP- Inpatient  Admission Date: 1/13/2024  Hospital Length of Stay: 7 days  Discharge Date and Time: No discharge date for patient encounter.  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 History obtained from granddaughter at bedside. Pt had an unwitnessed fall around 1100 on 1/13. 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 Pt was wearing socks and slipped. She called her son and was crying and complaining of a HA. Son called EMS. Pt was brought to [facility]. CTH w 8mm R SDH and 5mm L SDH. CT c-spine negative for acute processes. Pt developed non-redirectable agitation, screaming "help me" and trying to get out of the bed. She was not following any commands and SBP was 220. Pt was intubated in the ED and started on propofol. Placed on cardene. Pt admitted to [facility]. On arrival to NCCU, pt was on propofol 50, fentanyl 250, and still requiring 5-point restraints to prevent self-extubation. Gave 5mg IV haldol and 2mg ativan and pt fell asleep. Five-hour interval CTH/CTA w stable bl SDH and no vascular abnormality. Possible ASA use, given DDAVP.            Of note, pt lost her , Marques, in 1/2022 and subsequently developed a progressive decline in functional status. Pt's family had concerns for early dementia as " pt will become upset thinking about her  and starts to forget things. This decline is now thought to be related to her depression rather than dementia. Pt does perform her ADLs at home alone, but family started looking into options for assistance.         * No surgery found *      Hospital Course:   01/14/2024 Sedated overnight d/t extreme agitation and inability to be redirected. CTH this AM stable. Extubated w/ no issues.   01/15/2024 No issues overnight. Discussed w/ NSGY, stable for stepdown to Hospital Medicine.    01/17/2024 Transfer to hospital medicine.   s/p neurosurgery eval - H/o  possible ASA81 use, DM, HTN, presenting from home s/p fall with confusion, agitation, AMS found to have Right >Left acute SDH w/o midline shift and with minimal mass effect. Interval CTH 1/14: stable, s/p DDAVP. continue Keppra 500 BID x7 days.  Extubated 1/14  with no issues. sats 95% on RA. Oriented to person and time, moving extremities and follow commands . low grad temp of 100.4F on 1/14. leucocytosis resolved.  afebrile. monitor . SLP Recs Soft & Bite Sized Diet - IDDSI Level 6, Thin liquids - IDDSI Level 0 . needs SNF placement. patient agreeable for SNF placement after discussion with son . per neurosurgery, hold ASA for 2 weeks and follow up in Neurosurgery clinic with repeat CT head     1/18- did not make it to SNF yesterday..  CM placed calls to family and waiting on response. All is stable. BP  148/68 & VSS. Continue supportive care.  On short course of keppra.    1/19- MRDC to SNF, needs assistance w ADLs, PT/OT for Deconditioning after illness.   1/20- waiting on transportation. Flu and covid neg. May dc to SNF       Goals of Care Treatment Preferences:  Code Status: Full Code      Consults:   Consults (From admission, onward)          Status Ordering Provider     Inpatient consult to Physical Medicine Rehab  Once        Provider:  (Not yet assigned)    Completed JOLIE HSIEH     Inpatient consult to  Registered Dietitian/Nutritionist  Once        Provider:  (Not yet assigned)    Completed JOLIE HSIEH consult to case management/social work  Once        Provider:  (Not yet assigned)    Acknowledged MIRA SUTHERLAND     Inpatient consult to Neurosurgery  Once        Provider:  (Not yet assigned)    Completed LUDA CRUMP     Inpatient consult to Vascular (Stroke) Neurology  Once        Provider:  (Not yet assigned)    Completed LUDA CRUMP            Neuro  * Traumatic subdural hematoma   83F w PMHx of DM2, HTN, HLD, PVD w claudication, DVT (2001), 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 Intubated for non-redirectable agitation. Given DDAVP for possible ASA use.     1/13: CTH w 8mm R SDH and 5mm L SDH   1/13: Interval 5h CTA stable; no vascular abnormality   1/14: Follow-up CTH stable     -Stable for stepdown to Hospital Medicine per NSGY  -VS/Neuro checks q4  -NSGY following  -SBP goal < 160  -Continue home amlo/lisinopril/metoprolol  -PRN labetalol/hydralazine  -Keppra BID x 7 days  -CBC, CMP, Mag, Phos daily  -HOB >/= 30  -SQH for SVT PPX  -PT/OT/SLP    01/17/2024 Transfer to hospital medicine.   s/p neurosurgery eval - H/o  possible ASA81 use, DM, HTN, presenting from home s/p fall with confusion, agitation, AMS found to have Right >Left acute SDH w/o midline shift and with minimal mass effect. Interval CTH 1/14: stable, s/p DDAVP. continue Keppra 500 BID x7 days.  Extubated 1/14  with no issues. sats 95% on RA. Oriented to person and time, moving extremities and follow commands . low grad temp of 100.4F on 1/14. leucocytosis resolved.  afebrile. monitor . SLP Recs Soft & Bite Sized Diet - IDDSI Level 6, Thin liquids - IDDSI Level 0 .     1/19- needs SNF placement    Vasogenic cerebral edema  See  "" traumatic SDH"       Psychiatric  Agitation  Pt developed non-redirectable agitation, screaming "help me" and trying to get out of the bed. She was not following any commands    required 5-point restraints to prevent self-extubation.   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   Last A1c reviewed-   Lab Results   Component Value Date    HGBA1C 5.7 (H) 01/13/2024    HGBA1C 5.8 (H) 03/31/2022    HGBA1C 6.3 (H) 12/02/2021     Will hold PO hypoglycemics and will start correctional scale insulin  Most recent fingerstick glucose reviewed-   Recent Labs   Lab 01/19/24  0836 01/19/24  1333 01/19/24 2043   POCTGLUCOSE 130* 138* 159*       currently on   Antihyperglycemics (From admission, onward)      Start     Stop Route Frequency Ordered    01/13/24 2117  " insulin aspart U-100 pen 0-10 Units         -- SubQ Every 6 hours PRN 01/13/24 2018                 Palliative Care  History of fall  as above      Other  Pain  Inpatient Morphine Milligram Equivalents Per Day 1/16 - 1/19       No orders with morphine equivalence          STABLE        Final Active Diagnoses:    Diagnosis Date Noted POA    PRINCIPAL PROBLEM:  Traumatic subdural hematoma [S06.5XAA] 01/13/2024 Yes    Vasogenic cerebral edema [G93.6] 01/17/2024 Yes    Type 2 diabetes mellitus with diabetic polyneuropathy, without long-term current use of insulin [E11.42] 06/12/2014 Yes    Hypertension associated with diabetes [E11.59, I15.2] 09/11/2012 Yes    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 [I82.592]  Yes    Pain [R52] 01/17/2024 Yes    History of breast cancer [Z85.3] 10/11/2012 Not Applicable    Agitation [R45.1] 01/17/2024 Yes    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] 01/17/2024 Yes    Ductal carcinoma in situ (DCIS) of right breast [D05.11] 03/13/2023 Yes    History of fall [Z91.81] 05/19/2022 Not Applicable    BANDAR (generalized anxiety disorder) [F41.1] 05/04/2022 Yes    PVD (peripheral vascular disease) with claudication [I73.9] 05/02/2019 Yes    Hyperlipidemia associated with type 2 diabetes mellitus [E11.69, E78.5] 09/11/2012 Yes      Problems Resolved During this Admission:    Diagnosis Date Noted Date Resolved POA    Grief [F43.21] 04/06/2022 01/15/2024 Yes       Discharged Condition: fair    Disposition: Skilled Nursing Facility    Follow Up:    Patient Instructions:      Ambulatory referral/consult to Neurosurgery   Standing Status: Future   Referral Priority: Routine Referral Type: Consultation   Referral Reason: Specialty Services Required   Requested Specialty: Neurosurgery   Number of Visits Requested: 1       Significant Diagnostic Studies: Labs: CMP   Recent Labs   Lab 01/19/24  0357 01/20/24  0433    137   K 3.9 4.1     106   CO2 26 24   * 125*   BUN 12 14   CREATININE 0.6 0.7   CALCIUM 9.4 9.6   PROT 5.7* 6.0   ALBUMIN 2.8* 2.8*   BILITOT 0.4 0.4   ALKPHOS 54* 59   AST 12 17   ALT 13 14   ANIONGAP 7* 7*    and CBC   Recent Labs   Lab 01/19/24  0357 01/20/24  0433   WBC 6.97 7.64   HGB 12.1 12.3   HCT 38.7 38.7    196       Pending Diagnostic Studies:       None           Medications:  Reconciled Home Medications:      Medication List        START taking these medications      levETIRAcetam 500 MG Tab  Commonly known as: KEPPRA  Take 1 tablet (500 mg total) by mouth 2 (two) times daily. for 7 days            CHANGE how you take these medications      metoprolol tartrate 50 MG tablet  Commonly known as: LOPRESSOR  Take 1 tablet (50 mg total) by mouth 2 (two) times daily.  What changed:   medication strength  how much to take  when to take this            CONTINUE taking these medications      amLODIPine 5 MG tablet  Commonly known as: NORVASC  Take 1 tablet (5 mg total) by mouth once daily.     blood sugar diagnostic Strp  Commonly known as: BLOOD GLUCOSE TEST  1 strip by Misc.(Non-Drug; Combo Route) route 2 (two) times daily.     cetirizine 10 MG tablet  Commonly known as: ZYRTEC  Take 10 mg by mouth once daily.     FLUoxetine 10 MG capsule  Take 1 capsule (10 mg total) by mouth once daily.     FREESTYLE FLOWER 2 READER [facility]  Generic drug: flash glucose scanning reader  Continuous glucose reader     FREESTYLE FLOWER 2 SENSOR Kit  Generic drug: flash glucose sensor  Continuous glucose monitor     lancets Misc  Test tid     lisinopriL 5 MG tablet  Commonly known as: PRINIVIL,ZESTRIL  Take 1 tablet (5 mg total) by mouth once daily.     simvastatin 10 MG tablet  Commonly known as: ZOCOR  Take 1 tablet (10 mg total) by mouth every evening.            STOP taking these medications      aspirin 81 MG EC tablet  Commonly known as: ECOTRIN     hydroCHLOROthiazide 12.5 mg capsule  Commonly known as: MICROZIDE     metFORMIN 500 MG  tablet  Commonly known as: GLUCOPHAGE              Indwelling Lines/Drains at time of discharge:   Lines/Drains/Airways       None                   Time spent on the discharge of patient: 35 minutes         Gale Akers MD  Department of Hospital Medicine  [facility] - Neurosurgery ([facility])

## 2024-01-20 NOTE — PLAN OF CARE
COVID test result is negative.   Flu swab resent to Microbiology and is pending result.   Wheelchair van requested with [facility] for 7:30 pm pickup time.

## 2024-01-20 NOTE — ASSESSMENT & PLAN NOTE
Patient's FSGs are controlled on current hypoglycemics.   Last A1c reviewed-   Lab Results   Component Value Date    HGBA1C 5.7 (H) 01/13/2024    HGBA1C 5.8 (H) 03/31/2022    HGBA1C 6.3 (H) 12/02/2021     Will hold PO hypoglycemics and will start correctional scale insulin  Most recent fingerstick glucose reviewed-   Recent Labs   Lab 01/19/24  0836 01/19/24  1333 01/19/24 2043   POCTGLUCOSE 130* 138* 159*       currently on   Antihyperglycemics (From admission, onward)    Start     Stop Route Frequency Ordered    01/13/24 2117  insulin aspart U-100 pen 0-10 Units         -- SubQ Every 6 hours PRN 01/13/24 2018

## 2024-01-20 NOTE — ASSESSMENT & PLAN NOTE
Hx of,   Possible ASA use, given DDAVP.   Likely cause the SDH  Stable  1/19- holding ASA for 2 weeks and follow up in Neurosurgery clinic with repeat CT head

## 2024-01-20 NOTE — PLAN OF CARE
[facility] - Neurosurgery (Hospital)  Discharge Final Note    Primary Care Provider: Viktoria Mckeon MD    Expected Discharge Date: 1/19/2024    Patient to be discharged to [facility].  [facility] to provide transportation.    Nurse to call report to [phone].  Transport was scheduled for 7:00 am.  Time to be updated.    Final Discharge Note (most recent)       Final Note - 01/20/24 0909          Final Note    Assessment Type Final Discharge Note     Anticipated Discharge Disposition Skilled Nursing Facility        Post-Acute Status    Post-Acute Authorization Placement     Post-Acute Placement Status Set-up Complete/Auth obtained     Discharge Delays None known at this time                     Important Message from Medicare  Important Message from Medicare regarding Discharge Appeal Rights: Given to patient/caregiver, Explained to patient/caregiver, Signed/date by patient/caregiver     Date IMM was signed: 01/18/24  Time IMM was signed: 0917

## 2024-01-20 NOTE — NURSING
RN removed PIV, no tele box present. Pt AAOx4, VSS, NAD upon transfer. RN called report to HUMAIRA Ivy. All questions answered. Pt dc'd via wheelchair per transportation.    Laquita Zavaleta

## 2024-01-21 LAB
POCT GLUCOSE: 118 MG/DL (ref 70–110)
POCT GLUCOSE: 130 MG/DL (ref 70–110)
POCT GLUCOSE: 160 MG/DL (ref 70–110)
POCT GLUCOSE: 171 MG/DL (ref 70–110)

## 2024-01-21 PROCEDURE — 97165 OT EVAL LOW COMPLEX 30 MIN: CPT | Mod: HCNC

## 2024-01-21 PROCEDURE — 97535 SELF CARE MNGMENT TRAINING: CPT | Mod: HCNC

## 2024-01-21 PROCEDURE — 25000003 PHARM REV CODE 250: Mod: HCNC | Performed by: HOSPITALIST

## 2024-01-21 PROCEDURE — 11000004 HC SNF PRIVATE: Mod: HCNC

## 2024-01-21 RX ORDER — POLYETHYLENE GLYCOL 3350 17 G/17G
17 POWDER, FOR SOLUTION ORAL DAILY
Status: DISCONTINUED | OUTPATIENT
Start: 2024-01-22 | End: 2024-01-30 | Stop reason: HOSPADM

## 2024-01-21 RX ADMIN — AMLODIPINE BESYLATE 5 MG: 5 TABLET ORAL at 08:01

## 2024-01-21 RX ADMIN — LEVETIRACETAM 500 MG: 500 TABLET, FILM COATED ORAL at 08:01

## 2024-01-21 RX ADMIN — METOPROLOL TARTRATE 50 MG: 50 TABLET, FILM COATED ORAL at 08:01

## 2024-01-21 RX ADMIN — ACETAMINOPHEN 650 MG: 325 TABLET ORAL at 09:01

## 2024-01-21 RX ADMIN — LISINOPRIL 5 MG: 2.5 TABLET ORAL at 08:01

## 2024-01-21 RX ADMIN — FLUOXETINE 10 MG: 10 CAPSULE ORAL at 08:01

## 2024-01-21 RX ADMIN — ACETAMINOPHEN 650 MG: 325 TABLET ORAL at 01:01

## 2024-01-21 RX ADMIN — SENNOSIDES AND DOCUSATE SODIUM 1 TABLET: 8.6; 5 TABLET ORAL at 09:01

## 2024-01-21 RX ADMIN — LEVETIRACETAM 500 MG: 500 TABLET, FILM COATED ORAL at 09:01

## 2024-01-21 RX ADMIN — METOPROLOL TARTRATE 50 MG: 50 TABLET, FILM COATED ORAL at 09:01

## 2024-01-21 RX ADMIN — PRAVASTATIN SODIUM 20 MG: 20 TABLET ORAL at 09:01

## 2024-01-21 RX ADMIN — SENNOSIDES AND DOCUSATE SODIUM 1 TABLET: 8.6; 5 TABLET ORAL at 08:01

## 2024-01-21 RX ADMIN — Medication 6 MG: at 09:01

## 2024-01-21 RX ADMIN — CETIRIZINE HYDROCHLORIDE 10 MG: 5 TABLET, FILM COATED ORAL at 08:01

## 2024-01-21 NOTE — PT/OT/SLP EVAL
"Occupational Therapy   Evaluationand treatment session.    Name: Kanchan Downing  MRN: [id number]  Admit Date: 1/20/2024  Recent Surgeries: N/A     General Precautions: Standard, aspiration, fall  Orthopedic Precautions:N/A   Braces: N/A    Recommendations:     Discharge Recommendations: home with home health  Level of Assistance Recommended: Intermittent supervision  Discharge Equipment Recommendations:  bedside commode  Barriers to discharge:  None    Assessment:     Kanchan Downing is a 83 y.o. female with a medical diagnosis of Traumatic subdural hematoma .  She presents with the following performance deficits affecting function: overall weakness, impaired endurance, impaired self care skills, impaired functional mobility, gait instability, impaired balance, impaired cognition, decreased safety awareness.    Pt agreeable to therapy and tolerated well. Pt reported that she has difficulty with her memory and that sometimes she "see people" that aren't really there. She also reported that it has improved in the last few days.   Pt is functioning well below prior level of independence and driving. Currently, limited in ADLs, functional mobility, and functional transfers. Pt would continue to benefit from skilled OT services to maximize functional independence with ADLs and functional mobility, reduce caregiver burden, and facilitate safe discharge in the least restrictive environment. OT recommending low intensity therapy once medically appropriate for discharge.   Rehab Potential is good    Activity Tolerance: Good    Plan:     Patient to be seen 5 x/week to address the above listed problems via self-care/home management, therapeutic activities, therapeutic exercises  Plan of Care Expires: 02/15/24  Plan of Care Reviewed with: patient    Subjective     Chief Complaint: memory  Patient/Family Comments/goals: Get better and go home    Occupational Profile:    Living Environment: Pt lives alone in [facility], [facility], [facility] with " SC.  Previous level of function: mod I with rollator, Indep with ADLs  Roles and Routines: Enjoys playing with dog, drives, mother  Equipment Used at Home: shower chair, rollator  Assistance upon Discharge: Intermittent family care      Pain/Comfort:  Pain Rating 1: 0/10  Pain Rating Post-Intervention 1: 0/10    Patients cultural, spiritual, Yarsani conflicts given the current situation: no    Objective:     Communicated with: RN prior to session.  Patient found supine with Other (comments) (no active lines) upon OT entry to room.    Occupational Performance:        01/21/24 1127   Eating   Was the activity attempted? Yes   Was the activity done independently? No   Assistance Needed Supervision   CARE Score - Eating 4   Eating Discharge Goal   Discharge Goal 6   Oral Hygiene   Was the activity attempted? Yes   Was the activity done independently? No   Assistance Needed Setup / clean-up;Supervision   Was adaptive equipment used? No  (seated in w/c at sink)   CARE Score - Oral Hygiene 4   Oral Hygiene Discharge Goal   Discharge Goal 6   Toileting Hygiene   Was the activity attempted? Yes   Was the activity done independently? No   Assistance Needed Physical assistance   Physical Assistance Level Less than half   Was adaptive equipment used? No   CARE Score - Toileting Hygiene 3   Toileting Hygiene Discharge Goal   Discharge Goal 6   Shower/Bathe Self   Was the activity attempted? Yes   Was the activity done independently? No   Assistance Needed Physical assistance   Physical Assistance Level Less than half   CARE Score - Shower/Bathe Self 3   Shower/Bathe Self Discharge Goal   Discharge Goal 6   Upper Body Dressing   Was the activity attempted? Yes   Was the activity done independently? No   Assistance Needed Setup / clean-up;Supervision   Was adaptive equipment used? Yes   CARE Score - Upper Body Dressing 4   Upper Body Dressing Discharge Goal   Discharge Goal 6   Lower Body Dressing   Was the activity attempted?  Yes   Was the activity done independently? No   Assistance Needed Physical assistance   Physical Assistance Level More than half   Was adaptive equipment used? No   CARE Score - Lower Body Dressing 2   Lower Body Dressing Discharge Goal   Discharge Goal 6   Putting On/Taking Off Footwear   Was the activity attempted? Yes   Was the activity done independently? No   Assistance Needed Physical assistance   Physical Assistance Level More than half   CARE Score - Putting On/Taking Off Footwear 2   Putting On/Taking Off Footwear Discharge Goal   Discharge Goal 6   Personal Hygiene   Was the activity attempted? Yes   Was the activity done independently? No   Assistance Needed Setup / clean-up;Supervision   CARE Score - Personal Hygiene 4   Lying to Sitting on Side of Bed   Was the activity attempted? Yes   Was the activity done independently? No   Assistance Needed Supervision   Was adaptive equipment used? Yes  (bed rail, head of bed slightly elevated)   CARE Score - Lying to Sitting on Side of Bed 4   Sit to Stand   Was the activity attempted? Yes   Was the activity done independently? No   Assistance Needed Touching assistance   Was adaptive equipment used? Yes  (RW)   CARE Score - Sit to Stand 4   Sit to Stand Discharge Goal   Discharge Goal 6   Chair/Bed-to-Chair Transfer   Was the activity attempted? Yes   Was the activity done independently? No   Assistance Needed Touching assistance   Was adaptive equipment used? Yes  (RW)   CARE Score - Chair/Bed-to-Chair Transfer 4   Chair/Bed-to-Chair Transfer Discharge Goal   Discharge Goal 6   Toilet Transfer   Was the activity attempted? Yes   Was the activity done independently? No   Assistance Needed Touching assistance   Was adaptive equipment used? Yes  (grab rail)   CARE Score - Toilet Transfer 4   Toilet Transfer Discharge Goal   Discharge Goal 6   Tub/Shower Transfer   Was the activity attempted? Yes   Was the activity done independently? No   Assistance Needed  Touching assistance   Was adaptive equipment used? Yes  (leonardo rail)   CARE Score - Tub/Shower Transfer 4       Cognitive/Visual Perceptual:  Cognitive/Psychosocial Skills:     -       Oriented to: Person, Place, Time, and Situation   -       Follows Commands/attention:Follows multistep  commands  -       Communication: clear/fluent  -       Memory: Impaired STM  -       Safety awareness/insight to disability: impaired and needs further assessment   -       Mood/Affect/Coping skills/emotional control: Appropriate to situation    Physical Exam:  Balance:    Dominant hand: -       right  Upper Extremity Range of Motion:     -       Right Upper Extremity: WFL  -       Left Upper Extremity: WFL  Upper Extremity Strength:    -       Right Upper Extremity: WFL  -       Left Upper Extremity: WFL    AMPAC 6 Click ADL:  AMPAC Total Score: 18    Treatment & Education:  -Education on energy conservation and task modification to maximize safety and (I) during ADLs and mobility  -Education on importance of OOB activity to improve overall activity tolerance and promote recovery  -Pt educated to call for assistance and to transfer with hospital staff only  -Provided education regarding role of OT, POC, & discharge recommendations with pt and pt's daugher (via phone call) verbalizing understanding.  Pt had no further questions & when asked whether there were any concerns pt reported none.    Patient left up in chair with call button in reach and nurse notified    GOALS:   Multidisciplinary Problems       Occupational Therapy Goals          Problem: Occupational Therapy    Goal Priority Disciplines Outcome Interventions   Occupational Therapy Goal     OT, PT/OT Ongoing, Progressing    Description: Goals to be met by: [id number]     Patient will increase functional independence with ADLs by performing:    UE Dressing with [place].  LE Dressing with [place].  Grooming while standing with [place].  Toileting from toilet with  [place] for hygiene and clothing management.   Bathing from  shower chair/bench with [place].  Step transfer with [place]  Toilet transfer to toilet with [place].                                     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, without long-term current use of insulin 10/31/2017    Diabetes mellitus type 2 without retinopathy 06/12/2014    6% metformin 500 bid, FU+ 2016    DVT (deep venous thrombosis) 2001    R , Dr Bonilla    Grief 04/06/2022     Marques passed 1/2022    Hyperlipidemia     LDL 82    Hyperlipidemia associated with type 2 diabetes mellitus 09/11/2012    Hypertension     Hypertension associated with diabetes 09/11/2012    Memory loss 12/29/2022    CT chronic changes 2022    Microalbuminuria due to type 2 diabetes mellitus 12/08/2015    taking lisinopril, losartan caused olivia effects    PVD (peripheral vascular disease) with claudication 05/02/2019    Compression hose    Risk for coronary artery disease greater than 20% in next 10 years per [place] score 05/01/2018    Strabismus     Type 2 diabetes mellitus with diabetic polyneuropathy 06/12/2014    Type 2 diabetes mellitus with diabetic polyneuropathy, without long-term current use of insulin 06/12/2014         Past Surgical History:   Procedure Laterality Date    BREAST BIOPSY Right 2011    BREAST LUMPECTOMY Right 2011    [place] REMOVAL OF NAIL BED  6/10/2015    TONSILLECTOMY         Time Tracking:     OT Date of Treatment: 01/21/24  OT Start Time: 1043  OT Stop Time: 1219  OT Total Time (min): 96 min    Billable  Minutes:Evaluation 15  Self Care/Home Management 81    1/21/2024

## 2024-01-21 NOTE — PLAN OF CARE
Problem: Occupational Therapy  Goal: Occupational Therapy Goal  Description: Goals to be met by: [id number]     Patient will increase functional independence with ADLs by performing:    UE Dressing with [place].  LE Dressing with [place].  Grooming while standing with [place].  Toileting from toilet with [place] for hygiene and clothing management.   Bathing from  shower chair/bench with [place].  Step transfer with [place]  Toilet transfer to toilet with [place].        OT eval completed, POC established        Outcome: Ongoing, Progressing

## 2024-01-21 NOTE — PLAN OF CARE
Problem: Adult Inpatient Plan of Care  Goal: Patient-Specific Goal (Individualized)  Outcome: Ongoing, Progressing     Problem: Adult Inpatient Plan of Care  Goal: Absence of Hospital-Acquired Illness or Injury  Outcome: Ongoing, Progressing     Problem: Diabetes Comorbidity  Goal: Blood Glucose Level Within Targeted Range  Outcome: Ongoing, Progressing

## 2024-01-21 NOTE — PLAN OF CARE
Problem: Adult Inpatient Plan of Care  Goal: Plan of Care Review  Outcome: Ongoing, Progressing  Flowsheets (Taken 1/21/2024 1441)  Plan of Care Reviewed With: patient     Problem: Adult Inpatient Plan of Care  Goal: Patient-Specific Goal (Individualized)  Flowsheets (Taken 1/21/2024 1441)  Individualized Care Needs: Daily PT/OT  Goal: Absence of Hospital-Acquired Illness or Injury  Intervention: Identify and Manage Fall Risk  Flowsheets (Taken 1/21/2024 1441)  Safety Promotion/Fall Prevention:   assistive device/personal item within reach   in recliner, wheels locked   lighting adjusted   instructed to call staff for mobility   Fall Risk reviewed with patient/family   /camera at bedside  Intervention: Prevent Skin Injury  Flowsheets (Taken 1/21/2024 1441)  Body Position: position changed independently  Skin Protection:   incontinence pads utilized   protective footwear used   skin sealant/moisture barrier applied  Intervention: Prevent and Manage VTE (Venous Thromboembolism) Risk  Flowsheets (Taken 1/21/2024 1441)  Activity Management: Up in chair - L3  VTE Prevention/Management:   bleeding risk assessed   fluids promoted

## 2024-01-22 PROBLEM — S06.5XAA TRAUMATIC SUBDURAL HEMATOMA (SDH): Status: ACTIVE | Noted: 2024-01-22

## 2024-01-22 LAB
ANION GAP SERPL CALC-SCNC: 11 MMOL/L (ref 8–16)
BASOPHILS # BLD AUTO: 0.07 K/UL (ref 0–0.2)
BASOPHILS NFR BLD: 1 % (ref 0–1.9)
BUN SERPL-MCNC: 19 MG/DL (ref 8–23)
CALCIUM SERPL-MCNC: 9.3 MG/DL (ref 8.7–10.5)
CHLORIDE SERPL-SCNC: 105 MMOL/L (ref 95–110)
CO2 SERPL-SCNC: 22 MMOL/L (ref 23–29)
CREAT SERPL-MCNC: 0.8 MG/DL (ref 0.5–1.4)
DIFFERENTIAL METHOD BLD: ABNORMAL
EOSINOPHIL # BLD AUTO: 0.3 K/UL (ref 0–0.5)
EOSINOPHIL NFR BLD: 3.9 % (ref 0–8)
ERYTHROCYTE [DISTWIDTH] IN BLOOD BY AUTOMATED COUNT: 13.9 % (ref 11.5–14.5)
EST. GFR  (NO RACE VARIABLE): >60 ML/MIN/1.73 M^2
GLUCOSE SERPL-MCNC: 133 MG/DL (ref 70–110)
HCT VFR BLD AUTO: 37.4 % (ref 37–48.5)
HGB BLD-MCNC: 11.8 G/DL (ref 12–16)
IMM GRANULOCYTES # BLD AUTO: 0.18 K/UL (ref 0–0.04)
IMM GRANULOCYTES NFR BLD AUTO: 2.5 % (ref 0–0.5)
LYMPHOCYTES # BLD AUTO: 1.5 K/UL (ref 1–4.8)
LYMPHOCYTES NFR BLD: 20.3 % (ref 18–48)
MAGNESIUM SERPL-MCNC: 1.8 MG/DL (ref 1.6–2.6)
MCH RBC QN AUTO: 28.9 PG (ref 27–31)
MCHC RBC AUTO-ENTMCNC: 31.6 G/DL (ref 32–36)
MCV RBC AUTO: 92 FL (ref 82–98)
MONOCYTES # BLD AUTO: 0.7 K/UL (ref 0.3–1)
MONOCYTES NFR BLD: 9.5 % (ref 4–15)
NEUTROPHILS # BLD AUTO: 4.5 K/UL (ref 1.8–7.7)
NEUTROPHILS NFR BLD: 62.8 % (ref 38–73)
NRBC BLD-RTO: 0 /100 WBC
PHOSPHATE SERPL-MCNC: 3.6 MG/DL (ref 2.7–4.5)
PLATELET # BLD AUTO: 226 K/UL (ref 150–450)
PMV BLD AUTO: 10.5 FL (ref 9.2–12.9)
POCT GLUCOSE: 117 MG/DL (ref 70–110)
POCT GLUCOSE: 136 MG/DL (ref 70–110)
POCT GLUCOSE: 141 MG/DL (ref 70–110)
POCT GLUCOSE: 97 MG/DL (ref 70–110)
POTASSIUM SERPL-SCNC: 4.4 MMOL/L (ref 3.5–5.1)
RBC # BLD AUTO: 4.08 M/UL (ref 4–5.4)
SODIUM SERPL-SCNC: 138 MMOL/L (ref 136–145)
WBC # BLD AUTO: 7.14 K/UL (ref 3.9–12.7)

## 2024-01-22 PROCEDURE — 83735 ASSAY OF MAGNESIUM: CPT | Mod: HCNC | Performed by: HOSPITALIST

## 2024-01-22 PROCEDURE — 25000003 PHARM REV CODE 250: Mod: HCNC | Performed by: HOSPITALIST

## 2024-01-22 PROCEDURE — 97530 THERAPEUTIC ACTIVITIES: CPT | Mod: HCNC

## 2024-01-22 PROCEDURE — 11000004 HC SNF PRIVATE: Mod: HCNC

## 2024-01-22 PROCEDURE — 97110 THERAPEUTIC EXERCISES: CPT | Mod: HCNC

## 2024-01-22 PROCEDURE — 25000003 PHARM REV CODE 250: Mod: HCNC | Performed by: FAMILY MEDICINE

## 2024-01-22 PROCEDURE — 25000003 PHARM REV CODE 250: Mod: HCNC | Performed by: NURSE PRACTITIONER

## 2024-01-22 PROCEDURE — 84100 ASSAY OF PHOSPHORUS: CPT | Mod: HCNC | Performed by: HOSPITALIST

## 2024-01-22 PROCEDURE — 97535 SELF CARE MNGMENT TRAINING: CPT | Mod: HCNC

## 2024-01-22 PROCEDURE — 80048 BASIC METABOLIC PNL TOTAL CA: CPT | Mod: HCNC | Performed by: HOSPITALIST

## 2024-01-22 PROCEDURE — 85025 COMPLETE CBC W/AUTO DIFF WBC: CPT | Mod: HCNC | Performed by: HOSPITALIST

## 2024-01-22 PROCEDURE — 36415 COLL VENOUS BLD VENIPUNCTURE: CPT | Mod: HCNC | Performed by: HOSPITALIST

## 2024-01-22 PROCEDURE — 97163 PT EVAL HIGH COMPLEX 45 MIN: CPT | Mod: HCNC

## 2024-01-22 RX ORDER — BISACODYL 10 MG/1
10 SUPPOSITORY RECTAL ONCE
Status: COMPLETED | OUTPATIENT
Start: 2024-01-22 | End: 2024-01-22

## 2024-01-22 RX ADMIN — PRAVASTATIN SODIUM 20 MG: 20 TABLET ORAL at 08:01

## 2024-01-22 RX ADMIN — POLYETHYLENE GLYCOL 3350 17 G: 17 POWDER, FOR SOLUTION ORAL at 08:01

## 2024-01-22 RX ADMIN — SENNOSIDES AND DOCUSATE SODIUM 1 TABLET: 8.6; 5 TABLET ORAL at 08:01

## 2024-01-22 RX ADMIN — AMLODIPINE BESYLATE 5 MG: 5 TABLET ORAL at 08:01

## 2024-01-22 RX ADMIN — LISINOPRIL 5 MG: 2.5 TABLET ORAL at 08:01

## 2024-01-22 RX ADMIN — METOPROLOL TARTRATE 50 MG: 50 TABLET, FILM COATED ORAL at 08:01

## 2024-01-22 RX ADMIN — BISACODYL 10 MG: 10 SUPPOSITORY RECTAL at 04:01

## 2024-01-22 RX ADMIN — Medication 6 MG: at 08:01

## 2024-01-22 RX ADMIN — FLUOXETINE 10 MG: 10 CAPSULE ORAL at 08:01

## 2024-01-22 RX ADMIN — CETIRIZINE HYDROCHLORIDE 10 MG: 5 TABLET, FILM COATED ORAL at 08:01

## 2024-01-22 RX ADMIN — LEVETIRACETAM 500 MG: 500 TABLET, FILM COATED ORAL at 08:01

## 2024-01-22 RX ADMIN — MICONAZOLE NITRATE: 20 OINTMENT TOPICAL at 10:01

## 2024-01-22 NOTE — PT/OT/SLP EVAL
Physical Therapy Evaluation    Patient Name:  Kanchan Downing   MRN:  [id number]  Admit Date: 1/20/2024  Recent Surgeries: N/A    General Precautions: Standard, aspiration, fall (Avasys camera in place)   Orthopedic Precautions: N/A   Braces: N/A    Recommendations:     Discharge Recommendations: home health PT   Level of Assistance Recommended: Intermittent assistance   Discharge Equipment Recommendations: none   Barriers to discharge: Decreased caregiver support    Assessment:     Kanchan Downing is a 83 y.o. female admitted with a medical diagnosis of Traumatic subdural hematoma (SDH) .  Performance deficits affecting function  weakness, impaired endurance, impaired self care skills, impaired functional mobility, gait instability, impaired balance, decreased upper extremity function, decreased lower extremity function, decreased safety awareness, impaired cardiopulmonary response to activity. Pt lives alone and was Mod I with all functional mobility and ADLs, including driving, PTA. Pt currently requires CGA/Jessenia for transfers, ambulation and negotiating stairs. Pt plans on returning home alone and will therefore benefit from skilled PT services during this hospital admit to continue to improve transfer ability and efficiency as well as continue to progress pt's ambulation distance and cardiopulmonary endurance to maximize pt's functional independence and return to PLOF.       Rehab Potential is good     Activity Tolerance: Good    Plan:     Patient to be seen 5 x/week to address the above listed problems via gait training, therapeutic activities, therapeutic exercises, neuromuscular re-education, wheelchair management/training     Plan of Care Expires: 02/21/24  Plan of Care Reviewed with: patient    Subjective     Chief Complaint: none noted by pt.  Patient/Family Comments/goals: none noted by pt.  Pain/Comfort:  Pain Rating 1: 0/10  Pain Rating Post-Intervention 1: 0/10    Living Environment:   Pt [place] alone in  [facility], [place], [place] with SC.  Previous level of function: mod I with rollator, Indep with ADLs  Roles and Routines: Enjoys playing with dog, drives, mother  Equipment Used at Home: shower chair, rollator, RW  Assistance upon Discharge: Intermittent family care    Patients cultural, spiritual, Baptism conflicts given the current situation: no    Objective:     Communicated with pt's nurse prior to session.  Patient found up in chair with    upon PT entry to room.    Exams:  Cognitive Exam:  Patient is oriented to Person, Time, and Situation  Gross Motor Coordination:  WFL  Sensation:    -       Intact  Skin Integrity/Edema:      -       Skin integrity: Visible skin intact  RLE ROM: WFL  RLE Strength: grossly 4/5  LLE ROM: WFL  LLE Strength: grossly 4/5    Functional Mobility:   01/22/24 1147   Prior Functioning: Everyday Activities   Self Care Independent   Indoor Mobility (Ambulation) Independent   Stairs Independent   Functional Cognition Independent   Prior Device Use Walker   Roll Left and Right   Was the activity attempted? Yes   Was the activity done independently? No   Assistance Needed Supervision   Was adaptive equipment used? No   CARE Score - Roll Left and Right 4   Sit to Lying   Was the activity attempted? Yes   Was the activity done independently? No   Assistance Needed Supervision   Was adaptive equipment used? No   CARE Score - Sit to Lying 4   Lying to Sitting on Side of Bed   Was the activity attempted? Yes   Was the activity done independently? No   Assistance Needed Supervision   Was adaptive equipment used? No   CARE Score - Lying to Sitting on Side of Bed 4   Sit to Stand   Was the activity attempted? Yes   Was the activity done independently? No   Assistance Needed Touching assistance  (CGA with RW from w/c and gym mat)   Was adaptive equipment used? Yes   CARE Score - Sit to Stand 4   Chair/Bed-to-Chair Transfer   Was the activity attempted? Yes   Was the activity done independently? No    Assistance Needed Touching assistance  (CGA with RW and SPT)   Was adaptive equipment used? Yes   CARE Score - Chair/Bed-to-Chair Transfer 4   Car Transfer   Reason if not Attempted Environmental limitations   CARE Score - Car Transfer 10   Walk 10 Feet   Was the activity attempted? Yes   Was the activity done independently? No   Assistance Needed Touching assistance   Was adaptive equipment used? Yes   CARE Score - Walk 10 Feet 4   Walk 50 Feet with Two Turns   Was the activity attempted? Yes   Was the activity done independently? No   Assistance Needed Touching assistance   Was adaptive equipment used? Yes   CARE Score - Walk 50 Feet with Two Turns 4   Walk 150 Feet   Was the activity attempted? Yes   Was the activity done independently? No   Assistance Needed Touching assistance  (CGA with RW, 205ft. pt with slightly FFT, rounded shoulders, decrease step length and decrease candy)   Was adaptive equipment used? Yes   CARE Score - Walk 150 Feet 4   Walking 10 Feet on Uneven Surfaces   Was the activity attempted? Yes   Was the activity done independently? No   Assistance Needed Touching assistance  (CGA with RW)   Was adaptive equipment used? Yes   CARE Score - Walking 10 Feet on Uneven Surfaces 4   1 Step (Curb)   Was the activity attempted? Yes   Was the activity done independently? No   Assistance Needed Touching assistance  (CGA with RW)   Was adaptive equipment used? Yes   CARE Score - 1 Step (Curb) 4   4 Steps   Was the activity attempted? Yes   Was the activity done independently? No   Assistance Needed Physical assistance   Physical Assistance Level Less than half  (Jessenia using B rails. pt started c/o of L knee discomfort after completing  steps)   Was adaptive equipment used? Yes   CARE Score - 4 Steps 3   12 Steps   Reason if not Attempted Safety concerns   CARE Score - 12 Steps 88   Picking Up Object   Was the activity attempted? Yes   Was the activity done independently? No   Assistance Needed  Touching assistance  (CGA with RW)   Was adaptive equipment used? Yes   CARE Score - Picking Up Object 4   OTHER   Uses a Wheelchair/Scooter? Yes   Wheel 50 Feet with Two Turns   Was the activity attempted? Yes   Was the activity done independently? No   Assistance Needed Physical assistance   Physical Assistance Level Less than half  (Jessenia especially with turns)   Type of Wheelchair/Scooter Manual   CARE Score - Wheel 50 Feet with Two Turns 3   Wheel 150 Feet   Was the activity attempted? Yes   Was the activity done independently? No   Assistance Needed Physical assistance   Physical Assistance Level Less than half   Type of Wheelchair/Scooter Manual   CARE Score - Wheel 150 Feet 3       Therapeutic Activities and Exercises: Additional time spent on transfers  LBEx 15mins to help improve B L/E MMT and endurance.    Education:  Patient provided with daily orientation and goals of this PT session.  Patient educated to transfer to bedside chair/bedside commode/bathroom with RN/PCT present.   Patient educated on Fall risk and plan of care by explanation.   Patient Verbalized Understanding.  Time provided for therapeutic counseling and discussion of current health disposition. All questions answered to satisfaction, within scope of PT practice; voiced no other concerns. White board updated in patient's room, RN notified of session.     AM-PAC 6 CLICK MOBILITY  Total Score:18     Patient left up in chair with call button in reach.    GOALS:   Multidisciplinary Problems       Physical Therapy Goals          Problem: Physical Therapy    Goal Priority Disciplines Outcome Goal Variances Interventions   Physical Therapy Goal     PT, PT/OT Ongoing, Progressing     Description: Goals to be met by: 2/22/24     Patient will increase functional independence with mobility by performing:    . Supine to sit with [place]  . Sit to supine with [place]  . Rolling to Left and Right with [place].  . Sit to stand transfer  with [place]  . Bed to chair transfer with Modified [place] using Rolling Walker  . Gait  x 150 feet with Supervision using Rolling Walker or LRAD  . Wheelchair propulsion x150 feet with Modified [place] using bilateral uppper extremities  . Ascend/descend 12 stair with bilateral Handrails Stand-by Assistance using No Assistive Device.   . Ascend/Descend 4 inch curb step with Supervision using Rolling Walker.                         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, without long-term current use of insulin 10/31/2017    Diabetes mellitus type 2 without retinopathy 06/12/2014    6% metformin 500 bid, FU+ 2016    DVT (deep venous thrombosis) 2001    R , Dr Chad Golden 04/06/2022     Marques passed 1/2022    Hyperlipidemia     LDL 82    Hyperlipidemia associated with type 2 diabetes mellitus 09/11/2012    Hypertension     Hypertension associated with diabetes 09/11/2012    Memory loss 12/29/2022    CT chronic changes 2022    Microalbuminuria due to type 2 diabetes mellitus 12/08/2015    taking lisinopril, losartan caused olivia effects    PVD (peripheral vascular disease) with claudication 05/02/2019    Compression hose    Risk for coronary artery disease greater than 20% in next 10 years per [place] score 05/01/2018    Strabismus     Type 2 diabetes mellitus with diabetic polyneuropathy 06/12/2014    Type 2 diabetes mellitus with diabetic polyneuropathy, without long-term current use of insulin 06/12/2014       Past Surgical History:   Procedure Laterality Date    BREAST BIOPSY Right 2011     BREAST LUMPECTOMY Right 2011    [place] REMOVAL OF NAIL BED  6/10/2015    TONSILLECTOMY         Time Tracking:     PT Received On: 01/22/24  PT Start Time: 1105     PT Stop Time: 1156  PT Total Time (min): 51 min     Billable Minutes: Evaluation 25, Therapeutic Activity 11, and Therapeutic Exercise 15      01/22/2024

## 2024-01-22 NOTE — PROGRESS NOTES
[facility]                                  Skilled Nursing Facility                   Progress Note     Admit Date: 1/20/2024  [facility]   Principal Problem:  Traumatic subdural hematoma (SDH)   HPI obtained from patient interview and chart review     Chief Complaint: Fall with subsequent SDH, subsequent encounter    HPI: Kanchan Downing is an 83 y.o. 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 Patient admitted to [facility] on 1/13/24 with R SDH and 5mm L SDH sustained from fall at home. She was intubated ED, required significant sedation and cardene gtt.  Interval CTH 1/14: stable, s/p DDAVP. Extubated 1/14. Continue Keppra 500 BID x7 day (started 1/17). NSGY rec hold ASA for 2 weeks and follow up in Neurosurgery clinic with repeat CT head (due around 1/31).    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), Atherosclerosis of aorta (06/08/2016), Breast cancer (2011), Cataract, Controlled type 2 diabetes mellitus with circulatory disorder, without long-term current use of insulin (10/31/2017), Controlled type 2 diabetes mellitus with circulatory disorder, without long-term current use of insulin (10/31/2017), Diabetes mellitus type 2 without retinopathy (06/12/2014), DVT (deep venous thrombosis) (2001), Grief (04/06/2022), Hyperlipidemia, Hyperlipidemia associated with type 2 diabetes mellitus (09/11/2012), Hypertension, Hypertension associated with diabetes (09/11/2012), Memory loss (12/29/2022), Microalbuminuria due to type 2 diabetes mellitus (12/08/2015), PVD (peripheral vascular disease) with claudication (05/02/2019), Risk for coronary artery disease greater than 20% in next 10 years per  [place] score (05/01/2018), Strabismus, Type 2 diabetes mellitus with diabetic polyneuropathy (06/12/2014), and Type 2 diabetes mellitus with diabetic polyneuropathy, without long-term current use of insulin (06/12/2014).    Past Surgical History: Patient has a past surgical history that includes Tonsillectomy; pr removal of nail bed (6/10/2015); Breast biopsy (Right, 2011); and Breast lumpectomy (Right, 2011).    Social History: Patient reports that she has quit smoking. She has a 3.5 pack-year smoking history. She has never used smokeless tobacco. She reports that she does not drink alcohol and does not use drugs.    Allergies: Patient is allergic to sulfa (sulfonamide antibiotics).    ROS  Constitutional: Negative for fever   Eyes: Negative for blurred vision, double vision   Respiratory: Negative for cough, shortness of breath   Cardiovascular: Negative for chest pain, palpitations, and leg swelling.   Gastrointestinal: Negative for abdominal pain, constipation, diarrhea, nausea, vomiting.   Genitourinary: Negative for dysuria, frequency   Musculoskeletal:  + generalized weakness. Negative for back pain and myalgias.   Skin: Negative for itching and rash.   Neurological: Negative for dizziness, headaches.   Psychiatric/Behavioral: Negative for depression. The patient is not nervous/anxious.      24 hour Vital Sign Range   Temp:  [98 °F (36.7 °C)-98.5 °F (36.9 °C)]   Pulse:  [58-76]   Resp:  [18]   BP: (107-117)/(58-76)   SpO2:  [97 %-98 %]     PEx  Constitutional: Patient appears debilitated.  No distress noted  HENT:   Head: Normocephalic and atraumatic.   Eyes: Pupils are equal, round  Neck: Normal range of motion. Neck supple.   Cardiovascular: Normal rate, regular rhythm and normal heart sounds.    Pulmonary/Chest: Effort normal and breath sounds are clear  Abdominal: Soft, round. Bowel sounds are normal.   Musculoskeletal: Normal range of motion.   Neurological: Alert and oriented to person, place, and  "time.   Psychiatric: Normal mood and affect. Behavior is normal.   Skin: Skin is warm and dry.     No results for input(s): "GLUCOSE", "NA", "K", "CL", "CO2", "BUN", "CREATININE", "CALCIUM", "MG" in the last 24 hours.    No results for input(s): "WBC", "RBC", "HGB", "HCT", "PLT", "MCV", "MCH", "MCHC" in the last 24 hours.      Assessment and Plan:    Traumatic subdural hematoma s/p fall  Vasogenic cerebral edema    - SBP goal < 160  -Continue home amlo/lisinopril/metoprolol  -PRN labetalol/hydralazine  -Keppra BID x 7 days  -PT/OT/SLP     Hypertension associated with diabetes  -SBP goal < 160  -Continue home amlo/lisinopril/metoprolol  -PRN hydralazine/labetalol    Type 2 diabetes mellitus with diabetic polyneuropathy, without long-term current use of insulin   - Last A1c 5.7 on 1/13/24  - Home med(s):   - Continue glucose checks AC and HS  - SSI  - Hypoglycemia protocol in place     BANDAR (generalized anxiety disorder)  continue fluoxetin     At Risk for Constipation  - Continue scheduled senokot and miralax  - Adjust bowel regimen prn to avoid diarrhea  - Encourage fluid intake  - Encourage safe physical activity as tolerated  - Monitor bowel movement regularity and consistency     PVD (peripheral vascular disease) with claudication   PVD with  claudication      Hyperlipidemia associated with type 2 diabetes mellitus   -Atorvastatin daily    Extended Visit  Total time spent: 46 minutes  Description of Time: counseling patient on clinical condition, therapies provided, plan of care, emotional support, coordinating patient care with other care team members, reviewing and interpreting labs and imaging, collaboration with physician, initiating new orders, chart review, and documentation. See interval history.    Lenka Rebollar NP  Department of Hospital Medicine   [facility]     DOS: 1/21/2024       Patient note was created using MModal Dictation.  Any errors in syntax or even " information may not have been identified and edited on initial review prior to signing this note.

## 2024-01-22 NOTE — PT/OT/SLP PROGRESS
"Occupational Therapy   Treatment    Name: Kanchan Downing  MRN: [id number]  Admit Date: 1/20/2024  Admitting Diagnosis:  Traumatic subdural hematoma (SDH)    General Precautions: Standard, aspiration, fall   Orthopedic Precautions: N/A   Braces: N/A    Recommendations:     Discharge Recommendations:  home with home health  Level of Assistance Recommended at Discharge: 24 hours light assistance for ADL's and homemaking tasks  Discharge Equipment Recommendations: bedside commode  Barriers to discharge:  None    Assessment:     Kanchan Downing is a 83 y.o. female with a medical diagnosis of Traumatic subdural hematoma (SDH) .   Pt noted with intermittent confusion this date, Pt  repeating  information with differing facts each time. Pt tolerated treatment well without incident. Pt is progressing towards goals, however would benefit from continued skilled OT for improved coordination, strength, and activity tolerance neccessary for safe ADL completion  to maximize QOL and functional independence. She presents with deficits listed below. Performance deficits affecting function are weakness, impaired endurance, impaired self care skills, impaired functional mobility, gait instability, impaired balance, decreased coordination, decreased upper extremity function, decreased lower extremity function, decreased safety awareness.     Rehab Potential is good    Activity tolerance:  Good    Plan:     Patient to be seen 5 x/week to address the above listed problems via self-care/home management, therapeutic activities, therapeutic exercises    Plan of Care Expires: 02/15/24  Plan of Care Reviewed with: patient    Subjective   "I have three kids , my son is the oldest ", a few moments later " I have a daughter and 2 sons, my daughter's the oldest she's the queen". OT  cueing Pt asking which is the correct order of children , Pt reports " you know I'm not too sure". " I know I'll sound crazy saying this but when I fell this last time, " "it was a spirit that kept me held down to the ground, it always knocks me over"  Communicated with: RN  prior to session .    Pain/Comfort:  Pain Rating 1: 0/10    Patient's cultural, spiritual, Shinto conflicts given the current situation:  no    Objective:     Patient found HOB elevated with  (no active lines) upon OT entry to room.    Bed Mobility:    Patient completed Scooting/Bridging with stand by assistance  Patient completed Supine to Sit with contact guard assistance     Functional Mobility/Transfers:  Patient completed Sit <> Stand Transfer with contact guard assistance  with  hand-held assist   Patient completed Bed <> Chair Transfer using Stand Pivot technique with minimum assistance with hand-held assist  Patient completed Toilet Transfer Stand Pivot technique with minimum assistance with  grab bars  Functional Mobility: Pt able to self propel wheelchair ~ 8' with Sup and BUEs    Activities of Daily Living:  Grooming: stand by assistance while seated in w/c at sink for oral  and facial hygiene.   Lower Body Dressing: moderate assistance with doffing underwear and pants this date.   Toileting: contact guard assistance for clothes management, Pt able to complete toilet hygiene in standing at toilet with CGA  using grab bar to steady.     [facility] 6 Click ADL: 20      Treatment & Education:  Role of OT and OT POC  Fall Prevention/Safety Awareness Training - RW management during functional mobility and standing ADLs to decrease risk of falls  Upperbody Dressing   Educated on OOB activity with staff A only and impact on increasing functional independence.  Educated on importance of progressive mobilization to increase strength and endurance.      Patient left up in chair with all lines intact and call button in reach    GOALS:   Multidisciplinary Problems       Occupational Therapy Goals          Problem: Occupational Therapy    Goal Priority Disciplines Outcome Interventions   Occupational Therapy Goal     " OT, PT/OT Ongoing, Progressing    Description: Goals to be met by: 2/16400     Patient will increase functional independence with ADLs by performing:    UE Dressing with [employer].  LE Dressing with [employer].  Grooming while standing with [employer].  Toileting from toilet with [employer] for hygiene and clothing management.   Bathing from  shower chair/bench with [employer].  Step transfer with [employer]  Toilet transfer to toilet with [employer].                                     Time Tracking:     OT Date of Treatment: 01/22/24  OT Start Time: 1028    OT Stop Time: 1107  OT Total Time (min): 39 min    Billable Minutes:Self Care/Home Management 29  Therapeutic Activity 10    1/22/2024

## 2024-01-22 NOTE — CARE UPDATE
SS discussed non medical home assistance with [facility]rin[facility], and left card/book of information with pt.  Referred pt to [facility] also at [facility].

## 2024-01-22 NOTE — CONSULTS
"  [facility] - Skilled Nursing  Adult Nutrition  Consult Note    SUMMARY   Recommendation  Continue regular diet, RD following  Goals: PO to meet 85% of EEN with ONS by next RD follow up  Nutrition Goal Status: new  Communication of RD Recs: other (comment) (POC)    Assessment and Plan    No nutrition diagnosis at this time    Malnutrition Assessment  1/22     Skin (Micronutrient): bruised  Hair/Scalp (Micronutrient): sparse  Teeth (Micronutrient): broken dentition  Neck/Chest (Micronutrient): muscle wasting, subcutaneous fat loss  Musculoskeletal/Lower Extremities: subcutaneous fat loss, muscle wasting           Orbital Region (Subcutaneous Fat Loss): moderate depletion  Upper Arm Region (Subcutaneous Fat Loss): mild depletion  Thoracic and Lumbar Region: mild depletion   Episcopal Region (Muscle Loss): mild depletion  Clavicle and Acromion Bone Region (Muscle Loss): moderate depletion  Dorsal Hand (Muscle Loss): moderate depletion  Anterior Thigh Region (Muscle Loss): moderate depletion                 Reason for Assessment    Reason For Assessment: consult  Diagnosis:  (SDH)  Relevant Medical History: DM, HTN, HLD, PVD, BANDAR DVT, breast cancer, neuropathy  General Information Comments: Fall at home, lives alone, debility since becoming . Eats breakfast at home, goes to dinner for lunch or dinner w friends and then has light meal. They help her shop for groceries. she will not be driving anymore, tests her glucose at home, DM controlled, HgA1c 5.7.  Nutrition Discharge Planning: DC on diabetic diet    Nutrition/Diet History    Patient Reported Diet/Restrictions/Preferences: general  Food Preferences: none  Spiritual, Cultural Beliefs, Catholic Practices, Values that Affect Care: no  Food Allergies: NKFA  Factors Affecting Nutritional Intake: None identified at this time    Anthropometrics    Temp: 98.1 °F (36.7 °C)  Height Method: Stated  Height: 5' 8" (172.7 cm)  Height (inches): 68 in  Weight Method: Standard " Scale  Weight: 70 kg (154 lb 5.2 oz)  Weight (lb): 154.32 lb  Ideal Body Weight (IBW), Female: 140 lb  % Ideal Body Weight, Female (lb): 110.23 %  BMI (Calculated): 23.5  Weight Loss: unintentional  Usual Body Weight (UBW), k.6 kg  % Usual Body Weight: 94.03  % Weight Change From Usual Weight: -6.17 %       Lab/Procedures/Meds    Pertinent Labs Reviewed: reviewed  Pertinent Labs Comments: Hg 11.8, glucose 133  Pertinent Medications Reviewed: reviewed  Pertinent Medications Comments: Fluoxetine, polyethylene glycol, senna-docusate, statin,    Estimated/Assessed Needs    Weight Used For Calorie Calculations: 70 kg (154 lb 5.2 oz)  Energy Calorie Requirements (kcal): 1684  Energy Need Method: Merrill-St Jeor (x 1.4 (PAL))  Protein Requirements: 84  Weight Used For Protein Calculations: 70 kg (154 lb 5.2 oz) (x 1.2g/kg)  Fluid Requirements (mL): 1684 or per MD  Estimated Fluid Requirement Method: RDA Method  RDA Method (mL): 1684  CHO Requirement: 210      Nutrition Prescription Ordered    Current Diet Order: Regular  Nutrition Order Comments: PO 75%    Evaluation of Received Nutrient/Fluid Intake    I/O: no data  Energy Calories Required: meeting needs  Protein Required: not meeting needs  Fluid Required: meeting needs  Comments: LBM , complains of constipation, meds added, Has her own teeth  no problems chewing or swallowing  Tolerance: tolerating  % Intake of Estimated Energy Needs: 75 - 100 %  % Meal Intake: 75 - 100 %    Nutrition Risk  One time per week          Monitor and Evaluation    Food and Nutrient Intake: food and beverage intake  Food and Nutrient Adminstration: diet order  Physical Activity and Function: nutrition-related ADLs and IADLs  Anthropometric Measurements: weight change  Biochemical Data, Medical Tests and Procedures: glucose/endocrine profile, gastrointestinal profile, electrolyte and renal panel  Nutrition-Focused Physical Findings: overall appearance       Nutrition Follow-Up    RD  Follow-up?: Yes

## 2024-01-22 NOTE — PROGRESS NOTES
"                                                        [facility]                                  Skilled Nursing Facility                   Progress Note     Admit Date: 1/20/2024  BRIDGETTE   [id number]  Principal Problem:  Traumatic subdural hematoma (SDH)   HPI obtained from patient interview and chart review     Chief Complaint: Establish Care/ Initial Visit     HPI:   Mrs. 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  Admission to SNF for secondary weakness and debility.    Patient originally presented to [facility] ED on 1/13 after suffering a mechanical fall.  Per [facility] notes, History obtained from granddaughter at bedside. Pt had an unwitnessed fall around 1100 on 1/13. 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 Pt was wearing socks and slipped. She called her son and was crying and complaining of a HA. Son called EMS. Pt was brought to [facility]. CTH w 8mm R SDH and 5mm L SDH. Pt developed non-redirectable agitation, screaming "help me" and trying to get out of the bed. She was not following any commands and SBP was 220. Pt was intubated in the ED. Pt admitted to [facility]. On arrival to N[facility]U, pt was on propofol 50, fentanyl 250, and still requiring 5-point restraints to prevent self-extubation. Gave 5mg IV haldol and 2mg ativan and pt fell asleep. Five-hour interval CTH/CTA w stable bl SDH and no vascular abnormality. Of note, pt lost her , Marques, in 1/2022 and subsequently developed a progressive decline in functional status. Pt's family had concerns for early dementia as pt will become upset thinking about her "  and starts to forget things. This decline is now thought to be related to her depression rather than dementia. Pt does perform her ADLs at home alone, but family started looking into options for assistance.  Patient was extubated on 01/14.  She was step-down Hospital Medicine on 01/15. Interval CTH 1/14: stable, s/p DDAVP. continue Keppra 500 BID x7 days. Per neurosurgery, hold ASA for 2 weeks and follow up in Neurosurgery clinic with repeat CT head       Today, patient patient appears to be at her mental baseline which is intermittent confusion, she is oriented x3 but displays impaired higher level thinking.  She reports intermittent headaches that have been improving.  Patient denies any Visual disturbances.  Last bowel movement was on 01/18, patient is agreeable to a suppository.    Patient will be treated at [facility] with PT and OT to improve functional status and ability to perform ADLs.     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), Atherosclerosis of aorta (06/08/2016), Breast cancer (2011), Cataract, Controlled type 2 diabetes mellitus with circulatory disorder, without long-term current use of insulin (10/31/2017), Controlled type 2 diabetes mellitus with circulatory disorder, without long-term current use of insulin (10/31/2017), Diabetes mellitus type 2 without retinopathy (06/12/2014), DVT (deep venous thrombosis) (2001), Grief (04/06/2022), Hyperlipidemia, Hyperlipidemia associated with type 2 diabetes mellitus (09/11/2012), Hypertension, Hypertension associated with diabetes (09/11/2012), Memory loss (12/29/2022), Microalbuminuria due to type 2 diabetes mellitus (12/08/2015), PVD (peripheral vascular disease) with claudication (05/02/2019), Risk for coronary artery disease greater than 20% in next 10 years per [place] score (05/01/2018), Strabismus, Type 2 diabetes mellitus with diabetic polyneuropathy (06/12/2014), and  Type 2 diabetes mellitus with diabetic polyneuropathy, without long-term current use of insulin (06/12/2014).    Past Surgical History: Patient has a past surgical history that includes Tonsillectomy; pr removal of nail bed (6/10/2015); Breast biopsy (Right, 2011); and Breast lumpectomy (Right, 2011).    Social History: Patient reports that she has quit smoking. She has a 3.5 pack-year smoking history. She has never used smokeless tobacco. She reports that she does not drink alcohol and does not use drugs.    Family History: family history includes Breast cancer in her sister; Cataracts in her mother; Heart disease (age of onset: 50) in her father; Heart disease (age of onset: 58) in her mother; No Known Problems in her brother, maternal aunt, maternal grandfather, maternal grandmother, maternal uncle, paternal aunt, paternal grandfather, paternal grandmother, and paternal uncle; Thyroid disease in her sister.    Allergies: Patient is allergic to sulfa (sulfonamide antibiotics).    ROS  Constitutional: Negative for fever   Eyes: Negative for blurred vision, double vision   Respiratory: Negative for cough, shortness of breath   Cardiovascular: Negative for chest pain, palpitations, and leg swelling.   Gastrointestinal: Negative for abdominal pain, diarrhea, nausea, vomiting.  +constipation  Genitourinary: Negative for dysuria, frequency   Musculoskeletal:  + generalized weakness. Negative for back pain and myalgias.   Skin: Negative for itching and rash.   Neurological: Negative for dizziness. + intermittent headaches.   Psychiatric/Behavioral: Negative for depression. The patient is not nervous/anxious.      24 hour Vital Sign Range   Temp:  [98 °F (36.7 °C)]   Pulse:  [76]   Resp:  [18]   BP: (117)/(76)   SpO2:  [98 %]     Current BMI: Body mass index is 23.46 kg/m².    PEx  Constitutional: Patient appears debilitated.  No distress noted  HENT:   Head: Normocephalic and atraumatic.   Eyes: Pupils are equal,  "round  Neck: Normal range of motion. Neck supple.   Cardiovascular: Normal rate, regular rhythm and normal heart sounds.    Pulmonary/Chest: Effort normal and breath sounds are clear  Abdominal: Soft. Bowel sounds are normal.   Musculoskeletal: Normal range of motion.   Neurological: Alert and oriented to person, place, and time.  Impaired higher level thinking  Psychiatric: Normal mood and affect. Behavior is normal.   Skin: Skin is warm and dry.     No results for input(s): "GLUCOSE", "NA", "K", "CL", "CO2", "BUN", "CREATININE", "CALCIUM", "MG" in the last 24 hours.    No results for input(s): "WBC", "RBC", "HGB", "HCT", "PLT", "MCV", "MCH", "MCHC" in the last 24 hours.    Recent Labs   Lab 01/20/24  2108 01/21/24  0717 01/21/24  1127 01/21/24  1610 01/21/24 2022 01/22/24  0745   POCTGLUCOSE 112* 118* 130* 160* 171* 117*        Assessment and Plan:    Traumatic subdural hematoma  Vasogenic cerebral edema  History of fall  - CTH w 8mm R SDH and 5mm L SDH   - Interval 5h CTA stable; no vascular abnormality, Follow-up CTH stable  -SBP goal < 160  - Continue home amlo/lisinopril/metoprolol  - discontinuing Keppra BID as patient has completed over a 7 day course  - PT/ OT/ SLP     Type 2 diabetes mellitus with diabetic polyneuropathy, without long-term current use of insulin  - last A1c 5.7 on 01/13/2024  - Accu-Cheks AC/HS  - home regimen with metformin 500 mg daily  - continue SSI prn     Hypertension associated with diabetes  - SBP goal < 160  - home regimen with HCTZ 12.5 mg daily, amlodipine 5 mg daily, lisinopril 5 mg daily, metoprolol 50 mg BID  - 1/22 discontinuing amlodipine and lisinopril for low BP's, continue metoprolol 50 mg BID holding parameters added, continue to hold HCTZ    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, adjuvant XRT, and endocrine therapy x 5 yrs, R breast DCIS  2/2023, pt declined surgical intervention at that time     BANDAR (generalized anxiety disorder)  - continue fluoxetine 10 mg daily     PVD (peripheral vascular disease) with claudication  Hyperlipidemia associated with type 2 diabetes mellitus   - continue pravastatin 20 mg qHS in place of non formulary home simvastatin    Seasonal allergies  - continue home cetirizine 10 mg daily    Debility   - Continue with PT/OT for gait training and strengthening and restoration of ADL's   - Encourage mobility, OOB in chair, and early ambulation as appropriate  - Fall precautions   - Monitor for bowel and bladder dysfunction  - Monitor for and prevent skin breakdown and pressure ulcers  - Continue DVT prophylaxis with frequent ambulation      Anticipate disposition:  Home with home health    IP [facility] RISK OF UNPLANNED READMISSION Model: HIGH MODERATE LOW    Follow-up needed during SNF stay-    Follow-up needed after discharge from SNF: PCPDarius 2/1    Future Appointments   Date Time Provider Department Center   2/1/2024  2:15 PM [facility]-CT1 500 LB LIMIT [facility]   2/1/2024  3:00 PM Cheryl Arizmendi PA-C [facility] [address]   3/14/2024  1:20 PM Viktoria Mckeon MD [facility]         I certify that SNF services are required to be given on an inpatient basis because Kanchan Downing needs for skilled nursing care and/or skilled rehabilitation are required on a daily basis and such services can only practically be provided in a skilled nursing facility setting and are for an ongoing condition for which she received inpatient care in the hospital.     Total time of the visit 148 minutes   Description of non physical  exam/non charting time: counseling patient on clinical conditions and therapies provided.  Extensive chart review completed including all consultation notes.  All pertinent laboratory and radiographical images reviewed.        Jesica Sidhu NP  Department of Hospital Medicine   [facility]     DOS: 1/22/2024       Patient note was created using MModal Dictation.  Any errors in syntax or even information may not have been identified and edited on initial review prior to signing this note.

## 2024-01-22 NOTE — CLINICAL REVIEW
Clinical Pharmacy Chart Review Note      Admit Date: 1/20/2024   LOS: 2 days       Kanchan Downing is a 83 y.o. female admitted to SNF for PT/OT after hospitalization for traumatic subdural hematoma.    Active Hospital Problems    Diagnosis  POA    *Traumatic subdural hematoma (SDH) [S06.5XAA]  Yes    History of fall [Z91.81]  Not Applicable    Type 2 diabetes mellitus with diabetic polyneuropathy, without long-term current use of insulin [E11.42]  Yes      Resolved Hospital Problems   No resolved problems to display.     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 01/17/2024    Pain 01/17/2024    Traumatic subdural hematoma 01/13/2024    Ductal carcinoma in situ (DCIS) of right breast 03/13/2023    Memory loss 12/29/2022    History of fall 05/19/2022    BANDAR (generalized anxiety disorder) 05/04/2022    PVD (peripheral vascular disease) with claudication 05/02/2019    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     Pain of right lower extremity     Controlled type 2 diabetes mellitus with circulatory disorder, without long-term current use of insulin 10/31/2017    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    Atherosclerosis of aorta 06/08/2016    Generalized osteoarthritis 02/24/2015    Cervical spondylosis 02/24/2015    Osteoarthritis of left knee 02/24/2015    Type 2 diabetes mellitus with diabetic polyneuropathy, without long-term current use of insulin 06/12/2014    Nuclear sclerosis - Both Eyes 10/30/2012    History of breast cancer 10/11/2012    Hypertension associated with diabetes 09/11/2012    Hyperlipidemia associated with type 2 diabetes mellitus 09/11/2012       Scheduled Meds:     cetirizine  10 mg Oral Daily    FLUoxetine  10 mg Oral Daily    metoprolol tartrate  50 mg Oral BID    polyethylene glycol  17 g Oral Daily    pravastatin  20 mg Oral QHS    senna-docusate 8.6-50 mg  1 tablet Oral BID     Continuous Infusions:   PRN Meds: acetaminophen, acetaminophen, calcium carbonate, dextrose 50%, dextrose 50%, glucagon (human recombinant), glucose, glucose, insulin aspart U-100, melatonin    OBJECTIVE:     Vital Signs (Last 24H)  Temp:  [98 °F (36.7 °C)-98.1 °F (36.7 °C)]   Pulse:  [60-76]   Resp:  [18]   BP: (117-126)/(60-76)   SpO2:  [96 %-98 %]     Laboratory:  CBC:   Recent Labs   Lab 01/19/24 0357 01/20/24 0433 01/22/24 0439   WBC 6.97 7.64 7.14   RBC 4.15 4.24 4.08   HGB 12.1 12.3 11.8*   HCT 38.7 38.7 37.4    196 226   MCV 93 91 92   MCH 29.2 29.0 28.9   MCHC 31.3* 31.8* 31.6*     BMP:   Recent Labs   Lab 01/19/24 0357 01/20/24 0433 01/22/24 0439   * 125* 133*    137 138   K 3.9 4.1 4.4    106 105   CO2 26 24 22*   BUN 12 14 19   CREATININE 0.6 0.7 0.8   CALCIUM 9.4 9.6 9.3   MG 1.7 1.8 1.8     CMP:   Recent Labs   Lab 01/18/24  0419 01/19/24 0357 01/20/24 0433 01/22/24 0439   * 119* 125* 133*   CALCIUM 9.3 9.4 9.6 9.3   ALBUMIN 2.7* 2.8* 2.8*  --    PROT 5.6* 5.7* 6.0  --     139 137 138   K 3.7 3.9 4.1 4.4   CO2 24 26 24 22*    106 106 105   BUN 12 12 14 19   CREATININE 0.7 0.6 0.7 0.8   ALKPHOS 51* 54* 59  --    ALT 13 13 14  --    AST 15 12 17  --    BILITOT 0.6 0.4 0.4  --      LFTs:   Recent Labs   Lab 01/18/24  0419 01/19/24  0357 01/20/24  0433   ALT 13 13 14   AST 15 12 17   ALKPHOS 51* 54* 59   BILITOT 0.6 0.4 0.4   PROT 5.6* 5.7* 6.0   ALBUMIN 2.7* 2.8* 2.8*     Lab Results   Component Value Date    HGBA1C 5.7 (H) 01/13/2024           ASSESSMENT/PLAN:     I have reviewed the medications in compliance with CMS Regulation F756 of the [place]. Based on information gathered, the following items may need to be  addressed:    **According to PMH and home medication list, patient takes the following medications at home. These medications are not currently ordered at SNF:  Amlodipine 5 mg daily  HCTZ 12.5 mg daily  Metformin 500 mg daily  Lisinopril 5 mg daily  Simvastatin 10 mg every evening (non-formulary, currently taking pravastatin)    **Patient is taking fluoxetine (home med) for A gradual dose reduction is not recommended at this time. Patient to follow-up with prescribing MD as outpatient.  Monitor: mental status for depression, suicide ideation, anxiety, serotonin syndrome, hyponatremia, dizziness, drowsiness, somnolence      Medications reviewed by PharmD, please re-consult if needed.      Nuris White, Pharm. D.  Clinical Pharmacist  [facility]

## 2024-01-22 NOTE — PLAN OF CARE
Problem: Occupational Therapy  Goal: Occupational Therapy Goal  Description: Goals to be met by: 2/[id number]     Patient will increase functional independence with ADLs by performing:    UE Dressing with [place].  LE Dressing with [place].  Grooming while standing with [place].  Toileting from toilet with [place] for hygiene and clothing management.   Bathing from  shower chair/bench with [place].  Step transfer with [place]  Toilet transfer to toilet with [place].                Outcome: Ongoing, Progressing

## 2024-01-22 NOTE — PLAN OF CARE
Problem: Physical Therapy  Goal: Physical Therapy Goal  Description: Goals to be met by: 2/22/24     Patient will increase functional independence with mobility by performing:    . Supine to sit with [place]  . Sit to supine with [place]  . Rolling to Left and Right with [place].  . Sit to stand transfer with [place]  . Bed to chair transfer with Modified [place] using Rolling Walker  . Gait  x 150 feet with Supervision using Rolling Walker or LRAD  . Wheelchair propulsion x150 feet with Modified [place] using bilateral uppper extremities  . Ascend/descend 12 stair with bilateral Handrails Stand-by Assistance using No Assistive Device.   . Ascend/Descend 4 inch curb step with Supervision using Rolling Walker.    Outcome: Ongoing, Progressing

## 2024-01-23 LAB
POCT GLUCOSE: 106 MG/DL (ref 70–110)
POCT GLUCOSE: 145 MG/DL (ref 70–110)
POCT GLUCOSE: 160 MG/DL (ref 70–110)
POCT GLUCOSE: 87 MG/DL (ref 70–110)

## 2024-01-23 PROCEDURE — 25000003 PHARM REV CODE 250: Mod: HCNC | Performed by: HOSPITALIST

## 2024-01-23 PROCEDURE — 97535 SELF CARE MNGMENT TRAINING: CPT | Mod: HCNC

## 2024-01-23 PROCEDURE — 97116 GAIT TRAINING THERAPY: CPT | Mod: HCNC

## 2024-01-23 PROCEDURE — 25000003 PHARM REV CODE 250: Mod: HCNC | Performed by: NURSE PRACTITIONER

## 2024-01-23 PROCEDURE — 92523 SPEECH SOUND LANG COMPREHEN: CPT | Mod: HCNC

## 2024-01-23 PROCEDURE — 25000003 PHARM REV CODE 250: Mod: HCNC | Performed by: FAMILY MEDICINE

## 2024-01-23 PROCEDURE — 97110 THERAPEUTIC EXERCISES: CPT | Mod: HCNC

## 2024-01-23 PROCEDURE — 11000004 HC SNF PRIVATE: Mod: HCNC

## 2024-01-23 RX ADMIN — FLUOXETINE 10 MG: 10 CAPSULE ORAL at 08:01

## 2024-01-23 RX ADMIN — Medication 6 MG: at 09:01

## 2024-01-23 RX ADMIN — ACETAMINOPHEN 650 MG: 325 TABLET ORAL at 10:01

## 2024-01-23 RX ADMIN — PRAVASTATIN SODIUM 20 MG: 20 TABLET ORAL at 09:01

## 2024-01-23 RX ADMIN — SENNOSIDES AND DOCUSATE SODIUM 1 TABLET: 8.6; 5 TABLET ORAL at 08:01

## 2024-01-23 RX ADMIN — MICONAZOLE NITRATE: 20 OINTMENT TOPICAL at 09:01

## 2024-01-23 RX ADMIN — METOPROLOL TARTRATE 50 MG: 50 TABLET, FILM COATED ORAL at 08:01

## 2024-01-23 RX ADMIN — SENNOSIDES AND DOCUSATE SODIUM 1 TABLET: 8.6; 5 TABLET ORAL at 09:01

## 2024-01-23 RX ADMIN — CETIRIZINE HYDROCHLORIDE 10 MG: 5 TABLET, FILM COATED ORAL at 08:01

## 2024-01-23 RX ADMIN — MICONAZOLE NITRATE: 20 OINTMENT TOPICAL at 08:01

## 2024-01-23 RX ADMIN — POLYETHYLENE GLYCOL 3350 17 G: 17 POWDER, FOR SOLUTION ORAL at 08:01

## 2024-01-23 RX ADMIN — METOPROLOL TARTRATE 50 MG: 50 TABLET, FILM COATED ORAL at 09:01

## 2024-01-23 NOTE — PT/OT/SLP PROGRESS
"Occupational Therapy   Treatment    Name: Kanchan Downing  MRN: [id number]  Admitting Diagnosis:  Traumatic subdural hematoma (SDH)       Recommendations:     Discharge Recommendations: Low Intensity Therapy  Discharge Equipment Recommendations:  bedside commode  Barriers to discharge:   Decreased caregiver support    Assessment:     Kanchan Downing is a 83 y.o. female with a medical diagnosis of Traumatic subdural hematoma (SDH).  She presents with the following performance deficits affecting function are weakness, impaired endurance, impaired self care skills, impaired functional mobility, gait instability, impaired balance, impaired cognition, decreased safety awareness, decreased upper extremity function, decreased lower extremity function, decreased coordination.     Rehab Prognosis:  Good; patient would benefit from acute skilled OT services to address these deficits and reach maximum level of function.       Plan:     Patient to be seen 5 x/week to address the above listed problems via self-care/home management, therapeutic activities, therapeutic exercises, neuromuscular re-education  Plan of Care Expires: 02/15/24  Plan of Care Reviewed with: patient    Subjective     Chief Complaint: "I have trouble tying these pants."  Patient/Family Comments/goals: Agreeable  Pain/Comfort:  Pain Rating 1: 0/10  Pain Rating Post-Intervention 1: 0/10    Objective:     Communicated with: Nursing prior to session.  Patient found up in chair with  (no active lines) upon OT entry to room.    General Precautions: Standard, aspiration, fall    Orthopedic Precautions:N/A  Braces: N/A  Respiratory Status: Room air     Occupational Performance:     Bed Mobility:    Patient completed Rolling/Turning to Right with supervision  Patient completed Scooting/Bridging with supervision  Patient completed Sit to Supine with supervision     Functional Mobility/Transfers:  Patient completed Bed <> Chair Transfer using Step Transfer technique with " contact guard assistance with no assistive device and grab bars(s)  Patient completed Toilet Transfer Step Transfer technique with contact guard assistance with  no AD  Functional Mobility: ~8 steps (L/R transferring between surfaces) with CGA and use of wheelchair arm rest/bed rail/grab bar in bathroom    Activities of Daily Living:  Grooming: supervision seated in wheelchair at isnk  Upper Body Dressing: stand by assistance seated in wheelchair at sink  Lower Body Dressing: contact guard assistance to adjust sock seated in wheelchair, cue for safety with dynamic leaning  Toileting: minimum assistance for stability with BUE managing briefs/tying pants in standing    [facility] 6 Click ADL: 20    Treatment & Education:  -Patient and family educated on roles/goals of OT and POC.  -White board updated.  -Therapist provided time for questions and answered within scope of practice.  -Patient educated on importance of EOB/OOB activity to maximize recovery.    Patient left HOB elevated with all lines intact and call button in reach    GOALS:   Multidisciplinary Problems       Occupational Therapy Goals          Problem: Occupational Therapy    Goal Priority Disciplines Outcome Interventions   Occupational Therapy Goal     OT, PT/OT Ongoing, Progressing    Description: Goals to be met by: 2/00018     Patient will increase functional independence with ADLs by performing:    UE Dressing with [place].  LE Dressing with [place].  Grooming while standing with [place].  Toileting from toilet with [place] for hygiene and clothing management.   Bathing from  shower chair/bench with [place].  Step transfer with [place]  Toilet transfer to toilet with [place].                                     Time Tracking:     OT Date of Treatment: 01/23/24  OT Start Time: 1644  OT Stop Time: 1701  OT Total Time (min): 17 min    Billable Minutes:Self Care/Home Management 17               1/23/2024

## 2024-01-23 NOTE — PT/OT/SLP EVAL
Speech Language Pathology  Evaluation    Kanchan Downing   MRN: [id number]   Admitting Diagnosis: Traumatic subdural hematoma (SDH)    Diet recommendations: Solid Diet Level: Regular Diet - IDDSI Level 7  Liquid Diet Level: Thin liquids - IDDSI Level 0 Standard aspiration precautions    SLP Treatment Date: 01/23/24  Speech Start Time: 1427     Speech Stop Time: 1455     Speech Total (min): 28 min       TREATMENT BILLABLE MINUTES:  Eval 20  and Self Care/Home Management Training 8    Diagnosis: Traumatic subdural hematoma (SDH)      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, without long-term current use of insulin 10/31/2017    Diabetes mellitus type 2 without retinopathy 06/12/2014    6% metformin 500 bid, FU+ 2016    DVT (deep venous thrombosis) 2001    R , Dr Bonilla    Grief 04/06/2022     Marques passed 1/2022    Hyperlipidemia     LDL 82    Hyperlipidemia associated with type 2 diabetes mellitus 09/11/2012    Hypertension     Hypertension associated with diabetes 09/11/2012    Memory loss 12/29/2022    CT chronic changes 2022    Microalbuminuria due to type 2 diabetes mellitus 12/08/2015    taking lisinopril, losartan caused olivia effects    PVD (peripheral vascular disease) with claudication 05/02/2019    Compression hose    Risk for coronary artery disease greater than 20% in next 10 years per [place] score 05/01/2018    Strabismus     Type 2 diabetes mellitus with diabetic polyneuropathy 06/12/2014    Type 2 diabetes mellitus with diabetic polyneuropathy, without long-term current use of insulin 06/12/2014     Past Surgical  "History:   Procedure Laterality Date    BREAST BIOPSY Right 2011    BREAST LUMPECTOMY Right 2011    AL REMOVAL OF NAIL BED  6/10/2015    TONSILLECTOMY         History of Present Illness: Kanchan Downing is an 83F w PMHx of DM2, HTN, HLD, PVD w claudication, DVT (2001), 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 History obtained from granddaughter at bedside. Pt had an unwitnessed fall around 1100 on 1/13. 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 Pt was wearing socks and slipped. She called her son and was crying and complaining of a HA. Son called EMS. Pt was brought to [facility] [place], [place]. CTH w 8mm R SDH and 5mm L SDH. CT c-spine negative for acute processes. Pt developed non-redirectable agitation, screaming "help me" and trying to get out of the bed. She was not following any commands and SBP was 220. Pt was intubated in the ED and started on propofol. Placed on cardene. Pt admitted to [facility]. On arrival to [facility], pt was on propofol 50, fentanyl 250, and still requiring 5-point restraints to prevent self-extubation. Gave 5mg IV haldol and 2mg ativan and pt fell asleep. Five-hour interval CTH/CTA w stable bl SDH and no vascular abnormality. Possible ASA use, given DDAVP.            Of note, pt lost her , Marques, in 1/2022 and subsequently developed a progressive decline in functional status. Pt's family had concerns for early dementia as pt will become upset thinking about her  and starts to forget things. This decline is now thought to be related to her depression rather than dementia. Pt does perform her ADLs at home alone, but family started looking into options for assistance.            General Precautions: Standard, fall          Social Hx: Patient lives with alone in [place]. Her son " "lives 2 blocks away, but works. Her daughter lives in [place] and watches her grandson.  Pt states she does still drive, but only goes to a loca restaurant right down the street and the neighborhood grocery store. Pt no longer cooks.     Subjective:  "I do find I have a tendency to be more forgetful." Pt reported increased difficulty with memory since incurring SDH. She also reports occasional feelings of depression and sadness at home associated with loneliness. SLP inquired whether pt has considered moving to a senior living or assisted living facility.  Pt then stated that she does she her friends her age fairly regularly.       Objective:              Cognitive Status:    Behavioral Observations: alert, appropriate, and cooperative-  Memory and Orientation: Pt was O x 4. She answered 2 out of 3 general recall q's. Pt immediately recalled series of 3, 4, and 5 digits correctly. Following a 2 minute delay, pt recalled 3/3 unrelated words given mod cues (1/3 IND).   Attention: pt initiating conversation frequently and needing redirection to tx tasks.  Problem Solving: Pt provided solutions to problems appropriately. She generated multiple causes for a hypothetical problem with good ability.      Auditory Comprehension: answers complex yes/no questions and was able to follow 2- and 3-step commands with 100% accuracy.     Verbal Expression: conversational speech Pt talkative and initiating conversation about simple and complex information, thoughts, and ideas. During a word fluency task, pt listed 7 items in a category within one minute given min cues (15-20 is wnl).     Motor Speech:  wfl    Voice:  wfl    Reading:  tba - pt reports enjoying reading     Writing:  tba    Visual-Spatial: tba    Additional Treatment:  Education provided to pt and friend regarding role of SLP, purpose of speech/language/cognitive evaluation, aspects of cognition, impacts of SDH, memory limitations, memory strategies, and " recommendations to continue SLP service for cognitive tx at this time.  Pt demonstrated understanding of education provided, but will benefit from continued reinforcement.     Assessment:  Kanchan Downing is a 83 y.o. female with a medical diagnosis of Traumatic subdural hematoma (SDH) and presents with cognitive-linguistic deficits.      Discharge recommendations:       Goals:   Multidisciplinary Problems       SLP Goals          Problem: SLP    Goal Priority Disciplines Outcome   SLP Goal     SLP    Description: Speech Language Pathology Goals  Goals expected to be met by 1/30:  1. Pt will recall general information with 80% accuracy given min cues.   2. Following a 2 minute delay, pt will recall 3/3 novel items with min cues.   3. Pt will list an average of 9 items in a category in one minute given min cues.   4. Pt will complete mental manipulation tasks with 80% accuracy given min-mod cues.   5. Pt will participate in assessment of reading, writing, and visual spatial abilities.                                 Plan:   Patient to be seen Therapy Frequency: 3 x/week   Planned Interventions: Cognitive-Linguistic Therapy  Plan of Care expires: 02/22/24  Plan of Care reviewed with: patient, friend  SLP Follow-up?: Yes  SLP - Next Visit Date: 01/24/24 01/23/2024

## 2024-01-23 NOTE — PT/OT/SLP PROGRESS
Physical Therapy Treatment    Patient Name:  Kanchan Downing   MRN:  [id number]  Admit Date: 1/20/2024  Admitting Diagnosis: Traumatic subdural hematoma (SDH)  Recent Surgeries: N/A    General Precautions: Standard, aspiration, fall (Avasys camera in place)  Orthopedic Precautions: N/A  Braces: N/A    Recommendations:     Discharge Recommendations: home health PT  Level of Assistance Recommended at Discharge: Intermittent assistance   Discharge Equipment Recommendations: none  Barriers to discharge: Decreased caregiver support    Assessment:     Kanchan Downing is a 83 y.o. female admitted with a medical diagnosis of Traumatic subdural hematoma (SDH) . Performance deficits affecting function: weakness, impaired endurance, impaired self care skills, impaired functional mobility, gait instability, impaired balance, decreased upper extremity function, decreased lower extremity function, impaired cardiopulmonary response to activity, decreased safety awareness.  Pt will continue to benefit from skilled PT services during this hospital admit to continue to improve transfer ability and efficiency as well as continue to progress pt's ambulation distance and cardiopulmonary endurance to maximize pt's functional independence and return to PLOF.        Rehab Potential is good    Activity Tolerance: Good    Plan:     Patient to be seen 5 x/week to address the above listed problems via gait training, therapeutic activities, therapeutic exercises, neuromuscular re-education, wheelchair management/training    Plan of Care Expires: 02/21/24  Plan of Care Reviewed with: patient    Subjective     Pt. Agreeable to work with PT.     Pain/Comfort:  Pain Rating 1: 0/10  Pain Rating Post-Intervention 1: 0/10    Patient's cultural, spiritual, Spiritism conflicts given the current situation:  no    Objective:     Communicated with pt's nurse prior to session.  Patient found up in chair with   upon PT entry to room.     Therapeutic Activities  and Exercises: LBEx 15mins to help improve B l/E MMT and endurance.    Functional Mobility:  Transfers:     Sit to Stand:  contact guard assistance with rolling walker d.t pt with low w/c seat  Gait: 300ft x 2 trials with rollator and CGA d.t slight lateral instability, however pt with no LOB episodes. Pt needed a seated rest break d.t fatigue.  Stairs:  Pt ascended/descended 4 stair(s) with No Assistive Device with bilateral handrails with Contact Guard Assistance. Pt ascended with alternating Gt pattern and step to GT pattern descending stairs.    AM-PAC 6 CLICK MOBILITY  18    Patient left up in chair with call button in reach.    GOALS:   Multidisciplinary Problems       Physical Therapy Goals          Problem: Physical Therapy    Goal Priority Disciplines Outcome Goal Variances Interventions   Physical Therapy Goal     PT, PT/OT Ongoing, Progressing     Description: Goals to be met by: 2/22/24     Patient will increase functional independence with mobility by performing:    . Supine to sit with [place]  . Sit to supine with [place]  . Rolling to Left and Right with [place].  . Sit to stand transfer with [place]  . Bed to chair transfer with Modified [place] using Rolling Walker  . Gait  x 150 feet with Supervision using Rolling Walker or LRAD  . Wheelchair propulsion x150 feet with Modified [place] using bilateral uppper extremities  . Ascend/descend 12 stair with bilateral Handrails Stand-by Assistance using No Assistive Device.   . Ascend/Descend 4 inch curb step with Supervision using Rolling Walker.                         Time Tracking:     PT Received On: 01/23/24  PT Start Time: 0840  PT Stop Time: 0925  PT Total Time (min): 45 min    Billable Minutes: Gait Training 30 and Therapeutic Exercise 15    Treatment Type: Treatment  PT/PTA: PT     Number of PTA visits since last PT visit: 0     01/23/2024

## 2024-01-24 LAB
POCT GLUCOSE: 105 MG/DL (ref 70–110)
POCT GLUCOSE: 120 MG/DL (ref 70–110)
POCT GLUCOSE: 141 MG/DL (ref 70–110)
POCT GLUCOSE: 84 MG/DL (ref 70–110)

## 2024-01-24 PROCEDURE — 25000003 PHARM REV CODE 250: Mod: HCNC | Performed by: NURSE PRACTITIONER

## 2024-01-24 PROCEDURE — 97530 THERAPEUTIC ACTIVITIES: CPT | Mod: HCNC,CQ

## 2024-01-24 PROCEDURE — 25000003 PHARM REV CODE 250: Mod: HCNC | Performed by: HOSPITALIST

## 2024-01-24 PROCEDURE — 97110 THERAPEUTIC EXERCISES: CPT | Mod: HCNC

## 2024-01-24 PROCEDURE — 97110 THERAPEUTIC EXERCISES: CPT | Mod: HCNC,CQ

## 2024-01-24 PROCEDURE — 97530 THERAPEUTIC ACTIVITIES: CPT | Mod: HCNC

## 2024-01-24 PROCEDURE — 11000004 HC SNF PRIVATE: Mod: HCNC

## 2024-01-24 PROCEDURE — 97129 THER IVNTJ 1ST 15 MIN: CPT | Mod: HCNC

## 2024-01-24 PROCEDURE — 97535 SELF CARE MNGMENT TRAINING: CPT | Mod: HCNC

## 2024-01-24 RX ADMIN — Medication 6 MG: at 09:01

## 2024-01-24 RX ADMIN — METOPROLOL TARTRATE 50 MG: 50 TABLET, FILM COATED ORAL at 09:01

## 2024-01-24 RX ADMIN — MICONAZOLE NITRATE: 20 OINTMENT TOPICAL at 09:01

## 2024-01-24 RX ADMIN — FLUOXETINE 10 MG: 10 CAPSULE ORAL at 09:01

## 2024-01-24 RX ADMIN — CETIRIZINE HYDROCHLORIDE 10 MG: 5 TABLET, FILM COATED ORAL at 09:01

## 2024-01-24 RX ADMIN — SENNOSIDES AND DOCUSATE SODIUM 1 TABLET: 8.6; 5 TABLET ORAL at 09:01

## 2024-01-24 RX ADMIN — ACETAMINOPHEN 650 MG: 325 TABLET ORAL at 09:01

## 2024-01-24 RX ADMIN — PRAVASTATIN SODIUM 20 MG: 20 TABLET ORAL at 09:01

## 2024-01-24 NOTE — PLAN OF CARE
Interdisciplinary team, Madison Griffith, RN Charge Nurse, Joanie Rivera, , Chandrika El, PT, Rehab, Joanie Cotto, Activities, and Justa Jones, Dietician, spoke to patient and patient's son, Johan, for care plan conference, weekly status update, and therapy progress update. Tentative discharge date set for 2/5/24.
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
## 2024-01-24 NOTE — PT/OT/SLP PROGRESS
"Physical Therapy Treatment    Patient Name:  Kanchan Downing   MRN:  [id number]  Admit Date: 1/20/2024  Admitting Diagnosis: Traumatic subdural hematoma (SDH)  Recent Surgeries: N/A    General Precautions: Standard, aspiration, fall (Avasys camera in place)  Orthopedic Precautions: N/A  Braces: N/A    Recommendations:     Discharge Recommendations: home health PT  Level of Assistance Recommended at Discharge: Intermittent assistance   Discharge Equipment Recommendations: none  Barriers to discharge: Decreased caregiver support    Assessment:     Kanchan Downing is a 83 y.o. female admitted with a medical diagnosis of Traumatic subdural hematoma (SDH) .   Pt was agreeable and tolerated session well. Pt ambulated from her room to therapy gym with CGA and rollator. No LOB noted, but occasional cues for better posture and positioning with rollator. Pt required cues to safety awareness especially to lock rollator prior to standing up and sitting down. Patient continues to demonstrate the need for therapy on a scheduled basis exhibited by decreased independence with functional mobility    Performance deficits affecting function: weakness, impaired endurance, impaired self care skills, impaired functional mobility, gait instability, impaired balance, decreased upper extremity function, decreased lower extremity function, impaired cardiopulmonary response to activity, decreased safety awareness.    Rehab Potential is good    Activity Tolerance: Good    Plan:     Patient to be seen 5 x/week to address the above listed problems via gait training, therapeutic activities, therapeutic exercises, neuromuscular re-education, wheelchair management/training    Plan of Care Expires: 02/21/24  Plan of Care Reviewed with: patient    Subjective     "I've have [rollator] for years".     Pain/Comfort:  Pain Rating 1: 0/10  Pain Rating Post-Intervention 1: 0/10    Patient's cultural, spiritual, Orthodoxy conflicts given the current " situation:  no    Objective:     Patient found HOB elevated with  ([email or web address] telesitter) upon PT entry to room.     Therapeutic Activities and Exercises:   Seated BLE therex x10 reps: ankle pumps, LAQ, marching, hip abd/add with pillow, and hamstring curl with YTB  Patient educated on role of therapy, goals of session, and benefits of out of bed mobility.   Instructed on use of call button and importance of calling nursing staff for assistance with mobility   Questions/concerns addressed within PTA scope of practice  Pt verbalized understanding.    Functional Mobility:  Bed Mobility:   Scooting to EOB: stand by assistance  Supine to Sit: stand by assistance; HOB elevated  Increase time to complete   Transfers:   Sit <> Stand Transfer: contact guard assistance with  rollator    Cues to lock and unlock rollator.   Toilet Transfer: contact guard assistance with  rollator  using Step Transfer technique   Gait:  Pt ambulated ~265 ft with contact guard assistance and  rollator .  no rest breaks & no LOB  Gait Deviation(s): slight flexed posture with decrease step length and downward gaze   Verbal/tactile cues for keep rollator close and upright posture.     AM-PAC 6 CLICK MOBILITY  18    Patient left  up in chair in therapy gym  with  OT present for direct hand-off    GOALS:   Multidisciplinary Problems       Physical Therapy Goals          Problem: Physical Therapy    Goal Priority Disciplines Outcome Goal Variances Interventions   Physical Therapy Goal     PT, PT/OT Ongoing, Progressing     Description: Goals to be met by: 2/22/24     Patient will increase functional independence with mobility by performing:    . Supine to sit with [place]  . Sit to supine with [place]  . Rolling to Left and Right with [place].  . Sit to stand transfer with [place]  . Bed to chair transfer with Modified [place] using Rolling Walker  . Gait  x 150 feet with Supervision using Rolling Walker or LRAD  . Wheelchair  propulsion x150 feet with Modified [place] using bilateral uppper extremities  . Ascend/descend 12 stair with bilateral Handrails Stand-by Assistance using No Assistive Device.   . Ascend/Descend 4 inch curb step with Supervision using Rolling Walker.                         Time Tracking:     PT Received On: 01/24/24  PT Start Time: 1549  PT Stop Time: 1612  PT Total Time (min): 23 min    Billable Minutes: Therapeutic Activity 12 and Therapeutic Exercise 11    Treatment Type: Treatment  PT/PTA: PTA     Number of PTA visits since last PT visit: 1 01/24/2024

## 2024-01-24 NOTE — H&P
"Hospital Medicine  Skilled Nursing Facility   History and Physical Exam      Date of Service: 01/24/2024      Patient Name: Kanchan Downing  MRN: [id number]  Admission Date: 1/20/2024  Attending Physician: Vick Talavera MD  Primary Care Provider: Viktoria Mckeon MD  Code Status: Full Code      Principal problem:  Traumatic subdural hematoma (SDH)      Chief Complaint:   Chief Complaint   Patient presents with    Fall     Admitted to OS for rehabilitation following hospital stay for fall resulting in left subdural hematoma.           HPI:   "Mrs. 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  Admission to SNF for secondary weakness and debility.     Patient originally presented to [facility] ED on 1/13 after suffering a mechanical fall.  Per [facility] notes, History obtained from granddaughter at bedside. Pt had an unwitnessed fall around 1100 on 1/13. 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 Pt was wearing socks and slipped. She called her son and was crying and complaining of a HA. Son called EMS. Pt was brought to [facility]. CTH w 8mm R SDH and 5mm L SDH. Pt developed non-redirectable agitation, screaming "help me" and trying to get out of the bed. She was not following any commands and SBP was 220. Pt was intubated in the ED. Pt admitted to [facility]. On arrival to N[facility]U, pt was on propofol 50, fentanyl 250, and still requiring 5-point restraints to prevent self-extubation. Gave 5mg IV haldol and 2mg ativan and pt fell asleep. Five-hour interval CTH/CTA w stable bl SDH and no vascular abnormality. Of note, pt lost her , Marques, in 1/2022 and " "subsequently developed a progressive decline in functional status. Pt's family had concerns for early dementia as pt will become upset thinking about her  and starts to forget things. This decline is now thought to be related to her depression rather than dementia. Pt does perform her ADLs at home alone, but family started looking into options for assistance.  Patient was extubated on 01/14.  She was step-down Hospital Medicine on 01/15. Interval CTH 1/14: stable, s/p DDAVP. continue Keppra 500 BID x7 days. Per neurosurgery, hold ASA for 2 weeks and follow up in Neurosurgery clinic with repeat CT head       Today, patient patient appears to be at her mental baseline which is intermittent confusion, she is oriented x3 but displays impaired higher level thinking.  She reports intermittent headaches that have been improving.  Patient denies any Visual disturbances.  Last bowel movement was on 01/18, patient is agreeable to a suppository." Per Jesica Sidhu NP on 1/22/24.     She is doing better and currently playing [employer] with her son's mother-in-law who also visits her some at home. She has intermittent headache and says that she hears a phone ringing at times. Her visitor does not hear the phone ringing when she says that she is hearing it. She is eating okay. Denies any abdominal pain or nausea. She is working well therapy and walked 265 feet with CGA using her rollator.     Patient admitted with skilled services with PT and OT to improve functional status and ability to perform ADLs.       Past Medical History:   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, without long-term current use of insulin 10/31/2017    Controlled type 2 diabetes " mellitus with circulatory disorder, without long-term current use of insulin 10/31/2017    Diabetes mellitus type 2 without retinopathy 06/12/2014    6% metformin 500 bid, FU+ 2016    DVT (deep venous thrombosis) 2001    R , Dr Bonilla    Grief 04/06/2022     Marques passed 1/2022    Hyperlipidemia     LDL 82    Hyperlipidemia associated with type 2 diabetes mellitus 09/11/2012    Hypertension     Hypertension associated with diabetes 09/11/2012    Memory loss 12/29/2022    CT chronic changes 2022    Microalbuminuria due to type 2 diabetes mellitus 12/08/2015    taking lisinopril, losartan caused olivia effects    PVD (peripheral vascular disease) with claudication 05/02/2019    Compression hose    Risk for coronary artery disease greater than 20% in next 10 years per [place] score 05/01/2018    Strabismus     Type 2 diabetes mellitus with diabetic polyneuropathy 06/12/2014    Type 2 diabetes mellitus with diabetic polyneuropathy, without long-term current use of insulin 06/12/2014       Past Surgical History:   Past Surgical History:   Procedure Laterality Date    BREAST BIOPSY Right 2011    BREAST LUMPECTOMY Right 2011    MO REMOVAL OF NAIL BED  6/10/2015    TONSILLECTOMY         Social History:   Tobacco Use    Smoking status: Former     Current packs/day: 7.00     Average packs/day: 7.0 packs/day for 0.5 years (3.5 ttl pk-yrs)     Types: Cigarettes    Smokeless tobacco: Never   Substance and Sexual Activity    Alcohol use: No    Drug use: Never    Sexual activity: Never       Family History:   Family History       Problem Relation (Age of Onset)    Breast cancer Sister    Cataracts Mother    Heart disease Mother (58), Father (50)    No Known Problems Brother, Maternal Aunt, Maternal Uncle, Paternal Aunt, Paternal Uncle, Maternal Grandmother, Maternal Grandfather, Paternal Grandmother, Paternal Grandfather    Thyroid disease Sister            No current facility-administered medications on file prior to  encounter.     Current Outpatient Medications on File Prior to Encounter   Medication Sig    amLODIPine (NORVASC) 5 MG tablet Take 1 tablet (5 mg total) by mouth once daily.    blood sugar diagnostic (BLOOD GLUCOSE TEST) Strp 1 strip by Misc.(Non-Drug; Combo Route) route 2 (two) times daily.    cetirizine (ZYRTEC) 10 MG tablet Take 10 mg by mouth once daily.    flash glucose scanning reader (FREESTYLE FLOWER 2 READER) Misc Continuous glucose reader    flash glucose sensor (FREESTYLE FLOWER 2 SENSOR) Kit Continuous glucose monitor    FLUoxetine 10 MG capsule Take 1 capsule (10 mg total) by mouth once daily.    lancets Misc Test tid    levETIRAcetam (KEPPRA) 500 MG Tab Take 1 tablet (500 mg total) by mouth 2 (two) times daily. for 7 days    lisinopriL (PRINIVIL,ZESTRIL) 5 MG tablet Take 1 tablet (5 mg total) by mouth once daily.    metoprolol tartrate (LOPRESSOR) 50 MG tablet Take 1 tablet (50 mg total) by mouth 2 (two) times daily.    simvastatin (ZOCOR) 10 MG tablet Take 1 tablet (10 mg total) by mouth every evening.       Allergies:   Review of patient's allergies indicates:   Allergen Reactions    Sulfa (sulfonamide antibiotics)      Other reaction(s): Rash       ROS:  Review of Systems   Constitutional:  Negative for appetite change and fever.   Respiratory:  Negative for cough and shortness of breath.    Cardiovascular:  Negative for chest pain and palpitations.   Gastrointestinal:  Negative for abdominal pain, nausea and vomiting.   Genitourinary:  Negative for difficulty urinating and dysuria.   Musculoskeletal:  Negative for arthralgias and back pain.   Neurological:  Positive for weakness and headaches (occasional). Negative for dizziness and light-headedness.   Psychiatric/Behavioral:  Negative for sleep disturbance. The patient is not nervous/anxious.      Objective:  Temp:  [98.3 °F (36.8 °C)-98.7 °F (37.1 °C)]   Pulse:  [60-72]   Resp:  [18]   BP: (136-137)/(62)   SpO2:  [95 %-96 %]     Body mass index is  23.46 kg/m².    Physical Exam  Vitals and nursing note reviewed.   Constitutional:       General: She is not in acute distress.     Appearance: She is well-developed.   Cardiovascular:      Rate and Rhythm: Normal rate and regular rhythm.      Heart sounds: S1 normal and S2 normal. No murmur heard.  Pulmonary:      Effort: Pulmonary effort is normal. No respiratory distress.      Breath sounds: Normal breath sounds. No wheezing or rales.   Abdominal:      General: Bowel sounds are normal. There is no distension.      Palpations: Abdomen is soft.      Tenderness: There is no abdominal tenderness.   Musculoskeletal:         General: No tenderness.      Right lower leg: No edema.      Left lower leg: No edema.      Comments: Arthritic changes in hands   Skin:     General: Skin is warm and dry.      Findings: Bruising present.   Neurological:      Mental Status: She is alert and oriented to person, place, and time.   Psychiatric:         Mood and Affect: Mood normal.         Behavior: Behavior normal. Behavior is cooperative.         Thought Content: Thought content normal.       Significant Labs:   A1C:   Recent Labs   Lab 01/13/24  1249   HGBA1C 5.7*     POCT Glucose:   Recent Labs   Lab 01/23/24  2003 01/24/24  0701 01/24/24  1112   POCTGLUCOSE 160* 120* 84     TSH:   Recent Labs   Lab 01/13/24  1210   TSH 2.483     CBC:  Recent Labs   Lab 01/22/24  0439   WBC 7.14   HGB 11.8*   HCT 37.4      MCV 92     BMP:  Recent Labs   Lab 01/22/24  0439   *      K 4.4      CO2 22*   BUN 19   CREATININE 0.8   CALCIUM 9.3   MG 1.8   PHOS 3.6       Significant Imaging: I have reviewed all pertinent imaging results/findings completed during prior hospitalization.      Assessment and Plan:  Active Diagnoses:    Diagnosis Date Noted POA    PRINCIPAL PROBLEM:  Traumatic subdural hematoma (SDH) [S06.5XAA] 01/22/2024 Yes    History of fall [Z91.81] 05/19/2022 Not Applicable    BANDAR (generalized anxiety disorder)  [F41.1] 05/04/2022 Yes    Type 2 diabetes mellitus with diabetic polyneuropathy, without long-term current use of insulin [E11.42] 06/12/2014 Yes    Hyperlipidemia associated with type 2 diabetes mellitus [E11.69, E78.5] 09/11/2012 Yes      Problems Resolved During this Admission:       Traumatic subdural hematoma  Vasogenic cerebral edema  History of fall  - CTH with 8mm right SDH and 5 mm left SDH   - Interval 5h CTA stable; no vascular abnormality, Follow-up CTH stable  -SBP goal < 160  - Stopped Keppra BID as she completed a 7 day course for seizure prophylaxis.   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, adjuvant XRT, and endocrine therapy x 5 yrs, R breast DCIS  2/2023, pt declined surgical intervention at that time      BANDAR (generalized anxiety disorder)  - Continue fluoxetine 10 mg daily     PVD (peripheral vascular disease) with claudication  Hyperlipidemia associated with type 2 diabetes mellitus   - Continue pravastatin 20 mg qHS in place of non formulary home simvastatin     Seasonal allergies  - continue home cetirizine 10 mg  daily     Debility   - Continue with PT/OT for gait training and strengthening and restoration of ADL's   - Encourage mobility, OOB in chair, and early ambulation as appropriate  - Fall precautions   - Monitor for bowel and bladder dysfunction  - Monitor for and prevent skin breakdown and pressure ulcers  - Continue DVT prophylaxis with frequent ambulation          IP OHS RISK OF UNPLANNED READMISSION Model: Moderate      Anticipated Disposition:  Home with home health      Future Appointments   Date Time Provider Department Center   2/1/2024  2:15 PM [facility] OI-CT1 [address] LIMIT [facility]   2/1/2024  3:00 PM Cheryl Arizmendi PA-C [facility] [id number]   3/14/2024  1:20 PM Viktoria Mckeon MD [facility]       I certify that SNF services are required to be given on an inpatient basis because Kanchan Downing needs for skilled nursing care and/or skilled rehabilitation are required on a daily basis and such services can only practically be provided in a skilled nursing facility setting and are for an ongoing condition for which she received inpatient care in the hospital.       Vick Talavera MD  Department of Hospital Medicine   [facility] - Skilled Nursing

## 2024-01-24 NOTE — PLAN OF CARE
Problem: Occupational Therapy  Goal: Occupational Therapy Goal  Description: Goals to be met by: [id number]     Patient will increase functional independence with ADLs by performing:    UE Dressing with [place].  LE Dressing with [place].  Grooming while standing with [place].  Toileting from toilet with [place] for hygiene and clothing management.   Bathing from  shower chair/bench with [place].  Step transfer with [place]  Toilet transfer to toilet with [place].                Outcome: Ongoing, Progressing

## 2024-01-24 NOTE — PT/OT/SLP PROGRESS
Occupational Therapy   Treatment    Name: Kanchan Downing  MRN: [id number]  Admit Date: 1/20/2024  Admitting Diagnosis:  Traumatic subdural hematoma (SDH)    General Precautions: Standard, fall   Orthopedic Precautions: N/A   Braces: N/A    Recommendations:     Discharge Recommendations:  home health OT  Level of Assistance Recommended at Discharge: 24 hours light assistance for ADL's and homemaking tasks  Discharge Equipment Recommendations: none  Barriers to discharge:  None    Assessment:     Kanchan Downing is a 83 y.o. female with a medical diagnosis of Traumatic subdural hematoma (SDH) .  Pt tolerated treatment well without incident. Therapy session focused on therapeutic exercises to improve strength and endurance required for improved ADL and functional mobility performance. Pt is progressing towards goals, however would benefit from continued skilled OT for improved coordination, strength, and activity tolerance neccessary for safe ADL completion  to maximize QOL and functional independence. She presents with deficits listed below. Performance deficits affecting function are weakness, impaired endurance, impaired self care skills, impaired functional mobility, gait instability, impaired balance, impaired cognition, decreased coordination, decreased safety awareness, decreased lower extremity function, decreased upper extremity function.     Rehab Potential is good    Activity tolerance:  Good    Plan:     Patient to be seen 5 x/week to address the above listed problems via self-care/home management, therapeutic activities, therapeutic exercises, neuromuscular re-education    Plan of Care Expires: 02/15/24  Plan of Care Reviewed with: patient, grandchild(ravi), friend    Subjective     Communicated with: Pt agreeable to  OT this date .    Pain/Comfort:  Pain Rating 1: 0/10  Pain Rating Post-Intervention 1: 0/10    Patient's cultural, spiritual, Baptism conflicts given the current situation:  no    Objective:      Patient found  up in chair with PTA  with  (no active lines) seated in gym.    Bed Mobility:    Patient completed Sit to Supine with contact guard assistance with Leg lift to bed.     Functional Mobility/Transfers:  Patient completed Sit <> Stand Transfer with contact guard assistance  with  rollator  x 2 trials. Pt requiring cues for to lock brakes of Rollator and proper hand placement for transfers to reduce risk of falls during transitions from sit<>stand and stand <>sit  Functional Mobility: Pt walked 160' with CGA and Rollator. Pt noted to have 1x standing rest break.     Activities of Daily Living:  completed prior to OT arrival.     [facility] 6 Click ADL: 18    OT Exercises: UE Ergometer 10 min with Min resistance. Pt reporting fatigue from previous PT session, requesting to return to room to visit with family.       Treatment & Education:  Role of OT and OT POC  Fall Prevention/Safety Awareness Training - RW management during functional mobility to decrease risk of falls  Educated on OOB activity and impact on increasing functional independence.  Educated on importance of progressive mobilization to increase strength and endurance.      Patient left HOB elevated with call button in reach and granddaughter and friend  present    GOALS:   Multidisciplinary Problems       Occupational Therapy Goals          Problem: Occupational Therapy    Goal Priority Disciplines Outcome Interventions   Occupational Therapy Goal     OT, PT/OT Ongoing, Progressing    Description: Goals to be met by: 2/[id number]     Patient will increase functional independence with ADLs by performing:    UE Dressing with [place].  LE Dressing with [place].  Grooming while standing with [place].  Toileting from toilet with [place] for hygiene and clothing management.   Bathing from  shower chair/bench with [place].  Step transfer with [place]  Toilet transfer to toilet with [place].                                      Time Tracking:     OT Date of Treatment: 01/24/24  OT Start Time: 1613    OT Stop Time: 1643  OT Total Time (min): 30 min    Billable Minutes:Therapeutic Activity 15  Therapeutic Exercise 15    1/24/2024

## 2024-01-24 NOTE — PLAN OF CARE
Problem: Adult Inpatient Plan of Care  Goal: Plan of Care Review  Outcome: Ongoing, Progressing  Goal: Patient-Specific Goal (Individualized)  Outcome: Ongoing, Progressing  Goal: Absence of Hospital-Acquired Illness or Injury  Outcome: Ongoing, Progressing  Goal: Optimal Comfort and Wellbeing  Outcome: Ongoing, Progressing  Intervention: Provide Person-Centered Care  Flowsheets (Taken 1/23/2024 2150)  Trust Relationship/Rapport:   care explained   choices provided   questions encouraged   questions answered

## 2024-01-24 NOTE — CARE UPDATE
SS extended pt's date to 2/5 per Eusebia, her daughter.     Followed up with Johan, (on IDT call) on his need for non medical home assistance and to inform him of the date change.     Left him a message.

## 2024-01-24 NOTE — PROGRESS NOTES
[facility]                                  Skilled Nursing Facility                   Progress Note     Admit Date: 1/20/2024  BRIDGETTE 2/5/2024  [id number]  Principal Problem:  Traumatic subdural hematoma (SDH)   HPI obtained from patient interview and chart review     Chief Complaint:Re-evaluation of medical treatment and therapy status      HPI:   Mrs. 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  Admission to SNF for secondary weakness and debility.    Interval history:   24 hr vital sign ranges reviewed and listed below.  Patient denies shortness of breath, abdominal discomfort, nausea, or vomiting.  Patient continues to endorse intermittent headaches.  Patient reports an adequate appetite.  Patient denies dysuria.  Patient reports having regular bowel movements.  Patient progessing with PT/OT-Gait: 300ft x 2 trials with rollator and CGA d.t slight lateral instability, however pt with no LOB episodes. Pt needed a seated rest break d.t fatigue. Continuing to follow and treat all acute and chronic conditions.    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), Atherosclerosis of aorta (06/08/2016), Breast cancer (2011), Cataract, Controlled type 2 diabetes mellitus with circulatory disorder, without long-term current use of insulin (10/31/2017), Controlled type 2 diabetes mellitus with circulatory disorder, without long-term current use of insulin (10/31/2017), Diabetes mellitus type 2 without retinopathy (06/12/2014), DVT  (deep venous thrombosis) (2001), Grief (04/06/2022), Hyperlipidemia, Hyperlipidemia associated with type 2 diabetes mellitus (09/11/2012), Hypertension, Hypertension associated with diabetes (09/11/2012), Memory loss (12/29/2022), Microalbuminuria due to type 2 diabetes mellitus (12/08/2015), PVD (peripheral vascular disease) with claudication (05/02/2019), Risk for coronary artery disease greater than 20% in next 10 years per [place] score (05/01/2018), Strabismus, Type 2 diabetes mellitus with diabetic polyneuropathy (06/12/2014), and Type 2 diabetes mellitus with diabetic polyneuropathy, without long-term current use of insulin (06/12/2014).    Past Surgical History: Patient has a past surgical history that includes Tonsillectomy; pr removal of nail bed (6/10/2015); Breast biopsy (Right, 2011); and Breast lumpectomy (Right, 2011).    Social History: Patient reports that she has quit smoking. She has a 3.5 pack-year smoking history. She has never used smokeless tobacco. She reports that she does not drink alcohol and does not use drugs.    Family History: family history includes Breast cancer in her sister; Cataracts in her mother; Heart disease (age of onset: 50) in her father; Heart disease (age of onset: 58) in her mother; No Known Problems in her brother, maternal aunt, maternal grandfather, maternal grandmother, maternal uncle, paternal aunt, paternal grandfather, paternal grandmother, and paternal uncle; Thyroid disease in her sister.    Allergies: Patient is allergic to sulfa (sulfonamide antibiotics).    ROS  Constitutional: Negative for fever   Eyes: Negative for blurred vision, double vision   Respiratory: Negative for cough, shortness of breath   Cardiovascular: Negative for chest pain, palpitations, and leg swelling.   Gastrointestinal: Negative for abdominal pain, diarrhea, nausea, vomiting.  +constipation  Genitourinary: Negative for dysuria, frequency   Musculoskeletal:  + generalized weakness.  "Negative for back pain and myalgias.   Skin: Negative for itching and rash.   Neurological: Negative for dizziness. + intermittent headaches.   Psychiatric/Behavioral: Negative for depression. The patient is not nervous/anxious.      24 hour Vital Sign Range   Temp:  [98.3 °F (36.8 °C)-98.7 °F (37.1 °C)]   Pulse:  [60-72]   Resp:  [18]   BP: (136-137)/(62)   SpO2:  [95 %-96 %]     Current BMI: Body mass index is 23.46 kg/m².    PEx  Constitutional: Patient appears debilitated.  No distress noted  HENT:   Head: Normocephalic and atraumatic.   Eyes: Pupils are equal, round  Neck: Normal range of motion. Neck supple.   Cardiovascular: Normal rate, regular rhythm and normal heart sounds.    Pulmonary/Chest: Effort normal and breath sounds are clear  Abdominal: Soft. Bowel sounds are normal.   Musculoskeletal: Normal range of motion.   Neurological: Alert and oriented to person, place, and time.  Impaired higher level thinking  Psychiatric: Normal mood and affect. Behavior is normal.   Skin: Skin is warm and dry.     No results for input(s): "GLUCOSE", "NA", "K", "CL", "CO2", "BUN", "CREATININE", "CALCIUM", "MG" in the last 24 hours.    No results for input(s): "WBC", "RBC", "HGB", "HCT", "PLT", "MCV", "MCH", "MCHC" in the last 24 hours.    Recent Labs   Lab 01/23/24  0719 01/23/24  1108 01/23/24  1551 01/23/24 2003 01/24/24  0701 01/24/24  1112   POCTGLUCOSE 106 145* 87 160* 120* 84        Assessment and Plan:    Traumatic subdural hematoma  Vasogenic cerebral edema  History of fall  - CTH w 8mm R SDH and 5mm L SDH   - Interval 5h CTA stable; no vascular abnormality, Follow-up CTH stable  -SBP goal < 160  - Continue home amlo/lisinopril/metoprolol  - discontinuing Keppra BID as patient has completed over a 7 day course  - PT/ OT/ SLP     Type 2 diabetes mellitus with diabetic polyneuropathy, without long-term current use of insulin  - last A1c 5.7 on 01/13/2024  - Accu-Donald AC/HS  - home regimen with metformin 500 mg " daily  - stable, continue SSI prn     Hypertension associated with diabetes  - SBP goal < 160  - home regimen with HCTZ 12.5 mg daily, amlodipine 5 mg daily, lisinopril 5 mg daily, metoprolol 50 mg BID  - continue metoprolol 50 mg BID holding parameters in place, continue to hold amlodipine, lisinopril, HCTZ    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, adjuvant XRT, and endocrine therapy x 5 yrs, R breast DCIS  2/2023, pt declined surgical intervention at that time     BANDAR (generalized anxiety disorder)  - stable, continue fluoxetine 10 mg daily     PVD (peripheral vascular disease) with claudication  Hyperlipidemia associated with type 2 diabetes mellitus   - stable, continue pravastatin 20 mg qHS in place of non formulary home simvastatin    Seasonal allergies  - continue home cetirizine 10 mg daily    Debility   - Continue with PT/OT for gait training and strengthening and restoration of ADL's   - Encourage mobility, OOB in chair, and early ambulation as appropriate  - Fall precautions   - Monitor for bowel and bladder dysfunction  - Monitor for and prevent skin breakdown and pressure ulcers  - Continue DVT prophylaxis with frequent ambulation      Anticipate disposition:  Home with home health    IP [facility] RISK OF UNPLANNED READMISSION Model: HIGH MODERATE LOW    Follow-up needed during SNF stay-    Follow-up needed after discharge from SNF: Darius REYNOLDS 2/1    Future Appointments   Date Time Provider Department Center   2/1/2024  2:15 PM [facility] [address] [facility]   2/1/2024  3:00 PM  Cheryl Arizmendi, ESTEFANIA [facility] [address]   3/14/2024  1:20 PM Viktoria Mckeon MD [facility]       Jesica Sidhu NP  Department of [facility] Medicine   [facility]     DOS: 1/23/2024       Patient note was created using MModal Dictation.  Any errors in syntax or even information may not have been identified and edited on initial review prior to signing this note.

## 2024-01-25 LAB
ANION GAP SERPL CALC-SCNC: 8 MMOL/L (ref 8–16)
BASOPHILS # BLD AUTO: 0.07 K/UL (ref 0–0.2)
BASOPHILS NFR BLD: 1.2 % (ref 0–1.9)
BUN SERPL-MCNC: 12 MG/DL (ref 8–23)
CALCIUM SERPL-MCNC: 9.9 MG/DL (ref 8.7–10.5)
CHLORIDE SERPL-SCNC: 104 MMOL/L (ref 95–110)
CO2 SERPL-SCNC: 26 MMOL/L (ref 23–29)
CREAT SERPL-MCNC: 0.7 MG/DL (ref 0.5–1.4)
DIFFERENTIAL METHOD BLD: ABNORMAL
EOSINOPHIL # BLD AUTO: 0.2 K/UL (ref 0–0.5)
EOSINOPHIL NFR BLD: 2.8 % (ref 0–8)
ERYTHROCYTE [DISTWIDTH] IN BLOOD BY AUTOMATED COUNT: 13.6 % (ref 11.5–14.5)
EST. GFR  (NO RACE VARIABLE): >60 ML/MIN/1.73 M^2
GLUCOSE SERPL-MCNC: 128 MG/DL (ref 70–110)
HCT VFR BLD AUTO: 41.4 % (ref 37–48.5)
HGB BLD-MCNC: 12.9 G/DL (ref 12–16)
IMM GRANULOCYTES # BLD AUTO: 0.06 K/UL (ref 0–0.04)
IMM GRANULOCYTES NFR BLD AUTO: 1 % (ref 0–0.5)
LYMPHOCYTES # BLD AUTO: 1.2 K/UL (ref 1–4.8)
LYMPHOCYTES NFR BLD: 21.4 % (ref 18–48)
MAGNESIUM SERPL-MCNC: 2 MG/DL (ref 1.6–2.6)
MCH RBC QN AUTO: 29 PG (ref 27–31)
MCHC RBC AUTO-ENTMCNC: 31.2 G/DL (ref 32–36)
MCV RBC AUTO: 93 FL (ref 82–98)
MONOCYTES # BLD AUTO: 0.7 K/UL (ref 0.3–1)
MONOCYTES NFR BLD: 12.3 % (ref 4–15)
NEUTROPHILS # BLD AUTO: 3.5 K/UL (ref 1.8–7.7)
NEUTROPHILS NFR BLD: 61.3 % (ref 38–73)
NRBC BLD-RTO: 0 /100 WBC
PHOSPHATE SERPL-MCNC: 3.1 MG/DL (ref 2.7–4.5)
PLATELET # BLD AUTO: 242 K/UL (ref 150–450)
PMV BLD AUTO: 9.7 FL (ref 9.2–12.9)
POCT GLUCOSE: 110 MG/DL (ref 70–110)
POCT GLUCOSE: 117 MG/DL (ref 70–110)
POCT GLUCOSE: 128 MG/DL (ref 70–110)
POCT GLUCOSE: 159 MG/DL (ref 70–110)
POTASSIUM SERPL-SCNC: 4.9 MMOL/L (ref 3.5–5.1)
RBC # BLD AUTO: 4.45 M/UL (ref 4–5.4)
SODIUM SERPL-SCNC: 138 MMOL/L (ref 136–145)
WBC # BLD AUTO: 5.75 K/UL (ref 3.9–12.7)

## 2024-01-25 PROCEDURE — 36415 COLL VENOUS BLD VENIPUNCTURE: CPT | Mod: HCNC | Performed by: HOSPITALIST

## 2024-01-25 PROCEDURE — 25000003 PHARM REV CODE 250: Mod: HCNC | Performed by: NURSE PRACTITIONER

## 2024-01-25 PROCEDURE — 25000003 PHARM REV CODE 250: Mod: HCNC | Performed by: FAMILY MEDICINE

## 2024-01-25 PROCEDURE — 97530 THERAPEUTIC ACTIVITIES: CPT | Mod: HCNC

## 2024-01-25 PROCEDURE — 97116 GAIT TRAINING THERAPY: CPT | Mod: HCNC,CQ

## 2024-01-25 PROCEDURE — 84100 ASSAY OF PHOSPHORUS: CPT | Mod: HCNC | Performed by: HOSPITALIST

## 2024-01-25 PROCEDURE — 25000003 PHARM REV CODE 250: Mod: HCNC | Performed by: HOSPITALIST

## 2024-01-25 PROCEDURE — 97110 THERAPEUTIC EXERCISES: CPT | Mod: HCNC,CQ

## 2024-01-25 PROCEDURE — 83735 ASSAY OF MAGNESIUM: CPT | Mod: HCNC | Performed by: HOSPITALIST

## 2024-01-25 PROCEDURE — 97530 THERAPEUTIC ACTIVITIES: CPT | Mod: HCNC,CQ

## 2024-01-25 PROCEDURE — 97110 THERAPEUTIC EXERCISES: CPT | Mod: HCNC

## 2024-01-25 PROCEDURE — 85025 COMPLETE CBC W/AUTO DIFF WBC: CPT | Mod: HCNC | Performed by: HOSPITALIST

## 2024-01-25 PROCEDURE — 80048 BASIC METABOLIC PNL TOTAL CA: CPT | Mod: HCNC | Performed by: HOSPITALIST

## 2024-01-25 PROCEDURE — 94761 N-INVAS EAR/PLS OXIMETRY MLT: CPT | Mod: HCNC

## 2024-01-25 PROCEDURE — 11000004 HC SNF PRIVATE: Mod: HCNC

## 2024-01-25 RX ADMIN — FLUOXETINE 10 MG: 10 CAPSULE ORAL at 09:01

## 2024-01-25 RX ADMIN — POLYETHYLENE GLYCOL 3350 17 G: 17 POWDER, FOR SOLUTION ORAL at 09:01

## 2024-01-25 RX ADMIN — METOPROLOL TARTRATE 50 MG: 50 TABLET, FILM COATED ORAL at 09:01

## 2024-01-25 RX ADMIN — CETIRIZINE HYDROCHLORIDE 10 MG: 5 TABLET, FILM COATED ORAL at 09:01

## 2024-01-25 RX ADMIN — SENNOSIDES AND DOCUSATE SODIUM 1 TABLET: 8.6; 5 TABLET ORAL at 09:01

## 2024-01-25 RX ADMIN — MICONAZOLE NITRATE: 20 OINTMENT TOPICAL at 10:01

## 2024-01-25 RX ADMIN — ACETAMINOPHEN 650 MG: 325 TABLET ORAL at 10:01

## 2024-01-25 RX ADMIN — PRAVASTATIN SODIUM 20 MG: 20 TABLET ORAL at 09:01

## 2024-01-25 RX ADMIN — Medication 6 MG: at 09:01

## 2024-01-25 RX ADMIN — MICONAZOLE NITRATE: 20 OINTMENT TOPICAL at 09:01

## 2024-01-25 NOTE — PT/OT/SLP PROGRESS
"Physical Therapy Treatment    Patient Name:  Kanchan Downing   MRN:  [id number]  Admit Date: 1/20/2024  Admitting Diagnosis: Traumatic subdural hematoma (SDH)  Recent Surgeries: N/A    General Precautions: Standard, aspiration, fall  Orthopedic Precautions: N/A  Braces: N/A    Recommendations:     Discharge Recommendations: home health PT  Level of Assistance Recommended at Discharge: Intermittent assistance   Discharge Equipment Recommendations: none  Barriers to discharge: Decreased caregiver support    Assessment:     Kanchan Downing is a 83 y.o. female admitted with a medical diagnosis of Traumatic subdural hematoma (SDH) . Pt tolerated well, pt completed all functional mobility with SBA/CGA. Pt was very talkative and required redirecting attention multiple times. Pt would continue to benefit from skilled PT services to improve overall functional mobility, strength and endurance.      Performance deficits affecting function: weakness, impaired endurance, impaired self care skills, impaired functional mobility, gait instability, impaired balance, decreased upper extremity function, decreased lower extremity function, impaired cardiopulmonary response to activity, decreased safety awareness.    Rehab Potential is good    Activity Tolerance: Good    Plan:     Patient to be seen 5 x/week to address the above listed problems via gait training, therapeutic activities, therapeutic exercises, neuromuscular re-education, wheelchair management/training    Plan of Care Expires: 02/21/24  Plan of Care Reviewed with: patient    Subjective     Pt agreeable for PT "Can you ask for Tylenol?".     Pain/Comfort:  Pain Rating 1: 0/10  Pain Rating Post-Intervention 1: 0/10    Patient's cultural, spiritual, Bahai conflicts given the current situation:  no    Objective:      Patient found sitting edge of bed with  (avasys telesitter) upon PT entry to room.     Therapeutic Activities and Exercises: Nustep x 10 min For BLE " strengthening, endurance and ROM. Load 10   SBA/CGA. Pt holding on to outside of // bars    Standing TE: hip ext, hip abd/add, squats, hip flexion, heel raises x 12 reps for BLE strengthening     Patient educated on role of therapy, goals of session, and benefits of out of bed mobility.   Instructed on use of call button and importance of calling nursing staff for assistance with mobility   Questions/concerns addressed within PTA scope of practice  Pt verbalized understanding.    Functional Mobility:  Transfers:     Sit to Stand:  stand by assistance with Rolator. Vc for hand placement  Bed to Chair: stand by assistance with  no AD  using  Stand Pivot  Chair to Nustep: stand by assistance with  no AD  using  Stand Pivot  Gait: pt amb >300 ft with Rolator, vc for upright posture  Stairs:  Pt ascended/descended 4 stair(s) with No Assistive Device with bilateral handrails with Stand-by Assistance.   Wheelchair Propulsion:  Pt propelled Light weight wheelchair x ~30 feet on Level tile with  Bilateral upper extremity with Supervision or Set-up Assistance. Propulsion stopped due to veering left abruptly due to wheelchair issues.    AM-PAC 6 CLICK MOBILITY  18    Patient left up in chair with  PT tech .    GOALS:   Multidisciplinary Problems       Physical Therapy Goals          Problem: Physical Therapy    Goal Priority Disciplines Outcome Goal Variances Interventions   Physical Therapy Goal     PT, PT/OT Ongoing, Progressing     Description: Goals to be met by: 2/22/24     Patient will increase functional independence with mobility by performing:    . Supine to sit with [place]  . Sit to supine with [place]  . Rolling to Left and Right with [place].  . Sit to stand transfer with [place]  . Bed to chair transfer with Modified [place] using Rolling Walker  . Gait  x 150 feet with Supervision using Rolling Walker or LRAD  . Wheelchair propulsion x150 feet with Modified [place] using bilateral  uppper extremities  . Ascend/descend 12 stair with bilateral Handrails Stand-by Assistance using No Assistive Device.   . Ascend/Descend 4 inch curb step with Supervision using Rolling Walker.                         Time Tracking:     PT Received On: 01/25/24  PT Start Time: 0826  PT Stop Time: 0912  PT Total Time (min): 46 min    Billable Minutes: Gait Training 10, Therapeutic Activity 18, and Therapeutic Exercise 18    Treatment Type: Treatment  PT/PTA: PTA     Number of PTA visits since last PT visit: 2     01/25/2024

## 2024-01-25 NOTE — PLAN OF CARE
Problem: Adult Inpatient Plan of Care  Goal: Plan of Care Review  Outcome: Ongoing, Progressing  Goal: Patient-Specific Goal (Individualized)  Outcome: Ongoing, Progressing     Problem: Diabetes Comorbidity  Goal: Blood Glucose Level Within Targeted Range  Outcome: Ongoing, Progressing     Problem: Fall Injury Risk  Goal: Absence of Fall and Fall-Related Injury  Outcome: Ongoing, Progressing     Problem: Infection  Goal: Absence of Infection Signs and Symptoms  Outcome: Ongoing, Progressing

## 2024-01-25 NOTE — PLAN OF CARE
Problem: Adult Inpatient Plan of Care  Goal: Plan of Care Review  Outcome: Ongoing, Progressing  Goal: Patient-Specific Goal (Individualized)  Outcome: Ongoing, Progressing  Goal: Absence of Hospital-Acquired Illness or Injury  Outcome: Ongoing, Progressing  Intervention: Prevent Infection  Flowsheets (Taken 1/24/2024 7134)  Infection Prevention:   single patient room provided   rest/sleep promoted   hand hygiene promoted  Goal: Optimal Comfort and Wellbeing  Outcome: Ongoing, Progressing

## 2024-01-25 NOTE — PT/OT/SLP PROGRESS
"Occupational Therapy   Treatment    Name: Kanchan Downing  MRN: [id number]  Admit Date: 1/20/2024  Admitting Diagnosis:  Traumatic subdural hematoma (SDH)    General Precautions: Standard, aspiration, fall   Orthopedic Precautions: N/A   Braces: N/A    Recommendations:     Discharge Recommendations:  home health OT  Level of Assistance Recommended at Discharge: 24 hours light assistance for ADL's and homemaking tasks  Discharge Equipment Recommendations: none  Barriers to discharge:  None    Assessment:     Kanchan Downing is a 83 y.o. female with a medical diagnosis of Traumatic subdural hematoma (SDH). Pt tolerated session well and without incident and shows excellent motivation and potential to improve, but the pt continues to require assistance to perform self-care tasks and mobility. Pt strengths include Attention to task and Family support, Motivation, and Willingness to participate. Pt improved Standing tolerance and overall strength and endurance.  PT c/o headache at end of session- NSG notified.  However, pt would continue to benefit from cont'd OT services in the SNF setting to improve safety and independence /c functional tasks and ADLs upon discharge. Performance deficits affecting function are weakness, impaired endurance, impaired self care skills, impaired functional mobility, gait instability, impaired balance, impaired cognition, decreased coordination, decreased safety awareness, decreased lower extremity function, decreased upper extremity function.     Rehab Potential is good    Activity tolerance:  Good    Plan:     Patient to be seen 5 x/week to address the above listed problems via self-care/home management, therapeutic activities, therapeutic exercises, neuromuscular re-education    Plan of Care Expires: 02/15/24  Plan of Care Reviewed with: patient    Subjective     Communicated with: CHRISTOPHER prior to session.     "I am probably talking too much, but I don't care" .    Pain/Comfort:  Pain Rating 1: " 0/10  Pain Rating Post-Intervention 1: 0/10    Patient's cultural, spiritual, Caodaism conflicts given the current situation:  no    Objective:     Patient found up in chair with  (no lines) upon OT entry to room. Pt alert and agreeable to therapy.         Functional Mobility/Transfers:  Patient completed Sit <> Stand Transfer with contact guard assistance  with  rolling walker         AMPAC 6 Click ADL: 18    OT Exercises:     ~Pt participated in task to improve dynamic standing balance, hand-eye coordination, FMS, standing tolerance, and functional reach. Pt asked to place washers of various sizes over vertical poles of various heights while standing. Pt able to tolerate standing for ~10 min /c SBA and RW /c hand not in use for activity. Pt /c seated rest break after completion of task.    ~Pt participated in 12 minute UBE activity seated in WC at moderate resistance to address endurance and strength of UE to improve performance of ADLs and other functional tasks. .         Treatment & Education:  Pt educated on POC, role of OT, and safety. Pt performed tasks to improve safety and independence in functional tasks and ADLs as mentioned above.       Patient left up in chair with call button in reach and all needs met    GOALS:   Multidisciplinary Problems       Occupational Therapy Goals          Problem: Occupational Therapy    Goal Priority Disciplines Outcome Interventions   Occupational Therapy Goal     OT, PT/OT Ongoing, Progressing    Description: Goals to be met by: 2/19887     Patient will increase functional independence with ADLs by performing:    UE Dressing with [place].  LE Dressing with [place].  Grooming while standing with [place].  Toileting from toilet with [place] for hygiene and clothing management.   Bathing from  shower chair/bench with [place].  Step transfer with [place]  Toilet transfer to toilet with [place].                                     Time  Tracking:     OT Date of Treatment: 01/25/24  OT Start Time: 1013    OT Stop Time: 1042  OT Total Time (min): 29 min    Billable Minutes:Therapeutic Activity 14  Therapeutic Exercise 15    1/25/2024

## 2024-01-25 NOTE — PROGRESS NOTES
[facility]                                  Skilled Nursing Facility                   Progress Note     Admit Date: 1/20/2024  BRIDGETTE 2/5/2024  [id number] A  Principal Problem:  Traumatic subdural hematoma (SDH)   HPI obtained from patient interview and chart review     Chief Complaint:Re-evaluation of medical treatment and therapy status: Lab review    HPI:   Mrs. 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  Admission to SNF for secondary weakness and debility.    Interval history:  All of today's labs reviewed and are listed below.  24 hr vital sign ranges reviewed and listed below, no acute abnormalities noted.  Patient denies shortness of breath, abdominal discomfort, nausea, or vomiting.  Patient reports an adequate appetite.  Patient denies dysuria.  Patient reports having regular bowel movements.  Patient progressing with PT/OT-Gait: pt amb >300 ft with Rolator, vc for upright posture . Continuing to follow and treat all acute and chronic conditions.    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), Atherosclerosis of aorta (06/08/2016), Breast cancer (2011), Cataract, Controlled type 2 diabetes mellitus with circulatory disorder, without long-term current use of insulin (10/31/2017), Controlled type 2 diabetes mellitus with circulatory disorder, without long-term current use of insulin (10/31/2017), Diabetes mellitus type 2 without retinopathy (06/12/2014), DVT (deep venous thrombosis) (2001), Grief (04/06/2022),  Hyperlipidemia, Hyperlipidemia associated with type 2 diabetes mellitus (09/11/2012), Hypertension, Hypertension associated with diabetes (09/11/2012), Memory loss (12/29/2022), Microalbuminuria due to type 2 diabetes mellitus (12/08/2015), PVD (peripheral vascular disease) with claudication (05/02/2019), Risk for coronary artery disease greater than 20% in next 10 years per [place] score (05/01/2018), Strabismus, Type 2 diabetes mellitus with diabetic polyneuropathy (06/12/2014), and Type 2 diabetes mellitus with diabetic polyneuropathy, without long-term current use of insulin (06/12/2014).    Past Surgical History: Patient has a past surgical history that includes Tonsillectomy; pr removal of nail bed (6/10/2015); Breast biopsy (Right, 2011); and Breast lumpectomy (Right, 2011).    Social History: Patient reports that she has quit smoking. She has a 3.5 pack-year smoking history. She has never used smokeless tobacco. She reports that she does not drink alcohol and does not use drugs.    Family History: family history includes Breast cancer in her sister; Cataracts in her mother; Heart disease (age of onset: 50) in her father; Heart disease (age of onset: 58) in her mother; No Known Problems in her brother, maternal aunt, maternal grandfather, maternal grandmother, maternal uncle, paternal aunt, paternal grandfather, paternal grandmother, and paternal uncle; Thyroid disease in her sister.    Allergies: Patient is allergic to sulfa (sulfonamide antibiotics).    ROS  Constitutional: Negative for fever   Eyes: Negative for blurred vision, double vision   Respiratory: Negative for cough, shortness of breath   Cardiovascular: Negative for chest pain, palpitations, and leg swelling.   Gastrointestinal: Negative for abdominal pain, diarrhea, nausea, vomiting.  +constipation  Genitourinary: Negative for dysuria, frequency   Musculoskeletal:  + generalized weakness. Negative for back pain and myalgias.   Skin: Negative  for itching and rash.   Neurological: Negative for dizziness. + intermittent headaches.   Psychiatric/Behavioral: Negative for depression. The patient is not nervous/anxious.      24 hour Vital Sign Range   Temp:  [97.5 °F (36.4 °C)-97.9 °F (36.6 °C)]   Pulse:  [60]   Resp:  [16-18]   BP: (143-146)/(63-72)   SpO2:  [95 %-97 %]     Current BMI: Body mass index is 23.46 kg/m².    PEx  Constitutional: Patient appears debilitated.  No distress noted  HENT:   Head: Normocephalic and atraumatic.   Eyes: Pupils are equal, round  Neck: Normal range of motion. Neck supple.   Cardiovascular: Normal rate, regular rhythm and normal heart sounds.    Pulmonary/Chest: Effort normal and breath sounds are clear  Abdominal: Soft. Bowel sounds are normal.   Musculoskeletal: Normal range of motion.   Neurological: Alert and oriented to person, place, and time.  Impaired higher level thinking  Psychiatric: Normal mood and affect. Behavior is normal.   Skin: Skin is warm and dry.     Recent Labs   Lab 01/25/24  0401      K 4.9      CO2 26   BUN 12   CREATININE 0.7   CALCIUM 9.9   MG 2.0       Recent Labs   Lab 01/25/24  0401   WBC 5.75   RBC 4.45   HGB 12.9   HCT 41.4      MCV 93   MCH 29.0   MCHC 31.2*       Recent Labs   Lab 01/24/24  0701 01/24/24  1112 01/24/24  1643 01/24/24  2032 01/25/24  0712 01/25/24  1109   POCTGLUCOSE 120* 84 105 141* 117* 110        Assessment and Plan:    Traumatic subdural hematoma  Vasogenic cerebral edema  History of fall  - CTH w 8mm R SDH and 5mm L SDH   - Interval 5h CTA stable; no vascular abnormality, Follow-up CTH stable  -SBP goal < 160  - Continue home amlo/lisinopril/metoprolol  - discontinuing Keppra BID as patient has completed over a 7 day course  - PT/ OT/ SLP     Type 2 diabetes mellitus with diabetic polyneuropathy, without long-term current use of insulin  - last A1c 5.7 on 01/13/2024  - Accu-Cheks AC/HS  - home regimen with metformin 500 mg daily  - stable, continue SSI  prn     Hypertension associated with diabetes  - SBP goal < 160  - home regimen with HCTZ 12.5 mg daily, amlodipine 5 mg daily, lisinopril 5 mg daily, metoprolol 50 mg BID  - continue metoprolol 50 mg BID holding parameters in place, continue to hold amlodipine, lisinopril, HCTZ    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, adjuvant XRT, and endocrine therapy x 5 yrs, R breast DCIS  2/2023, pt declined surgical intervention at that time     BANDAR (generalized anxiety disorder)  - stable, continue fluoxetine 10 mg daily     PVD (peripheral vascular disease) with claudication  Hyperlipidemia associated with type 2 diabetes mellitus   - stable, continue pravastatin 20 mg qHS in place of non formulary home simvastatin    Seasonal allergies  - continue home cetirizine 10 mg daily    Debility   - Continue with PT/OT for gait training and strengthening and restoration of ADL's   - Encourage mobility, OOB in chair, and early ambulation as appropriate  - Fall precautions   - Monitor for bowel and bladder dysfunction  - Monitor for and prevent skin breakdown and pressure ulcers  - Continue DVT prophylaxis with frequent ambulation      Anticipate disposition:  Home with home health    IP [facility] RISK OF UNPLANNED READMISSION Model: HIGH MODERATE LOW    Follow-up needed during SNF stay-    Follow-up needed after discharge from SNF: Darius REYNOLDS 2/1    Future Appointments   Date Time Provider Department Center   2/1/2024  2:15 PM [facility] OIC-CT1 [address] LIMIT [facility] CTS IC Imaging Ctr   2/1/2024  3:00 PM Cheryl Arizmendi PA-C [facility]  [id number]   3/14/2024  1:20 PM Viktoria Mckeon MD [facility]       Jesica Sidhu NP  Department of [facility]   [facility]     DOS: 1/25/2024      Patient note was created using MModal Dictation.  Any errors in syntax or even information may not have been identified and edited on initial review prior to signing this note.

## 2024-01-26 LAB
POCT GLUCOSE: 104 MG/DL (ref 70–110)
POCT GLUCOSE: 148 MG/DL (ref 70–110)
POCT GLUCOSE: 169 MG/DL (ref 70–110)
POCT GLUCOSE: 176 MG/DL (ref 70–110)

## 2024-01-26 PROCEDURE — 11000004 HC SNF PRIVATE: Mod: HCNC

## 2024-01-26 PROCEDURE — 97129 THER IVNTJ 1ST 15 MIN: CPT | Mod: HCNC

## 2024-01-26 PROCEDURE — 25000003 PHARM REV CODE 250: Mod: HCNC | Performed by: FAMILY MEDICINE

## 2024-01-26 PROCEDURE — 94761 N-INVAS EAR/PLS OXIMETRY MLT: CPT | Mod: HCNC

## 2024-01-26 PROCEDURE — 97530 THERAPEUTIC ACTIVITIES: CPT | Mod: HCNC

## 2024-01-26 PROCEDURE — 97110 THERAPEUTIC EXERCISES: CPT | Mod: HCNC

## 2024-01-26 PROCEDURE — 97130 THER IVNTJ EA ADDL 15 MIN: CPT | Mod: HCNC

## 2024-01-26 PROCEDURE — 97535 SELF CARE MNGMENT TRAINING: CPT | Mod: HCNC

## 2024-01-26 PROCEDURE — 25000003 PHARM REV CODE 250: Mod: HCNC | Performed by: HOSPITALIST

## 2024-01-26 RX ORDER — METOPROLOL TARTRATE 25 MG/1
25 TABLET, FILM COATED ORAL 2 TIMES DAILY
Status: DISCONTINUED | OUTPATIENT
Start: 2024-01-26 | End: 2024-01-30 | Stop reason: HOSPADM

## 2024-01-26 RX ORDER — DOCUSATE SODIUM 283 MG/5ML
1 LIQUID RECTAL DAILY PRN
Status: DISCONTINUED | OUTPATIENT
Start: 2024-01-26 | End: 2024-01-30 | Stop reason: HOSPADM

## 2024-01-26 RX ADMIN — POLYETHYLENE GLYCOL 3350 17 G: 17 POWDER, FOR SOLUTION ORAL at 10:01

## 2024-01-26 RX ADMIN — SENNOSIDES AND DOCUSATE SODIUM 1 TABLET: 8.6; 5 TABLET ORAL at 10:01

## 2024-01-26 RX ADMIN — Medication 6 MG: at 08:01

## 2024-01-26 RX ADMIN — MICONAZOLE NITRATE: 20 OINTMENT TOPICAL at 08:01

## 2024-01-26 RX ADMIN — MICONAZOLE NITRATE: 20 OINTMENT TOPICAL at 10:01

## 2024-01-26 RX ADMIN — SENNOSIDES AND DOCUSATE SODIUM 1 TABLET: 8.6; 5 TABLET ORAL at 08:01

## 2024-01-26 RX ADMIN — FLUOXETINE 10 MG: 10 CAPSULE ORAL at 10:01

## 2024-01-26 RX ADMIN — CETIRIZINE HYDROCHLORIDE 10 MG: 5 TABLET, FILM COATED ORAL at 10:01

## 2024-01-26 RX ADMIN — PRAVASTATIN SODIUM 20 MG: 20 TABLET ORAL at 08:01

## 2024-01-26 NOTE — PROGRESS NOTES
[facility]                                  Skilled Nursing Facility                   Progress Note     Admit Date: 1/20/2024  BRIDGETTE 2/5/2024  [id number] A  Principal Problem:  Traumatic subdural hematoma (SDH)   HPI obtained from patient interview and chart review     Chief Complaint:Re-evaluation of medical treatment and therapy status    HPI:   Mrs. 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  Admission to SNF for secondary weakness and debility.    Interval history:   24 hr vital sign ranges reviewed and listed below, heart rate slightly lower than her norm which is low 60s, today is 56. .  Patient continues to report intermittent headaches that are overall improved compared to previous days.  Patient remains at her mental baseline which is pleasantly confused.  Patient denies shortness of breath, abdominal discomfort, nausea, or vomiting.  Patient's last bowel movement was on 01/22, patient with previous history of constipation, patient has taken her bowel regimen for the last 2 days.  Patient denies dysuria.  Patient reports having regular bowel movements.  Patient progressing with PT/OT.  Continuing to follow and treat all acute and chronic conditions.    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), Atherosclerosis of aorta (06/08/2016), Breast cancer (2011), Cataract, Controlled type 2 diabetes mellitus with circulatory disorder, without long-term current use of insulin (10/31/2017), Controlled type 2 diabetes  mellitus with circulatory disorder, without long-term current use of insulin (10/31/2017), Diabetes mellitus type 2 without retinopathy (06/12/2014), DVT (deep venous thrombosis) (2001), Grief (04/06/2022), Hyperlipidemia, Hyperlipidemia associated with type 2 diabetes mellitus (09/11/2012), Hypertension, Hypertension associated with diabetes (09/11/2012), Memory loss (12/29/2022), Microalbuminuria due to type 2 diabetes mellitus (12/08/2015), PVD (peripheral vascular disease) with claudication (05/02/2019), Risk for coronary artery disease greater than 20% in next 10 years per [place] score (05/01/2018), Strabismus, Type 2 diabetes mellitus with diabetic polyneuropathy (06/12/2014), and Type 2 diabetes mellitus with diabetic polyneuropathy, without long-term current use of insulin (06/12/2014).    Past Surgical History: Patient has a past surgical history that includes Tonsillectomy; pr removal of nail bed (6/10/2015); Breast biopsy (Right, 2011); and Breast lumpectomy (Right, 2011).    Social History: Patient reports that she has quit smoking. She has a 3.5 pack-year smoking history. She has never used smokeless tobacco. She reports that she does not drink alcohol and does not use drugs.    Family History: family history includes Breast cancer in her sister; Cataracts in her mother; Heart disease (age of onset: 50) in her father; Heart disease (age of onset: 58) in her mother; No Known Problems in her brother, maternal aunt, maternal grandfather, maternal grandmother, maternal uncle, paternal aunt, paternal grandfather, paternal grandmother, and paternal uncle; Thyroid disease in her sister.    Allergies: Patient is allergic to sulfa (sulfonamide antibiotics).    ROS  Constitutional: Negative for fever   Eyes: Negative for blurred vision, double vision   Respiratory: Negative for cough, shortness of breath   Cardiovascular: Negative for chest pain, palpitations, and leg swelling.   Gastrointestinal: Negative  "for abdominal pain, diarrhea, nausea, vomiting.  +constipation  Genitourinary: Negative for dysuria, frequency   Musculoskeletal:  + generalized weakness. Negative for back pain and myalgias.   Skin: Negative for itching and rash.   Neurological: Negative for dizziness. + intermittent headaches.   Psychiatric/Behavioral: Negative for depression. The patient is not nervous/anxious.      24 hour Vital Sign Range   Temp:  [98.4 °F (36.9 °C)]   Pulse:  [56-62]   Resp:  [18]   BP: (116-144)/(58-68)   SpO2:  [97 %]     Current BMI: Body mass index is 23.46 kg/m².    PEx  Constitutional: Patient appears debilitated.  No distress noted  HENT:   Head: Normocephalic and atraumatic.   Eyes: Pupils are equal, round  Neck: Normal range of motion. Neck supple.   Cardiovascular:  Decreased rate, regular rhythm and normal heart sounds.    Pulmonary/Chest: Effort normal and breath sounds are clear  Abdominal: Soft. Bowel sounds are normal.   Musculoskeletal: Normal range of motion.   Neurological: Alert and oriented to person, place, and time.  Impaired higher level thinking  Psychiatric: Normal mood and affect. Behavior is normal.   Skin: Skin is warm and dry.     No results for input(s): "GLUCOSE", "NA", "K", "CL", "CO2", "BUN", "CREATININE", "CALCIUM", "MG" in the last 24 hours.      No results for input(s): "WBC", "RBC", "HGB", "HCT", "PLT", "MCV", "MCH", "MCHC" in the last 24 hours.      Recent Labs   Lab 01/25/24  0712 01/25/24  1109 01/25/24  1618 01/25/24  2047 01/26/24  0717 01/26/24  1052   POCTGLUCOSE 117* 110 128* 159* 148* 104        Assessment and Plan:    Traumatic subdural hematoma  Vasogenic cerebral edema  History of fall  - CTH w 8mm R SDH and 5mm L SDH   - Interval 5h CTA stable; no vascular abnormality, Follow-up CTH stable  -SBP goal < 160  - Continue home amlo/lisinopril/metoprolol  - discontinuing Keppra BID as patient has completed over a 7 day course  - PT/ OT/ SLP    Dementia  - suspected  - family " reporting confusion and memory loss over the last few months  - has remained confused at SNF, will sometimes need to sit at nurse's station  - will need official evaluation with Neurology  Delirium precautions:  - Avoid antihistamines, anticholinergics, hypnotics, and minimize opiates while controlling for pain as these medications may exacerbate delirium. Cues for day/night will assist with keeping patient calm and oriented - during daytime, please keep adequate light in room (open windows, lights on) and please keep room dim at night-time to encourage normal sleep-wake cycles. Continuing to have nursing and family reorient the patient and encourage family to visit    Type 2 diabetes mellitus with diabetic polyneuropathy, without long-term current use of insulin  - last A1c 5.7 on 01/13/2024  - Accu-Cheks AC/HS  - home regimen with metformin 500 mg daily  - stable, continue SSI prn     Bradycardia  Hypertension associated with diabetes  - SBP goal < 160  - home regimen with amlodipine 5 mg daily, lisinopril 5 mg daily, metoprolol 50 mg BID, HCTZ 12.5 mg daily  - 1/26 reducing metoprolol to 25 mg BID for bradycardia, continue to hold amlodipine 5 mg daily, lisinopril 5 mg daily, continue to hold HCTZ 12.5 mg daily     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, adjuvant XRT, and endocrine therapy x 5 yrs, R breast DCIS  2/2023, pt declined surgical intervention at that time     BANDAR (generalized anxiety disorder)  - stable, continue fluoxetine 10 mg daily     PVD (peripheral vascular disease) with  claudication  Hyperlipidemia associated with type 2 diabetes mellitus   - stable, continue pravastatin 20 mg qHS in place of non formulary home simvastatin    Osteoarthritis  - PT/OT    Seasonal allergies  - continue home cetirizine 10 mg daily    Debility   - Continue with PT/OT for gait training and strengthening and restoration of ADL's   - Encourage mobility, OOB in chair, and early ambulation as appropriate  - Fall precautions   - Monitor for bowel and bladder dysfunction  - Monitor for and prevent skin breakdown and pressure ulcers  - Continue DVT prophylaxis with frequent ambulation      Anticipate disposition:  Home with home health    Hospital bed justification:  Patient requires a hospital bed due to requiring positioning of the body in ways not feasible with an ordinary bed to alleviate pain/ is completely immobile or limited mobility and cannot independently make changes in body position without the use of the bed.       IP [facility] RISK OF UNPLANNED READMISSION Model: LOW    Follow-up needed during SNF stay-    Follow-up needed after discharge from SNF: Darius REYNOLDS 2/1    Future Appointments   Date Time Provider Department Center   2/1/2024  2:15 PM [facility] OI-CT1 [address] LIMIT [facility] IC Imaging Ctr   2/1/2024  3:00 PM Cheryl Arizmendi PA-C [facility] [address]   3/14/2024  1:20 PM Viktoria Mckeon MD [facility]       Jesica Sidhu NP  Department of Hospital Medicine   [facility]     DOS: 1/26/2024      Patient note was created using MModal Dictation.  Any errors in syntax or even information may not have been identified and edited on initial review prior to signing this note.

## 2024-01-26 NOTE — PT/OT/SLP PROGRESS
"Occupational Therapy   Treatment    Name: Kanchan Downing  MRN: [id number]  Admit Date: 1/20/2024  Admitting Diagnosis:  Traumatic subdural hematoma (SDH)    General Precautions: Standard, fall, aspiration   Orthopedic Precautions: N/A   Braces: N/A    Recommendations:     Discharge Recommendations:  home health OT  Level of Assistance Recommended at Discharge: 24 hours light assistance for ADL's and homemaking tasks  Discharge Equipment Recommendations: none  Barriers to discharge:  None    Assessment:     Kanchan Downing is a 83 y.o. female with a medical diagnosis of Traumatic subdural hematoma (SDH). Pt tolerated session well and without incident and shows excellent motivation and potential to improve, but the pt continues to require assistance to perform self-care tasks and mobility. Pt strengths include Attention to task and Family support, Motivation, and Willingness to participate. Pt improved trunk mobility and generalized strength/endurance. However, pt would continue to benefit from cont'd OT services in the SNF setting to improve safety and independence /c functional tasks and ADLs upon discharge. Performance deficits affecting function are weakness, impaired endurance, impaired self care skills, impaired functional mobility, gait instability, impaired balance, impaired cognition, decreased coordination, decreased safety awareness, decreased lower extremity function, decreased upper extremity function.     Rehab Potential is good    Activity tolerance:  Good    Plan:     Patient to be seen 5 x/week to address the above listed problems via self-care/home management, therapeutic activities, therapeutic exercises, neuromuscular re-education    Plan of Care Expires: 02/15/24  Plan of Care Reviewed with: patient    Subjective     Communicated with: CHRISTOPHER prior to session.     At beginning of therapy session  Pt- "Am I gonna get to throw something?"  OT- "Nope"  Pt- "Aw hell" .    Pain/Comfort:  Pain Rating 1: " 0/10  Pain Rating Post-Intervention 1: 0/10    Patient's cultural, spiritual, Druze conflicts given the current situation:  no    Objective:     Patient found up in chair with  (no lines) upon OT entry to room. Pt alert and agreeable to therapy.         Functional Mobility/Transfers:  Patient completed Sit <> Stand Transfer with minimum assistance  with  rollator/RW from BS and mat  Patient completed Bedside chair <> WheelChair Transfer using Step Transfer technique with contact guard assistance with rollator  Patient completed WC<>Mat t/f /c CGA and RW  Functional Mobility: Pt able to ambulate ~7 feet from BS chair to WC /c CGA and rollator.    [facility] 6 Click ADL: 18    OT Exercises:   ~Pt participated in dynamic standing balance activity to improve trunk mobility and cross body, forward, and posterior reach /c LUE and RUE. Pt asked to reach and retrieve objects forward, cross-body, and in posterior and place on rungs. Pt then asked to take objects off of rungs with opposite hands and place in original position. Pt able to complete using RW for support /c uninvolved UE and CGA. Pt /c no LOB.    ~Pt participated in 13 minute UBE activity seated in WC at moderate resistance to address endurance and strength of UE to improve performance of ADLs and other functional tasks.     ~Pt participated in 2x10 modified abdominal crunches, modified abdominal extensions modified abdominal oblique crunches (R/L), and modified abdominal twists (R/L) while seated at edge of mat.     ~Pt participated in  2x10 wide  rows and wide  lat pulldowns using green theraband seated at edge of mat.        Treatment & Education:  Pt educated on POC, role of OT, and safety. Pt performed tasks to improve safety and independence in functional tasks and ADLs as mentioned above.       Patient left up in chair with call button in reach and all needs met    GOALS:   Multidisciplinary Problems       Occupational Therapy Goals           Problem: Occupational Therapy    Goal Priority Disciplines Outcome Interventions   Occupational Therapy Goal     OT, PT/OT Ongoing, Progressing    Description: Goals to be met by: [id number]     Patient will increase functional independence with ADLs by performing:    UE Dressing with [place].  LE Dressing with [place].  Grooming while standing with [place].  Toileting from toilet with [place] for hygiene and clothing management.   Bathing from  shower chair/bench with [place].  Step transfer with [place]  Toilet transfer to toilet with [place].                                     Time Tracking:     OT Date of Treatment: 01/26/24  OT Start Time: 1345    OT Stop Time: 1432  OT Total Time (min): 47 min    Billable Minutes:Therapeutic Activity 17  Therapeutic Exercise 30    1/26/2024

## 2024-01-26 NOTE — PLAN OF CARE
[facility] Skilled Nursing      HOME HEALTH ORDERS  FACE TO FACE ENCOUNTER    Patient Name: Kanchan Downing  YOB: 1940    PCP: Viktoria Mckeon MD   PCP Address: [address] [place] [address] [address] / [place] [address]  PCP Phone Number: [phone]  PCP Fax: [phone]    Encounter Date: 1/19/24    Admit to Home Health    Diagnoses:  Active Hospital Problems    Diagnosis  POA    *Traumatic subdural hematoma (SDH) [S06.5XAA]  Yes    History of fall [Z91.81]  Not Applicable    BANDAR (generalized anxiety disorder) [F41.1]  Yes    Type 2 diabetes mellitus with diabetic polyneuropathy, without long-term current use of insulin [E11.42]  Yes    Hyperlipidemia associated with type 2 diabetes mellitus [E11.69, E78.5]  Yes      Resolved Hospital Problems   No resolved problems to display.       Follow Up Appointments:  Future Appointments   Date Time Provider Department Center   2/1/2024  2:15 PM [facility]-CT1 500 LB LIMIT [facility] IC Imaging Ctr   2/1/2024  3:00 PM Cheryl Arizmendi PA-C [facility] [address]   3/14/2024  1:20 PM Viktoria Mckeon MD [facility]       Allergies:  Review of patient's allergies indicates:   Allergen Reactions    Sulfa (sulfonamide antibiotics)      Other reaction(s): Rash       Medications: Review discharge medications with patient and family and provide education.      I have seen and examined this patient within the last 30 days. My clinical findings that support the need for the home health skilled services and home bound status are the following:no   Weakness/numbness causing balance and gait disturbance due to Stroke making it taxing to leave home.       Referrals/ Consults  Physical Therapy to evaluate and treat. Evaluate for home safety and equipment needs; Establish/upgrade home exercise program. Perform / instruct on therapeutic exercises, gait training, transfer training, and Range of Motion.  Occupational Therapy to evaluate and treat.  Evaluate home environment for safety and equipment needs. Perform/Instruct on transfers, ADL training, ROM, and therapeutic exercises.  Speech Therapy  to evaluate and treat for  Language, Swallowing, and Cognition.   to evaluate for community resources/long-range planning.  Aide to provide assistance with personal care, ADLs, and vital signs.    Activities:   activity as tolerated    Nursing:   Agency to admit patient within 24 hours of hospital discharge unless specified on physician order or at patient request    SN to complete comprehensive assessment including routine vital signs. Instruct on disease process and s/s of complications to report to MD. Review/verify medication list sent home with the patient at time of discharge  and instruct patient/caregiver as needed. Frequency may be adjusted depending on start of care date.     Skilled nurse to perform up to 3 visits PRN for symptoms related to diagnosis    Notify MD if SBP > 160 or < 90; DBP > 90 or < 50; HR > 120 or < 50; Temp > 101; O2 < 88%    Ok to schedule additional visits based on staff availability and patient request on consecutive days within the home health episode.    Miscellaneous   Diabetic Care:   SN to perform and educate Diabetic management with blood glucose monitoring:, Fingerstick blood sugar AC and HS, and Report CBG < 60 or > 350 to physician.    Home Health Aide:  Nursing Three times weekly, Physical Therapy Three times weekly, Occupational Therapy Three times weekly, Speech Language Pathology Three times weekly, Medical Social Work Other: chayal , and Home Health Aide Three times weekly    Wound Care Orders  no    I certify that this patient is confined to her home and needs intermittent skilled nursing care, physical therapy, speech therapy, and occupational therapy.

## 2024-01-26 NOTE — CARE UPDATE
SS informed received [employer] with LCD of 1/28/2024; Spoke to Daughter who stated she would like to appeal. Provided information on the phone for that process.  Informed  who will follow through with appeal.       1/29/2024 8:30 am    Pt's daughter, Eusebia, informed  via phone call that this last appeal is the final notice that [employer] will distribute. Family is not ready to take patient home, however, she will today after family training.  PHAN Nair AC to please contact daughter for FT time today, and pt will most likely discharge following this time.     Discharge will be today, 1/29/2024, after family training.

## 2024-01-26 NOTE — PLAN OF CARE
CONFIRMATION #  [id number]  The files have been received for the Appeals Case [id number]. This does not confirm they are timely.    All requested documentation uploaded to [employer].

## 2024-01-26 NOTE — PLAN OF CARE
Problem: Adult Inpatient Plan of Care  Goal: Plan of Care Review  Outcome: Ongoing, Progressing  Goal: Patient-Specific Goal (Individualized)  Outcome: Ongoing, Progressing  Goal: Absence of Hospital-Acquired Illness or Injury  Outcome: Ongoing, Progressing  Intervention: Prevent Infection  Flowsheets (Taken 1/25/2024 0686)  Infection Prevention:   single patient room provided   rest/sleep promoted   hand hygiene promoted  Goal: Optimal Comfort and Wellbeing  Outcome: Ongoing, Progressing

## 2024-01-26 NOTE — PT/OT/SLP PROGRESS
"Speech Language Pathology  Treatment    Kanchan Downing   MRN: [id number]   Admitting Diagnosis: Traumatic subdural hematoma (SDH)    Diet recommendations: Solid Diet Level: Regular Diet - IDDSI Level 7  Liquid Diet Level: Thin liquids - IDDSI Level 0 Standard aspiration precautions    SLP Treatment Date: 01/26/24  Speech Start Time: 0933     Speech Stop Time: 1004     Speech Total (min): 31 min       TREATMENT BILLABLE MINUTES:  Speech Therapy Individual (cognitive therapy, 2 units) 23 and Self Care/Home Management Training 8    Has the patient been evaluated by SLP for swallowing? : No  Keep patient NPO?: No   General Precautions: Standard, fall          Subjective:  "The devil doesn't like it when you dedicate your life to God."          Objective:      Pt seen for ongoing cognitive-linguistic therapy while sitting up in chair in room. No visitors present. Pt c/o 2/10 headache, of which nurse was informed. Pt was O x 4.  Pt was able to identify problems in pictures with 90% accuracy IND. She stated effects of problems with 100% accuracy. Solutions were also provided with 60% accuracy IND/100% given cues. Pt completed a mental manipulation/word progression task with 70% accuracy IND/100% given cues.  Pt provided 2 uses for common objects with 100% accuracy. Education was provided to pt regarding role of SLP, SDH, cognitive impairments, rehabilitation, problem solving and safety awareness, fall prevention, rationale for cognitive tx tasks, and SLP treatment plan and POC. Pt will benefit from continued reinforcement.     Assessment:  Kanchan Downing is a 83 y.o. female with a medical diagnosis of Traumatic subdural hematoma (SDH) and presents with cognitive-linguistic deficits.     Discharge recommendations: Discharge Facility/Level of Care Needs: home health speech therapy     Goals:   Multidisciplinary Problems       SLP Goals          Problem: SLP    Goal Priority Disciplines Outcome   SLP Goal     SLP  "   Description: Speech Language Pathology Goals  Goals expected to be met by 1/30:  1. Pt will recall general information with 80% accuracy given min cues.   2. Following a 2 minute delay, pt will recall 3/3 novel items with min cues.   3. Pt will list an average of 9 items in a category in one minute given min cues.   4. Pt will complete mental manipulation tasks with 80% accuracy given min-mod cues.   5. Pt will participate in assessment of reading, writing, and visual spatial abilities.                                 Plan:   Patient to be seen Therapy Frequency: 3 x/week  Planned Interventions: Cognitive-Linguistic Therapy  Plan of Care expires: 02/22/24  Plan of Care reviewed with: patient  SLP Follow-up?: Yes  SLP - Next Visit Date: 01/29/24 01/26/2024

## 2024-01-26 NOTE — PLAN OF CARE
Problem: Adult Inpatient Plan of Care  Goal: Plan of Care Review  Outcome: Ongoing, Progressing  Flowsheets (Taken 1/26/2024 1428)  Plan of Care Reviewed With: patient     Problem: Adult Inpatient Plan of Care  Goal: Patient-Specific Goal (Individualized)  Flowsheets (Taken 1/26/2024 1428)  Individualized Care Needs: deysi pt/ot to regain strength  Goal: Absence of Hospital-Acquired Illness or Injury  Intervention: Identify and Manage Fall Risk  Flowsheets (Taken 1/26/2024 1428)  Safety Promotion/Fall Prevention:   assistive device/personal item within reach   lighting adjusted   instructed to call staff for mobility   Fall Risk reviewed with patient/family  Intervention: Prevent Skin Injury  Flowsheets (Taken 1/26/2024 1428)  Body Position: weight shifting  Skin Protection:   skin sealant/moisture barrier applied   protective footwear used   incontinence pads utilized  Intervention: Prevent and Manage VTE (Venous Thromboembolism) Risk  Flowsheets (Taken 1/26/2024 1428)  Activity Management: Up in chair - L3  VTE Prevention/Management:   bleeding risk assessed   fluids promoted

## 2024-01-27 LAB
POCT GLUCOSE: 104 MG/DL (ref 70–110)
POCT GLUCOSE: 116 MG/DL (ref 70–110)
POCT GLUCOSE: 126 MG/DL (ref 70–110)
POCT GLUCOSE: 162 MG/DL (ref 70–110)

## 2024-01-27 PROCEDURE — 25000003 PHARM REV CODE 250: Mod: HCNC | Performed by: FAMILY MEDICINE

## 2024-01-27 PROCEDURE — 97116 GAIT TRAINING THERAPY: CPT | Mod: HCNC

## 2024-01-27 PROCEDURE — 97535 SELF CARE MNGMENT TRAINING: CPT | Mod: HCNC

## 2024-01-27 PROCEDURE — 25000003 PHARM REV CODE 250: Mod: HCNC | Performed by: HOSPITALIST

## 2024-01-27 PROCEDURE — 11000004 HC SNF PRIVATE: Mod: HCNC

## 2024-01-27 PROCEDURE — 97530 THERAPEUTIC ACTIVITIES: CPT | Mod: HCNC

## 2024-01-27 PROCEDURE — 97110 THERAPEUTIC EXERCISES: CPT | Mod: HCNC

## 2024-01-27 PROCEDURE — 25000003 PHARM REV CODE 250: Mod: HCNC | Performed by: NURSE PRACTITIONER

## 2024-01-27 RX ADMIN — Medication 6 MG: at 08:01

## 2024-01-27 RX ADMIN — POLYETHYLENE GLYCOL 3350 17 G: 17 POWDER, FOR SOLUTION ORAL at 10:01

## 2024-01-27 RX ADMIN — PRAVASTATIN SODIUM 20 MG: 20 TABLET ORAL at 08:01

## 2024-01-27 RX ADMIN — SENNOSIDES AND DOCUSATE SODIUM 1 TABLET: 8.6; 5 TABLET ORAL at 10:01

## 2024-01-27 RX ADMIN — FLUOXETINE 10 MG: 10 CAPSULE ORAL at 10:01

## 2024-01-27 RX ADMIN — SENNOSIDES AND DOCUSATE SODIUM 1 TABLET: 8.6; 5 TABLET ORAL at 08:01

## 2024-01-27 RX ADMIN — MICONAZOLE NITRATE: 20 OINTMENT TOPICAL at 10:01

## 2024-01-27 RX ADMIN — ACETAMINOPHEN 650 MG: 325 TABLET ORAL at 01:01

## 2024-01-27 RX ADMIN — CETIRIZINE HYDROCHLORIDE 10 MG: 5 TABLET, FILM COATED ORAL at 10:01

## 2024-01-27 RX ADMIN — METOPROLOL TARTRATE 25 MG: 25 TABLET, FILM COATED ORAL at 08:01

## 2024-01-27 NOTE — PLAN OF CARE
Problem: Occupational Therapy  Goal: Occupational Therapy Goal  Description: Goals to be met by: [id number]/42024     Patient will increase functional independence with ADLs by performing:    UE Dressing with Alden- Not met  LE Dressing with Alden- Not met  Grooming while standing with Alden- Not met  Toileting from toilet with Alden for hygiene and clothing management- Not met   Bathing from  shower chair/bench with Alden- Not met  Step transfer with Alden- Not met  Toilet transfer to toilet with Alden- Not met                Outcome: Adequate for Care Transition   Pt to d/c from OT services 1/27/2024 and SNF 1/29/2024. Pt appropriate for next level of care.

## 2024-01-27 NOTE — PT/OT/SLP PROGRESS
Physical Therapy Treatment    Patient Name:  Kanchan Downing   MRN:  [id number]  Admit Date: 1/20/2024  Admitting Diagnosis: Traumatic subdural hematoma (SDH)  Recent Surgeries: N/A    General Precautions: Standard, aspiration, fall  Orthopedic Precautions: N/A  Braces: N/A    Recommendations:     Discharge Recommendations: home health PT  Level of Assistance Recommended at Discharge: Intermittent assistance   Discharge Equipment Recommendations: none  Barriers to discharge: Decreased caregiver support    Assessment:     Kanchan Downing is a 83 y.o. female admitted with a medical diagnosis of Traumatic subdural hematoma (SDH) . Pt's candy much slower today and therefore pt spent an extensive amount of time GT with rollator. Pt will continue to benefit from skilled PT services during this hospital admit to continue to improve transfer ability and efficiency as well as continue to progress pt's ambulation distance and cardiopulmonary endurance to maximize pt's functional independence and return to PLOF.   Performance deficits affecting function: weakness, impaired endurance, impaired self care skills, impaired functional mobility, gait instability, impaired balance, decreased upper extremity function, decreased lower extremity function, decreased safety awareness, impaired cardiopulmonary response to activity.    Rehab Potential is good    Activity Tolerance: Good    Plan:     Patient to be seen 5 x/week to address the above listed problems via gait training, therapeutic activities, therapeutic exercises, neuromuscular re-education, wheelchair management/training    Plan of Care Expires: 02/21/24  Plan of Care Reviewed with: patient    Subjective     Pt. Agreeable to work with PT.     Pain/Comfort:  Pain Rating 1: 0/10  Pain Rating Post-Intervention 1: 0/10    Patient's cultural, spiritual, Zoroastrian conflicts given the current situation:  no    Objective:     Communicated with pt's nurse prior to session.  Patient  found up in chair with   upon PT entry to room.     Therapeutic Activities and Exercises: Nustep with resistance set at 4 for 15mins to help improve pt's overall MMT and cardiovascular endurance.    Functional Mobility:  Transfers:     Sit to Stand:  stand by assistance with 4 wheeled walker  Gait: 300ft x 2 trials with rollator and CGA/SBA for safety. Pt needed a seated rest break d.t fatigue and frequent VC for pt to stay closer to the rollator,    AM-PAC 6 CLICK MOBILITY  18    Patient left up in chair with call button in reach.    GOALS:   Multidisciplinary Problems       Physical Therapy Goals          Problem: Physical Therapy    Goal Priority Disciplines Outcome Goal Variances Interventions   Physical Therapy Goal     PT, PT/OT Ongoing, Progressing     Description: Goals to be met by: 2/22/24     Patient will increase functional independence with mobility by performing:    . Supine to sit with Effort  . Sit to supine with Effort  . Rolling to Left and Right with Effort.  . Sit to stand transfer with Effort  . Bed to chair transfer with Modified Effort using Rolling Walker  . Gait  x 150 feet with Supervision using Rolling Walker or LRAD  . Wheelchair propulsion x150 feet with Modified Effort using bilateral uppper extremities  . Ascend/descend 12 stair with bilateral Handrails Stand-by Assistance using No Assistive Device.   . Ascend/Descend 4 inch curb step with Supervision using Rolling Walker.                         Time Tracking:     PT Received On: 01/27/24  PT Start Time: 1119  PT Stop Time: 1158  PT Total Time (min): 39 min    Billable Minutes: Gait Training 24 and Therapeutic Exercise 15    Treatment Type: Treatment  PT/PTA: PT     Number of PTA visits since last PT visit: 0     01/27/2024

## 2024-01-28 LAB
POCT GLUCOSE: 166 MG/DL (ref 70–110)
POCT GLUCOSE: 177 MG/DL (ref 70–110)
POCT GLUCOSE: 199 MG/DL (ref 70–110)
POCT GLUCOSE: 223 MG/DL (ref 70–110)

## 2024-01-28 PROCEDURE — 25000003 PHARM REV CODE 250: Mod: HCNC | Performed by: FAMILY MEDICINE

## 2024-01-28 PROCEDURE — 63600175 PHARM REV CODE 636 W HCPCS: Mod: HCNC | Performed by: HOSPITALIST

## 2024-01-28 PROCEDURE — 25000003 PHARM REV CODE 250: Mod: HCNC | Performed by: HOSPITALIST

## 2024-01-28 PROCEDURE — 11000004 HC SNF PRIVATE: Mod: HCNC

## 2024-01-28 PROCEDURE — 25000003 PHARM REV CODE 250: Mod: HCNC | Performed by: NURSE PRACTITIONER

## 2024-01-28 RX ADMIN — INSULIN ASPART 1 UNITS: 100 INJECTION, SOLUTION INTRAVENOUS; SUBCUTANEOUS at 09:01

## 2024-01-28 RX ADMIN — MICONAZOLE NITRATE: 20 OINTMENT TOPICAL at 08:01

## 2024-01-28 RX ADMIN — CETIRIZINE HYDROCHLORIDE 10 MG: 5 TABLET, FILM COATED ORAL at 09:01

## 2024-01-28 RX ADMIN — PRAVASTATIN SODIUM 20 MG: 20 TABLET ORAL at 08:01

## 2024-01-28 RX ADMIN — MICONAZOLE NITRATE: 20 OINTMENT TOPICAL at 10:01

## 2024-01-28 RX ADMIN — METOPROLOL TARTRATE 25 MG: 25 TABLET, FILM COATED ORAL at 08:01

## 2024-01-28 RX ADMIN — POLYETHYLENE GLYCOL 3350 17 G: 17 POWDER, FOR SOLUTION ORAL at 09:01

## 2024-01-28 RX ADMIN — FLUOXETINE 10 MG: 10 CAPSULE ORAL at 10:01

## 2024-01-28 RX ADMIN — METOPROLOL TARTRATE 25 MG: 25 TABLET, FILM COATED ORAL at 09:01

## 2024-01-28 RX ADMIN — MICONAZOLE NITRATE: 20 OINTMENT TOPICAL at 05:01

## 2024-01-28 RX ADMIN — SENNOSIDES AND DOCUSATE SODIUM 1 TABLET: 8.6; 5 TABLET ORAL at 09:01

## 2024-01-28 RX ADMIN — SENNOSIDES AND DOCUSATE SODIUM 1 TABLET: 8.6; 5 TABLET ORAL at 08:01

## 2024-01-29 LAB
ANION GAP SERPL CALC-SCNC: 6 MMOL/L (ref 8–16)
BASOPHILS # BLD AUTO: 0.03 K/UL (ref 0–0.2)
BASOPHILS NFR BLD: 0.5 % (ref 0–1.9)
BUN SERPL-MCNC: 18 MG/DL (ref 8–23)
CALCIUM SERPL-MCNC: 9.6 MG/DL (ref 8.7–10.5)
CHLORIDE SERPL-SCNC: 101 MMOL/L (ref 95–110)
CO2 SERPL-SCNC: 25 MMOL/L (ref 23–29)
CREAT SERPL-MCNC: 0.7 MG/DL (ref 0.5–1.4)
DIFFERENTIAL METHOD BLD: ABNORMAL
EOSINOPHIL # BLD AUTO: 0 K/UL (ref 0–0.5)
EOSINOPHIL NFR BLD: 0.3 % (ref 0–8)
ERYTHROCYTE [DISTWIDTH] IN BLOOD BY AUTOMATED COUNT: 13.4 % (ref 11.5–14.5)
EST. GFR  (NO RACE VARIABLE): >60 ML/MIN/1.73 M^2
GLUCOSE SERPL-MCNC: 144 MG/DL (ref 70–110)
HCT VFR BLD AUTO: 35.5 % (ref 37–48.5)
HGB BLD-MCNC: 11.4 G/DL (ref 12–16)
IMM GRANULOCYTES # BLD AUTO: 0.02 K/UL (ref 0–0.04)
IMM GRANULOCYTES NFR BLD AUTO: 0.3 % (ref 0–0.5)
LYMPHOCYTES # BLD AUTO: 1 K/UL (ref 1–4.8)
LYMPHOCYTES NFR BLD: 17.7 % (ref 18–48)
MAGNESIUM SERPL-MCNC: 2 MG/DL (ref 1.6–2.6)
MCH RBC QN AUTO: 29.1 PG (ref 27–31)
MCHC RBC AUTO-ENTMCNC: 32.1 G/DL (ref 32–36)
MCV RBC AUTO: 91 FL (ref 82–98)
MONOCYTES # BLD AUTO: 0.8 K/UL (ref 0.3–1)
MONOCYTES NFR BLD: 13.1 % (ref 4–15)
NEUTROPHILS # BLD AUTO: 4 K/UL (ref 1.8–7.7)
NEUTROPHILS NFR BLD: 68.1 % (ref 38–73)
NRBC BLD-RTO: 0 /100 WBC
PHOSPHATE SERPL-MCNC: 2.8 MG/DL (ref 2.7–4.5)
PLATELET # BLD AUTO: 222 K/UL (ref 150–450)
PMV BLD AUTO: 10 FL (ref 9.2–12.9)
POCT GLUCOSE: 110 MG/DL (ref 70–110)
POCT GLUCOSE: 125 MG/DL (ref 70–110)
POCT GLUCOSE: 163 MG/DL (ref 70–110)
POCT GLUCOSE: 178 MG/DL (ref 70–110)
POTASSIUM SERPL-SCNC: 4.6 MMOL/L (ref 3.5–5.1)
RBC # BLD AUTO: 3.92 M/UL (ref 4–5.4)
SODIUM SERPL-SCNC: 132 MMOL/L (ref 136–145)
WBC # BLD AUTO: 5.86 K/UL (ref 3.9–12.7)

## 2024-01-29 PROCEDURE — 80048 BASIC METABOLIC PNL TOTAL CA: CPT | Mod: HCNC | Performed by: HOSPITALIST

## 2024-01-29 PROCEDURE — 97116 GAIT TRAINING THERAPY: CPT | Mod: HCNC

## 2024-01-29 PROCEDURE — 25000003 PHARM REV CODE 250: Mod: HCNC | Performed by: HOSPITALIST

## 2024-01-29 PROCEDURE — 83735 ASSAY OF MAGNESIUM: CPT | Mod: HCNC | Performed by: HOSPITALIST

## 2024-01-29 PROCEDURE — 11000004 HC SNF PRIVATE: Mod: HCNC

## 2024-01-29 PROCEDURE — 36415 COLL VENOUS BLD VENIPUNCTURE: CPT | Mod: HCNC | Performed by: HOSPITALIST

## 2024-01-29 PROCEDURE — 85025 COMPLETE CBC W/AUTO DIFF WBC: CPT | Mod: HCNC | Performed by: HOSPITALIST

## 2024-01-29 PROCEDURE — 97530 THERAPEUTIC ACTIVITIES: CPT | Mod: HCNC

## 2024-01-29 PROCEDURE — 25000003 PHARM REV CODE 250: Mod: HCNC | Performed by: FAMILY MEDICINE

## 2024-01-29 PROCEDURE — 84100 ASSAY OF PHOSPHORUS: CPT | Mod: HCNC | Performed by: HOSPITALIST

## 2024-01-29 PROCEDURE — 25000003 PHARM REV CODE 250: Mod: HCNC | Performed by: NURSE PRACTITIONER

## 2024-01-29 RX ORDER — ACETAMINOPHEN 325 MG/1
650 TABLET ORAL EVERY 6 HOURS PRN
Refills: 0 | COMMUNITY
Start: 2024-01-29

## 2024-01-29 RX ORDER — AMLODIPINE BESYLATE 5 MG/1
5 TABLET ORAL DAILY
Qty: 90 TABLET | Refills: 3 | Status: ON HOLD
Start: 2024-01-29 | End: 2024-02-21 | Stop reason: HOSPADM

## 2024-01-29 RX ORDER — LISINOPRIL 5 MG/1
5 TABLET ORAL DAILY
Qty: 90 TABLET | Refills: 3 | Status: ON HOLD
Start: 2024-01-29 | End: 2024-02-21 | Stop reason: HOSPADM

## 2024-01-29 RX ORDER — POLYETHYLENE GLYCOL 3350 17 G/17G
17 POWDER, FOR SOLUTION ORAL DAILY PRN
Refills: 0 | Status: ON HOLD | COMMUNITY
Start: 2024-01-29 | End: 2024-02-19

## 2024-01-29 RX ORDER — SODIUM CHLORIDE 1 G/1
1000 TABLET ORAL ONCE
Status: COMPLETED | OUTPATIENT
Start: 2024-01-29 | End: 2024-01-29

## 2024-01-29 RX ADMIN — MICONAZOLE NITRATE: 20 OINTMENT TOPICAL at 08:01

## 2024-01-29 RX ADMIN — ACETAMINOPHEN 650 MG: 325 TABLET ORAL at 08:01

## 2024-01-29 RX ADMIN — FLUOXETINE 10 MG: 10 CAPSULE ORAL at 08:01

## 2024-01-29 RX ADMIN — CETIRIZINE HYDROCHLORIDE 10 MG: 5 TABLET, FILM COATED ORAL at 08:01

## 2024-01-29 RX ADMIN — PRAVASTATIN SODIUM 20 MG: 20 TABLET ORAL at 08:01

## 2024-01-29 RX ADMIN — METOPROLOL TARTRATE 25 MG: 25 TABLET, FILM COATED ORAL at 08:01

## 2024-01-29 RX ADMIN — SODIUM CHLORIDE 1000 MG: 1 TABLET ORAL at 11:01

## 2024-01-29 RX ADMIN — POLYETHYLENE GLYCOL 3350 17 G: 17 POWDER, FOR SOLUTION ORAL at 08:01

## 2024-01-29 RX ADMIN — SENNOSIDES AND DOCUSATE SODIUM 1 TABLET: 8.6; 5 TABLET ORAL at 08:01

## 2024-01-29 NOTE — PROGRESS NOTES
[facility]                                  Skilled Nursing Facility                   Progress Note     Admit Date: 1/20/2024  BRIDGETTE 1/30/2024  [id number]  Principal Problem:  Traumatic subdural hematoma (SDH)   HPI obtained from patient interview and chart review     Chief Complaint:Re-evaluation of medical treatment and therapy status: Lab review    HPI:   Mrs. 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  Admission to SNF for secondary weakness and debility.    Interval history:  All of today's labs reviewed and are listed below.  Na 132.  24 hr vital sign ranges reviewed and listed below, no acute abnormalities noted.  Patient denies shortness of breath, abdominal discomfort, nausea, or vomiting.  Patient reports an adequate appetite.  Patient denies dysuria.  Patient reports having regular bowel movements.  Patient progressing with PT/OT. Continuing to follow and treat all acute and chronic conditions.  Discharge coordination completed.  Discussion held with family at bedside.    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), Atherosclerosis of aorta (06/08/2016), Breast cancer (2011), Cataract, Controlled type 2 diabetes mellitus with circulatory disorder, without long-term current use of insulin (10/31/2017), Controlled type 2 diabetes mellitus with circulatory disorder, without long-term current use of insulin (10/31/2017), Diabetes mellitus type 2 without retinopathy (06/12/2014), DVT (deep venous thrombosis) (2001),  Grief (04/06/2022), Hyperlipidemia, Hyperlipidemia associated with type 2 diabetes mellitus (09/11/2012), Hypertension, Hypertension associated with diabetes (09/11/2012), Memory loss (12/29/2022), Microalbuminuria due to type 2 diabetes mellitus (12/08/2015), PVD (peripheral vascular disease) with claudication (05/02/2019), Risk for coronary artery disease greater than 20% in next 10 years per [place] score (05/01/2018), Strabismus, Type 2 diabetes mellitus with diabetic polyneuropathy (06/12/2014), and Type 2 diabetes mellitus with diabetic polyneuropathy, without long-term current use of insulin (06/12/2014).    Past Surgical History: Patient has a past surgical history that includes Tonsillectomy; pr removal of nail bed (6/10/2015); Breast biopsy (Right, 2011); and Breast lumpectomy (Right, 2011).    Social History: Patient reports that she has quit smoking. She has a 3.5 pack-year smoking history. She has never used smokeless tobacco. She reports that she does not drink alcohol and does not use drugs.    Family History: family history includes Breast cancer in her sister; Cataracts in her mother; Heart disease (age of onset: 50) in her father; Heart disease (age of onset: 58) in her mother; No Known Problems in her brother, maternal aunt, maternal grandfather, maternal grandmother, maternal uncle, paternal aunt, paternal grandfather, paternal grandmother, and paternal uncle; Thyroid disease in her sister.    Allergies: Patient is allergic to sulfa (sulfonamide antibiotics).    ROS  Constitutional: Negative for fever   Eyes: Negative for blurred vision, double vision   Respiratory: Negative for cough, shortness of breath   Cardiovascular: Negative for chest pain, palpitations, and leg swelling.   Gastrointestinal: Negative for abdominal pain, diarrhea, nausea, vomiting.  +constipation  Genitourinary: Negative for dysuria, frequency   Musculoskeletal:  + generalized weakness. Negative for back pain and  myalgias.   Skin: Negative for itching and rash.   Neurological: Negative for dizziness. + intermittent headaches.   Psychiatric/Behavioral: Negative for depression. The patient is not nervous/anxious.      24 hour Vital Sign Range   Temp:  [98.2 °F (36.8 °C)-98.3 °F (36.8 °C)]   Pulse:  [68-87]   Resp:  [18]   BP: (125-134)/(60-66)   SpO2:  [96 %-99 %]     Current BMI: Body mass index is 23.6 kg/m².    PEx  Constitutional: Patient appears debilitated.  No distress noted  HENT:   Head: Normocephalic and atraumatic.   Eyes: Pupils are equal, round  Neck: Normal range of motion. Neck supple.   Cardiovascular:  Decreased rate, regular rhythm and normal heart sounds.    Pulmonary/Chest: Effort normal and breath sounds are clear  Abdominal: Soft. Bowel sounds are normal.   Musculoskeletal: Normal range of motion.   Neurological: Alert and oriented to person, place, and time.  Impaired higher level thinking  Psychiatric: Normal mood and affect. Behavior is normal.   Skin: Skin is warm and dry.     Recent Labs   Lab 01/29/24  0440   *   K 4.6      CO2 25   BUN 18   CREATININE 0.7   CALCIUM 9.6   MG 2.0         Recent Labs   Lab 01/29/24  0440   WBC 5.86   RBC 3.92*   HGB 11.4*   HCT 35.5*      MCV 91   MCH 29.1   MCHC 32.1         Recent Labs   Lab 01/28/24  0706 01/28/24  1102 01/28/24  1616 01/28/24  2108 01/29/24  0703 01/29/24  1117   POCTGLUCOSE 166* 199* 177* 223* 163* 110        Assessment and Plan:    Traumatic subdural hematoma  Vasogenic cerebral edema  History of fall  - CTH w 8mm R SDH and 5mm L SDH   - Interval 5h CTA stable; no vascular abnormality, Follow-up CTH stable  -SBP goal < 160  - Continue home amlo/lisinopril/metoprolol  - discontinuing Keppra BID as patient has completed over a 7 day course  - PT/ OT/ SLP    Hyponatremia  - initiated sodium chloride 1 g x 1 dose    Dementia  - suspected  - family reporting confusion and memory loss over the last few months  - has remained  confused at SNF, will sometimes need to sit at nurse's station  - will need official evaluation with Neurology  Delirium precautions:  - Avoid antihistamines, anticholinergics, hypnotics, and minimize opiates while controlling for pain as these medications may exacerbate delirium. Cues for day/night will assist with keeping patient calm and oriented - during daytime, please keep adequate light in room (open windows, lights on) and please keep room dim at night-time to encourage normal sleep-wake cycles. Continuing to have nursing and family reorient the patient and encourage family to visit    Type 2 diabetes mellitus with diabetic polyneuropathy, without long-term current use of insulin  - last A1c 5.7 on 01/13/2024  - Accu-Cheks AC/HS  - home regimen with metformin 500 mg daily  - stable, continue SSI prn     Bradycardia  Hypertension associated with diabetes  - SBP goal < 160  - home regimen with amlodipine 5 mg daily, lisinopril 5 mg daily, metoprolol 50 mg BID, HCTZ 12.5 mg daily  - 1/26 reducing metoprolol to 25 mg BID for bradycardia, continue to hold amlodipine 5 mg daily, lisinopril 5 mg daily, continue to hold HCTZ 12.5 mg daily     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, adjuvant XRT, and endocrine therapy x 5 yrs, R breast DCIS  2/2023, pt declined surgical intervention at that time     BANDAR (generalized anxiety disorder)  - stable, continue fluoxetine 10 mg daily     PVD (peripheral vascular disease) with claudication  Hyperlipidemia associated with type 2 diabetes mellitus   - stable, continue  pravastatin 20 mg qHS in place of non formulary home simvastatin    Osteoarthritis  - PT/OT    Seasonal allergies  - continue home cetirizine 10 mg daily    Debility   - Continue with PT/OT for gait training and strengthening and restoration of ADL's   - Encourage mobility, OOB in chair, and early ambulation as appropriate  - Fall precautions   - Monitor for bowel and bladder dysfunction  - Monitor for and prevent skin breakdown and pressure ulcers  - Continue DVT prophylaxis with frequent ambulation      Anticipate disposition:  Home with home health    Hospital bed justification:  Patient requires a hospital bed due to her requiring positioning of the body in ways not feasible with an ordinary bed to alleviate pain/ is completely immobile /or limited mobility and cannot independently make changes in body position without the use of the bed. The positioning of the body cannot be sufficiently resolved by the use of pillows and wedges.    IP [facility] RISK OF UNPLANNED READMISSION Model: LOW    Follow-up needed during SNF stay-    Follow-up needed after discharge from SNF: PCPDarius 2/1    Future Appointments   Date Time Provider Department Center   2/1/2024  2:15 PM [facility]-CT1 500 LB LIMIT [facility] IC Imaging Ctr   2/1/2024  3:00 PM Cheryl Arizmendi PA-C [facility] [address]OS[address]   2/21/2024  2:00 PM Patricia Nelson NP [facility]   3/14/2024  1:20 PM Viktoria Mckeon MD [facility]       I spent 47  minutes reviewing patient records, examining, and counseling the patient/ patient's family with greater than 50% of the time spent in direct patient care and coordination.  Discharge coordination.  Discussion held with family at bedside.    Jesica Sidhu NP  Department of Hospital Medicine   [facility]     DOS: 1/29/2024      Patient note was created using MModal Dictation.  Any errors in syntax or even information may not have been identified and edited  on initial review prior to signing this note.

## 2024-01-29 NOTE — DISCHARGE SUMMARY
"[facility]  Discharge Summary      Patient Name: Kanchan Downing  MRN: [id number]  SEAN: [id number]  Patient Class: IP- SNF  Admission Date: 1/20/2024  Hospital Length of Stay: 9 days  Discharge Date and Time:  01/30/2024   Attending Physician: Vick Talavera MD   Discharging Provider: Jesica Sidhu NP  Primary Care Provider: Viktoria Mckeon MD    Primary Care Team: [facility] 8 JESICA SIDHU     HPI:   Mrs. 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  Admission to SNF for secondary weakness and debility.     Patient originally presented to [facility] ED on 1/13 after suffering a mechanical fall.  Per [facility] notes, History obtained from granddaughter at bedside. Pt had an unwitnessed fall around 1100 on 1/13. 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 Pt was wearing socks and slipped. She called her son and was crying and complaining of a HA. Son called EMS. Pt was brought to [facility]. CTH w 8mm R SDH and 5mm L SDH. Pt developed non-redirectable agitation, screaming "help me" and trying to get out of the bed. She was not following any commands and SBP was 220. Pt was intubated in the ED. Pt admitted to [facility]. On arrival to [facility], pt was on propofol 50, fentanyl 250, and still requiring 5-point restraints to prevent self-extubation. Gave 5mg IV haldol and 2mg ativan and pt fell asleep. Five-hour interval CTH/CTA w stable bl SDH and no vascular abnormality. Of note, pt lost her , Marques, in 1/2022 and subsequently developed a progressive decline in functional status. Pt's family had concerns for early dementia as " pt will become upset thinking about her  and starts to forget things. This decline is now thought to be related to her depression rather than dementia. Pt does perform her ADLs at home alone, but family started looking into options for assistance.  Patient was extubated on 01/14.  She was step-down Hospital Medicine on 01/15. Interval CTH 1/14: stable, s/p DDAVP. continue Keppra 500 BID x7 days. Per neurosurgery, hold ASA for 2 weeks and follow up in Neurosurgery clinic with repeat CT head       Today, patient patient appears to be at her mental baseline which is intermittent confusion, she is oriented x3 but displays impaired higher level thinking.  She reports intermittent headaches that have been improving.  Patient denies any Visual disturbances.  Last bowel movement was on 01/18, patient is agreeable to a suppository.     Patient will be treated at [facility] with PT and OT to improve functional status and ability to perform ADLs.    Hospital Course:   Patient progressed well with PT and OT- last PT note states that patient ambulated 300ft x 2 trials with rollator and CGA/SBA for safety. Pt needed a seated rest break d.t fatigue and frequent VC for pt to stay closer to the rollator. Patient had no significant events during their stay at SNF.  Patient continues to have mild intermittent headaches that are decreasing in frequency.  Home health was set up. DME was ordered if needed. Follow up appointment to be made by patient within one week. All prescriptions and discharge instructions were ordered to be given to the patient prior to discharge.     Traumatic subdural hematoma  Vasogenic cerebral edema  History of fall  - CTH w 8mm R SDH and 5mm L SDH   - Interval 5h CTA stable; no vascular abnormality, Follow-up CTH stable  - SBP goal < 160  - Continue metoprolol; holding home amlo/lisinopril low to normal BP's  - completed Keppra course  - PT/ OT/ SLP  - patient will follow-up with neurosurgery with   head CT on 02/01    PEx  Constitutional: Patient appears debilitated.  No distress noted  HENT:   Head: Normocephalic and atraumatic.   Eyes: Pupils are equal, round  Neck: Normal range of motion. Neck supple.   Cardiovascular:  Decreased rate, regular rhythm and normal heart sounds.    Pulmonary/Chest: Effort normal and breath sounds are clear  Abdominal: Soft. Bowel sounds are normal.   Musculoskeletal: Normal range of motion.   Neurological: Alert and oriented to person, place, and time.  Impaired higher level thinking  Psychiatric: Normal mood and affect. Behavior is normal.   Skin: Skin is warm and dry       Goals of Care Treatment Preferences:  Code Status: Full Code      Consults:   Consults (From admission, onward)          Status Ordering Provider     Inpatient consult to Registered Dietitian/Nutritionist  Once        Provider:  (Not yet assigned)    SHAY Nolasco            No new Assessment & Plan notes have been filed under this hospital service since the last note was generated.  Service: Hospital Medicine    Final Active Diagnoses:    Diagnosis Date Noted POA    PRINCIPAL PROBLEM:  Traumatic subdural hematoma (SDH) [S06.5XAA] 01/22/2024 Yes    History of fall [Z91.81] 05/19/2022 Not Applicable    BANDAR (generalized anxiety disorder) [F41.1] 05/04/2022 Yes    Type 2 diabetes mellitus with diabetic polyneuropathy, without long-term current use of insulin [E11.42] 06/12/2014 Yes    Hyperlipidemia associated with type 2 diabetes mellitus [E11.69, E78.5] 09/11/2012 Yes      Problems Resolved During this Admission:       Discharged Condition: stable    Disposition: [place] [place] [facility]    Follow Up:    Patient Instructions:      No driving until:   Order Comments: Cleared by PCP     Notify your health care provider if you experience any of the following:  temperature >100.4     Notify your health care provider if you experience any of the following:  persistent nausea and vomiting or diarrhea      Notify your health care provider if you experience any of the following:  severe uncontrolled pain     Notify your health care provider if you experience any of the following:  redness, tenderness, or signs of infection (pain, swelling, redness, odor or green/yellow discharge around incision site)     Notify your health care provider if you experience any of the following:  difficulty breathing or increased cough     Notify your health care provider if you experience any of the following:  severe persistent headache     Notify your health care provider if you experience any of the following:  worsening rash     Notify your health care provider if you experience any of the following:  persistent dizziness, light-headedness, or visual disturbances     Notify your health care provider if you experience any of the following:  increased confusion or weakness     Activity as tolerated       Significant Diagnostic Studies: N/A    Pending Diagnostic Studies:       None           Medications:  Reconciled Home Medications:      Medication List        START taking these medications      acetaminophen 325 MG tablet  Commonly known as: TYLENOL  Take 2 tablets (650 mg total) by mouth every 6 (six) hours as needed for Pain.     polyethylene glycol 17 gram Pwpk  Commonly known as: GLYCOLAX  Take 17 g by mouth daily as needed for Constipation.            CHANGE how you take these medications      amLODIPine 5 MG tablet  Commonly known as: NORVASC  Take 1 tablet (5 mg total) by mouth once daily. HOLD UNTIL OTHERWISE DIRECTED BY PRIMARY CARE PROVIDER  What changed: additional instructions     lisinopriL 5 MG tablet  Commonly known as: PRINIVIL,ZESTRIL  Take 1 tablet (5 mg total) by mouth once daily. HOLD UNTIL OTHERWISE DIRECTED BY PRIMARY CARE PROVIDER  What changed: additional instructions            CONTINUE taking these medications      blood sugar diagnostic Strp  Commonly known as: BLOOD GLUCOSE TEST  1 strip by  Misc.(Non-Drug; Combo Route) route 2 (two) times daily.     cetirizine 10 MG tablet  Commonly known as: ZYRTEC  Take 10 mg by mouth once daily.     FLUoxetine 10 MG capsule  Take 1 capsule (10 mg total) by mouth once daily.     FREESTYLE FLOWER 2 READER Misc  Generic drug: flash glucose scanning reader  Continuous glucose reader     FREESTYLE FLOWER 2 SENSOR Kit  Generic drug: flash glucose sensor  Continuous glucose monitor     lancets Misc  Test tid     metoprolol tartrate 50 MG tablet  Commonly known as: LOPRESSOR  Take 1 tablet (50 mg total) by mouth 2 (two) times daily.     simvastatin 10 MG tablet  Commonly known as: ZOCOR  Take 1 tablet (10 mg total) by mouth every evening.            STOP taking these medications      levETIRAcetam 500 MG Tab  Commonly known as: KEPPRA              Indwelling Lines/Drains at time of discharge:   Lines/Drains/Airways       None                   Time spent on the discharge of patient: 38 minutes         Jesica Sidhu NP  Department of [facility]  [facility]

## 2024-01-29 NOTE — PLAN OF CARE
Family Training     Patient Name:  Kanchan Downing   MRN:  [id number]  Admit Date: 1/20/2024       called to schedule family training this date. Pt's family/caregiver agreeable to attend training on Mon.1/29 @ 11am.     1/29/2024

## 2024-01-29 NOTE — PLAN OF CARE
Problem: Adult Inpatient Plan of Care  Goal: Plan of Care Review  Outcome: Ongoing, Progressing  Goal: Patient-Specific Goal (Individualized)  Outcome: Ongoing, Progressing  Goal: Absence of Hospital-Acquired Illness or Injury  Outcome: Ongoing, Progressing  Goal: Optimal Comfort and Wellbeing  Outcome: Ongoing, Progressing  Goal: Readiness for Transition of Care  Outcome: Ongoing, Progressing     Problem: Adult Inpatient Plan of Care  Goal: Plan of Care Review  Outcome: Ongoing, Progressing  Goal: Patient-Specific Goal (Individualized)  Outcome: Ongoing, Progressing  Goal: Absence of Hospital-Acquired Illness or Injury  Outcome: Ongoing, Progressing  Goal: Optimal Comfort and Wellbeing  Outcome: Ongoing, Progressing  Goal: Readiness for Transition of Care  Outcome: Ongoing, Progressing     Problem: Adult Inpatient Plan of Care  Goal: Plan of Care Review  Outcome: Ongoing, Progressing  Goal: Patient-Specific Goal (Individualized)  Outcome: Ongoing, Progressing  Goal: Absence of Hospital-Acquired Illness or Injury  Outcome: Ongoing, Progressing  Goal: Optimal Comfort and Wellbeing  Outcome: Ongoing, Progressing  Goal: Readiness for Transition of Care  Outcome: Ongoing, Progressing     Problem: Adult Inpatient Plan of Care  Goal: Plan of Care Review  Outcome: Ongoing, Progressing  Goal: Patient-Specific Goal (Individualized)  Outcome: Ongoing, Progressing  Goal: Absence of Hospital-Acquired Illness or Injury  Outcome: Ongoing, Progressing  Goal: Optimal Comfort and Wellbeing  Outcome: Ongoing, Progressing  Goal: Readiness for Transition of Care  Outcome: Ongoing, Progressing     Problem: Adult Inpatient Plan of Care  Goal: Plan of Care Review  Outcome: Ongoing, Progressing  Goal: Patient-Specific Goal (Individualized)  Outcome: Ongoing, Progressing  Goal: Absence of Hospital-Acquired Illness or Injury  Outcome: Ongoing, Progressing  Goal: Optimal Comfort and Wellbeing  Outcome: Ongoing, Progressing  Goal: Readiness  for Transition of Care  Outcome: Ongoing, Progressing     Problem: Adult Inpatient Plan of Care  Goal: Plan of Care Review  Outcome: Ongoing, Progressing  Goal: Patient-Specific Goal (Individualized)  Outcome: Ongoing, Progressing  Goal: Absence of Hospital-Acquired Illness or Injury  Outcome: Ongoing, Progressing  Goal: Optimal Comfort and Wellbeing  Outcome: Ongoing, Progressing  Goal: Readiness for Transition of Care  Outcome: Ongoing, Progressing     Problem: Adult Inpatient Plan of Care  Goal: Plan of Care Review  Outcome: Ongoing, Progressing  Goal: Patient-Specific Goal (Individualized)  Outcome: Ongoing, Progressing  Goal: Absence of Hospital-Acquired Illness or Injury  Outcome: Ongoing, Progressing  Goal: Optimal Comfort and Wellbeing  Outcome: Ongoing, Progressing  Goal: Readiness for Transition of Care  Outcome: Ongoing, Progressing

## 2024-01-29 NOTE — HOSPITAL COURSE
Patient progressed well with PT and OT- last PT note states that patient ambulated***. Patient had no significant events during their stay at SNF. Home health was set up. DME was ordered if needed. Follow up appointment to be made by patient within one week. All prescriptions and discharge instructions were ordered to be given to the patient prior to discharge.     PEx  Constitutional: Patient appears debilitated.  No distress noted  HENT:   Head: Normocephalic and atraumatic.   Eyes: Pupils are equal, round  Neck: Normal range of motion. Neck supple.   Cardiovascular:  Decreased rate, regular rhythm and normal heart sounds.    Pulmonary/Chest: Effort normal and breath sounds are clear  Abdominal: Soft. Bowel sounds are normal.   Musculoskeletal: Normal range of motion.   Neurological: Alert and oriented to person, place, and time.  Impaired higher level thinking  Psychiatric: Normal mood and affect. Behavior is normal.   Skin: Skin is warm and dry

## 2024-01-29 NOTE — CARE UPDATE
Pt will discharge to home with daughter in personal car on 1.29, undetermined time, per Ninoska.      [place] on 1.31    DME: pending hospital bed, wheelchair and BS commode      1218 pm 1.29 Requesting justifications/orders for all equipment above. No justifications for equipment at this time.

## 2024-01-29 NOTE — PLAN OF CARE
Problem: Physical Therapy  Goal: Physical Therapy Goal  Description: Goals to be met by: 2/22/24     Patient will increase functional independence with mobility by performing:    . Supine to sit with [place]  . Sit to supine with [place]  . Rolling to Left and Right with [place].  . Sit to stand transfer with [place]  . Bed to chair transfer with Modified [place] using Rolling Walker  . Gait  x 150 feet with Supervision using Rolling Walker or LRAD  . Wheelchair propulsion x150 feet with Modified [place] using bilateral uppper extremities  . Ascend/descend 12 stair with bilateral Handrails Stand-by Assistance using No Assistive Device.   . Ascend/Descend 4 inch curb step with Supervision using Rolling Walker.    Rehab Services' DME recommendations    Wheelchair  Number of hours up in a wheelchair per day >8 hrs/ day      Style Light weight    Justification for light weight w/c: patient can and does self-propel in a lightweight wheelchair, patient has impaired ability to participate in MRADLs, mobility limitations cannot be sifficiently resolved with a cane/walker, the patient is willing to use a wheelchair in the home, the patient has a caregiver who is available, willing, and able to provide assistance with the wheelchair, and the patient requires additional person for safety with mobility with walker  Seat Width 18 (Standard adult)  Seat Depth 18  Back Height Standard  Leg Support Standard and Swing Away  Arm Height Full, Swing Away, and Adjustable Height  Lap Belt N/A  Accessories Front Brakes and Anti-tippers  Cushion Basic  Justification for Cushion for pressure relief as pt will be in w/c >8hrs/day        3 in 1 commode Standard     Justification for 3 in 1 commode: to elevate the height of the toilet seat, making transfers easier     OR      Raised Toilet with armrest             Outcome: Ongoing, Progressing

## 2024-01-29 NOTE — CARE UPDATE
Per PENNIE Mooney, ROSETTA will process wc and commode through her [employer]. Hospital bed will be processed through [facility].       Pt will discharge 1/30 to home with family in personal car after lunch per Ninoska.      [facility] will follow pt on 1.31  Pending hospital bed to be delivered      430 pm   Hospital bed to be delivered tomorrow

## 2024-01-29 NOTE — PT/OT/SLP PROGRESS
Physical Therapy Treatment/ Discharge Summary    Patient Name:  Kanchan Downing   MRN:  [id number]  Admit Date: 1/20/2024  Admitting Diagnosis: Traumatic subdural hematoma (SDH)  Recent Surgeries: N/A    General Precautions: Standard, aspiration, fall  Orthopedic Precautions: N/A  Braces: N/A    Recommendations:     Discharge Recommendations: home health PT  Level of Assistance Recommended at Discharge: Intermittent assistance   Discharge Equipment Recommendations: none  Barriers to discharge: Decreased caregiver support    Assessment:     Kanchan Downing is a 83 y.o. female admitted with a medical diagnosis of Traumatic subdural hematoma (SDH) . Pt's family completed family training. They verb and demo understanding on how to provide appropriate assistance/guarding for the following tasks: bed mobility, transfers, ambulation, curb step, stairs. They were also informed on DME recommendations and that SBA/CGA is recommended for safety upon D/C. Pt's family verb understanding.  .      Performance deficits affecting function: weakness, impaired endurance, impaired self care skills, impaired functional mobility, gait instability, impaired balance, decreased upper extremity function, decreased lower extremity function, decreased safety awareness, impaired cardiopulmonary response to activity.    Rehab Potential is good    Activity Tolerance: Good    Plan:     Patient to be seen 5 x/week to address the above listed problems via gait training, therapeutic activities, therapeutic exercises, neuromuscular re-education, wheelchair management/training    Plan of Care Expires: 02/21/24  Plan of Care Reviewed with: patient    Subjective     Pt. Agreeable to work with PT.     Pain/Comfort:  Pain Rating 1: 0/10  Pain Rating Post-Intervention 1: 0/10    Patient's cultural, spiritual, Yazidi conflicts given the current situation:  no    Objective:     Communicated with pt's nurse prior to session.  Patient found HOB elevated with    upon PT entry to room.       Functional Mobility:     01/29/24 1530   Toileting Hygiene   Was the activity attempted? Yes   Was the activity done independently? No   Assistance Needed Supervision   Was adaptive equipment used? Yes  (viridiana)   CARE Score - Toileting Hygiene 4   Roll Left and Right   Was the activity attempted? Yes   Was the activity done independently? No   Assistance Needed Supervision   Was adaptive equipment used? No   CARE Score - Roll Left and Right 4   Sit to Lying   Was the activity attempted? Yes   Was the activity done independently? No   Assistance Needed Supervision   Was adaptive equipment used? No   CARE Score - Sit to Lying 4   Lying to Sitting on Side of Bed   Was the activity attempted? Yes   Was the activity done independently? No   Assistance Needed Supervision   Was adaptive equipment used? No   CARE Score - Lying to Sitting on Side of Bed 4   Sit to Stand   Was the activity attempted? Yes   Was the activity done independently? No   Assistance Needed Supervision   Was adaptive equipment used? Yes   CARE Score - Sit to Stand 4   Chair/Bed-to-Chair Transfer   Was the activity attempted? Yes   Was the activity done independently? No   Assistance Needed Supervision   Was adaptive equipment used? No   CARE Score - Chair/Bed-to-Chair Transfer 4   Toilet Transfer   Was the activity attempted? Yes   Was the activity done independently? No   Assistance Needed Supervision   Was adaptive equipment used? Yes  (viridiana)   CARE Score - Toilet Transfer 4   Car Transfer   Reason if not Attempted Environmental limitations   CARE Score - Car Transfer 10   Walk 10 Feet   Was the activity attempted? Yes   Was the activity done independently? No   Assistance Needed Touching assistance   Was adaptive equipment used? Yes   CARE Score - Walk 10 Feet 4   Walk 50 Feet with Two Turns   Was the activity attempted? Yes   Was the activity done independently? No   Assistance Needed Touching assistance   Was  adaptive equipment used? Yes   CARE Score - Walk 50 Feet with Two Turns 4   Walk 150 Feet   Was the activity attempted? Yes   Was the activity done independently? No   Assistance Needed Touching assistance  (~200ft with rollator and CGA for safety)   Was adaptive equipment used? Yes   CARE Score - Walk 150 Feet 4   Walking 10 Feet on Uneven Surfaces   Was the activity attempted? Yes   Was the activity done independently? No   Assistance Needed Touching assistance   Was adaptive equipment used? Yes   CARE Score - Walking 10 Feet on Uneven Surfaces 4   1 Step (Curb)   Was the activity attempted? Yes   Was the activity done independently? No   Assistance Needed Touching assistance  (CGA with rollator)   Was adaptive equipment used? Yes   CARE Score - 1 Step (Curb) 4   4 Steps   Was the activity attempted? Yes   Was the activity done independently? No   Assistance Needed Touching assistance   Was adaptive equipment used? Yes   CARE Score - 4 Steps 4   12 Steps   Was the activity attempted? Yes   Was the activity done independently? No   Assistance Needed Touching assistance  (CGA with B rails)   Was adaptive equipment used? Yes   CARE Score - 12 Steps 4   Picking Up Object   Was the activity attempted? Yes   Was the activity done independently? No   Assistance Needed Touching assistance   Was adaptive equipment used? Yes   CARE Score - Picking Up Object 4   OTHER   Uses a Wheelchair/Scooter? Yes   Wheel 50 Feet with Two Turns   Was the activity attempted? Yes   Was the activity done independently? No   Assistance Needed Supervision   Type of Wheelchair/Scooter Manual   CARE Score - Wheel 50 Feet with Two Turns 4   Wheel 150 Feet   Was the activity attempted? Yes   Was the activity done independently? No   Assistance Needed Supervision   Type of Wheelchair/Scooter Manual   CARE Score - Wheel 150 Feet 4       AM-PAC 6 CLICK MOBILITY  18    Patient left up in chair with call button in reach.    GOALS:   Multidisciplinary  Problems       Physical Therapy Goals          Problem: Physical Therapy    Goal Priority Disciplines Outcome Goal Variances Interventions   Physical Therapy Goal     PT, PT/OT Ongoing, Progressing     Description: Goals to be met by: 2/22/24     Patient will increase functional independence with mobility by performing:    . Supine to sit with [place]- not met  . Sit to supine with [place]-not met  . Rolling to Left and Right with [place].-not met  . Sit to stand transfer with [place]-not met  . Bed to chair transfer with Modified [place] using Rolling Walker-not met  . Gait  x 150 feet with Supervision using Rolling Walker or LRAD-not met  . Wheelchair propulsion x150 feet with Modified [place] using bilateral uppper extremities-not met  . Ascend/descend 12 stair with bilateral Handrails Stand-by Assistance using No Assistive Device. -not met  . Ascend/Descend 4 inch curb step with Supervision using Rolling Walker.-not met    Rehab Services' DME recommendations    Wheelchair  Number of hours up in a wheelchair per day >8 hrs/ day      Style Light weight    Justification for light weight w/c: patient can and does self-propel in a lightweight wheelchair, patient has impaired ability to participate in MRADLs, mobility limitations cannot be sifficiently resolved with a cane/walker, the patient is willing to use a wheelchair in the home, the patient has a caregiver who is available, willing, and able to provide assistance with the wheelchair, and the patient requires additional person for safety with mobility with walker  Seat Width 18 (Standard adult)  Seat Depth 18  Back Height Standard  Leg Support Standard and Swing Away  Arm Height Full, Swing Away, and Adjustable Height  Lap Belt N/A  Accessories Front Brakes and Anti-tippers  Cushion Basic  Justification for Cushion for pressure relief as pt will be in w/c >8hrs/day        3 in 1 commode Standard     Justification for 3 in 1 commode:  to elevate the height of the toilet seat, making transfers easier     OR      Raised Toilet with armrest                                  Time Tracking:     PT Received On: 01/29/24  PT Start Time: 1105  PT Stop Time: 1150  PT Total Time (min): 45 min    Billable Minutes: Gait Training 25 and Therapeutic Activity 15    Treatment Type: Treatment, Family Training  PT/PTA: PT     Number of PTA visits since last PT visit: 0     01/29/2024

## 2024-01-29 NOTE — PROGRESS NOTES
"[facility] - Skilled Nursing  Adult Nutrition  Progress Note    SUMMARY   Recommendations  Continue regular diet, RD following  Goals: PO to meet 85% of EEN with ONS by next RD follow up  Nutrition Goal Status: progressing towards goal  Communication of RD Recs: other (comment) (POC)    Assessment and Plan  No nutrition diagnosis at this time     Malnutrition Assessment 1/22     Skin (Micronutrient): bruised  Hair/Scalp (Micronutrient): sparse  Teeth (Micronutrient): broken dentition  Neck/Chest (Micronutrient): muscle wasting, subcutaneous fat loss  Musculoskeletal/Lower Extremities: subcutaneous fat loss, muscle wasting           Orbital Region (Subcutaneous Fat Loss): moderate depletion  Upper Arm Region (Subcutaneous Fat Loss): mild depletion  Thoracic and Lumbar Region: mild depletion   [place] Region (Muscle Loss): mild depletion  Clavicle and Acromion Bone Region (Muscle Loss): moderate depletion  Dorsal Hand (Muscle Loss): moderate depletion  Anterior Thigh Region (Muscle Loss): moderate depletion                 Reason for Assessment    Reason For Assessment: RD follow-up  Diagnosis:  (SDH)  Relevant Medical History: DM, HTN, HLD, PVD, BANDAR DVT, breast cancer, neuropathy  Interdisciplinary Rounds: attended  General Information Comments: PO 50-75% DC  in am  Nutrition Discharge Planning: DC on diabetic diet    Nutrition/Diet History    Patient Reported Diet/Restrictions/Preferences: general  Food Preferences: none  Spiritual, Cultural Beliefs, Zoroastrian Practices, Values that Affect Care: no  Food Allergies: NKFA  Factors Affecting Nutritional Intake: None identified at this time    Anthropometrics    Temp: 98.3 °F (36.8 °C)  Height Method: Stated  Height: 5' 8" (172.7 cm)  Height (inches): 68 in  Weight Method: Standard Scale  Weight: 70.4 kg (155 lb 3.3 oz)  Weight (lb): 155.21 lb  Ideal Body Weight (IBW), Female: 140 lb  % Ideal Body Weight, Female (lb): 110.23 %  BMI (Calculated): 23.6  Weight Loss: " unintentional  Usual Body Weight (UBW), k.6 kg  % Usual Body Weight: 94.03  % Weight Change From Usual Weight: -6.17 %       Lab/Procedures/Meds    Pertinent Labs Reviewed: reviewed  Pertinent Labs Comments: Hg 11.4, Hct 35.5, glucose 144,  Pertinent Medications Reviewed: reviewed  Pertinent Medications Comments: Fluoxetine, polyethylene glycol, senna-docusate, statin,    Estimated/Assessed Needs    Weight Used For Calorie Calculations: 70 kg (154 lb 5.2 oz)  Energy Calorie Requirements (kcal): 1684  Energy Need Method: [place]-St Jeor (x 1.4 (PAL))  Protein Requirements: 84  Weight Used For Protein Calculations: 70 kg (154 lb 5.2 oz) (x 1.2g/kg)  Fluid Requirements (mL): 1684 or per MD  Estimated Fluid Requirement Method: RDA Method  RDA Method (mL): 1684  CHO Requirement: 210      Nutrition Prescription Ordered    Current Diet Order: Regular  Nutrition Order Comments: PO 75%    Evaluation of Received Nutrient/Fluid Intake    I/O: no data  Energy Calories Required: meeting needs  Protein Required: not meeting needs  Fluid Required: meeting needs  Comments: LBM   Tolerance: tolerating  % Intake of Estimated Energy Needs: 75 - 100 %  % Meal Intake: 50 - 75 %    Nutrition Risk    Level of Risk/Frequency of Follow-up: low (one time per week)     Monitor and Evaluation    Food and Nutrient Intake: food and beverage intake  Food and Nutrient Adminstration: diet order  Physical Activity and Function: nutrition-related ADLs and IADLs  Anthropometric Measurements: weight change  Biochemical Data, Medical Tests and Procedures: glucose/endocrine profile, gastrointestinal profile, electrolyte and renal panel  Nutrition-Focused Physical Findings: overall appearance     Nutrition Follow-Up    RD Follow-up?: Yes

## 2024-01-29 NOTE — PLAN OF CARE
Problem: Physical Therapy  Goal: Physical Therapy Goal  Description: Goals to be met by: 2/22/24     Patient will increase functional independence with mobility by performing:    . Supine to sit with Reagan- not met  . Sit to supine with Reagan-not met  . Rolling to Left and Right with Reagan.-not met  . Sit to stand transfer with Reagan-not met  . Bed to chair transfer with Modified Reagan using Rolling Walker-not met  . Gait  x 150 feet with Supervision using Rolling Walker or LRAD-not met  . Wheelchair propulsion x150 feet with Modified Reagan using bilateral uppper extremities-not met  . Ascend/descend 12 stair with bilateral Handrails Stand-by Assistance using No Assistive Device. -not met  . Ascend/Descend 4 inch curb step with Supervision using Rolling Walker.-not met    Rehab Services' DME recommendations    Wheelchair  Number of hours up in a wheelchair per day >8 hrs/ day      Style Light weight    Justification for light weight w/c: patient can and does self-propel in a lightweight wheelchair, patient has impaired ability to participate in MRADLs, mobility limitations cannot be sifficiently resolved with a cane/walker, the patient is willing to use a wheelchair in the home, the patient has a caregiver who is available, willing, and able to provide assistance with the wheelchair, and the patient requires additional person for safety with mobility with walker  Seat Width 18 (Standard adult)  Seat Depth 18  Back Height Standard  Leg Support Standard and Swing Away  Arm Height Full, Swing Away, and Adjustable Height  Lap Belt N/A  Accessories Front Brakes and Anti-tippers  Cushion Basic  Justification for Cushion for pressure relief as pt will be in w/c >8hrs/day        3 in 1 commode Standard     Justification for 3 in 1 commode: to elevate the height of the toilet seat, making transfers easier     OR      Raised Toilet with armrest             1/29/2024 1529 by Nikko  Chandrika FULLER, PT  Outcome: Ongoing, Progressing  1/29/2024 1235 by Chandrika El, PT  Outcome: Ongoing, Progressing

## 2024-01-29 NOTE — DISCHARGE INSTRUCTIONS
Discharge Instructions    Please follow up with your primary care physician and check this document for other follow up appointments you may have.    [facility] will be following you at home starting 1.31.2024 . Please call them if you have any questions at [address], [place] [id number], [phone].      For further benefits, call your insurance provider and get the names and numbers of providers and vendors for the services your insurance tells you about.  You need to know where that your benefits can be utilized.  [employer]  [phone]

## 2024-01-30 ENCOUNTER — TELEPHONE (OUTPATIENT)
Dept: PRIMARY CARE CLINIC | Facility: CLINIC | Age: 84
End: 2024-01-30
Payer: MEDICARE

## 2024-01-30 VITALS
WEIGHT: 155.19 LBS | HEART RATE: 68 BPM | BODY MASS INDEX: 23.52 KG/M2 | OXYGEN SATURATION: 97 % | HEIGHT: 68 IN | DIASTOLIC BLOOD PRESSURE: 67 MMHG | SYSTOLIC BLOOD PRESSURE: 124 MMHG | RESPIRATION RATE: 16 BRPM | TEMPERATURE: 98 F

## 2024-01-30 LAB
POCT GLUCOSE: 112 MG/DL (ref 70–110)
POCT GLUCOSE: 120 MG/DL (ref 70–110)

## 2024-01-30 PROCEDURE — 25000003 PHARM REV CODE 250: Mod: HCNC | Performed by: NURSE PRACTITIONER

## 2024-01-30 PROCEDURE — 25000003 PHARM REV CODE 250: Mod: HCNC | Performed by: HOSPITALIST

## 2024-01-30 RX ADMIN — SENNOSIDES AND DOCUSATE SODIUM 1 TABLET: 8.6; 5 TABLET ORAL at 08:01

## 2024-01-30 RX ADMIN — CETIRIZINE HYDROCHLORIDE 10 MG: 5 TABLET, FILM COATED ORAL at 08:01

## 2024-01-30 RX ADMIN — FLUOXETINE 10 MG: 10 CAPSULE ORAL at 08:01

## 2024-01-30 RX ADMIN — METOPROLOL TARTRATE 25 MG: 25 TABLET, FILM COATED ORAL at 08:01

## 2024-01-30 RX ADMIN — MICONAZOLE NITRATE: 20 OINTMENT TOPICAL at 08:01

## 2024-01-30 NOTE — TELEPHONE ENCOUNTER
----- Message from Sheila Betancourt sent at 1/30/2024  9:26 AM CST -----  Contact: Virginie burns/[facility]  Pt is being discharged from skilled nursing on today. Pt is needing to be seen by 02/07. Pt has an Skilled nursing appt scheduled for 02/21. Per Virginie, pt needs to be seen sooner. Please call pt at  [phone] to schedule a sooner appt.                 Thank you

## 2024-01-30 NOTE — NURSING
Admission Diagnosis: Traumatic subdural hemorrhage with*     POC reviewed with pt, pt verbalized understanding. Safety maintained throughout shift, bed locked and in lowest position, call light in reach. Pt remained free of fall/trauma. VSS, afebrile this shift.    Pt pain  Last Documentation = Comfort/Acceptable Pain Level: 0 (01/28/24 1140)    Last Documentation = Pain Rating (0-10): Activity: 0 (01/28/24 0739)    Last Documentation = Pain Rating (0-10): Rest: 0 (01/30/24 1500)    Skin assessment completed prior to discharge   Location: no skin breakdown   Description:   Recommendation:      AVS reviewed with patient prior to discharge. Discharge instruction, follow up appointments and medication instructions given to pt, pt verbalized understanding. Patient IV lines have been removed prior to discharge. Rx sent to patients pharmacy.   DISCHARGE INSTRUCTIONS GAVE TO PATIENT AND DAUGHTER BOTH UNDERSTANDS.DISCHARGED BY WHEELCHAIR ACCOMPANIED BY PCT TO LOBBY AND ASSISTED INTO FAMILY VEHICLE.DISCHARGED TO HOME WITH DAUGHTER AND PERSONAL BELONGINGS.* No surgery found *      OUTCOME: Condition has improved and patient is now ready for discharge.    DISPOSITION: Home-Health Care [facility]    FOLLOWUP: With primary care provider

## 2024-01-30 NOTE — PT/OT/SLP PROGRESS
Speech Language Pathology      Kanchan Downing  MRN: [id number]    Patient not seen today secondary to  (pt discharging today. Continued  SLP services recommended.).

## 2024-01-30 NOTE — CARE UPDATE
"Per Hellen, [facility] spoke to family regarding HHA set.     Emailed note at 1:48 pm    "I just spoke to Chang's family regarding home health starting tomorrow and we will contact Thuy this afternoon with a time. Johan gave his sisters cell number to contact this afternoon."  Hellen Cadena    "

## 2024-01-30 NOTE — PLAN OF CARE
Interdisciplinary team, Madison Griffith, RN Charge Nurse, Joanie Rivera, , Chandrika El, PT, Rehab, Joanie Cotto, Activities, and Justa Jones, Dietician, spoke to patient for care plan conference, weekly status update, and therapy progress update. Discharge date set for 1/30/24.

## 2024-01-31 ENCOUNTER — PATIENT OUTREACH (OUTPATIENT)
Dept: ADMINISTRATIVE | Facility: CLINIC | Age: 84
End: 2024-01-31
Payer: MEDICARE

## 2024-01-31 NOTE — PROGRESS NOTES
2nd Attempt made to reach patient for TCC call. The patient's daughter is unable to conduct the call @ this time. The patient requested a callback.    Per request, provided tcc nurse's cb information.

## 2024-01-31 NOTE — PROGRESS NOTES
C3 nurse attempted to contact Kanchan Downing  for a TCC post hospital discharge follow up call. No answer. Left voicemail with callback information. The patient does not have a scheduled HOSFU appointment. Message sent to PCP staff for assistance with scheduling visit with patient.

## 2024-02-01 ENCOUNTER — TELEPHONE (OUTPATIENT)
Dept: PRIMARY CARE CLINIC | Facility: CLINIC | Age: 84
End: 2024-02-01
Payer: MEDICARE

## 2024-02-01 ENCOUNTER — HOSPITAL ENCOUNTER (OUTPATIENT)
Dept: RADIOLOGY | Facility: HOSPITAL | Age: 84
Discharge: HOME | End: 2024-02-01
Payer: MEDICARE

## 2024-02-01 ENCOUNTER — OFFICE VISIT (OUTPATIENT)
Dept: NEUROSURGERY | Facility: CLINIC | Age: 84
End: 2024-02-01
Payer: MEDICARE

## 2024-02-01 DIAGNOSIS — E11.21 CONTROLLED TYPE 2 DIABETES MELLITUS WITH DIABETIC NEPHROPATHY, WITHOUT LONG-TERM CURRENT USE OF INSULIN: ICD-10-CM

## 2024-02-01 DIAGNOSIS — S06.5X0D TRAUMATIC SUBDURAL HEMATOMA WITHOUT LOSS OF CONSCIOUSNESS, SUBSEQUENT ENCOUNTER: ICD-10-CM

## 2024-02-01 DIAGNOSIS — S06.5XAA SDH (SUBDURAL HEMATOMA): ICD-10-CM

## 2024-02-01 DIAGNOSIS — I62.00 NONTRAUMATIC SUBDURAL HEMORRHAGE, UNSPECIFIED: Primary | ICD-10-CM

## 2024-02-01 PROCEDURE — 1111F DSCHRG MED/CURRENT MED MERGE: CPT | Mod: HCNC,CPTII,S$GLB, | Performed by: PHYSICIAN ASSISTANT

## 2024-02-01 PROCEDURE — 99999 PR PBB SHADOW E&M-EST. PATIENT-LVL III: CPT | Mod: PBBFAC,HCNC,, | Performed by: PHYSICIAN ASSISTANT

## 2024-02-01 PROCEDURE — 1126F AMNT PAIN NOTED NONE PRSNT: CPT | Mod: HCNC,CPTII,S$GLB, | Performed by: PHYSICIAN ASSISTANT

## 2024-02-01 PROCEDURE — 70450 CT HEAD/BRAIN W/O DYE: CPT | Mod: TC,HCNC

## 2024-02-01 PROCEDURE — 3288F FALL RISK ASSESSMENT DOCD: CPT | Mod: HCNC,CPTII,S$GLB, | Performed by: PHYSICIAN ASSISTANT

## 2024-02-01 PROCEDURE — 1101F PT FALLS ASSESS-DOCD LE1/YR: CPT | Mod: HCNC,CPTII,S$GLB, | Performed by: PHYSICIAN ASSISTANT

## 2024-02-01 PROCEDURE — 99214 OFFICE O/P EST MOD 30 MIN: CPT | Mod: HCNC,S$GLB,, | Performed by: PHYSICIAN ASSISTANT

## 2024-02-01 PROCEDURE — 70450 CT HEAD/BRAIN W/O DYE: CPT | Mod: 26,HCNC,, | Performed by: RADIOLOGY

## 2024-02-01 PROCEDURE — 1159F MED LIST DOCD IN RCRD: CPT | Mod: HCNC,CPTII,S$GLB, | Performed by: PHYSICIAN ASSISTANT

## 2024-02-01 RX ORDER — SIMVASTATIN 10 MG/1
10 TABLET, FILM COATED ORAL NIGHTLY
Qty: 90 TABLET | Refills: 2 | Status: SHIPPED | OUTPATIENT
Start: 2024-02-01 | End: 2024-02-06 | Stop reason: SDUPTHER

## 2024-02-01 NOTE — TELEPHONE ENCOUNTER
No care due was identified.  Health [facility] Embedded Care Due Messages. Reference number: [id number].   2/01/2024 11:46:14 AM CST

## 2024-02-01 NOTE — TELEPHONE ENCOUNTER
----- Message from Charlene Ward sent at 2/1/2024 11:20 AM CST -----  Contact: [facility]/[phone]  Type: Home Health (orders, updates, clarifications, etc.)    Home Health Agency/Nurse:[facility]    Phone number:[phone]    Reason for call:check on medication status     Comments: Miguel stated that pt was told to hold off on  taking her Lisinopril and amlodipine by the hospital , he needs to know If pt should resume taking those medications. Please advise

## 2024-02-01 NOTE — TELEPHONE ENCOUNTER
----- Message from Charlene Ward sent at 2/1/2024 11:20 AM CST -----  Contact: [facility]/[phone]  Type: Home Health (orders, updates, clarifications, etc.)    Home Health Agency/Nurse:[facility]    Phone number:[phone]    Reason for call:check on medication status     Comments: Miguel stated that pt was told to hold off on  taking her Lisinopril and amlodipine by the hospital , he needs to know If pt should resume taking those medications. Please advise

## 2024-02-01 NOTE — TELEPHONE ENCOUNTER
pt was told to hold off on  taking her Lisinopril and amlodipine by the hospital ,Home Health needs to know If pt should resume taking those medications. Please advise.   HOSP F/U visit scheduled with Dr Addison on 2/6/24    Home Health Agency/Nurse:[facility]     Phone number:[phone]

## 2024-02-01 NOTE — TELEPHONE ENCOUNTER
If BP < 140/90, stay off of those meds    Bring home BP readings w her to visit w Dr Addison next week       Miguel with Home Health has been informed, and verbalized understanding.

## 2024-02-01 NOTE — PROGRESS NOTES
C3 nurse spoke with Kanchan Downing's daughter, Eusebia for a TCC post hospital discharge follow up call. The patient does not have a scheduled HOSFU appointment with Viktoria Mckeon MD within 5-7 days post hospital discharge date 01/31/24. C3 nurse was unable to schedule HOSFU appointment in [facility].    Message sent to PCP staff requesting they contact patient and schedule follow up appointment.

## 2024-02-02 ENCOUNTER — TELEPHONE (OUTPATIENT)
Dept: NEUROSURGERY | Facility: CLINIC | Age: 84
End: 2024-02-02
Payer: MEDICARE

## 2024-02-02 DIAGNOSIS — I62.00 NONTRAUMATIC SUBDURAL HEMORRHAGE, UNSPECIFIED: ICD-10-CM

## 2024-02-02 DIAGNOSIS — S06.5XAA SDH (SUBDURAL HEMATOMA): Primary | ICD-10-CM

## 2024-02-02 NOTE — TELEPHONE ENCOUNTER
Called pt to schedule angiogram. Had to leave a voice message. Left clinic number to return call.

## 2024-02-05 NOTE — PROGRESS NOTES
"Kanchan Downing  1940        Subjective     Chief Complaint: Hospital F/u    History of Present Illness:  Ms. Kanchan Downing is a 83 y.o. female who presents to clinic for hospital follow-up. PCP is Dr. Mckeon. Recently had traumatic subdural 2/2 fall.     Repeat CT head with some expansion.  Considering embolization procedure with IR.    Patient lives alone.  Daughter is with her today.  Long discussion regarding assisted living versus aide.  The patient does not want assisted living.  Her daughter is going to try and contact [facility] regarding [facility] from her late  who was a .    Only on Metoprolol and Prozac only.  Holding her Lisinopril and Amlodipine since d/c.  BP at goal today. No dizziness or falls recently.    Some mild headache but nothing acute/severe.  She was counseled to let us know if she would any acute changes and to notify Neurosurgery urgently but does not feel like she has had any severe concerns recently.  Overall feels like she is slowly improving but not quite yet back to her baseline.  She has a walker today, her biggest fear is falling while home.      Saw Neurosurgery last week for a hospital f/u. Per notes, CT scan with expansion. Per that A/P, "acute on subacute bilateral subdural hematomas. The right subdural hematoma has expanded with midline shift and some mass effect. The patient has no complaints and neurological exam is normal. We discussed that should she develop new symptoms craniotomy for subdural evacuation and decompression may be warranted. At this time, we will get her in for bilateral MMA embolization in the next 1-2 weeks with CTH follow up."       Review of Systems   Constitutional:  Positive for malaise/fatigue. Negative for chills and fever.   Eyes:  Negative for blurred vision.   Respiratory:  Negative for cough.    Gastrointestinal:  Negative for nausea and vomiting.   Musculoskeletal:  Positive for falls (Not recently) and joint pain " (Knees). Negative for myalgias.   Neurological:  Positive for weakness and headaches (Mild). Negative for dizziness, tingling and speech change.   Psychiatric/Behavioral:  The patient is nervous/anxious.         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, without long-term current use of insulin 10/31/2017    Diabetes mellitus type 2 without retinopathy 06/12/2014    6% metformin 500 bid, FU+ 2016    DVT (deep venous thrombosis) 2001    R , Dr Bonilla    Grief 04/06/2022     Marques passed 1/2022    Hyperlipidemia     LDL 82    Hyperlipidemia associated with type 2 diabetes mellitus 09/11/2012    Hypertension     Hypertension associated with diabetes 09/11/2012    Memory loss 12/29/2022    CT chronic changes 2022    Microalbuminuria due to type 2 diabetes mellitus 12/08/2015    taking lisinopril, losartan caused olivia effects    PVD (peripheral vascular disease) with claudication 05/02/2019    Compression hose    Risk for coronary artery disease greater than 20% in next 10 years per [place] score 05/01/2018    Strabismus     Type 2 diabetes mellitus with diabetic polyneuropathy 06/12/2014    Type 2 diabetes mellitus with diabetic polyneuropathy, without long-term current use of insulin 06/12/2014       Past Surgical History:   Procedure Laterality Date    BREAST BIOPSY Right 2011    BREAST LUMPECTOMY Right 2011    NE REMOVAL OF NAIL BED  6/10/2015    TONSILLECTOMY         Family History   Problem Relation Age of Onset    Heart disease Mother 58    Cataracts Mother     Heart disease Father 50    Thyroid disease  Sister     Breast cancer Sister     No Known Problems Brother     No Known Problems Maternal Aunt     No Known Problems Maternal Uncle     No Known Problems Paternal Aunt     No Known Problems Paternal Uncle     No Known Problems Maternal Grandmother     No Known Problems Maternal Grandfather     No Known Problems Paternal Grandmother     No Known Problems Paternal Grandfather     Amblyopia Neg Hx     Blindness Neg Hx     Diabetes Neg Hx     Glaucoma Neg Hx     Hypertension Neg Hx     Macular degeneration Neg Hx     Retinal detachment Neg Hx     Strabismus Neg Hx     Stroke Neg Hx        Social History     Socioeconomic History    Marital status:    Tobacco Use    Smoking status: Former     Current packs/day: 7.00     Average packs/day: 7.0 packs/day for 0.5 years (3.5 ttl pk-yrs)     Types: Cigarettes    Smokeless tobacco: Never   Substance and Sexual Activity    Alcohol use: No    Drug use: Never    Sexual activity: Never   Social History Narrative     to Marques, 3 children, nondrinker, nonsmoker, she declines colonoscopy     Social Determinants of Health     Financial Resource Strain: Low Risk  (1/16/2024)    Overall Financial Resource Strain (CARDIA)     Difficulty of Paying Living Expenses: Not hard at all   Food Insecurity: No Food Insecurity (1/16/2024)    Hunger Vital Sign     Worried About Running Out of Food in the Last Year: Never true     Ran Out of Food in the Last Year: Never true   Transportation Needs: No Transportation Needs (1/16/2024)    PRAPARE - Transportation     Lack of Transportation (Medical): No     Lack of Transportation (Non-Medical): No   Physical Activity: Inactive (1/16/2024)    Exercise Vital Sign     Days of Exercise per Week: 0 days     Minutes of Exercise per Session: 0 min   Stress: No Stress Concern Present (1/16/2024)    [employer] [place] [employer] - Occupational Stress Questionnaire     Feeling of Stress : Not at all   Social Connections: Socially  Isolated (1/16/2024)    Social Connection and Isolation Panel [NHANES]     Frequency of Communication with Friends and Family: More than three times a week     Frequency of Social Gatherings with Friends and Family: Three times a week     Attends Spiritism Services: Never     Active Member of Clubs or Organizations: No     Attends Club or Organization Meetings: Never     Marital Status:    Housing Stability: Low Risk  (1/16/2024)    Housing Stability Vital Sign     Unable to Pay for Housing in the Last Year: No     Number of Places Lived in the Last Year: 1     Unstable Housing in the Last Year: No       MEDICATIONS & ALLERGIES:     Current Outpatient Medications on File Prior to Visit   Medication Sig    acetaminophen (TYLENOL) 325 MG tablet Take 2 tablets (650 mg total) by mouth every 6 (six) hours as needed for Pain.    blood sugar diagnostic (BLOOD GLUCOSE TEST) Strp 1 strip by Misc.(Non-Drug; Combo Route) route 2 (two) times daily.    cetirizine (ZYRTEC) 10 MG tablet Take 10 mg by mouth once daily.    flash glucose scanning reader (FREESTYLE FLOWER 2 READER) [facility]    flash glucose sensor ([employer]YLE FLOWER 2 SENSOR) Kit Continuous glucose monitor    FLUoxetine 10 MG capsule Take 1 capsule (10 mg total) by mouth once daily.    lancets Misc Test tid    metoprolol tartrate (LOPRESSOR) 50 MG tablet Take 1 tablet (50 mg total) by mouth 2 (two) times daily.    amLODIPine (NORVASC) 5 MG tablet Take 1 tablet (5 mg total) by mouth once daily. HOLD UNTIL OTHERWISE DIRECTED BY PRIMARY CARE PROVIDER (Patient not taking: Reported on 2/1/2024)    lisinopriL (PRINIVIL,ZESTRIL) 5 MG tablet Take 1 tablet (5 mg total) by mouth once daily. HOLD UNTIL OTHERWISE DIRECTED BY PRIMARY CARE PROVIDER (Patient not taking: Reported on 2/1/2024)    polyethylene glycol (GLYCOLAX) 17 gram PwPk Take 17 g by mouth daily as needed for Constipation. (Patient not taking: Reported on 2/1/2024)     No current  "facility-administered medications on file prior to visit.       Review of patient's allergies indicates:   Allergen Reactions    Sulfa (sulfonamide antibiotics)      Other reaction(s): Rash       OBJECTIVE:     Vital Signs:  Vitals:    02/06/24 0913   BP: 130/74   BP Location: Left arm   Patient Position: Sitting   BP Method: Medium (Manual)   Pulse: (!) 56   SpO2: 97%   Weight: 69.5 kg (153 lb 3.5 oz)   Height: 5' 8" (1.727 m)       Body mass index is 23.3 kg/m².     Physical Exam:  Physical Exam  Vitals and nursing note reviewed. Exam conducted with a chaperone present.   Constitutional:       General: She is not in acute distress.     Appearance: Normal appearance. She is not ill-appearing, toxic-appearing or diaphoretic.      Comments: Daughter here.   Walker in place   HENT:      Head: Normocephalic and atraumatic.   Eyes:      General: No scleral icterus.        Right eye: No discharge.         Left eye: No discharge.      Conjunctiva/sclera: Conjunctivae normal.   Pulmonary:      Effort: Pulmonary effort is normal. No respiratory distress.   Musculoskeletal:      Cervical back: Normal range of motion.      Comments: Bilateral ankle edema   Feet:      Right foot:      Toenail Condition: Right toenails are abnormally thick and ingrown. Fungal disease present.     Left foot:      Toenail Condition: Left toenails are abnormally thick and ingrown. Fungal disease present.  Skin:     General: Skin is warm and dry.   Neurological:      General: No focal deficit present.      Mental Status: She is alert and oriented to person, place, and time.      Cranial Nerves: No dysarthria or facial asymmetry.      Sensory: Sensation is intact.      Motor: No tremor.      Gait: Gait abnormal.   Psychiatric:         Attention and Perception: Attention normal.         Mood and Affect: Mood is depressed. Affect is tearful.         Speech: Speech is tangential.         Behavior: Behavior is not agitated or slowed. Behavior is " cooperative.         Cognition and Memory: Cognition and memory normal.            Laboratory  Lab Results   Component Value Date    WBC 5.86 01/29/2024    HGB 11.4 (L) 01/29/2024    HCT 35.5 (L) 01/29/2024    MCV 91 01/29/2024     01/29/2024     Lab Results   Component Value Date     (H) 01/29/2024     (L) 01/29/2024    K 4.6 01/29/2024     01/29/2024    CO2 25 01/29/2024    BUN 18 01/29/2024    CREATININE 0.7 01/29/2024    CALCIUM 9.6 01/29/2024    MG 2.0 01/29/2024     Lab Results   Component Value Date    INR 1.0 01/13/2024    INR 1.2 01/13/2024    INR 0.9 01/13/2024     Lab Results   Component Value Date    HGBA1C 5.7 (H) 01/13/2024           Health Maintenance         Date Due Completion Date    COVID-19 Vaccine (1) Never done ---    RSV Vaccine (Age 60+ and Pregnant patients) (1 - 1-dose 60+ series) Never done ---    Shingles Vaccine (1 of 2) 01/18/2016 11/23/2015    DEXA Scan 05/05/2020 5/5/2015    Diabetes Urine Screening 06/09/2021 6/9/2020    Eye Exam 08/02/2023 8/2/2022    Influenza Vaccine (1) Never done ---    Hemoglobin A1c 07/13/2024 1/13/2024    Lipid Panel 01/13/2025 1/13/2024    TETANUS VACCINE 11/02/2027 11/2/2017              ASSESSMENT & PLAN:   Ms. Kanchan Downing is a 83 y.o. female who was seen today in clinic for hospital follow up.  Message sent to her Neurosurgery team yesterday in regards to family's concern regarding MMA embolization procedure.  They are going to reach out with next steps.  No focal deficits today, mild headache but not severe.  Discussed contacting Neurosurgery and presenting to ED urgently with any acute changes this could be signs that the subdural is expanding.  They voiced understanding.  Much of our discussion today was focused on living situation.  I am concerned with her functional status living alone.  We discussed assisted living which the patient is not amenable to at this time.  Her daughter is going to reach out to [facility] to see if  an aide maybe available through benefits.  Additionally, she is having some bilateral knee/joint pain.  Not new.  Used to follow with ortho and had joint injections.  Would be interested in repeat.  We will place that referral.  BP is at goal today.  Advised to continue holding amlodipine and lisinopril as we do not want her blood pressure to drop too low.  Discussed checking at home and let me or her primary care or neurosurgeon know if blood pressure going above 150/ 90.      1. Hospital discharge follow-up    2. Traumatic subdural hematoma without loss of consciousness, subsequent encounter    3. History of fall    4. Balance disorder    5. Chronic pain of both knees  -     X-Ray Knee 3 View Bilateral; Future; Expected date: 02/06/2024  -     Ambulatory referral/consult to Sports Medicine; Future; Expected date: 02/13/2024  -     diclofenac sodium (VOLTAREN) 1 % Gel; Apply 2 g topically 3 (three) times daily. To knees for pain  Dispense: 150 g; Refill: 3    6. Ductal carcinoma in situ (DCIS) of right breast    7. Ingrown nail of great toe  -     Ambulatory referral/consult to Podiatry; Future; Expected date: 02/13/2024    8. Controlled type 2 diabetes mellitus with diabetic nephropathy, without long-term current use of insulin  -     simvastatin (ZOCOR) 10 MG tablet; Take 1 tablet (10 mg total) by mouth every evening.  Dispense: 90 tablet; Refill: 2         Rashida Addison MD  Internal Medicine         Portions of this note may have been generated using voice recognition software.  Please excuse any spelling/grammatical errors. Occasional wrong-word or sound-a-like substitutions may have also occurred due to the inherent limitations of voice recognition software. Please read the chart carefully and recognize, using context, where substitutions have occurred.        Time spent in the evaluation and management of this patient exceeded 50min and greater than 50% of this time was in face-to-face time with the patient  on day of the clinic visit.   This includes face-to-face time and non face-to-face time and includes the following:  --preparing to see the patient (eg, obtaining and/or reviewing old records such as, when applicable, primary care notes, specialist notes, hospital notes, review of laboratory tests, and/or radiographic and/or cardiology or other studies)  --performing a medically appropriate review of systems and examination and/or evaluation  --reviewing and independently interpreting results (not separately reported; eg, lab results) and communicating results to the patient and/or family/caregiver  --placing orders and/or reviewing other physician's orders which can both include medications, laboratory studies, radiographic studies, procedures, referrals etcetera and   --counseling and educating the patient and/or family member/caregiver regarding the treatment plan  --documentation of the visit in the electronic health record  --communicating with other health care providers regarding referrals, studies, follow-up, etc

## 2024-02-05 NOTE — PROGRESS NOTES
Neurosurgery  Established Patient    SUBJECTIVE:     History of Present Illness: Kanchan Downing is a 83 y.o. female who presents for 2 week follow up of bilateral SDH R>L. She initially presented to the ED on 1/13/24 after an unwitnessed fall at home. Repeat imaging was stable. She was eventually discharged with outpatient follow up. She is here today accompanied by her daughter and great grandson. She denies headache, vision changes, or weakness. She is no longer taking Keppra. She has continued to hold her ASA and is not sure why she was taking it in the first place.       Review of patient's allergies indicates:   Allergen Reactions    Sulfa (sulfonamide antibiotics)      Other reaction(s): Rash       Current Outpatient Medications   Medication Sig Dispense Refill    acetaminophen (TYLENOL) 325 MG tablet Take 2 tablets (650 mg total) by mouth every 6 (six) hours as needed for Pain.  0    blood sugar diagnostic (BLOOD GLUCOSE TEST) Strp 1 strip by Misc.(Non-Drug; Combo Route) route 2 (two) times daily. 100 strip 12    flash glucose scanning reader (FREESTYLE FLOWER 2 READER) [facility] Continuous glucose reader 1 each 12    flash glucose sensor (FREESTYLE FLOWER 2 SENSOR) Kit Continuous glucose monitor 1 kit 12    FLUoxetine 10 MG capsule Take 1 capsule (10 mg total) by mouth once daily. 30 capsule 11    lancets Misc Test tid 100 each 12    metoprolol tartrate (LOPRESSOR) 50 MG tablet Take 1 tablet (50 mg total) by mouth 2 (two) times daily. 60 tablet 11    amLODIPine (NORVASC) 5 MG tablet Take 1 tablet (5 mg total) by mouth once daily. HOLD UNTIL OTHERWISE DIRECTED BY PRIMARY CARE PROVIDER (Patient not taking: Reported on 2/1/2024) 90 tablet 3    cetirizine (ZYRTEC) 10 MG tablet Take 10 mg by mouth once daily.      lisinopriL (PRINIVIL,ZESTRIL) 5 MG tablet Take 1 tablet (5 mg total) by mouth once daily. HOLD UNTIL OTHERWISE DIRECTED BY PRIMARY CARE PROVIDER (Patient not taking: Reported on 2/1/2024) 90 tablet 3     polyethylene glycol (GLYCOLAX) 17 gram PwPk Take 17 g by mouth daily as needed for Constipation. (Patient not taking: Reported on 2/1/2024)  0    simvastatin (ZOCOR) 10 MG tablet TAKE 1 TABLET (10 MG TOTAL) BY MOUTH EVERY EVENING. (Patient not taking: Reported on 2/1/2024) 90 tablet 2     No current facility-administered medications for this visit.       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, without long-term current use of insulin 10/31/2017    Diabetes mellitus type 2 without retinopathy 06/12/2014    6% metformin 500 bid, FU+ 2016    DVT (deep venous thrombosis) 2001    R , Dr Chad Golden 04/06/2022     Marques passed 1/2022    Hyperlipidemia     LDL 82    Hyperlipidemia associated with type 2 diabetes mellitus 09/11/2012    Hypertension     Hypertension associated with diabetes 09/11/2012    Memory loss 12/29/2022    CT chronic changes 2022    Microalbuminuria due to type 2 diabetes mellitus 12/08/2015    taking lisinopril, losartan caused olivia effects    PVD (peripheral vascular disease) with claudication 05/02/2019    Compression hose    Risk for coronary artery disease greater than 20% in next 10 years per [place] score 05/01/2018    Strabismus     Type 2 diabetes mellitus with diabetic polyneuropathy 06/12/2014    Type 2 diabetes mellitus with diabetic polyneuropathy, without long-term current use of insulin 06/12/2014     Past Surgical History:   Procedure Laterality Date    BREAST BIOPSY Right 2011    BREAST LUMPECTOMY Right 2011    SD REMOVAL OF NAIL BED  6/10/2015    TONSILLECTOMY       Family History        Problem Relation (Age of Onset)    Breast cancer Sister    Cataracts Mother    Heart disease Mother (58), Father (50)    No Known Problems Brother, Maternal Aunt, Maternal Uncle, Paternal Aunt, Paternal Uncle, Maternal Grandmother, Maternal Grandfather, Paternal Grandmother, Paternal Grandfather    Thyroid disease Sister          Social History     Socioeconomic History    Marital status:    Tobacco Use    Smoking status: Former     Current packs/day: 7.00     Average packs/day: 7.0 packs/day for 0.5 years (3.5 ttl pk-yrs)     Types: Cigarettes    Smokeless tobacco: Never   Substance and Sexual Activity    Alcohol use: No    Drug use: Never    Sexual activity: Never   Social History Narrative     to Marques, 3 children, nondrinker, nonsmoker, she declines colonoscopy     Social Determinants of Health     Financial Resource Strain: Low Risk  (1/16/2024)    Overall Financial Resource Strain (CARDIA)     Difficulty of Paying Living Expenses: Not hard at all   Food Insecurity: No Food Insecurity (1/16/2024)    Hunger Vital Sign     Worried About Running Out of Food in the Last Year: Never true     Ran Out of Food in the Last Year: Never true   Transportation Needs: No Transportation Needs (1/16/2024)    PRAPARE - Transportation     Lack of Transportation (Medical): No     Lack of Transportation (Non-Medical): No   Physical Activity: Inactive (1/16/2024)    Exercise Vital Sign     Days of Exercise per Week: 0 days     Minutes of Exercise per Session: 0 min   Stress: No Stress Concern Present (1/16/2024)    [employer] [place] [employer] - Occupational Stress Questionnaire     Feeling of Stress : Not at all   Social Connections: Socially Isolated (1/16/2024)    Social Connection and Isolation Panel [NHANES]     Frequency of Communication with Friends and Family: More than three times a week     Frequency of Social Gatherings with Friends and Family: Three times a week     Attends Yarsanism Services:  Never     Active Member of Clubs or Organizations: No     Attends Club or Organization Meetings: Never     Marital Status:    Housing Stability: Low Risk  (1/16/2024)    Housing Stability Vital Sign     Unable to Pay for Housing in the Last Year: No     Number of Places Lived in the Last Year: 1     Unstable Housing in the Last Year: No       Review of Systems    OBJECTIVE:     Vital Signs  Pain Score: 0-No pain  There is no height or weight on file to calculate BMI.    Neurosurgery Physical Exam  General: well developed, well nourished, no distress.   Head: normocephalic  Neurologic: Alert and oriented. Thought content appropriate.  GCS: Motor: 6/Verbal: 5/Eyes: 4 GCS Total: 15  Mental Status: Awake, Alert, Oriented x 4  Language: No aphasia  Speech: No dysarthria  Cranial nerves: face symmetric, tongue midline, CN II-XII grossly intact.   Eyes: pupils equal, round, reactive to light with accommodation, EOMI.   Pulmonary: normal respirations, no signs of respiratory distress  Abdomen: soft, non-distended, not tender to palpation  Skin: Skin is warm, dry and intact.  Sensory: intact to light touch throughout    Motor Strength:Moves all extremities spontaneously with good tone.  Full strength upper and lower extremities. No abnormal movements seen.     Strength  Deltoids Triceps Biceps Wrist Extension Wrist Flexion Hand    Upper: R 5/5 5/5 5/5 5/5 5/5 5/5    L 5/5 5/5 5/5 5/5 5/5 5/5     Iliopsoas Quadriceps Knee  Flexion Tibialis  anterior Gastro- cnemius EHL   Lower: R 5/5 5/5 5/5 5/5 5/5 5/5    L 5/5 5/5 5/5 5/5 5/5 5/5          Cerebellar:   Finger-to-nose: intact bilaterally   Pronator drift: absent bilaterally  Gait stable, fluid.       Diagnostic Results:  CTH from 2/1 which I have personally reviewed shows expansion of the right subdural hematoma with mixed density collection suggesting acute on subacute bleed.     ASSESSMENT/PLAN:     Kanchan Downing is a 83 y.o. female with acute on subacute  bilateral subdural hematomas. The right subdural hematoma has expanded with midline shift and some mass effect. The patient has no complaints and neurological exam is normal. We discussed that should she develop new symptoms craniotomy for subdural evacuation and decompression may be warranted. At this time, we will get her in for bilateral MMA embolization in the next 1-2 weeks with CTH follow up. I discussed with the patient and her daughter strict return precautions. They voiced understanding.

## 2024-02-06 ENCOUNTER — DOCUMENTATION ONLY (OUTPATIENT)
Dept: NEUROSURGERY | Facility: HOSPITAL | Age: 84
End: 2024-02-06
Payer: MEDICARE

## 2024-02-06 ENCOUNTER — OFFICE VISIT (OUTPATIENT)
Dept: PRIMARY CARE CLINIC | Facility: CLINIC | Age: 84
End: 2024-02-06
Payer: MEDICARE

## 2024-02-06 ENCOUNTER — HOSPITAL ENCOUNTER (OUTPATIENT)
Dept: RADIOLOGY | Facility: HOSPITAL | Age: 84
Discharge: HOME | End: 2024-02-06
Attending: STUDENT IN AN ORGANIZED HEALTH CARE EDUCATION/TRAINING PROGRAM
Payer: MEDICARE

## 2024-02-06 ENCOUNTER — TELEPHONE (OUTPATIENT)
Dept: NEUROSURGERY | Facility: CLINIC | Age: 84
End: 2024-02-06
Payer: MEDICARE

## 2024-02-06 VITALS
SYSTOLIC BLOOD PRESSURE: 130 MMHG | HEART RATE: 56 BPM | HEIGHT: 68 IN | DIASTOLIC BLOOD PRESSURE: 74 MMHG | BODY MASS INDEX: 23.22 KG/M2 | OXYGEN SATURATION: 97 % | WEIGHT: 153.25 LBS

## 2024-02-06 DIAGNOSIS — R26.89 BALANCE DISORDER: ICD-10-CM

## 2024-02-06 DIAGNOSIS — M25.562 CHRONIC PAIN OF BOTH KNEES: ICD-10-CM

## 2024-02-06 DIAGNOSIS — L60.0 INGROWN NAIL OF GREAT TOE: ICD-10-CM

## 2024-02-06 DIAGNOSIS — Z91.81 HISTORY OF FALL: ICD-10-CM

## 2024-02-06 DIAGNOSIS — M25.561 CHRONIC PAIN OF BOTH KNEES: ICD-10-CM

## 2024-02-06 DIAGNOSIS — E11.21 CONTROLLED TYPE 2 DIABETES MELLITUS WITH DIABETIC NEPHROPATHY, WITHOUT LONG-TERM CURRENT USE OF INSULIN: ICD-10-CM

## 2024-02-06 DIAGNOSIS — G89.29 CHRONIC PAIN OF BOTH KNEES: ICD-10-CM

## 2024-02-06 DIAGNOSIS — Z09 HOSPITAL DISCHARGE FOLLOW-UP: Primary | ICD-10-CM

## 2024-02-06 DIAGNOSIS — S06.5X0D TRAUMATIC SUBDURAL HEMATOMA WITHOUT LOSS OF CONSCIOUSNESS, SUBSEQUENT ENCOUNTER: ICD-10-CM

## 2024-02-06 DIAGNOSIS — D05.11 DUCTAL CARCINOMA IN SITU (DCIS) OF RIGHT BREAST: ICD-10-CM

## 2024-02-06 PROCEDURE — 73562 X-RAY EXAM OF KNEE 3: CPT | Mod: TC,50,HCNC,PN

## 2024-02-06 PROCEDURE — 1111F DSCHRG MED/CURRENT MED MERGE: CPT | Mod: HCNC,CPTII,S$GLB, | Performed by: STUDENT IN AN ORGANIZED HEALTH CARE EDUCATION/TRAINING PROGRAM

## 2024-02-06 PROCEDURE — 73562 X-RAY EXAM OF KNEE 3: CPT | Mod: 26,50,HCNC, | Performed by: RADIOLOGY

## 2024-02-06 PROCEDURE — 1126F AMNT PAIN NOTED NONE PRSNT: CPT | Mod: HCNC,CPTII,S$GLB, | Performed by: STUDENT IN AN ORGANIZED HEALTH CARE EDUCATION/TRAINING PROGRAM

## 2024-02-06 PROCEDURE — 99999 PR PBB SHADOW E&M-EST. PATIENT-LVL V: CPT | Mod: PBBFAC,HCNC,, | Performed by: STUDENT IN AN ORGANIZED HEALTH CARE EDUCATION/TRAINING PROGRAM

## 2024-02-06 PROCEDURE — 3075F SYST BP GE 130 - 139MM HG: CPT | Mod: HCNC,CPTII,S$GLB, | Performed by: STUDENT IN AN ORGANIZED HEALTH CARE EDUCATION/TRAINING PROGRAM

## 2024-02-06 PROCEDURE — 3078F DIAST BP <80 MM HG: CPT | Mod: HCNC,CPTII,S$GLB, | Performed by: STUDENT IN AN ORGANIZED HEALTH CARE EDUCATION/TRAINING PROGRAM

## 2024-02-06 PROCEDURE — 3288F FALL RISK ASSESSMENT DOCD: CPT | Mod: HCNC,CPTII,S$GLB, | Performed by: STUDENT IN AN ORGANIZED HEALTH CARE EDUCATION/TRAINING PROGRAM

## 2024-02-06 PROCEDURE — 99215 OFFICE O/P EST HI 40 MIN: CPT | Mod: HCNC,S$GLB,, | Performed by: STUDENT IN AN ORGANIZED HEALTH CARE EDUCATION/TRAINING PROGRAM

## 2024-02-06 PROCEDURE — 1159F MED LIST DOCD IN RCRD: CPT | Mod: HCNC,CPTII,S$GLB, | Performed by: STUDENT IN AN ORGANIZED HEALTH CARE EDUCATION/TRAINING PROGRAM

## 2024-02-06 PROCEDURE — 1100F PTFALLS ASSESS-DOCD GE2>/YR: CPT | Mod: HCNC,CPTII,S$GLB, | Performed by: STUDENT IN AN ORGANIZED HEALTH CARE EDUCATION/TRAINING PROGRAM

## 2024-02-06 PROCEDURE — 1160F RVW MEDS BY RX/DR IN RCRD: CPT | Mod: HCNC,CPTII,S$GLB, | Performed by: STUDENT IN AN ORGANIZED HEALTH CARE EDUCATION/TRAINING PROGRAM

## 2024-02-06 RX ORDER — DICLOFENAC SODIUM 10 MG/G
2 GEL TOPICAL 3 TIMES DAILY
Qty: 150 G | Refills: 3 | Status: SHIPPED | OUTPATIENT
Start: 2024-02-06 | End: 2024-03-14

## 2024-02-06 RX ORDER — SIMVASTATIN 10 MG/1
10 TABLET, FILM COATED ORAL NIGHTLY
Qty: 90 TABLET | Refills: 2 | Status: SHIPPED | OUTPATIENT
Start: 2024-02-06 | End: 2024-04-24 | Stop reason: SDUPTHER

## 2024-02-06 NOTE — PROGRESS NOTES
Received call back from patient and discussed MMA embolization along with reasons for recommendation again. Patient would like to proceed with procedure. Plan for 2/15. I have reached out to Dr. Magdalena Sinha's nurse to call patient/patient's family with pre procedure instructions and timing. Per patient, best number for contact is her daughter, Eusebia, whose number is in epic or Eusebia's mother in law who is also a caretaker for Ms. Downing. Her name is Anne Guevara and number is [phone].     Cheryl Arizmendi PA-C   Neurosurgery  [facility]

## 2024-02-06 NOTE — PROGRESS NOTES
Received an inbox message from Dr. Rashida Addison with internal medicine about patient and her daughter's concerns about MMA embolization.     MMA embolization discussed in detail at appointment on 2/1 with patient and patient's daughter.     I called the patient's daughter, Eusebia, who was present at the appointment on 2/1 to discuss her concerns at 2:44 pm She stated that they wanted to give it time before going forward with MMA embolization to see if bilateral subdural hematomas improves on their own. I discussed with her that given the expansion of the subdural hematomas and also the acute on subacute blood I was worried about worsening expansion that could warrant surgical intervention should she decompensate. I explained that this procedure would be to hopefully prevent any further worsening of the subdural hematomas. She voiced understanding and instructed me to speak with her mother for a final decision. I called the patient Ms. Downing today, 2/6/24  at 2:48 pm with no answer. I left a voicemail asking her to call me back at my direct line to discuss the procedure. Will attempt to call patient again later.     Cheryl Arizmendi PA-C   Neurosurgery  [facility]

## 2024-02-06 NOTE — TELEPHONE ENCOUNTER
Called and spoke to pts dtr Eusebia. Reviewed instructions. Report to 3 rd floor sscu for 830 am. Someone will need to drive pt home.   Nothing to eat or drink after 12 midnight   Do not take any medications for sleep or anxiety night before procedure.  Do not take any diabetic oral medications the night before or morning of procedure  Take blood pressure medication with a small sip of water morning of procedure  Labs to be done prior to procedure  Remove any nail polish or gel nail from one finger of each hand  Morning of procedure shower with antibacterial soap (dial, dove). Do not use hair spray, hair pins/clips or other hair products.Do not use perfume, powder, deodorant, after shave, makeup, mascara or false eyelashes or lotions after shower.  May wear glasses, contact lens, dentures or hearing aids but bring case to put them in when procedure started  Do not lift anything heavier than 10 pounds  Avoid strenuous activity for 2 weeks  Will need someone to drive home after procedure and for about 3 days after procedure  Need to be monitor for about 8 hours after home. Observe for shortness of breath, numbness/tingling in any extremity,difficulty speaking (report to nearest emergency room)  Keep incision clian and dry for 24 hrs. Remove bandage after 24 hrs and shower. Do not bathe or submerge in water until site is completely healed. Do not allow force of water to hit site until healed.    Dtr verbalized understanding. Will email instructions to dtr as well.

## 2024-02-08 ENCOUNTER — PATIENT MESSAGE (OUTPATIENT)
Dept: PRIMARY CARE CLINIC | Facility: CLINIC | Age: 84
End: 2024-02-08
Payer: MEDICARE

## 2024-02-08 DIAGNOSIS — M17.0 OSTEOARTHRITIS OF BOTH KNEES, UNSPECIFIED OSTEOARTHRITIS TYPE: Primary | ICD-10-CM

## 2024-02-12 ENCOUNTER — DOCUMENTATION ONLY (OUTPATIENT)
Dept: PREADMISSION TESTING | Facility: HOSPITAL | Age: 84
End: 2024-02-12
Payer: MEDICARE

## 2024-02-12 ENCOUNTER — PATIENT MESSAGE (OUTPATIENT)
Dept: INTERVENTIONAL RADIOLOGY/VASCULAR | Facility: HOSPITAL | Age: 84
End: 2024-02-12
Payer: MEDICARE

## 2024-02-14 ENCOUNTER — TELEPHONE (OUTPATIENT)
Dept: NEUROSURGERY | Facility: CLINIC | Age: 84
End: 2024-02-14
Payer: MEDICARE

## 2024-02-14 NOTE — TELEPHONE ENCOUNTER
----- Message from Marva Mi MA sent at 2/14/2024  3:22 PM CST -----  Regarding: FW: Advise  Contact: [phone]  Se Sinha. Can you please advise this pt? Thank you!    ----- Message -----  From: Ruba Torres  Sent: 2/14/2024   3:21 PM CST  To: [facility]dalena [facility] Staff  Subject: Advise                                           ACE LARSON calling regarding Patient Advice (message) for Rocio Laws / daughter is calling to verify where to go for pt surgery tomorrow. Daughter is also calling to verify if pt takes her anxiety medication today she should not take it in the morning.         Chief Complaint   Patient presents with   • Follow-up     3 month follow up        HPI  Last visit 9/28/22.    a1c 7.4 (12/22/22).  He has not been able to get ozempic for past 2 months.      Been waking up with right sided back pain.  Has not tried any otc meds.  Sitting up straight in a chair, warm compresses help.    Review of Systems   Constitutional: Negative.    Respiratory: Negative.    Cardiovascular: Negative.    Endocrine: Negative.    Musculoskeletal: Positive for back pain.        Saw urologist, no kidney stones       Past Medical History:   Diagnosis Date   • Diabetes mellitus (CMS/HCC)    • High cholesterol    • Kidney stone     kidney stones       Patient Active Problem List   Diagnosis   • Type 2 diabetes mellitus without complication (CMS/HCC)   • Hyperlipidemia   • Low testosterone   • Recurrent kidney stones   • Primary hypertension       Past Surgical History:   Procedure Laterality Date   • Kidney stone surgery     • Knee surgery Left        Family History   Problem Relation Age of Onset   • Leukemia Mother          Alcohol Use: Yes           .6 oz/week       Comment: special occasion       Tobacco Use: Quit          Packs/Day: .1    Years:            Quit date: 01/01/1980       Drug Use:    Not Current*       Current Outpatient Medications   Medication Sig Dispense Refill   • dulaglutide (TRULICITY) 1.5 MG/0.5ML pen-injector Inject 1.5 mg into the skin every 7 days. 2 mL 2   • rosuvastatin (CRESTOR) 40 MG tablet Take 1 tablet by mouth daily. 90 tablet 1   • glipiZIDE (GLUCOTROL) 5 MG tablet Take 1 tablet by mouth in the morning and 1 tablet in the evening. Take before meals. 180 tablet 1   • lisinopril (ZESTRIL) 10 MG tablet TAKE 1 TABLET BY MOUTH EVERY DAY 90 tablet 1   • Januvia 100 MG tablet TAKE 1 TABLET BY MOUTH EVERY DAY 90 tablet 1   • blood glucose test strip Test blood sugar 2 times daily. Diagnosis: E11.9. Meter: Accu-chek 100 each 3   • aspirin 81 MG tablet Take 1 tablet by mouth  daily.       No current facility-administered medications for this visit.       ALLERGIES:  No Known Allergies    Visit Vitals  /84   Pulse 81   Resp 16   Ht 5' 7.5\" (1.715 m)   Wt 101.2 kg (223 lb 3.5 oz)   SpO2 97%   BMI 34.44 kg/m²       Physical Exam  Constitutional:       Appearance: He is obese.   Cardiovascular:      Rate and Rhythm: Normal rate and regular rhythm.   Pulmonary:      Effort: Pulmonary effort is normal.      Breath sounds: Normal breath sounds.   Musculoskeletal:      Right lower leg: No edema.      Left lower leg: No edema.   Neurological:      Mental Status: He is alert.         Assessment and Plan  Jeison was seen today for follow-up.    Diagnoses and all orders for this visit:    Type 2 diabetes mellitus without complication, without long-term current use of insulin (CMS/[facility])  -     dulaglutide (TRULICITY) 1.5 MG/0.5ML pen-injector; Inject 1.5 mg into the skin every 7 days.    Hyperlipidemia, unspecified hyperlipidemia type  -     rosuvastatin (CRESTOR) 40 MG tablet; Take 1 tablet by mouth daily.    Need for vaccination  -     INFLUENZA QUADRIVALENT SPLIT PRES FREE 0.5 ML VACC, IM (FLULAVAL,FLUARIX,FLUZONE)    Primary hypertension      Checked pt's formulary.  Checked with pharmacy for stock.  Will switch over to trulicity.  F/u in 3 months.    BP well controlled.    Tolerating high dose statin.    Back pain--sound muscular.  Pt declines PT at this time.

## 2024-02-15 ENCOUNTER — ANESTHESIA (OUTPATIENT)
Dept: INTERVENTIONAL RADIOLOGY/VASCULAR | Facility: HOSPITAL | Age: 84
End: 2024-02-15
Payer: MEDICARE

## 2024-02-15 ENCOUNTER — HOSPITAL ENCOUNTER (OUTPATIENT)
Dept: RADIOLOGY | Facility: HOSPITAL | Age: 84
Discharge: HOME | End: 2024-02-15
Attending: NEUROLOGICAL SURGERY | Admitting: NEUROLOGICAL SURGERY
Payer: MEDICARE

## 2024-02-15 ENCOUNTER — DOCUMENTATION ONLY (OUTPATIENT)
Dept: NEUROLOGY | Facility: HOSPITAL | Age: 84
End: 2024-02-15
Payer: MEDICARE

## 2024-02-15 ENCOUNTER — HOSPITAL ENCOUNTER (OUTPATIENT)
Dept: INTERVENTIONAL RADIOLOGY/VASCULAR | Facility: HOSPITAL | Age: 84
Discharge: HOME | End: 2024-02-23
Attending: HOSPITALIST | Admitting: HOSPITALIST
Payer: MEDICARE

## 2024-02-15 VITALS — HEART RATE: 82 BPM

## 2024-02-15 DIAGNOSIS — S06.5X9S TRAUMATIC SUBDURAL HEMATOMA WITH LOSS OF CONSCIOUSNESS, SEQUELA: Primary | ICD-10-CM

## 2024-02-15 DIAGNOSIS — I62.00 NONTRAUMATIC SUBDURAL HEMORRHAGE, UNSPECIFIED: ICD-10-CM

## 2024-02-15 DIAGNOSIS — S06.5XAA SDH (SUBDURAL HEMATOMA): ICD-10-CM

## 2024-02-15 DIAGNOSIS — Z91.89 AT RISK FOR LONG QT SYNDROME: ICD-10-CM

## 2024-02-15 LAB
POCT GLUCOSE: 128 MG/DL (ref 70–110)
POCT GLUCOSE: 89 MG/DL (ref 70–110)

## 2024-02-15 PROCEDURE — G0379 DIRECT REFER HOSPITAL OBSERV: HCPCS | Mod: HCNC

## 2024-02-15 PROCEDURE — 36227 PLACE CATH XTRNL CAROTID: CPT | Mod: 50,HCNC | Performed by: NEUROLOGICAL SURGERY

## 2024-02-15 PROCEDURE — 25500020 PHARM REV CODE 255: Mod: HCNC | Performed by: NEUROLOGICAL SURGERY

## 2024-02-15 PROCEDURE — 25000003 PHARM REV CODE 250: Mod: HCNC

## 2024-02-15 PROCEDURE — 36218 PLACE CATHETER IN ARTERY: CPT | Mod: HCNC,59,, | Performed by: NEUROLOGICAL SURGERY

## 2024-02-15 PROCEDURE — 27201076 IR ANGIOGRAM CAROTID INTERNAL INC ARCH AND CEREBRAL BILAT: Mod: HCNC

## 2024-02-15 PROCEDURE — D9220A PRA ANESTHESIA: Mod: HCNC,CRNA,, | Performed by: STUDENT IN AN ORGANIZED HEALTH CARE EDUCATION/TRAINING PROGRAM

## 2024-02-15 PROCEDURE — 36227 PLACE CATH XTRNL CAROTID: CPT

## 2024-02-15 PROCEDURE — 63600175 PHARM REV CODE 636 W HCPCS: Mod: HCNC | Performed by: ANESTHESIOLOGY

## 2024-02-15 PROCEDURE — 25000003 PHARM REV CODE 250: Mod: HCNC | Performed by: HOSPITALIST

## 2024-02-15 PROCEDURE — 75774 ARTERY X-RAY EACH VESSEL: CPT | Mod: 26,HCNC,59, | Performed by: NEUROLOGICAL SURGERY

## 2024-02-15 PROCEDURE — C1725 CATH, TRANSLUMIN NON-LASER: HCPCS | Mod: HCNC

## 2024-02-15 PROCEDURE — 99214 OFFICE O/P EST MOD 30 MIN: CPT | Mod: HCNC,GC,, | Performed by: PSYCHIATRY & NEUROLOGY

## 2024-02-15 PROCEDURE — 63600175 PHARM REV CODE 636 W HCPCS: Mod: HCNC

## 2024-02-15 PROCEDURE — 75898 FOLLOW-UP ANGIOGRAPHY: CPT | Mod: TC,HCNC | Performed by: NEUROLOGICAL SURGERY

## 2024-02-15 PROCEDURE — 36218 PLACE CATHETER IN ARTERY: CPT | Mod: HCNC,50,59 | Performed by: NEUROLOGICAL SURGERY

## 2024-02-15 PROCEDURE — 75898 FOLLOW-UP ANGIOGRAPHY: CPT | Mod: 26,HCNC,, | Performed by: NEUROLOGICAL SURGERY

## 2024-02-15 PROCEDURE — 36224 PLACE CATH CAROTD ART: CPT

## 2024-02-15 PROCEDURE — G0269 OCCLUSIVE DEVICE IN VEIN ART: HCPCS | Mod: TC,HCNC | Performed by: NEUROLOGICAL SURGERY

## 2024-02-15 PROCEDURE — 63600175 PHARM REV CODE 636 W HCPCS: Mod: HCNC | Performed by: STUDENT IN AN ORGANIZED HEALTH CARE EDUCATION/TRAINING PROGRAM

## 2024-02-15 PROCEDURE — 36224 PLACE CATH CAROTD ART: CPT | Mod: 50,51,HCNC, | Performed by: NEUROLOGICAL SURGERY

## 2024-02-15 PROCEDURE — G0378 HOSPITAL OBSERVATION PER HR: HCPCS | Mod: HCNC

## 2024-02-15 PROCEDURE — 36227 PLACE CATH XTRNL CAROTID: CPT | Mod: 50,HCNC,, | Performed by: NEUROLOGICAL SURGERY

## 2024-02-15 PROCEDURE — 61624 TCAT PERM OCCLS/EMBOLJ CNS: CPT | Mod: HCNC,,, | Performed by: NEUROLOGICAL SURGERY

## 2024-02-15 PROCEDURE — 63600175 PHARM REV CODE 636 W HCPCS: Mod: JZ,JG,HCNC

## 2024-02-15 PROCEDURE — 70496 CT ANGIOGRAPHY HEAD: CPT | Mod: TC,HCNC

## 2024-02-15 PROCEDURE — D9220A PRA ANESTHESIA: Mod: HCNC,ANES,, | Performed by: ANESTHESIOLOGY

## 2024-02-15 PROCEDURE — 36224 PLACE CATH CAROTD ART: CPT | Mod: 50,HCNC | Performed by: NEUROLOGICAL SURGERY

## 2024-02-15 PROCEDURE — 37000008 HC ANESTHESIA 1ST 15 MINUTES: Mod: HCNC

## 2024-02-15 PROCEDURE — 75894 X-RAYS TRANSCATH THERAPY: CPT | Mod: 26,HCNC,, | Performed by: NEUROLOGICAL SURGERY

## 2024-02-15 PROCEDURE — 25000003 PHARM REV CODE 250: Mod: HCNC | Performed by: NEUROLOGICAL SURGERY

## 2024-02-15 PROCEDURE — 61624 TCAT PERM OCCLS/EMBOLJ CNS: CPT | Mod: HCNC | Performed by: NEUROLOGICAL SURGERY

## 2024-02-15 PROCEDURE — 37000009 HC ANESTHESIA EA ADD 15 MINS: Mod: HCNC

## 2024-02-15 PROCEDURE — 70496 CT ANGIOGRAPHY HEAD: CPT | Mod: 26,HCNC,, | Performed by: RADIOLOGY

## 2024-02-15 PROCEDURE — 75894 X-RAYS TRANSCATH THERAPY: CPT | Mod: TC,HCNC | Performed by: NEUROLOGICAL SURGERY

## 2024-02-15 PROCEDURE — 25000003 PHARM REV CODE 250: Mod: HCNC | Performed by: STUDENT IN AN ORGANIZED HEALTH CARE EDUCATION/TRAINING PROGRAM

## 2024-02-15 RX ORDER — HEPARIN SODIUM 1000 [USP'U]/ML
INJECTION, SOLUTION INTRAVENOUS; SUBCUTANEOUS
Status: DISCONTINUED | OUTPATIENT
Start: 2024-02-15 | End: 2024-02-15

## 2024-02-15 RX ORDER — MEPERIDINE HYDROCHLORIDE 50 MG/ML
5 INJECTION INTRAMUSCULAR; INTRAVENOUS; SUBCUTANEOUS EVERY 10 MIN PRN
Status: COMPLETED | OUTPATIENT
Start: 2024-02-15 | End: 2024-02-15

## 2024-02-15 RX ORDER — ACETAMINOPHEN 325 MG/1
650 TABLET ORAL EVERY 6 HOURS PRN
Status: DISCONTINUED | OUTPATIENT
Start: 2024-02-15 | End: 2024-02-15

## 2024-02-15 RX ORDER — HYDRALAZINE HYDROCHLORIDE 20 MG/ML
5 INJECTION INTRAMUSCULAR; INTRAVENOUS ONCE
Status: COMPLETED | OUTPATIENT
Start: 2024-02-15 | End: 2024-02-15

## 2024-02-15 RX ORDER — LABETALOL HCL 20 MG/4 ML
5 SYRINGE (ML) INTRAVENOUS
Status: DISCONTINUED | OUTPATIENT
Start: 2024-02-15 | End: 2024-02-15

## 2024-02-15 RX ORDER — HYDROMORPHONE HYDROCHLORIDE 1 MG/ML
0.2 INJECTION, SOLUTION INTRAMUSCULAR; INTRAVENOUS; SUBCUTANEOUS EVERY 5 MIN PRN
Status: DISCONTINUED | OUTPATIENT
Start: 2024-02-15 | End: 2024-02-15

## 2024-02-15 RX ORDER — LABETALOL HCL 20 MG/4 ML
SYRINGE (ML) INTRAVENOUS
Status: COMPLETED
Start: 2024-02-15 | End: 2024-02-15

## 2024-02-15 RX ORDER — LIDOCAINE AND PRILOCAINE 25; 25 MG/G; MG/G
CREAM TOPICAL ONCE
Status: COMPLETED | OUTPATIENT
Start: 2024-02-15 | End: 2024-02-15

## 2024-02-15 RX ORDER — LORAZEPAM 2 MG/ML
1 INJECTION INTRAMUSCULAR ONCE
Status: COMPLETED | OUTPATIENT
Start: 2024-02-15 | End: 2024-02-15

## 2024-02-15 RX ORDER — HYDRALAZINE HYDROCHLORIDE 20 MG/ML
5 INJECTION INTRAMUSCULAR; INTRAVENOUS ONCE AS NEEDED
Status: DISCONTINUED | OUTPATIENT
Start: 2024-02-15 | End: 2024-02-15

## 2024-02-15 RX ORDER — ONDANSETRON HYDROCHLORIDE 2 MG/ML
INJECTION, SOLUTION INTRAVENOUS
Status: DISCONTINUED | OUTPATIENT
Start: 2024-02-15 | End: 2024-02-15

## 2024-02-15 RX ORDER — EPHEDRINE SULFATE 50 MG/ML
INJECTION, SOLUTION INTRAVENOUS
Status: DISCONTINUED | OUTPATIENT
Start: 2024-02-15 | End: 2024-02-15

## 2024-02-15 RX ORDER — ATROPINE SULFATE 0.4 MG/ML
INJECTION, SOLUTION ENDOTRACHEAL; INTRAMEDULLARY; INTRAMUSCULAR; INTRAVENOUS; SUBCUTANEOUS
Status: DISCONTINUED | OUTPATIENT
Start: 2024-02-15 | End: 2024-02-15

## 2024-02-15 RX ORDER — IODIXANOL 320 MG/ML
100 INJECTION, SOLUTION INTRAVASCULAR
Status: DISCONTINUED | OUTPATIENT
Start: 2024-02-15 | End: 2024-02-15

## 2024-02-15 RX ORDER — SODIUM CHLORIDE 0.9 % (FLUSH) 0.9 %
10 SYRINGE (ML) INJECTION
Status: DISCONTINUED | OUTPATIENT
Start: 2024-02-15 | End: 2024-02-15

## 2024-02-15 RX ORDER — ONDANSETRON HYDROCHLORIDE 2 MG/ML
4 INJECTION, SOLUTION INTRAVENOUS EVERY 8 HOURS PRN
Status: DISCONTINUED | OUTPATIENT
Start: 2024-02-15 | End: 2024-02-23 | Stop reason: HOSPADM

## 2024-02-15 RX ORDER — PROCHLORPERAZINE EDISYLATE 5 MG/ML
5 INJECTION INTRAMUSCULAR; INTRAVENOUS EVERY 30 MIN PRN
Status: DISCONTINUED | OUTPATIENT
Start: 2024-02-15 | End: 2024-02-15

## 2024-02-15 RX ORDER — FENTANYL CITRATE 50 UG/ML
25 INJECTION, SOLUTION INTRAMUSCULAR; INTRAVENOUS EVERY 5 MIN PRN
Status: DISCONTINUED | OUTPATIENT
Start: 2024-02-15 | End: 2024-02-15

## 2024-02-15 RX ORDER — PROPOFOL 10 MG/ML
VIAL (ML) INTRAVENOUS
Status: DISCONTINUED | OUTPATIENT
Start: 2024-02-15 | End: 2024-02-15

## 2024-02-15 RX ORDER — LIDOCAINE HYDROCHLORIDE 20 MG/ML
INJECTION INTRAVENOUS
Status: DISCONTINUED | OUTPATIENT
Start: 2024-02-15 | End: 2024-02-15

## 2024-02-15 RX ORDER — LABETALOL HCL 20 MG/4 ML
10 SYRINGE (ML) INTRAVENOUS EVERY 4 HOURS PRN
Status: DISCONTINUED | OUTPATIENT
Start: 2024-02-15 | End: 2024-02-23 | Stop reason: HOSPADM

## 2024-02-15 RX ORDER — HYDRALAZINE HYDROCHLORIDE 20 MG/ML
10 INJECTION INTRAMUSCULAR; INTRAVENOUS EVERY 6 HOURS PRN
Status: DISCONTINUED | OUTPATIENT
Start: 2024-02-15 | End: 2024-02-23 | Stop reason: HOSPADM

## 2024-02-15 RX ORDER — FENTANYL CITRATE 50 UG/ML
INJECTION, SOLUTION INTRAMUSCULAR; INTRAVENOUS
Status: DISCONTINUED | OUTPATIENT
Start: 2024-02-15 | End: 2024-02-15

## 2024-02-15 RX ORDER — NALOXONE HCL 0.4 MG/ML
0.02 VIAL (ML) INJECTION
Status: DISCONTINUED | OUTPATIENT
Start: 2024-02-15 | End: 2024-02-23 | Stop reason: HOSPADM

## 2024-02-15 RX ORDER — PHENYLEPHRINE HYDROCHLORIDE 10 MG/ML
INJECTION INTRAVENOUS
Status: DISCONTINUED | OUTPATIENT
Start: 2024-02-15 | End: 2024-02-15

## 2024-02-15 RX ORDER — TALC
6 POWDER (GRAM) TOPICAL NIGHTLY PRN
Status: DISCONTINUED | OUTPATIENT
Start: 2024-02-15 | End: 2024-02-23 | Stop reason: HOSPADM

## 2024-02-15 RX ORDER — LORAZEPAM 2 MG/ML
INJECTION INTRAMUSCULAR
Status: COMPLETED
Start: 2024-02-15 | End: 2024-02-15

## 2024-02-15 RX ORDER — LABETALOL HCL 20 MG/4 ML
15 SYRINGE (ML) INTRAVENOUS EVERY 10 MIN PRN
Status: DISCONTINUED | OUTPATIENT
Start: 2024-02-15 | End: 2024-02-15

## 2024-02-15 RX ORDER — IBUPROFEN 200 MG
16 TABLET ORAL
Status: DISCONTINUED | OUTPATIENT
Start: 2024-02-15 | End: 2024-02-23 | Stop reason: HOSPADM

## 2024-02-15 RX ORDER — HALOPERIDOL 5 MG/ML
0.5 INJECTION INTRAMUSCULAR EVERY 10 MIN PRN
Status: DISCONTINUED | OUTPATIENT
Start: 2024-02-15 | End: 2024-02-15

## 2024-02-15 RX ORDER — SODIUM CHLORIDE 9 MG/ML
INJECTION, SOLUTION INTRAVENOUS CONTINUOUS
Status: DISCONTINUED | OUTPATIENT
Start: 2024-02-15 | End: 2024-02-15

## 2024-02-15 RX ORDER — IBUPROFEN 200 MG
24 TABLET ORAL
Status: DISCONTINUED | OUTPATIENT
Start: 2024-02-15 | End: 2024-02-23 | Stop reason: HOSPADM

## 2024-02-15 RX ORDER — LIDOCAINE HYDROCHLORIDE 10 MG/ML
1 INJECTION, SOLUTION EPIDURAL; INFILTRATION; INTRACAUDAL; PERINEURAL ONCE
Status: DISCONTINUED | OUTPATIENT
Start: 2024-02-15 | End: 2024-02-15

## 2024-02-15 RX ORDER — VERAPAMIL HYDROCHLORIDE 2.5 MG/ML
INJECTION, SOLUTION INTRAVENOUS
Status: COMPLETED | OUTPATIENT
Start: 2024-02-15 | End: 2024-02-15

## 2024-02-15 RX ORDER — OXYCODONE HYDROCHLORIDE 5 MG/1
5 TABLET ORAL
Status: DISCONTINUED | OUTPATIENT
Start: 2024-02-15 | End: 2024-02-15

## 2024-02-15 RX ORDER — PROCHLORPERAZINE EDISYLATE 5 MG/ML
5 INJECTION INTRAMUSCULAR; INTRAVENOUS EVERY 6 HOURS PRN
Status: DISCONTINUED | OUTPATIENT
Start: 2024-02-15 | End: 2024-02-23 | Stop reason: HOSPADM

## 2024-02-15 RX ORDER — ACETAMINOPHEN 325 MG/1
650 TABLET ORAL EVERY 4 HOURS PRN
Status: DISCONTINUED | OUTPATIENT
Start: 2024-02-15 | End: 2024-02-23 | Stop reason: HOSPADM

## 2024-02-15 RX ORDER — GLUCAGON 1 MG
1 KIT INJECTION
Status: DISCONTINUED | OUTPATIENT
Start: 2024-02-15 | End: 2024-02-23 | Stop reason: HOSPADM

## 2024-02-15 RX ORDER — SODIUM CHLORIDE 0.9 % (FLUSH) 0.9 %
10 SYRINGE (ML) INJECTION
Status: DISCONTINUED | OUTPATIENT
Start: 2024-02-15 | End: 2024-02-23 | Stop reason: HOSPADM

## 2024-02-15 RX ADMIN — HEPARIN SODIUM 2000 UNITS: 1000 INJECTION, SOLUTION INTRAVENOUS; SUBCUTANEOUS at 10:02

## 2024-02-15 RX ADMIN — MEPERIDINE HYDROCHLORIDE 5 MG: 50 INJECTION INTRAMUSCULAR; INTRAVENOUS; SUBCUTANEOUS at 05:02

## 2024-02-15 RX ADMIN — PROPOFOL 50 MG: 10 INJECTION, EMULSION INTRAVENOUS at 11:02

## 2024-02-15 RX ADMIN — FENTANYL CITRATE 25 MCG: 50 INJECTION, SOLUTION INTRAMUSCULAR; INTRAVENOUS at 12:02

## 2024-02-15 RX ADMIN — FENTANYL CITRATE 25 MCG: 50 INJECTION, SOLUTION INTRAMUSCULAR; INTRAVENOUS at 11:02

## 2024-02-15 RX ADMIN — ONDANSETRON 4 MG: 2 INJECTION INTRAMUSCULAR; INTRAVENOUS at 10:02

## 2024-02-15 RX ADMIN — ATROPINE SULFATE 0.2 MG: 0.4 INJECTION, SOLUTION INTRAVENOUS at 11:02

## 2024-02-15 RX ADMIN — EPHEDRINE SULFATE 10 MG: 50 INJECTION INTRAVENOUS at 11:02

## 2024-02-15 RX ADMIN — VERAPAMIL HYDROCHLORIDE 5 MG: 2.5 INJECTION, SOLUTION INTRAVENOUS at 11:02

## 2024-02-15 RX ADMIN — SODIUM CHLORIDE 0.2 MCG/KG/MIN: 9 INJECTION, SOLUTION INTRAVENOUS at 10:02

## 2024-02-15 RX ADMIN — Medication 15 MG: at 05:02

## 2024-02-15 RX ADMIN — HYDRALAZINE HYDROCHLORIDE 5 MG: 20 INJECTION, SOLUTION INTRAMUSCULAR; INTRAVENOUS at 01:02

## 2024-02-15 RX ADMIN — IOHEXOL 75 ML: 350 INJECTION, SOLUTION INTRAVENOUS at 03:02

## 2024-02-15 RX ADMIN — LABETALOL HYDROCHLORIDE 5 MG: 5 INJECTION, SOLUTION INTRAVENOUS at 02:02

## 2024-02-15 RX ADMIN — LABETALOL HYDROCHLORIDE 15 MG: 5 INJECTION, SOLUTION INTRAVENOUS at 05:02

## 2024-02-15 RX ADMIN — LABETALOL HYDROCHLORIDE 5 MG: 5 INJECTION, SOLUTION INTRAVENOUS at 04:02

## 2024-02-15 RX ADMIN — LORAZEPAM 1 MG: 2 INJECTION INTRAMUSCULAR at 05:02

## 2024-02-15 RX ADMIN — GLYCOPYRROLATE 0.2 MG: 0.2 INJECTION, SOLUTION INTRAMUSCULAR; INTRAVENOUS at 10:02

## 2024-02-15 RX ADMIN — LIDOCAINE HYDROCHLORIDE 75 MG: 20 INJECTION INTRAVENOUS at 10:02

## 2024-02-15 RX ADMIN — LORAZEPAM 1 MG: 2 INJECTION INTRAMUSCULAR; INTRAVENOUS at 05:02

## 2024-02-15 RX ADMIN — LIDOCAINE AND PRILOCAINE: 25; 25 CREAM TOPICAL at 09:02

## 2024-02-15 RX ADMIN — PHENYLEPHRINE HYDROCHLORIDE 100 MCG: 10 INJECTION INTRAVENOUS at 11:02

## 2024-02-15 RX ADMIN — HYDRALAZINE HYDROCHLORIDE 5 MG: 20 INJECTION, SOLUTION INTRAMUSCULAR; INTRAVENOUS at 02:02

## 2024-02-15 RX ADMIN — LABETALOL HYDROCHLORIDE 5 MG: 5 INJECTION, SOLUTION INTRAVENOUS at 05:02

## 2024-02-15 RX ADMIN — SODIUM CHLORIDE: 0.9 INJECTION, SOLUTION INTRAVENOUS at 10:02

## 2024-02-15 RX ADMIN — FENTANYL CITRATE 25 MCG: 50 INJECTION, SOLUTION INTRAMUSCULAR; INTRAVENOUS at 10:02

## 2024-02-15 RX ADMIN — PROPOFOL 50 MG: 10 INJECTION, EMULSION INTRAVENOUS at 10:02

## 2024-02-15 NOTE — TRANSFER OF CARE
"Anesthesia Transfer of Care Note    Patient: Kanchan Downing    Procedure(s) Performed: * No procedures listed *    Patient location: PACU    Anesthesia Type: MAC    Transport from OR: Transported from OR on 6-10 L/min O2 by face mask with adequate spontaneous ventilation    Post pain: adequate analgesia    Post assessment: no apparent anesthetic complications and tolerated procedure well    Post vital signs: stable    Level of consciousness: sedated    Nausea/Vomiting: no nausea/vomiting    Complications: none    Transfer of care protocol was followed      Last vitals: Visit Vitals  /77   Pulse 69   Temp 36.7 °C (98.1 °F) (Temporal)   Resp 17   Ht 5' 8" (1.727 m)   Wt 65.8 kg (145 lb)   LMP  (LMP Unknown)   SpO2 100%   Breastfeeding No   BMI 22.05 kg/m²     "

## 2024-02-15 NOTE — CODE/ RAPID DOCUMENTATION
RAPID RESPONSE NURSE STROKE CODE NOTE         Admit Date: 2/15/2024  LOS: 0  Code Status: Full Code   Date of Consult: 02/15/2024  : 1940  Age: 83 y.o.  Weight:   Wt Readings from Last 1 Encounters:   02/15/24 65.8 kg (145 lb)     Sex: female  Race: White   Bed: Room/bed info not found:   MRN: [id number]  Time Rapid Response Team page Received: 1458  Time Rapid Response Team at Bedside: 1500  Time Rapid Response Team left Bedside: 1530  Was the patient discharged from an ICU this admission? No   Was the patient discharged from a PACU within last 24 hours? No   Did the patient receive conscious sedation/general anesthesia in last 24 hours? No  Was the patient in the ED within the past 24 hours? No  Was the patient on NIPPV within the past 24 hours? No   Did this progress into an ARC or CPA: No  Attending Physician: Abdi Guevara MD  Primary Service: Hospitalist       SITUATION    Notified by pager.  Called to evaluate the patient for Neuro    BACKGROUND    Why is the patient in the hospital?: <principal problem not specified>  Patient has a past medical history of Anxiety, Arthritis, Arthritis of hand, Atherosclerosis of aorta, Breast cancer, Cataract, Controlled type 2 diabetes mellitus with circulatory disorder, without long-term current use of insulin, Controlled type 2 diabetes mellitus with circulatory disorder, without long-term current use of insulin, Diabetes mellitus type 2 without retinopathy, DVT (deep venous thrombosis), Grief, Hyperlipidemia, Hyperlipidemia associated with type 2 diabetes mellitus, Hypertension, Hypertension associated with diabetes, Memory loss, Microalbuminuria due to type 2 diabetes mellitus, PVD (peripheral vascular disease) with claudication, Risk for coronary artery disease greater than 20% in next 10 years per [place] score, Strabismus, Type 2 diabetes mellitus with diabetic polyneuropathy, and Type 2 diabetes mellitus with diabetic polyneuropathy, without long-term  "current use of insulin.    ASSESSMENT    Last VS: BP (!) 184/88   Pulse 82   Temp 97.3 °F (36.3 °C) (Temporal)   Resp 14   Ht 5' 8" (1.727 m)   Wt 65.8 kg (145 lb)   LMP  (LMP Unknown)   SpO2 96%   Breastfeeding No   BMI 22.05 kg/m²     24H Vital Sign Range:  Temp:  [97.3 °F (36.3 °C)-98.1 °F (36.7 °C)]   Pulse:  [55-82]   Resp:  [12-24]   BP: (135-196)/(72-90)   SpO2:  [96 %-100 %]     Last know well time: 150    Glucose time: n/a   Glucose result: n/a    Physical Exam    Time Stroke Code initiated: 1458  Stroke team Arrival time: 1505  Stroke Code activation triggers: new confusion, weakness, word salad, left sided weakness  Vascular Neurology provider you spoke with:  Dr. Crystal    Time arrived at CT: 1505  Time CT completed: 1525    VAN positive or negative: positive    Thrombolytic decision: No      IR decision: No      RECOMMENDATIONS    We recommend: Finish recovery in PACU and admit to hospital for observation, or bring to emergency room for further evaluation    FOLLOW-UP/PLAN    Call the Rapid Response Nurse, Sumaya Junior, RN at [phone] for additional questions or concerns.    PHYSICIAN ESCALATION    Orders received and case discussed with Dr. Crystal.     Disposition:  PACU 4            "

## 2024-02-15 NOTE — PROGRESS NOTES
02/15/24 1445   Vital Signs   BP (!) 196/90     Javed FONTAINE at bedside. Anesthesia Tessy FONTAINE also at bedside and aware of pt's HTN. Pt has slurred speech, Mds made aware. Pt following commands and oriented to self and place upon first assessment, face symmetrical, PERRLA. Stroke code called at 1455 per Víctor FONTAINE request. Pt taken to CT with stroke team.

## 2024-02-15 NOTE — PROCEDURES
A 5F sheath was placed into the right femoral artery and a 5F Juan catheter was advanced into the aortic arch. The right CCA, right ICA, ECA, left CCA, left ICA and left ECA were subselected and angiography of the brain was performed after injection into each of these vessels.    Preliminary interpretation:   Right Middle Meningeal Artery was identified, subselected. Aiden was used to frontal branch of right MMA and Contour -355 particles embolization for parietal branch was performed using Headway 21 / TruSelect Microcatheter, guided with aid of Synchro. No anastomoses of distal MMA branches with ophthalmic artery via the recurrent meningeal artery, and with posterior auricular artery supplying facial nerve was identified. Following this, left MMA was identified and embolization using particles was performed. No immediate post procedure complications identified.      Please see Imaging report for full details.           Following selected left CCA, ECA arteries & angiography of the brain was performed after injection into each of these vessels. Identified anterior division of left MMA supplying contralateral meninges. No anastomoses of distal MMA branches with ophthalmic artery via the recurrent meningeal artery, and with posterior auricular artery supplying facial nerve was identified. Sub selected the vessel and performed Contour -355 particle embolization was using Headway  Microcatheter, guided with aid of Synchro Guidewire. Follow-up angio revealed successful particles embolization with no reflux beyond target vessel. No immediate post procedure complications identified.     Please see Imaging report for full details.      The patient tolerated the procedure well.     A right femoral artery angiogram was performed, the sheath removed and hemostasis achieved using manual pressure. No hematoma was present at the time of hemostasis.    Zeyad Yates MD  Fellow, NeuroEndovascular Surgery,  [facility]  Board certified Vascular Neurologist  [place], [place]

## 2024-02-15 NOTE — PROGRESS NOTES
IR called d/t pt having some slurred speech and possible confusion. MD will be coming to bedside. JUDY.

## 2024-02-15 NOTE — H&P
Interventional Neuroradiology Pre-procedure Note    Procedure: Diagnostic cerebral angiogram    History of Present Illness:  Kanchan Downing is a 83 y.o. 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     ROS:   Hematological: no known coagulopathies  Respiratory: no shortness of breath  Cardiovascular: no chest pain  Gastrointestinal: no abdominal pain  Genito-Urinary: no dysuria  Musculoskeletal: negative  Neurological: no TIA or stroke symptoms     Scheduled Meds:    LIDOcaine (PF) 10 mg/ml (1%)  1 mL Other Once     Current Meds:   Current Outpatient Medications:     acetaminophen (TYLENOL) 325 MG tablet, Take 2 tablets (650 mg total) by mouth every 6 (six) hours as needed for Pain., Disp: , Rfl: 0    amLODIPine (NORVASC) 5 MG tablet, Take 1 tablet (5 mg total) by mouth once daily. HOLD UNTIL OTHERWISE DIRECTED BY PRIMARY CARE PROVIDER, Disp: 90 tablet, Rfl: 3    FLUoxetine 10 MG capsule, Take 1 capsule (10 mg total) by mouth once daily., Disp: 30 capsule, Rfl: 11    lisinopriL (PRINIVIL,ZESTRIL) 5 MG tablet, Take 1 tablet (5 mg total) by mouth once daily. HOLD UNTIL OTHERWISE DIRECTED BY PRIMARY CARE PROVIDER, Disp: 90 tablet, Rfl: 3    metoprolol tartrate (LOPRESSOR) 50 MG tablet, Take 1 tablet (50 mg total) by mouth 2 (two) times daily., Disp: 60 tablet, Rfl: 11    simvastatin (ZOCOR) 10 MG tablet, Take 1 tablet (10 mg total) by mouth every evening., Disp: 90 tablet, Rfl: 2    blood sugar diagnostic (BLOOD GLUCOSE TEST) Strp, 1 strip by Misc.(Non-Drug; Combo Route) route 2 (two) times daily., Disp: 100 strip, Rfl: 12    cetirizine (ZYRTEC) 10 MG tablet, Take 10 mg by mouth once daily., Disp: , Rfl:     diclofenac sodium (VOLTAREN) 1 % Gel, Apply 2 g topically 3 (three) times daily. To knees for pain, Disp: 150 g, Rfl: 3    flash glucose scanning reader (FREESTYLE FLOWER 2 READER) [facility], Continuous glucose reader, Disp: 1 each, Rfl: 12     flash glucose sensor (FREESTYLE FLOWER 2 SENSOR) Kit, Continuous glucose monitor, Disp: 1 kit, Rfl: 12    lancets Misc, Test tid, Disp: 100 each, Rfl: 12    polyethylene glycol (GLYCOLAX) 17 gram PwPk, Take 17 g by mouth daily as needed for Constipation. (Patient not taking: Reported on 2/1/2024), Disp: , Rfl: 0    Current Facility-Administered Medications:     0.9%  NaCl infusion, , Intravenous, Continuous, Jolynn Thompson PA-C    LIDOcaine (PF) 10 mg/ml (1%) injection 10 mg, 1 mL, Other, Once, Jolynn Thompson PA-C   Continuous Infusions:    sodium chloride 0.9%       PRN Meds:    Allergies:   Review of patient's allergies indicates:   Allergen Reactions    Sulfa (sulfonamide antibiotics)      Other reaction(s): Rash     Sedation Hx: No adverse events.    Labs:              Objective:  Vitals: Blood pressure 135/72, not currently breastfeeding.     Physical Exam:  General: well developed, well nourished, no distress.   Head: normocephalic, atraumatic  Neck: No tracheal deviation. No palpable masses. Full ROM.   Neurologic: Alert and oriented. Thought content appropriate.  GCS: E4 V5 M6; Total: 15  Language: No aphasia  Speech: No dysarthria  Cranial nerves: face symmetric, tongue midline, CN II-XII grossly intact.   Eyes: pupils equal, round, reactive to light with accomodation, EOMI.   Pulmonary: normal respirations, no signs of respiratory distress  Abdomen: soft, non-distended, not tender to palpation  Vascular: Pulses 2+ and symmetric radial and dorsalis pedis. No LE edema.   Skin: Skin is warm, dry and intact.  Sensory: intact to light touch throughout  Motor Strength: Moves all extremities spontaneously with good tone.  Full strength upper and lower extremities. No abnormal movements seen.     ASA: 3  MAL: 2    Plan:  -Plan for cerebral angiogram with MA embolization   -Sedation Plan: Per anesthesia  -All diagnostics and imaging reviewed  -Patient NPO since MN  -Risks & benefits of procedure explained in  detail; patient consented and all questions answered  -Further reccs to follow procedure          Hunter Nicole MD, MHA  Fellow, NeuroEndovascular Surgery, [facility]  Neurologist, [facility] [place], [place]

## 2024-02-15 NOTE — PLAN OF CARE
Procedure complete. Pt tolerated well. Recovery for 4 hr flat, hemostasis 1255. Site CDI. Pt transferred to PACU4 and report to be given bedside.

## 2024-02-15 NOTE — ANESTHESIA PREPROCEDURE EVALUATION
02/15/2024  Kanchan Downing is a 83 y.o., female.      Pre-op Assessment    I have reviewed the Patient Summary Reports.    I have reviewed the NPO Status.      Review of Systems  Anesthesia Hx:  No problems with previous Anesthesia   History of prior surgery of interest to airway management or planning:  Previous anesthesia: General, MAC        Denies Family Hx of Anesthesia complications.    Denies Personal Hx of Anesthesia complications.                    Social:  Non-Smoker, Social Alcohol Use       Cardiovascular:     Hypertension          PVD                        Hypertension         Pulmonary:  Pulmonary Normal   Denies COPD.  Denies Asthma.   Denies Shortness of breath.  Denies Recent URI.                 Renal/:  Chronic Renal Disease        Kidney Function/Disease             Musculoskeletal:  Arthritis   BLE edema      Arthritis          Neurological:    Neuromuscular Disease,       Chronic SDHs    Arthritis                         Neuromuscular Disease   Endocrine:  Diabetes, well controlled, type 2    Diabetes                    Denies Obesity / BMI > 30  Psych:  Psychiatric History                  Physical Exam  General: Cooperative, Alert and Oriented    Airway:  Mallampati: II   Mouth Opening: Normal  TM Distance: Normal  Tongue: Normal  Neck ROM: Normal ROM    Chest/Lungs:  Normal Respiratory Rate    Heart:  Rate: Normal        Anesthesia Plan  Type of Anesthesia, risks & benefits discussed:    Anesthesia Type: Gen ETT, Gen Supraglottic Airway, Gen Natural Airway  Intra-op Monitoring Plan: Standard ASA Monitors  Induction:  IV  Informed Consent: Informed consent signed with the Patient and all parties understand the risks and agree with anesthesia plan.  All questions answered.   ASA Score: 3  Day of Surgery Review of History & Physical: H&P Update referred to the  surgeon/provider.    Ready For Surgery From Anesthesia Perspective.     .

## 2024-02-15 NOTE — PLAN OF CARE
Pt arrived to IR rm 4 for Mma Embo. Pt oriented to unit and staff, Pt safely transferred from stretcher to procedural table. Fall risk reviewed and comfort measures utilized with interventions. Safety strap applied, position pillows to minimize pressure points. Blankets applied. Pt prepped and draped utilizing standard sterile technique. Patient placed on continuous monitoring, as required by sedation policy. Timeouts implemented utilizing standard universal time-out per department and facility policy. CRNA to remain at bedside with continuous monitoring. Pt resting comfortably. Denies pain/discomfort. Will continue to monitor. See flow sheets for monitoring, medication administration, and updates. patient verbalizes understanding.

## 2024-02-15 NOTE — CODE/ RAPID DOCUMENTATION
"RAPID RESPONSE RESPIRATORY THERAPY STROKE CODE NOTE             Code Status: Full Code   : 1940  Bed: Room/bed info not found:   MRN: [id number]  Time page Received: 1458  Time Rapid Response RT at Bedside: 1500  Time Rapid Response RT left Bedside: 1530    SITUATION    Evaluated patient for: Stroke Code     BACKGROUND    Why is the patient in the hospital?: <principal problem not specified>    Patient has a past medical history of Anxiety, Arthritis, Arthritis of hand, Atherosclerosis of aorta, Breast cancer, Cataract, Controlled type 2 diabetes mellitus with circulatory disorder, without long-term current use of insulin, Controlled type 2 diabetes mellitus with circulatory disorder, without long-term current use of insulin, Diabetes mellitus type 2 without retinopathy, DVT (deep venous thrombosis), Grief, Hyperlipidemia, Hyperlipidemia associated with type 2 diabetes mellitus, Hypertension, Hypertension associated with diabetes, Memory loss, Microalbuminuria due to type 2 diabetes mellitus, PVD (peripheral vascular disease) with claudication, Risk for coronary artery disease greater than 20% in next 10 years per [place] score, Strabismus, Type 2 diabetes mellitus with diabetic polyneuropathy, and Type 2 diabetes mellitus with diabetic polyneuropathy, without long-term current use of insulin.    24 Hours Vitals Range:  Temp:  [97.3 °F (36.3 °C)-98.1 °F (36.7 °C)]   Pulse:  [55-82]   Resp:  [12-24]   BP: (135-196)/(72-90)   SpO2:  [96 %-100 %]     Labs:    No results for input(s): "NA", "K", "CL", "CO2", "BUN", "CREATININE", "GLU", "PHOS", "MG" in the last 72 hours.    Invalid input(s): "CMP", "TBIL"     No results for input(s): "PH", "PCO2", "PO2", "HCO3", "POCSATURATED", "BE" in the last 72 hours.    ASSESSMENT/INTERVENTIONS    Stroke code called post-op due to aphasia and weakness. Pt on 2L NC for comfort, no respiratory concern. Pt transported to CT and back to PACU bed 4.    Last VS   Temp: 97.3 °F " (36.3 °C) (02/15 1306)  Pulse: 82 (02/15 1530)  Resp: 14 (02/15 1530)  BP: 184/88 (02/15 1530)  SpO2: 96 % (02/15 1530)    Level of Consciousness: Level of Consciousness (AVPU): alert  O2 Device/Concentration: NC 2L  NIPPV: No  Surgical airway: No  ETCO2 monitored: N/a  Ambu at bedside: Yes    Active Orders   Respiratory Care    Oxygen Continuous     Frequency: Continuous     Number of Occurrences: Until Specified     Order Comments: Discontinue when Sp02 is greater than or equal to 95% of equal to Preop Sp02       Order Questions:      Device type: Low flow      Device: Simple Face Mask      Titrate O2 per Oxygen Titration Protocol: Yes      Notify MD of: Inability to achieve desired SpO2    Pulse Oximetry Continuous     Frequency: Continuous     Number of Occurrences: Until Specified       RECOMMENDATIONS    We recommend: RRT Recs: Admit to inpatient following recovery in PACU    ESCALATION    Orders received and case discussed with Dr. CHRISTOFER Crystal.    FOLLOW-UP    Please call back the Rapid Response RT, Eduardo Kyle RRT at x [phone] for any questions or concerns.

## 2024-02-15 NOTE — DISCHARGE INSTRUCTIONS
For scheduling: Call Rekha at [phone]    For questions or concerns call: KASHMIR MON-FRI 8 AM- 5PM [phone]. Radiology resident on call [phone].    For immediate concerns that are not emergent, you may call our radiology clinic at: [phone]    May remove dressing in 24 hours and shower.   Do Not sit in bath water, hot tub, or swim for 7 days

## 2024-02-15 NOTE — CONSULTS
[facility] - [facility]  Vascular Neurology  Comprehensive Stroke Center  Consult Note    Consults  Assessment/Plan:     Patient is a 83 y.o. 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 LKN 1:30 when she arrived to PACU where she was aphasic, repeating phrases, crying and stroke code called by Neuro IR fellow. No complications per anesthesia during procedure and she received 2000U heparin and propofol and fentanyl. Initial NIHSS of 17 for confusion, unable to lift bl upper extremities, dysarthria and aphasia but exam limited to patient's mentation. CTH with no acute findings and CTA without LVO; no TNK given recent SDH. Patient's exam later improved and no focal deficits, NIHSS4.     Overall presentation less suggestive to be neurovascular pathology given improvement with time and lack of focal deficits with unremarkable imaging. Suspect maybe reaction to anesthetics on top of hypertension.     Recommendations   No further acute diagnostics or intervention from vascular neurology and recommend admission to hospital medicine for observation to make sure patient returns to baseline.   If patient develops any new neurological deficits or does not continue to improve by tomorrow, consider MRI brain w/o contrast.     Antithrombotics for secondary stroke prevention: Antiplatelets: None: Intracerebral Hemorrhage    Statins for secondary stroke prevention and hyperlipidemia, if present:   Statins: None: Reason: not indicated     Aggressive risk factor modification: HTN       Diagnostics ordered/pending: Other: consider non MRI if new deficits or does not improve.     VTE prophylaxis: None: Reason for No Pharmacological VTE Prophylaxis: History of systemic or intracranial bleeding    BP parameters: ICH: SBP  <140      Will follow up on patient if remains admitted.     STROKE DOCUMENTATION      Acute Stroke Times   Last Known Normal Date: 02/15/24  Last Known Normal Time: 1300  Symptom Onset Date: 02/15/24  Symptom Onset Time: 1330  Stroke Team Called Date: 02/15/24  Stroke Team Called Time: 1500  Stroke Team Arrival Date: 02/15/24  Stroke Team Arrival Time: 1501  CT Interpretation Time: 1515  Thrombolytic Therapy Recommended: No  CTA Interpretation Time: 1515  Thrombectomy Recommended: No         NIH Scale:  1a. Level of Consciousness: 0-->Alert, keenly responsive  1b. LOC Questions: 1-->Answers one question correctly  1c. LOC Commands: 1-->Performs one task correctly  2. Best Gaze: 0-->Normal  3. Visual: 0-->No visual loss  4. Facial Palsy: 0-->Normal symmetrical movements  5a. Motor Arm, Left: 0-->No drift, limb holds 90 (or 45) degrees for full 10 secs  5b. Motor Arm, Right: 0-->No drift, limb holds 90 (or 45) degrees for full 10 secs  6a. Motor Leg, Left: 0-->No drift, leg holds 30 degree position for full 5 secs  6b. Motor Leg, Right: 0-->No drift, leg holds 30 degree position for full 5 secs  7. Limb Ataxia: 0-->Absent  8. Sensory: 0-->Normal, no sensory loss  9. Best Language: 1-->Mild-to-moderate aphasia, some obvious loss of fluency or facility of comprehension, without significant limitation on ideas expressed or form of expression. Reduction of speech and/or comprehension, however, makes conversation. . . (see row details)  10. Dysarthria: 1-->Mild-to-moderate dysarthria, patient slurs at least some words and, at worst, can be understood with some difficulty  11. Extinction and Inattention (formerly Neglect): 0-->No abnormality  Total (NIH Stroke Scale): 4    Modified Gilman Score: 2  Ana Laura Coma Scale:    ABCD2 Score:    [id number] Score:   HAS -BLED Score:   ICH Score:   Hunt & Agarwal Classification:       Thrombolysis Candidate? No, Recent intracranial hemorrhage     Delays to Thrombolysis?   No    Interventional Revascularization Candidate?   Is the patient eligible for mechanical endovascular reperfusion (KEVIN)?  No; No large vessel occlusion identified on imaging     Delays to Thrombectomy? No    Hemorrhagic change of an Ischemic Stroke: Does this patient have an ischemic stroke with hemorrhagic changes? 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 Per Neuro IR fellow patient was neuro intact prior to procedure and once she arrived to PACU there were some issues controlling her BP to keep them <140. Patient started to become confused, repeating the same phrase without pertinent context and unable to follow commands. Per Nurse at PACU patient LKN ~1:30 pm and per anesthesiologist this was a change from how she was prior to procedure. On arrival patient was awake, able to tell me her name but having a hard time following commands with dysarthric speech. Initially limited movement on RLE which was 2/2 to pain. Initial NIHSS 17. Per anesthesiologist at bedside, patient received 2000U of heparin as well as propofol and fentanyl and no significant BP changes during procedure.   Patient taken to CT and no acute findings, no acute hemorrhage or worsening SDH. CTA without LVO or HG stenosis. Patient not a candidate for TNK given recent SDH and no IR as no LVO.     When patient was transported back to PACU her mentation was better, she was able to fully lift bl uppers and lowers without drift and no facial droop; NIHSS 4 (dysarthria, confusion and aphasia). Per daughter at bedside this was an improvement from when she came from procedure and at baseline patient have cognitive impairment.     Past Medical History:   Diagnosis Date    Anxiety     Arthritis     Dr Schofield    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     Atherosclerosis of aorta 06/08/2016    CXR 2013    Breast cancer 2011    right breast invasive micropapillary CA, Stage !A    Cataract     Controlled type 2 diabetes mellitus with circulatory disorder, without long-term current use of insulin 10/31/2017    Controlled type 2 diabetes mellitus with circulatory disorder, without long-term current use of insulin 10/31/2017    Diabetes mellitus type 2 without retinopathy 06/12/2014    6% metformin 500 bid, FU+ 2016    DVT (deep venous thrombosis) 2001    R , Dr Bonilla    Grief 04/06/2022     Marques passed 1/2022    Hyperlipidemia     LDL 82    Hyperlipidemia associated with type 2 diabetes mellitus 09/11/2012    Hypertension     Hypertension associated with diabetes 09/11/2012    Memory loss 12/29/2022    CT chronic changes 2022    Microalbuminuria due to type 2 diabetes mellitus 12/08/2015    taking lisinopril, losartan caused olivia effects    PVD (peripheral vascular disease) with claudication 05/02/2019    Compression hose    Risk for coronary artery disease greater than 20% in next 10 years per [place] score 05/01/2018    Strabismus     Type 2 diabetes mellitus with diabetic polyneuropathy 06/12/2014    Type 2 diabetes mellitus with diabetic polyneuropathy, without long-term current use of insulin 06/12/2014      Past Surgical History:   Procedure Laterality Date    BREAST BIOPSY Right 2011    BREAST LUMPECTOMY Right 2011    [place] REMOVAL OF NAIL BED  6/10/2015    TONSILLECTOMY        Medications have been reviewed and reconciled.    Review of Systems   Unable to perform ROS: Mental status change   Neurological:  Positive for speech change and weakness.     Physical Exam  Constitutional:       Comments: Crying     Eyes:      Extraocular Movements: Extraocular movements intact.      Pupils: Pupils are equal, round, and reactive to light.   Cardiovascular:      Rate and Rhythm: Normal rate.   Pulmonary:      Effort: No respiratory distress.   Abdominal:      General:  There is no distension.   Musculoskeletal:         General: No swelling.   Neurological:      General: No focal deficit present.      Mental Status: She is alert. Mental status is at baseline.      Cranial Nerves: Cranial nerves 2-12 are intact. No cranial nerve deficit.      Sensory: No sensory deficit.      Motor: Motor strength is normal.No weakness.      Coordination: Finger-Nose-Finger Test normal.        NEUROLOGICAL EXAMINATION:     CRANIAL NERVES   Cranial nerves II through XII intact.     CN III, IV, VI   Pupils are equal, round, and reactive to light.    MOTOR EXAM     Strength   Strength 5/5 throughout.     SENSORY EXAM   Light touch normal.     GAIT AND COORDINATION      Coordination   Finger to nose coordination: normal    Tremor   Resting tremor: absent          Diagnostic Results:        Brain imaging/Vessel Imaging:  Impression:     Similar appearance of predominately low-density extra-axial collection along the right hemisphere with resorption of the previously identified left-sided collection.  Improved midline shift to the left.     No evidence of acute territorial infarct or gross new hemorrhage.     No new acute major branch stenosis/occlusion at the [id number].     Cardiac Evaluation:   N/A       Nereida Crystal MD  Comprehensive Stroke Center  Department of Vascular Neurology   [facility] - [facility]

## 2024-02-15 NOTE — PROGRESS NOTES
02/15/24 1700   Vital Signs   BP (!) 191/73   MAP (mmHg) 105     MD, Rudison called d/t pt's HTN and shaking. MD placing orders. Pt has no new symptoms. WCTM.

## 2024-02-16 PROBLEM — F32.A DEPRESSION: Status: ACTIVE | Noted: 2024-02-16

## 2024-02-16 PROBLEM — F05 ACUTE CONFUSIONAL STATE: Status: ACTIVE | Noted: 2024-02-16

## 2024-02-16 PROBLEM — R47.01 APHASIA: Status: ACTIVE | Noted: 2024-02-16

## 2024-02-16 PROBLEM — G93.5 BRAIN COMPRESSION: Status: ACTIVE | Noted: 2024-02-16

## 2024-02-16 LAB
ALBUMIN SERPL BCP-MCNC: 3.1 G/DL (ref 3.5–5.2)
ALP SERPL-CCNC: 56 U/L (ref 55–135)
ALT SERPL W/O P-5'-P-CCNC: 6 U/L (ref 10–44)
ANION GAP SERPL CALC-SCNC: 9 MMOL/L (ref 8–16)
AST SERPL-CCNC: 9 U/L (ref 10–40)
BASOPHILS # BLD AUTO: 0.03 K/UL (ref 0–0.2)
BASOPHILS NFR BLD: 0.4 % (ref 0–1.9)
BILIRUB SERPL-MCNC: 0.7 MG/DL (ref 0.1–1)
BUN SERPL-MCNC: 12 MG/DL (ref 8–23)
CALCIUM SERPL-MCNC: 9.5 MG/DL (ref 8.7–10.5)
CHLORIDE SERPL-SCNC: 107 MMOL/L (ref 95–110)
CO2 SERPL-SCNC: 25 MMOL/L (ref 23–29)
CREAT SERPL-MCNC: 0.8 MG/DL (ref 0.5–1.4)
DIFFERENTIAL METHOD BLD: ABNORMAL
EOSINOPHIL # BLD AUTO: 0.1 K/UL (ref 0–0.5)
EOSINOPHIL NFR BLD: 0.8 % (ref 0–8)
ERYTHROCYTE [DISTWIDTH] IN BLOOD BY AUTOMATED COUNT: 14 % (ref 11.5–14.5)
EST. GFR  (NO RACE VARIABLE): >60 ML/MIN/1.73 M^2
GLUCOSE SERPL-MCNC: 153 MG/DL (ref 70–110)
HCT VFR BLD AUTO: 35.8 % (ref 37–48.5)
HGB BLD-MCNC: 11.7 G/DL (ref 12–16)
IMM GRANULOCYTES # BLD AUTO: 0.04 K/UL (ref 0–0.04)
IMM GRANULOCYTES NFR BLD AUTO: 0.6 % (ref 0–0.5)
LYMPHOCYTES # BLD AUTO: 0.5 K/UL (ref 1–4.8)
LYMPHOCYTES NFR BLD: 6.4 % (ref 18–48)
MCH RBC QN AUTO: 29.3 PG (ref 27–31)
MCHC RBC AUTO-ENTMCNC: 32.7 G/DL (ref 32–36)
MCV RBC AUTO: 90 FL (ref 82–98)
MONOCYTES # BLD AUTO: 0.6 K/UL (ref 0.3–1)
MONOCYTES NFR BLD: 8.1 % (ref 4–15)
NEUTROPHILS # BLD AUTO: 6 K/UL (ref 1.8–7.7)
NEUTROPHILS NFR BLD: 83.7 % (ref 38–73)
NRBC BLD-RTO: 0 /100 WBC
PLATELET # BLD AUTO: 178 K/UL (ref 150–450)
PMV BLD AUTO: 9.5 FL (ref 9.2–12.9)
POCT GLUCOSE: 138 MG/DL (ref 70–110)
POCT GLUCOSE: 156 MG/DL (ref 70–110)
POTASSIUM SERPL-SCNC: 3.9 MMOL/L (ref 3.5–5.1)
PROT SERPL-MCNC: 6.1 G/DL (ref 6–8.4)
RBC # BLD AUTO: 3.99 M/UL (ref 4–5.4)
SODIUM SERPL-SCNC: 141 MMOL/L (ref 136–145)
WBC # BLD AUTO: 7.19 K/UL (ref 3.9–12.7)

## 2024-02-16 PROCEDURE — 92610 EVALUATE SWALLOWING FUNCTION: CPT | Mod: HCNC

## 2024-02-16 PROCEDURE — 80053 COMPREHEN METABOLIC PANEL: CPT | Mod: HCNC | Performed by: HOSPITALIST

## 2024-02-16 PROCEDURE — 97116 GAIT TRAINING THERAPY: CPT | Mod: HCNC

## 2024-02-16 PROCEDURE — 97162 PT EVAL MOD COMPLEX 30 MIN: CPT | Mod: HCNC

## 2024-02-16 PROCEDURE — 97530 THERAPEUTIC ACTIVITIES: CPT | Mod: HCNC

## 2024-02-16 PROCEDURE — G0378 HOSPITAL OBSERVATION PER HR: HCPCS | Mod: HCNC

## 2024-02-16 PROCEDURE — 36415 COLL VENOUS BLD VENIPUNCTURE: CPT | Mod: HCNC | Performed by: HOSPITALIST

## 2024-02-16 PROCEDURE — 85025 COMPLETE CBC W/AUTO DIFF WBC: CPT | Mod: HCNC | Performed by: HOSPITALIST

## 2024-02-16 PROCEDURE — 25000003 PHARM REV CODE 250: Mod: HCNC | Performed by: HOSPITALIST

## 2024-02-16 PROCEDURE — 92523 SPEECH SOUND LANG COMPREHEN: CPT | Mod: HCNC

## 2024-02-16 PROCEDURE — 97167 OT EVAL HIGH COMPLEX 60 MIN: CPT | Mod: HCNC

## 2024-02-16 PROCEDURE — 97535 SELF CARE MNGMENT TRAINING: CPT | Mod: HCNC

## 2024-02-16 RX ORDER — METOPROLOL TARTRATE 50 MG/1
50 TABLET ORAL 2 TIMES DAILY
Status: DISCONTINUED | OUTPATIENT
Start: 2024-02-16 | End: 2024-02-23 | Stop reason: HOSPADM

## 2024-02-16 RX ORDER — ATORVASTATIN CALCIUM 20 MG/1
20 TABLET, FILM COATED ORAL DAILY
Status: DISCONTINUED | OUTPATIENT
Start: 2024-02-16 | End: 2024-02-23 | Stop reason: HOSPADM

## 2024-02-16 RX ORDER — FLUOXETINE 10 MG/1
10 CAPSULE ORAL DAILY
Status: DISCONTINUED | OUTPATIENT
Start: 2024-02-16 | End: 2024-02-23 | Stop reason: HOSPADM

## 2024-02-16 RX ADMIN — ACETAMINOPHEN 650 MG: 325 TABLET ORAL at 08:02

## 2024-02-16 RX ADMIN — METOPROLOL TARTRATE 50 MG: 50 TABLET, FILM COATED ORAL at 08:02

## 2024-02-16 RX ADMIN — ACETAMINOPHEN 650 MG: 325 TABLET ORAL at 09:02

## 2024-02-16 RX ADMIN — FLUOXETINE 10 MG: 10 CAPSULE ORAL at 08:02

## 2024-02-16 RX ADMIN — ATORVASTATIN CALCIUM 20 MG: 20 TABLET, FILM COATED ORAL at 08:02

## 2024-02-16 NOTE — NURSING TRANSFER
Nursing Transfer Note      2/15/2024   8:03 PM    Reason patient is being transferred: post-procedure    Transfer To: [id number]    Transfer via stretcher    Transfer with 1 L NC to O2    Transported by 2 RNs    Order for Tele Monitor? No    Additional Lines: Oxygen    Medicines sent: none    Any special needs or follow-up needed: routine    Patient belongings transferred with patient: Yes    Chart send with patient: Yes    Notified: daughter

## 2024-02-16 NOTE — SUBJECTIVE & OBJECTIVE
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, without long-term current use of insulin 10/31/2017    Diabetes mellitus type 2 without retinopathy 06/12/2014    6% metformin 500 bid, FU+ 2016    DVT (deep venous thrombosis) 2001    R , Dr Chad Golden 04/06/2022     Marques passed 1/2022    Hyperlipidemia     LDL 82    Hyperlipidemia associated with type 2 diabetes mellitus 09/11/2012    Hypertension     Hypertension associated with diabetes 09/11/2012    Memory loss 12/29/2022    CT chronic changes 2022    Microalbuminuria due to type 2 diabetes mellitus 12/08/2015    taking lisinopril, losartan caused olivia effects    PVD (peripheral vascular disease) with claudication 05/02/2019    Compression hose    Risk for coronary artery disease greater than 20% in next 10 years per [place] score 05/01/2018    Strabismus     Type 2 diabetes mellitus with diabetic polyneuropathy 06/12/2014    Type 2 diabetes mellitus with diabetic polyneuropathy, without long-term current use of insulin 06/12/2014       Past Surgical History:   Procedure Laterality Date    BREAST BIOPSY Right 2011    BREAST LUMPECTOMY Right 2011    [place] REMOVAL OF NAIL BED  6/10/2015    TONSILLECTOMY         Review of patient's allergies indicates:   Allergen Reactions    Sulfa (sulfonamide antibiotics)      Other reaction(s): Rash       No current facility-administered medications on file prior to encounter.     Current Outpatient Medications on File Prior to Encounter   Medication Sig    acetaminophen (TYLENOL) 325 MG tablet Take 2 tablets (650 mg total) by mouth every 6  (six) hours as needed for Pain.    amLODIPine (NORVASC) 5 MG tablet Take 1 tablet (5 mg total) by mouth once daily. HOLD UNTIL OTHERWISE DIRECTED BY PRIMARY CARE PROVIDER    FLUoxetine 10 MG capsule Take 1 capsule (10 mg total) by mouth once daily.    lisinopriL (PRINIVIL,ZESTRIL) 5 MG tablet Take 1 tablet (5 mg total) by mouth once daily. HOLD UNTIL OTHERWISE DIRECTED BY PRIMARY CARE PROVIDER    metoprolol tartrate (LOPRESSOR) 50 MG tablet Take 1 tablet (50 mg total) by mouth 2 (two) times daily.    simvastatin (ZOCOR) 10 MG tablet Take 1 tablet (10 mg total) by mouth every evening.    blood sugar diagnostic (BLOOD GLUCOSE TEST) Strp 1 strip by Misc.(Non-Drug; Combo Route) route 2 (two) times daily.    cetirizine (ZYRTEC) 10 MG tablet Take 10 mg by mouth once daily.    diclofenac sodium (VOLTAREN) 1 % Gel Apply 2 g topically 3 (three) times daily. To knees for pain    flash glucose scanning reader (FREESTYLE FLOWER 2 READER) Misc Continuous glucose reader    flash glucose sensor (FREESTYLE FLOWER 2 SENSOR) Kit Continuous glucose monitor    lancets Misc Test tid    polyethylene glycol (GLYCOLAX) 17 gram PwPk Take 17 g by mouth daily as needed for Constipation. (Patient not taking: Reported on 2/1/2024)     Family History       Problem Relation (Age of Onset)    Breast cancer Sister    Cataracts Mother    Heart disease Mother (58), Father (50)    No Known Problems Brother, Maternal Aunt, Maternal Uncle, Paternal Aunt, Paternal Uncle, Maternal Grandmother, Maternal Grandfather, Paternal Grandmother, Paternal Grandfather    Thyroid disease Sister          Tobacco Use    Smoking status: Former     Current packs/day: 7.00     Average packs/day: 7.0 packs/day for 0.5 years (3.5 ttl pk-yrs)     Types: Cigarettes    Smokeless tobacco: Never   Substance and Sexual Activity    Alcohol use: No    Drug use: Never    Sexual activity: Not Currently     Review of Systems   Constitutional:  Negative for activity change, appetite  change, chills, fever and unexpected weight change.   HENT:  Negative for congestion and sore throat.    Respiratory:  Negative for cough and shortness of breath.    Cardiovascular:  Negative for chest pain, palpitations and leg swelling.   Gastrointestinal:  Negative for abdominal distention, abdominal pain, blood in stool, constipation, diarrhea, nausea and vomiting.   Genitourinary:  Negative for difficulty urinating, dysuria and hematuria.   Musculoskeletal:  Positive for arthralgias. Negative for myalgias.   Skin:  Negative for color change and rash.   Neurological:  Positive for speech difficulty and weakness. Negative for dizziness, tremors and seizures.     Objective:     Vital Signs (Most Recent):  Temp: 99.1 °F (37.3 °C) (02/15/24 2350)  Pulse: 87 (02/15/24 2350)  Resp: 18 (02/15/24 2350)  BP: 130/61 (02/15/24 2350)  SpO2: 99 % (02/15/24 2350) Vital Signs (24h Range):  Temp:  [97.3 °F (36.3 °C)-99.1 °F (37.3 °C)] 99.1 °F (37.3 °C)  Pulse:  [55-94] 87  Resp:  [12-24] 18  SpO2:  [96 %-100 %] 99 %  BP: (130-196)/(60-96) 130/61     Weight: 65.8 kg (145 lb)  Body mass index is 22.05 kg/m².     Physical Exam  Vitals reviewed.   Constitutional:       General: She is not in acute distress.     Appearance: She is well-developed.   HENT:      Head: Normocephalic and atraumatic.   Eyes:      Extraocular Movements: Extraocular movements intact.      Pupils: Pupils are equal, round, and reactive to light.   Neck:      Vascular: No JVD.      Trachea: No tracheal deviation.   Cardiovascular:      Rate and Rhythm: Normal rate and regular rhythm.      Heart sounds: No murmur heard.     No friction rub. No gallop.   Pulmonary:      Effort: No respiratory distress.      Breath sounds: Normal breath sounds. No wheezing or rales.   Abdominal:      General: Bowel sounds are normal. There is no distension.      Palpations: Abdomen is soft. There is no mass.      Tenderness: There is no abdominal tenderness.   Musculoskeletal:          General: No deformity.      Cervical back: Neck supple.   Lymphadenopathy:      Cervical: No cervical adenopathy.   Skin:     General: Skin is warm and dry.      Findings: No rash.   Neurological:      Mental Status: She is alert and oriented to person, place, and time.              CRANIAL NERVES     CN III, IV, VI   Pupils are equal, round, and reactive to light.       Significant Labs: All pertinent labs within the past 24 hours have been reviewed.    Significant Imaging: I have reviewed all pertinent imaging results/findings within the past 24 hours.

## 2024-02-16 NOTE — ANESTHESIA POSTPROCEDURE EVALUATION
"Anesthesia Post Evaluation    Patient: Kanchan Donwing    Procedure(s) Performed: * No procedures listed *    Final Anesthesia Type: general      Patient location during evaluation: PACU  Patient participation: Yes- Able to Participate  Level of consciousness: awake and alert  Post-procedure vital signs: reviewed and stable  Pain management: adequate  Airway patency: patent    PONV status at discharge: No PONV  Anesthetic complications: yes  Perioperative Events: other periop events (see comments)      [place]lalli-operative Events Comments: Report od confusion"dysrathria" and LE weakness as emerging from anesthesia in PACU. RN called stroke code. CT negative for acute changes. Symptoms improved as patient became more awake. Pt admitted overnight for monitoring.   Cardiovascular status: blood pressure returned to baseline  Respiratory status: face mask  Hydration status: euvolemic  Follow-up not needed.              Vitals Value Taken Time   /55 02/16/24 1129   Temp 36.9 °C (98.4 °F) 02/16/24 1129   Pulse 81 02/16/24 1221   Resp 18 02/16/24 1129   SpO2 97 % 02/16/24 1129         Event Time   Out of Recovery 20:00:00         Pain/Dk Score: Pain Rating Prior to Med Admin: 3 (2/16/2024  9:09 AM)  Pain Rating Post Med Admin: 0 (2/16/2024 10:09 AM)  Dk Score: 9 (2/15/2024  7:10 PM)          "

## 2024-02-16 NOTE — ASSESSMENT & PLAN NOTE
-Pt. S/p MMA embolization. Procedure complicated by weakness/dysarthria, now improving  -Continue to monitor with regular neurochecks. PT/OT/SLP ordered. Consider MRI if symptoms persistent/not improving as recommended by vascular neurology

## 2024-02-16 NOTE — HPI
"82 yo F with PMHx B/L chronic traumatic SDH, HTN, and HLD who [place] [facility]. Pt. Has been followed by neurosurgery for B/L subacute bilateral subdural hematomas which have been enlarging (R>L w midline shift and some mass effect). Pt. Has not reported significant worsening neurologic symptoms despite the enlargement, so the patient was referred for bilateral MMA embolization in an attempt to limit the growth of the SDH without pt. Needing craniotomy. Pt. Underwent procedure today, but afterwards she developed B/L weakness, dysarthria and aphasia. CTA showed "Similar appearance of predominately low-density extra-axial collection along the right hemisphere with resorption of the previously identified left-sided collection. Improved midline shift to the left. No acute findings." Pt. Symptoms improved with time. Hospital medicine was contacted for admission for further monitoring after procedure given her acute post-procedure change.  "

## 2024-02-16 NOTE — ANESTHESIA RELEASE NOTE
"Anesthesia Release from PACU Note    Patient: Kanchan Downing    Procedure(s) Performed: * No procedures listed *    Anesthesia type: MAC    Post pain: Adequate analgesia    Post assessment: no apparent anesthetic complications    Last Vitals: Visit Vitals  /60   Pulse 86   Temp 36.7 °C (98.1 °F) (Temporal)   Resp 16   Ht 5' 8" (1.727 m)   Wt 65.8 kg (145 lb)   LMP  (LMP Unknown)   SpO2 100%   Breastfeeding No   BMI 22.05 kg/m²       Post vital signs: stable    Level of consciousness: awake, alert , and oriented    Nausea/Vomiting: no nausea/no vomiting    Complications: none    Airway Patency: patent    Respiratory: unassisted, spontaneous ventilation    Cardiovascular: stable and blood pressure at baseline    Hydration: euvolemic  "

## 2024-02-16 NOTE — ASSESSMENT & PLAN NOTE
-Continue home lopressor. Pt. Currently holding amlodipine and lisinopril until PCP f/u. Consider restarting if BP remains elevated

## 2024-02-16 NOTE — CONSULTS
Patient seen and consult note answered, please refer to consult note by me generated below.     Nereida Crystal MD   Neurology Resident, PGY4  [facility]

## 2024-02-16 NOTE — PLAN OF CARE
Problem: Adult Inpatient Plan of Care  Goal: Optimal Comfort and Wellbeing  Outcome: Ongoing, Progressing     Problem: Adult Inpatient Plan of Care  Goal: Readiness for Transition of Care  Outcome: Ongoing, Progressing     Problem: Diabetes Comorbidity  Goal: Blood Glucose Level Within Targeted Range  Outcome: Ongoing, Progressing     Problem: Fall Injury Risk  Goal: Absence of Fall and Fall-Related Injury  Outcome: Ongoing, Progressing     Problem: Fatigue  Goal: Improved Activity Tolerance  Outcome: Ongoing, Progressing     Patient more alert this morning, able to walk to bathroom with walker and 1 person assist, walker is baseline. No complaints of pain.

## 2024-02-16 NOTE — NURSING
Nurses Note -- 4 Eyes      2/16/2024   5:47 AM      Skin assessed during: Admit      [] No Altered Skin Integrity Present    []Prevention Measures Documented      [x] Yes- Altered Skin Integrity Present or Discovered   [x] LDA Added if Not in Epic (Describe Wound)   [] New Altered Skin Integrity was Present on Admit and Documented in LDA   [] Wound Image Taken    Wound Care Consulted? No    Attending Nurse:  Ramonita MORSE RN    Second RN/Staff Member:  Ramonita THAPA RN    Only angio site in right groin

## 2024-02-16 NOTE — PT/OT/SLP EVAL
Speech Language Pathology Evaluation  Cognitive/Bedside Swallow    Patient Name:  Kanchan Downing   MRN:  [id number]  Admitting Diagnosis: Traumatic subdural hematoma    Recommendations:                  General Recommendations:  Speech/language therapy and Cognitive-linguistic therapy  Diet recommendations:  Regular Diet - IDDSI Level 7, Thin liquids - IDDSI Level 0   Aspiration Precautions: Standard aspiration precautions   General Precautions: Standard, aphasia  Communication strategies:  go to room if call light pushed    Assessment:     Kanchan Downing is a 83 y.o. female with an SLP diagnosis of  functional swallow, aphasia and cognitive-linguistic impairment . SLP will follow.     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, without long-term current use of insulin 10/31/2017    Diabetes mellitus type 2 without retinopathy 06/12/2014    6% metformin 500 bid, FU+ 2016    DVT (deep venous thrombosis) 2001    R , Dr Bonilla    Grief 04/06/2022     Marques passed 1/2022    Hyperlipidemia     LDL 82    Hyperlipidemia associated with type 2 diabetes mellitus 09/11/2012    Hypertension     Hypertension associated with diabetes 09/11/2012    Memory loss 12/29/2022    CT chronic changes 2022    Microalbuminuria due to type 2 diabetes mellitus 12/08/2015    taking lisinopril, losartan caused olivia effects    PVD (peripheral vascular disease) with claudication 05/02/2019    Compression hose    Risk for coronary artery disease greater than 20% in next 10 years per [place] score 05/01/2018    Strabismus     Type 2 diabetes  "mellitus with diabetic polyneuropathy 06/12/2014    Type 2 diabetes mellitus with diabetic polyneuropathy, without long-term current use of insulin 06/12/2014     Past Surgical History:   Procedure Laterality Date    BREAST BIOPSY Right 2011    BREAST LUMPECTOMY Right 2011    [place] REMOVAL OF NAIL BED  6/10/2015    TONSILLECTOMY       Principal Problem:Traumatic subdural hematoma     Chief Complaint: No chief complaint on file.     HPI: "84 yo F with PMHx B/L chronic traumatic SDH, HTN, and HLD who presented to [facility] [place] MMA embolization. Pt. Has been followed by neurosurgery for B/L subacute bilateral subdural hematomas which have been enlarging (R>L w midline shift and some mass effect). Pt. Has not reported significant worsening neurologic symptoms despite the enlargement, so the patient was referred for bilateral MMA embolization in an attempt to limit the growth of the SDH without pt. Needing craniotomy. Pt. Underwent procedure today, but afterwards she developed B/L weakness, dysarthria and aphasia. CTA showed "Similar appearance of predominately low-density extra-axial collection along the right hemisphere with resorption of the previously identified left-sided collection. Improved midline shift to the left. No acute findings." Pt. Symptoms improved with time. Hospital medicine was contacted for admission for further monitoring after procedure given her acute post-procedure change."    Prior Intubation HX:  none    Modified Barium Swallow: none    Chest X-Rays: none this admission     Prior diet: regular/thin    Subjective     Spoke with RN prior to session.    Pain/Comfort:  Pain Rating 1: 0/10  Pain Rating Post-Intervention 1: 0/10    Respiratory Status: Room air    Objective:     Cognitive Status:    Arousal/Alertness Appropriate response to stimuli  Attention Alternating attention deficit     Orientation Person, Place/Situation & year w/ min assist  Problem Solving Sequencing 0/2, Solutions 3/3 , and " "Compare/contrast 0/2  Reasoning Inferences  3/3 min assist     Receptive Language:   Comprehension:      Questions Simple yes/no 100%  Commands  One step 100%  two step basic commands 100% repetitions  complex/abstract commands 0/3    Pragmatics:    wfl    Expressive Language:  Verbal:    Automatic Speech  Counting 1-10 100% and Months of the year 12/12 min assist  Initiation wfl  Naming Confrontation 100%, Convergent 80%, and Divergent responsive 5/5 concrete, 2/5 abstract following MOD assist  Sentence formulation impaired      Motor Speech:  WFL    Voice:   WFL    Visual-Spatial:  Clock drawing: poor spacing,. 12/21 numbers present, adequate contour, 2 hands present, wrong time.     Reading:   Word 100% and Sentence 100%    Reading comprehension: required min assist to answer questions    Written Expression:   Dominant hand: Right  Assessment hand: Right  Sentence formulation "you are pretty"  Dictation print first name with fair legibility    Oral Musculature Evaluation  Oral Musculature: WFL  Dentition: present and adequate  Secretion Management: adequate  Mucosal Quality: dry  Oral Labial Strength and Mobility: WFL  Lingual Strength and Mobility: WFL  Velar Elevation: [facility]  Buccal Strength and Mobility: [facility]  Volitional Cough: elicited  Volitional Swallow: timely  Voice Prior to PO Intake: clear    Bedside Swallow Eval:   Consistencies Assessed:  Thin liquids 8 oz cup rim & straw  Solids cracker x2      Oral Phase:   WFL    Pharyngeal Phase:   no overt clinical signs/symptoms of aspiration  no overt clinical signs/symptoms of pharyngeal dysphagia    Compensatory Strategies  None    Treatment: HOB raised. Pt able to self present all consistencies. Recommend she continue her current diet. Education provided re: role of SLP, diet recs, swallow precs, s/s aspiration and POC.  Pt verbalized understanding and agreement.     Goals:   Multidisciplinary Problems       SLP Goals          Problem: SLP    Goal Priority " Disciplines Outcome   SLP Goal     SLP Ongoing, Progressing   Description: Speech Language Pathology Goals  Goals expected to be met by 2/29    1. Pt will tolerate least restrictive PO diet without any overt s/sx of airway compromise.   2. Pt will complete word finding tasks with 80% acc.   3. Pt will follow complex/multi-unit directions with 80% acc.  4. Pt will complete mental flexibility tasks with 80% acc with min assist.                                Plan:     Patient to be seen:  3 x/week   Plan of Care expires:  03/17/24  Plan of Care reviewed with:  patient   SLP Follow-Up:  Yes       Discharge recommendations:  Therapy Intensity Recommendations at Discharge: Low Intensity Therapy   Barriers to Discharge:  None    Time Tracking:     SLP Treatment Date:   02/16/24  Speech Start Time:  0808  Speech Stop Time:  0834     Speech Total Time (min):  26 min    Billable Minutes: Eval 12 , Eval Swallow and Oral Function 6, and Self Care/Home Management Training 8    02/16/2024

## 2024-02-16 NOTE — CARE UPDATE
I have reviewed the chart of Kanchan Downing who is hospitalized for the following:    Active Hospital Problems    Diagnosis    *Traumatic subdural hematoma    Brain compression     Midline shift to the left measuring 5 mm is improved from the prior study.  The basal cisterns remain patent.   Repeat cth stable   s/p MMA        Depression     On fluoxetine       Aphasia     Aphasic post mma, improved      Acute confusional state     Became confused s/p MMA  Now improving       Vasogenic cerebral edema     Midline shift to the left measuring 5 mm is improved from the prior study.  The basal cisterns remain patent.   Repeat cth stable   s/p MMA      Hypertension associated with diabetes    Hyperlipidemia associated with type 2 diabetes mellitus          Angie Delcid NP  Unit Based [facility]

## 2024-02-16 NOTE — H&P
"  [facility] Neurosurgery ([facility])  [facility] Medicine  History & Physical    Patient Name: Kanchan Downing  MRN: [id number]  Patient Class: OP- Observation  Admission Date: 2/15/2024  Attending Physician: Hugo Slater MD   Primary Care Provider: Viktoria Mckeon MD         Patient information was obtained from patient, past medical records, and ER records.     Subjective:     Principal Problem:Traumatic subdural hematoma    Chief Complaint: No chief complaint on file.       HPI: 82 yo F with PMHx B/L chronic traumatic SDH, HTN, and HLD who presented to [facility] [place] for elective MMA embolization. Pt. Has been followed by neurosurgery for B/L subacute bilateral subdural hematomas which have been enlarging (R>L w midline shift and some mass effect). Pt. Has not reported significant worsening neurologic symptoms despite the enlargement, so the patient was referred for bilateral MMA embolization in an attempt to limit the growth of the SDH without pt. Needing craniotomy. Pt. Underwent procedure today, but afterwards she developed B/L weakness, dysarthria and aphasia. CTA showed "Similar appearance of predominately low-density extra-axial collection along the right hemisphere with resorption of the previously identified left-sided collection. Improved midline shift to the left. No acute findings." Pt. Symptoms improved with time. Hospital medicine was contacted for admission for further monitoring after procedure given her acute post-procedure change.    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, without long-term current use of insulin " 10/31/2017    Diabetes mellitus type 2 without retinopathy 06/12/2014    6% metformin 500 bid, FU+ 2016    DVT (deep venous thrombosis) 2001    R Dr Chad 04/06/2022     Marques passed 1/2022    Hyperlipidemia     LDL 82    Hyperlipidemia associated with type 2 diabetes mellitus 09/11/2012    Hypertension     Hypertension associated with diabetes 09/11/2012    Memory loss 12/29/2022    CT chronic changes 2022    Microalbuminuria due to type 2 diabetes mellitus 12/08/2015    taking lisinopril, losartan caused olivia effects    PVD (peripheral vascular disease) with claudication 05/02/2019    Compression hose    Risk for coronary artery disease greater than 20% in next 10 years per [place] score 05/01/2018    Strabismus     Type 2 diabetes mellitus with diabetic polyneuropathy 06/12/2014    Type 2 diabetes mellitus with diabetic polyneuropathy, without long-term current use of insulin 06/12/2014       Past Surgical History:   Procedure Laterality Date    BREAST BIOPSY Right 2011    BREAST LUMPECTOMY Right 2011    [place] REMOVAL OF NAIL BED  6/10/2015    TONSILLECTOMY         Review of patient's allergies indicates:   Allergen Reactions    Sulfa (sulfonamide antibiotics)      Other reaction(s): Rash       No current facility-administered medications on file prior to encounter.     Current Outpatient Medications on File Prior to Encounter   Medication Sig    acetaminophen (TYLENOL) 325 MG tablet Take 2 tablets (650 mg total) by mouth every 6 (six) hours as needed for Pain.    amLODIPine (NORVASC) 5 MG tablet Take 1 tablet (5 mg total) by mouth once daily. HOLD UNTIL OTHERWISE DIRECTED BY PRIMARY CARE PROVIDER    FLUoxetine 10 MG capsule Take 1 capsule (10 mg total) by mouth once daily.    lisinopriL (PRINIVIL,ZESTRIL) 5 MG tablet Take 1 tablet (5 mg total) by mouth once daily. HOLD UNTIL OTHERWISE DIRECTED BY PRIMARY CARE PROVIDER    metoprolol tartrate (LOPRESSOR) 50 MG tablet Take 1 tablet (50 mg total) by  mouth 2 (two) times daily.    simvastatin (ZOCOR) 10 MG tablet Take 1 tablet (10 mg total) by mouth every evening.    blood sugar diagnostic (BLOOD GLUCOSE TEST) Strp 1 strip by Misc.(Non-Drug; Combo Route) route 2 (two) times daily.    cetirizine (ZYRTEC) 10 MG tablet Take 10 mg by mouth once daily.    diclofenac sodium (VOLTAREN) 1 % Gel Apply 2 g topically 3 (three) times daily. To knees for pain    flash glucose scanning reader (FREESTYLE FLOWER 2 READER) Misc Continuous glucose reader    flash glucose sensor (FREESTYLE FLOWER 2 SENSOR) Kit Continuous glucose monitor    lancets Misc Test tid    polyethylene glycol (GLYCOLAX) 17 gram PwPk Take 17 g by mouth daily as needed for Constipation. (Patient not taking: Reported on 2/1/2024)     Family History       Problem Relation (Age of Onset)    Breast cancer Sister    Cataracts Mother    Heart disease Mother (58), Father (50)    No Known Problems Brother, Maternal Aunt, Maternal Uncle, Paternal Aunt, Paternal Uncle, Maternal Grandmother, Maternal Grandfather, Paternal Grandmother, Paternal Grandfather    Thyroid disease Sister          Tobacco Use    Smoking status: Former     Current packs/day: 7.00     Average packs/day: 7.0 packs/day for 0.5 years (3.5 ttl pk-yrs)     Types: Cigarettes    Smokeless tobacco: Never   Substance and Sexual Activity    Alcohol use: No    Drug use: Never    Sexual activity: Not Currently     Review of Systems   Constitutional:  Negative for activity change, appetite change, chills, fever and unexpected weight change.   HENT:  Negative for congestion and sore throat.    Respiratory:  Negative for cough and shortness of breath.    Cardiovascular:  Negative for chest pain, palpitations and leg swelling.   Gastrointestinal:  Negative for abdominal distention, abdominal pain, blood in stool, constipation, diarrhea, nausea and vomiting.   Genitourinary:  Negative for difficulty urinating, dysuria and hematuria.   Musculoskeletal:  Positive for  arthralgias. Negative for myalgias.   Skin:  Negative for color change and rash.   Neurological:  Positive for speech difficulty and weakness. Negative for dizziness, tremors and seizures.     Objective:     Vital Signs (Most Recent):  Temp: 99.1 °F (37.3 °C) (02/15/24 2350)  Pulse: 87 (02/15/24 2350)  Resp: 18 (02/15/24 2350)  BP: 130/61 (02/15/24 2350)  SpO2: 99 % (02/15/24 2350) Vital Signs (24h Range):  Temp:  [97.3 °F (36.3 °C)-99.1 °F (37.3 °C)] 99.1 °F (37.3 °C)  Pulse:  [55-94] 87  Resp:  [12-24] 18  SpO2:  [96 %-100 %] 99 %  BP: (130-196)/(60-96) 130/61     Weight: 65.8 kg (145 lb)  Body mass index is 22.05 kg/m².     Physical Exam  Vitals reviewed.   Constitutional:       General: She is not in acute distress.     Appearance: She is well-developed.   HENT:      Head: Normocephalic and atraumatic.   Eyes:      Extraocular Movements: Extraocular movements intact.      Pupils: Pupils are equal, round, and reactive to light.   Neck:      Vascular: No JVD.      Trachea: No tracheal deviation.   Cardiovascular:      Rate and Rhythm: Normal rate and regular rhythm.      Heart sounds: No murmur heard.     No friction rub. No gallop.   Pulmonary:      Effort: No respiratory distress.      Breath sounds: Normal breath sounds. No wheezing or rales.   Abdominal:      General: Bowel sounds are normal. There is no distension.      Palpations: Abdomen is soft. There is no mass.      Tenderness: There is no abdominal tenderness.   Musculoskeletal:         General: No deformity.      Cervical back: Neck supple.   Lymphadenopathy:      Cervical: No cervical adenopathy.   Skin:     General: Skin is warm and dry.      Findings: No rash.   Neurological:      Mental Status: She is alert and oriented to person, place, and time.              CRANIAL NERVES     CN III, IV, VI   Pupils are equal, round, and reactive to light.       Significant Labs: All pertinent labs within the past 24 hours have been reviewed.    Significant  Imaging: I have reviewed all pertinent imaging results/findings within the past 24 hours.  Assessment/Plan:     * Traumatic subdural hematoma  -Pt. S/p MMA embolization. Procedure complicated by weakness/dysarthria, now improving  -Continue to monitor with regular neurochecks. PT/OT/SLP ordered. Consider MRI if symptoms persistent/not improving as recommended by vascular neurology      Hyperlipidemia associated with type 2 diabetes mellitus  -Continue home statin      Hypertension associated with diabetes  -Continue home lopressor. Pt. Currently holding amlodipine and lisinopril until PCP f/u. Consider restarting if BP remains elevated        VTE Risk Mitigation (From admission, onward)           Ordered     IP VTE HIGH RISK PATIENT  Once         02/15/24 2242     Place sequential compression device  Until discontinued         02/15/24 2242                       On 02/16/2024, patient should be placed in hospital observation services under my care.        Lo FONTAINE made aware of pt's shaking, HTN, and anxiety. Labetalol orders placed. WCTM.        Abdi Guevara MD  Department of Hospital Medicine  [facility] - Neurosurgery ([facility])

## 2024-02-16 NOTE — PLAN OF CARE
Goals remain appropriate.  Problem: Occupational Therapy  Goal: Occupational Therapy Goal  Description: Goals set 2/16 with an expiration date, 3/16:  Patient will increase functional independence with ADLs by performing:    Patient will demonstrate rolling to the right with modified independence.  Not met   Patient will demonstrate rolling to the left with modified independence.   Not met  Patient will demonstrate supine -sit with modified independence.   Not met  Patient will demonstrate stand pivot transfers with CGA.   Not met  Patient will demonstrate grooming while standing with CGA.   Not met  Patient will demonstrate upper body dressing with SBA while seated EOB.   Not met  Patient will demonstrate lower body dressing with CGA while seated EOB.   Not met  Patient will demonstrate toileting with CGA.   Not met  Patient's family / caregiver will demonstrate independence and safety with assisting patient with self-care skills and functional mobility.     Not met          Outcome: Ongoing, Progressing

## 2024-02-16 NOTE — PLAN OF CARE
Problem: SLP  Goal: SLP Goal  Description: Speech Language Pathology Goals  Goals expected to be met by 2/29    1. Pt will tolerate least restrictive PO diet without any overt s/sx of airway compromise.   2. Pt will complete word finding tasks with 80% acc.   3. Pt will follow complex/multi-unit directions with 80% acc.  4. Pt will complete mental flexibility tasks with 80% acc with min assist.     Outcome: Ongoing, Progressing     Recommend she continue with a regular diet and thin liquids at this time. SLP will follow.

## 2024-02-16 NOTE — PLAN OF CARE
[facility] - Neurosurgery (Hospital)  Initial Discharge Assessment       Primary Care Provider: Viktoria Mckeon MD    Admission Diagnosis: SDH (subdural hematoma) [S06.5XAA]  Traumatic subdural hematoma [S06.5XAA]    Admission Date: 2/15/2024  Expected Discharge Date:     Transition of Care Barriers: None    Payor: HUMANA MANAGED MEDICARE / Plan: HUMANA MEDICARE HMO / Product Type: Capitation /     Extended Emergency Contact Information  Primary Emergency Contact: Eusebia leo  Mobile Phone: [phone]  Relation: Daughter  Preferred language: English  Secondary Emergency Contact: luis enrique leo   [place]  Mobile Phone: [phone]  Relation: Son  Preferred language: English   needed? No    Discharge Plan A: Rehab  Discharge Plan B: Skilled Nursing Facility      [facility] #[id number] - [place], [place] - [address] [facility]  [address] [place] [address]  Phone: [phone] Fax: [phone]    [facility] - [facility] [address] [facility]  [address] [facility] [address]  Phone: [phone] Fax: [phone]    [id number] - [place], [place] - [address] [facility]  [address] [facility] [address]  Phone: [phone] Fax: [phone]      Initial Assessment (most recent)       Adult Discharge Assessment - 02/16/24 1622          Discharge Assessment    Assessment Type Discharge Planning Assessment     Confirmed/corrected address, phone number and insurance Yes     Confirmed Demographics Correct on Facesheet     Source of Information patient     Communicated BRIDGETTE with patient/caregiver Date not available/Unable to determine     Reason For Admission sdh     People in Home alone     Do you expect to return to your current living situation? Yes     Do you have help at home or someone to help you manage your care at home? No     Prior to hospitilization cognitive status: Alert/Oriented      Current cognitive status: Alert/Oriented     Walking or Climbing Stairs Difficulty yes     Walking or Climbing Stairs ambulation difficulty, requires equipment     Mobility Management rollator     Dressing/Bathing Difficulty no     Equipment Currently Used at Home rollator     Readmission within 30 days? Yes     Patient currently being followed by outpatient case management? No     Do you currently have service(s) that help you manage your care at home? Yes     Name and Contact number of agency Daughter olga [facility]     Is the pt/caregiver preference to resume services with current agency Yes     Do you take prescription medications? Yes     Do you have prescription coverage? Yes     Coverage HUMANA MANAGED MEDICARE - HUMANA MEDICARE HMO -     Do you have any problems affording any of your prescribed medications? No     Is the patient taking medications as prescribed? yes     Who is going to help you get home at discharge? family     How do you get to doctors appointments? family or friend will provide     Are you on dialysis? No     Do you take coumadin? No     Discharge Plan A Rehab     Discharge Plan B Skilled Nursing Facility     DME Needed Upon Discharge  none     Discharge Plan discussed with: Patient;Adult children     Transition of Care Barriers None        Physical Activity    On average, how many days per week do you engage in moderate to strenuous exercise (like a brisk walk)? 0 days     On average, how many minutes do you engage in exercise at this level? 0 min        Financial Resource Strain    How hard is it for you to pay for the very basics like food, housing, medical care, and heating? Not hard at all        Housing Stability    In the last 12 months, was there a time when you were not able to pay the mortgage or rent on time? No     In the last 12 months, was there a time when you did not have a steady place to sleep or slept in a shelter (including now)? No        Transportation Needs     In the past 12 months, has lack of transportation kept you from medical appointments or from getting medications? No     In the past 12 months, has lack of transportation kept you from meetings, work, or from getting things needed for daily living? No        Food Insecurity    Within the past 12 months, you worried that your food would run out before you got the money to buy more. Never true     Within the past 12 months, the food you bought just didn't last and you didn't have money to get more. Never true        Stress    Do you feel stress - tense, restless, nervous, or anxious, or unable to sleep at night because your mind is troubled all the time - these days? Not at all        Social Connections    In a typical week, how many times do you talk on the phone with family, friends, or neighbors? More than three times a week     How often do you get together with friends or relatives? More than three times a week     How often do you attend Restoration or Restoration services? Never     Do you belong to any clubs or organizations such as Restoration groups, unions, fraternal or athletic groups, or school groups? No     How often do you attend meetings of the clubs or organizations you belong to? Never     Are you , , , , never , or living with a partner?         Alcohol Use    Q1: How often do you have a drink containing alcohol? Never     Q2: How many drinks containing alcohol do you have on a typical day when you are drinking? Patient does not drink     Q3: How often do you have six or more drinks on one occasion? Never

## 2024-02-16 NOTE — PT/OT/SLP EVAL
"Occupational Therapy   Evaluation    Name: Kanchan Downing  MRN: [id number]  Admitting Diagnosis: Traumatic subdural hematoma  Recent Surgery: * No procedures listed * 1 Day Post-Op    Recommendations:     Discharge Recommendations: High Intensity Therapy  Discharge Equipment Recommendations:  bath bench  Barriers to discharge:  Decreased caregiver support (fall risk)    Assessment:     Kanchan Downing is a 83 y.o. female with a medical diagnosis of Traumatic subdural hematoma.  She presents with performance deficits affecting function: weakness, impaired endurance, decreased coordination, decreased upper extremity function, impaired self care skills, impaired functional mobility, impaired balance.      Rehab Prognosis: Good; patient would benefit from acute skilled OT services to address these deficits and reach maximum level of function.       Plan:     Patient to be seen 4 x/week to address the above listed problems via sensory integration, neuromuscular re-education, therapeutic exercises, therapeutic activities, self-care/home management  Plan of Care Expires: 03/16/24  Plan of Care Reviewed with: patient    Subjective     Patient:  "I have a hard time when I walk; I started falling."    Occupational Profile:  Per patient:  Patient resides in [place] alone in one story home with one step to enter.  Patient is right handed.  PTA patient independent with ADLs including driving.  Patient recently hired someone to assist with household cleaning.  DME: cane, walker, wheelchair, shower chair.  Hobbies: Bible Study.    Pain/Comfort:  Pain Rating 1: 0/10  Pain Rating Post-Intervention 1: 0/10    Patients cultural, spiritual, Rastafari conflicts given the current situation: no    Objective:     Communicated with: Nurse prior to session.  Patient found supine with bed alarm, telemetry upon OT entry to room.    General Precautions: Standard, aphasia  Orthopedic Precautions:    Braces: N/A  Respiratory Status: Room " air    Occupational Performance:    Bed Mobility:    Patient completed Rolling/Turning to Left with  supervision  Patient completed Rolling/Turning to Right with supervision  Patient completed Supine to Sit with stand by assistance  Patient completed Sit to Supine with stand by assistance    Functional Mobility/Transfers:  Patient completed Sit <> Stand Transfer with moderate assistance  with  no assistive device   Patient completed Bed <> Chair Transfer using Stand Pivot technique with moderate assistance with no assistive device    Activities of Daily Living:  Grooming: moderate assistance while standing  Lower Body Dressing: maximal assistance while seated EOB    Cognitive/Visual Perceptual:  Cognitive/Psychosocial Skills:     -       Oriented to: Person, Place, Time  -       Follows Commands/attention:Follows one-step commands    Physical Exam:  Postural examination/scapula alignment:    -       Rounded shoulders  Upper Extremity Range of Motion:     -       Right Upper Extremity: WFL  -       Left Upper Extremity: WFL  Upper Extremity Strength:    -       Right Upper Extremity: WFL  -       Left Upper Extremity: WFL    AMPAC 6 Click ADL:  AMPAC Total Score: 13    Treatment & Education:  Patient education provided on role of OT and need for continued OT upon discharge.   Patient alert and oriented x 3; able to follow 4/4 one step commands. Continued education, patient/ family training recommended.    Patient's functional status and disposition recommendation discussed with PT.  White board updated in patient's room.  OT asked if there were any other questions; patient/ family had no further questions.  Session was conducted separately from PT session to give more opportunities to mobilize throughout the day.     Patient left supine with all lines intact, call button in reach, and bed alarm on    GOALS:   Multidisciplinary Problems       Occupational Therapy Goals          Problem: Occupational Therapy    Goal  Priority Disciplines Outcome Interventions   Occupational Therapy Goal     OT, PT/OT Ongoing, Progressing    Description: Goals set 2/16 with an expiration date, 3/16:  Patient will increase functional independence with ADLs by performing:    Patient will demonstrate rolling to the right with modified independence.  Not met   Patient will demonstrate rolling to the left with modified independence.   Not met  Patient will demonstrate supine -sit with modified independence.   Not met  Patient will demonstrate stand pivot transfers with CGA.   Not met  Patient will demonstrate grooming while standing with CGA.   Not met  Patient will demonstrate upper body dressing with SBA while seated EOB.   Not met  Patient will demonstrate lower body dressing with CGA while seated EOB.   Not met  Patient will demonstrate toileting with CGA.   Not met  Patient's family / caregiver will demonstrate independence and safety with assisting patient with self-care skills and functional mobility.     Not met                               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, without long-term current use of insulin 10/31/2017    Diabetes mellitus type 2 without retinopathy 06/12/2014    6% metformin 500 bid, FU+ 2016    DVT (deep venous thrombosis) 2001    R , Dr Chad Golden 04/06/2022     Marques passed 1/2022    Hyperlipidemia     LDL 82    Hyperlipidemia associated with type 2 diabetes mellitus 09/11/2012    Hypertension     Hypertension associated with diabetes 09/11/2012    Memory loss 12/29/2022    CT chronic changes 2022     Microalbuminuria due to type 2 diabetes mellitus 12/08/2015    taking lisinopril, losartan caused olivia effects    PVD (peripheral vascular disease) with claudication 05/02/2019    Compression hose    Risk for coronary artery disease greater than 20% in next 10 years per [place] score 05/01/2018    Strabismus     Type 2 diabetes mellitus with diabetic polyneuropathy 06/12/2014    Type 2 diabetes mellitus with diabetic polyneuropathy, without long-term current use of insulin 06/12/2014         Past Surgical History:   Procedure Laterality Date    BREAST BIOPSY Right 2011    BREAST LUMPECTOMY Right 2011    NC REMOVAL OF NAIL BED  6/10/2015    TONSILLECTOMY         Time Tracking:     OT Date of Treatment: 02/16/24  OT Start Time: 0840  OT Stop Time: 0900  OT Total Time (min): 20 min    Billable Minutes:Evaluation 10  Therapeutic Activity 10    2/16/2024

## 2024-02-17 PROBLEM — M79.674 GREAT TOE PAIN, RIGHT: Status: ACTIVE | Noted: 2024-02-17

## 2024-02-17 LAB
ALBUMIN SERPL BCP-MCNC: 2.8 G/DL (ref 3.5–5.2)
ALP SERPL-CCNC: 52 U/L (ref 55–135)
ALT SERPL W/O P-5'-P-CCNC: 6 U/L (ref 10–44)
ANION GAP SERPL CALC-SCNC: 8 MMOL/L (ref 8–16)
AST SERPL-CCNC: 9 U/L (ref 10–40)
BASOPHILS # BLD AUTO: 0.04 K/UL (ref 0–0.2)
BASOPHILS NFR BLD: 0.8 % (ref 0–1.9)
BILIRUB SERPL-MCNC: 0.7 MG/DL (ref 0.1–1)
BUN SERPL-MCNC: 16 MG/DL (ref 8–23)
CALCIUM SERPL-MCNC: 9.1 MG/DL (ref 8.7–10.5)
CHLORIDE SERPL-SCNC: 104 MMOL/L (ref 95–110)
CO2 SERPL-SCNC: 28 MMOL/L (ref 23–29)
CREAT SERPL-MCNC: 0.8 MG/DL (ref 0.5–1.4)
DIFFERENTIAL METHOD BLD: ABNORMAL
EOSINOPHIL # BLD AUTO: 0.3 K/UL (ref 0–0.5)
EOSINOPHIL NFR BLD: 6.1 % (ref 0–8)
ERYTHROCYTE [DISTWIDTH] IN BLOOD BY AUTOMATED COUNT: 14.2 % (ref 11.5–14.5)
EST. GFR  (NO RACE VARIABLE): >60 ML/MIN/1.73 M^2
GLUCOSE SERPL-MCNC: 104 MG/DL (ref 70–110)
HCT VFR BLD AUTO: 35.2 % (ref 37–48.5)
HGB BLD-MCNC: 11.2 G/DL (ref 12–16)
IMM GRANULOCYTES # BLD AUTO: 0.01 K/UL (ref 0–0.04)
IMM GRANULOCYTES NFR BLD AUTO: 0.2 % (ref 0–0.5)
LYMPHOCYTES # BLD AUTO: 1.1 K/UL (ref 1–4.8)
LYMPHOCYTES NFR BLD: 20.9 % (ref 18–48)
MCH RBC QN AUTO: 29.2 PG (ref 27–31)
MCHC RBC AUTO-ENTMCNC: 31.8 G/DL (ref 32–36)
MCV RBC AUTO: 92 FL (ref 82–98)
MONOCYTES # BLD AUTO: 0.5 K/UL (ref 0.3–1)
MONOCYTES NFR BLD: 9.4 % (ref 4–15)
NEUTROPHILS # BLD AUTO: 3.3 K/UL (ref 1.8–7.7)
NEUTROPHILS NFR BLD: 62.6 % (ref 38–73)
NRBC BLD-RTO: 0 /100 WBC
PLATELET # BLD AUTO: 157 K/UL (ref 150–450)
PMV BLD AUTO: 10.1 FL (ref 9.2–12.9)
POTASSIUM SERPL-SCNC: 3.7 MMOL/L (ref 3.5–5.1)
PROT SERPL-MCNC: 5.7 G/DL (ref 6–8.4)
RBC # BLD AUTO: 3.84 M/UL (ref 4–5.4)
SODIUM SERPL-SCNC: 140 MMOL/L (ref 136–145)
WBC # BLD AUTO: 5.21 K/UL (ref 3.9–12.7)

## 2024-02-17 PROCEDURE — 94761 N-INVAS EAR/PLS OXIMETRY MLT: CPT | Mod: HCNC

## 2024-02-17 PROCEDURE — 25000003 PHARM REV CODE 250: Mod: HCNC | Performed by: HOSPITALIST

## 2024-02-17 PROCEDURE — 85025 COMPLETE CBC W/AUTO DIFF WBC: CPT | Mod: HCNC | Performed by: HOSPITALIST

## 2024-02-17 PROCEDURE — 36415 COLL VENOUS BLD VENIPUNCTURE: CPT | Mod: HCNC | Performed by: HOSPITALIST

## 2024-02-17 PROCEDURE — 80053 COMPREHEN METABOLIC PANEL: CPT | Mod: HCNC | Performed by: HOSPITALIST

## 2024-02-17 RX ORDER — COLCHICINE 0.6 MG/1
0.6 TABLET, FILM COATED ORAL DAILY
Status: DISCONTINUED | OUTPATIENT
Start: 2024-02-17 | End: 2024-02-21

## 2024-02-17 RX ADMIN — ATORVASTATIN CALCIUM 20 MG: 20 TABLET, FILM COATED ORAL at 09:02

## 2024-02-17 RX ADMIN — METOPROLOL TARTRATE 50 MG: 50 TABLET, FILM COATED ORAL at 09:02

## 2024-02-17 RX ADMIN — COLCHICINE 0.6 MG: 0.6 TABLET, FILM COATED ORAL at 01:02

## 2024-02-17 RX ADMIN — Medication 6 MG: at 09:02

## 2024-02-17 RX ADMIN — ACETAMINOPHEN 650 MG: 325 TABLET ORAL at 06:02

## 2024-02-17 RX ADMIN — FLUOXETINE 10 MG: 10 CAPSULE ORAL at 09:02

## 2024-02-17 NOTE — NURSING
Patient c/o itching with heart monitor leads. RN informed provider and was instructed to remove heart monitor box at this time.

## 2024-02-17 NOTE — PROGRESS NOTES
"[facility] - Neurosurgery ([facility])  Hospital Medicine  Progress Note    Patient Name: Kanchan Downing  MRN: [id number]  Patient Class: OP- Outpatient Recovery   Admission Date: 2/15/2024  Length of Stay: 0 days  Attending Physician: Hugo Slater MD  Primary Care Provider: Viktoria Mckeon MD        Subjective:     Principal Problem:Traumatic subdural hematoma        HPI:  82 yo F with PMHx B/L chronic traumatic SDH, HTN, and HLD who presented to [facility] for elective MMA embolization. Pt. Has been followed by neurosurgery for B/L subacute bilateral subdural hematomas which have been enlarging (R>L w midline shift and some mass effect). Pt. Has not reported significant worsening neurologic symptoms despite the enlargement, so the patient was referred for bilateral MMA embolization in an attempt to limit the growth of the SDH without pt. Needing craniotomy. Pt. Underwent procedure today, but afterwards she developed B/L weakness, dysarthria and aphasia. CTA showed "Similar appearance of predominately low-density extra-axial collection along the right hemisphere with resorption of the previously identified left-sided collection. Improved midline shift to the left. No acute findings." Pt. Symptoms improved with time. Hospital medicine was contacted for admission for further monitoring after procedure given her acute post-procedure change.    Overview/Hospital Course:  Patient had no recurrence of any encephalopathy or neurological symptoms.    Interval History:  Patient denies any chest pain.  No shortness a breath.  Afebrile.  Pending placement. Reports some right great toe pain citing toe nail as problem    Review of Systems  Objective:     Vital Signs (Most Recent):  Temp: 97.8 °F (36.6 °C) (02/17/24 1135)  Pulse: 64 (02/17/24 1135)  Resp: 18 (02/17/24 1135)  BP: (!) 122/57 (02/17/24 1135)  SpO2: 97 % (02/17/24 1135) Vital Signs (24h Range):  Temp:  [97.5 °F (36.4 °C)-98.7 °F (37.1 °C)] 97.8 °F (36.6 °C)  Pulse:  " [57-81] 64  Resp:  [16-18] 18  SpO2:  [95 %-97 %] 97 %  BP: (103-138)/(52-65) 122/57     Weight: 65.8 kg (145 lb)  Body mass index is 22.05 kg/m².    Intake/Output Summary (Last 24 hours) at 2/17/2024 1150  Last data filed at 2/17/2024 0627  Gross per 24 hour   Intake --   Output 450 ml   Net -450 ml         Physical Exam      Clear lungs bilaterally, unlabored breathing, on room air, no cyanosis   Heart sounds indicate a regular rate and rhythm  Awake alert no acute distress   No facial droop, no slurred speech  Pupils equal bilaterally  Extraocular motions intact  5/5 proximal upper and lower extremity strength bilaterally including fist  and hip flexor strength   no obvious lower extremity edema   No sobeida ingrown toenails noted on pt's toes; to Tenderness elicited on either great toe; no drainage or sobeida erythema noted on exam of either foot        Assessment/Plan:      * Traumatic subdural hematoma  -Pt. S/p MMA embolization. Procedure complicated by weakness/dysarthria, now improving  -Continue to monitor with regular neurochecks. PT/OT/SLP ordered. Consider MRI if symptoms persistent/not improving as recommended by vascular neurology      Acute confusional state  S/p MMA confusion  Now resolved      Brain compression  Midline shift improving based on repeat CT; stable  Sp MMA      Great toe pain, right  Don't see overt clinical evidence of ingrown toe nail  Trial of gout medicine      Aphasia  resolved      Depression  fluoxetine        Hypertension associated with diabetes  -Continue home lopressor. Pt. Currently holding amlodipine and lisinopril until PCP f/u. Consider restarting if BP remains elevated        VTE Risk Mitigation (From admission, onward)           Ordered     IP VTE HIGH RISK PATIENT  Once         02/15/24 2242     Place sequential compression device  Until discontinued         02/15/24 2242                    Discharge Planning   BRIDGETTE:      Code Status: Prior   Is the patient medically  ready for discharge?: Yes    Reason for patient still in hospital (select all that apply): Pending disposition  Discharge Plan A: Rehab            Pending SNF placement      Hugo Slater MD  Department of Hospital Medicine   [facility] - Neurosurgery ([facility])

## 2024-02-17 NOTE — PT/OT/SLP EVAL
Physical Therapy Evaluation    Patient Name:  Kanchan Downing   MRN:  [id number]    Recommendations:     Discharge Recommendations: High Intensity Therapy   Discharge Equipment Recommendations:  (transport chair)   Barriers to discharge:  increased level of assistance and fall risk    Assessment:     Kanchan Downing is a 83 y.o. female admitted with a medical diagnosis of Traumatic subdural hematoma.  She presents with the following impairments/functional limitations: weakness, impaired endurance, impaired self care skills, impaired functional mobility, gait instability, impaired balance, impaired cognition, decreased lower extremity function, decreased safety awareness, impaired cardiopulmonary response to activity. Pt would benefit from high intensity/frequency therapy for: Dynamic/static standing/sitting balance through skilled balance training, strengthening with the use of skilled therapeutic exercises interventions, mobility and safety training to ensure safe discharge home through skilled patient and caregiver education, and mobility through adaptive equipment training. Pt highly motivated to return to independent PLOF and can tolerate 3+hours of therapy. Pt continues to benefit from a collaborative multidisciplinary program to improve quality of life and focus on recovery of impairments.      Rehab Prognosis: Good; patient would benefit from acute skilled PT services to address these deficits and reach maximum level of function.    Recent Surgery: * No procedures listed * 1 Day Post-Op    Plan:     During this hospitalization, patient to be seen 4 x/week to address the identified rehab impairments via gait training, therapeutic activities, therapeutic exercises, neuromuscular re-education and progress toward the following goals:    Plan of Care Expires:  03/17/24    Subjective     Chief Complaint: none verbalized  Patient/Family Comments/goals: pt reports her legs will get weak and she has to hurry up and sit  "down so she doesn't fall   Pain/Comfort:  Pain Rating 1:  (not rated)  Location 1:  (toe nails)  Pain Addressed 1: Reposition, Distraction  Pain Rating Post-Intervention 1: other (see comments) (not rated)    Patients cultural, spiritual, Anabaptist conflicts given the current situation: no    Living Environment:  Pt lives alone in a [facility] with 2 small NAVI. Pt has a WIS with a shower chair with grab bars.  Prior to admission, patients level of function was modified independent for all functional mobility and ADLs. Pt's family has cameras in the house to monitor and keep an eye on patient.  Equipment used at home: rollator, shower chair, grab bar (owns a SPC and RW).  DME owned (not currently used): rolling walker and single point cane.  Upon discharge, patient will have assistance from family, but it is limited.    Objective:     Communicated with RN prior to session.  Patient found HOB elevated with bed alarm, peripheral IV, telemetry  upon PT entry to room.    General Precautions: Standard, aphasia, fall  Orthopedic Precautions:N/A   Braces: N/A  Respiratory Status: Room air    Exams:  Cognitive Exam:  Patient is oriented to Person, Place, and Situation  Difficulty with getting time, did get "2024"  Gross Motor Coordination:  WFL  Postural Exam:  Patient presented with the following abnormalities:    -       Rounded shoulders  -       Forward head  -       Kyphosis  Sensation: denies numbness/tingling  RLE ROM: WFL  RLE Strength: hip flexion 4/5, knee extension 4/5, knee flexion 4/5, DF 4/5  LLE ROM: WFL  LLE Strength: hip flexion 4/5, knee extension 4/5, knee flexion 4/5, DF 4/5    Functional Mobility:  Bed Mobility:     Scooting: minimum assistance  Supine to Sit: minimum assistance, bed rail utilized; pt educated on importance of sitting on EOB prior to standing  Sit to Supine: minimum assistance with LEs  Transfers:     Sit to Stand:  minimum assistance with rolling walker  Increased time taken with static " stand prior to mobilizing  Gait: 2 ft left and right and 3 ft fwd/bwd; Nathan with RW management and balance, decreased gait speed and step length, forward flexed posture, VCing for upright, but pt unable to maintain  Balance:   Static Sitting: SBA  Dynamic Sitting: SBA-CGA  Static Standing: CGA with RW  Dynamic Standing: Nathan with RW      AM-PAC 6 CLICK MOBILITY  Total Score:16       Treatment & Education:  Pt educated on taking time to site EOB and in standing prior to mobilizing.   Patient educated on role of therapy, goals of session, and benefits of mobilizing.   Discussed PT plan of care during hospitalization.   Patient educated on calling for assistance.   Patient educated on how their diagnosis impacts their mobility within PT scope of practice.   All questions answered within PT scope of practice.    Patient left HOB elevated with all lines intact, call button in reach, bed alarm on, RN notified, and pt's daughter present in room.    GOALS:   Multidisciplinary Problems       Physical Therapy Goals          Problem: Physical Therapy    Goal Priority Disciplines Outcome Goal Variances Interventions   Physical Therapy Goal     PT, PT/OT Ongoing, Progressing     Description: Goals to be met by: 3/1/2024     Patient will increase functional independence with mobility by performin. Supine to sit with Supervision  2. Sit to supine with Supervision  3. Rolling to Left and Right with Supervision.  4. Sit to stand transfer with Supervision with RW  5. Bed to chair transfer with Supervision using Rolling Walker  6. Gait  x 30 feet with Supervision using Rolling Walker.   7. Ascend/descend 2  small steps with no Handrails Contact Guard Assistance using Rolling Walker.   8. Lower extremity exercise program x10 reps per handout, with supervision                             History:     Past Medical History:   Diagnosis Date    Anxiety     Arthritis     Dr Schofield    Arthritis of hand 2017    Atherosclerosis of  aorta 06/08/2016    CXR 2013    Breast cancer 2011    right breast invasive micropapillary CA, Stage !A    Cataract     Controlled type 2 diabetes mellitus with circulatory disorder, without long-term current use of insulin 10/31/2017    Controlled type 2 diabetes mellitus with circulatory disorder, without long-term current use of insulin 10/31/2017    Diabetes mellitus type 2 without retinopathy 06/12/2014    6% metformin 500 bid, FU+ 2016    DVT (deep venous thrombosis) 2001    R , Dr Bonilla    Grief 04/06/2022     Marques passed 1/2022    Hyperlipidemia     LDL 82    Hyperlipidemia associated with type 2 diabetes mellitus 09/11/2012    Hypertension     Hypertension associated with diabetes 09/11/2012    Memory loss 12/29/2022    CT chronic changes 2022    Microalbuminuria due to type 2 diabetes mellitus 12/08/2015    taking lisinopril, losartan caused olivia effects    PVD (peripheral vascular disease) with claudication 05/02/2019    Compression hose    Risk for coronary artery disease greater than 20% in next 10 years per [place] score 05/01/2018    Strabismus     Type 2 diabetes mellitus with diabetic polyneuropathy 06/12/2014    Type 2 diabetes mellitus with diabetic polyneuropathy, without long-term current use of insulin 06/12/2014       Past Surgical History:   Procedure Laterality Date    BREAST BIOPSY Right 2011    BREAST LUMPECTOMY Right 2011    AL REMOVAL OF NAIL BED  6/10/2015    TONSILLECTOMY         Time Tracking:     PT Received On: 02/16/24  PT Start Time: 1552     PT Stop Time: 1650  PT Total Time (min): 58 min     Billable Minutes: Evaluation 13, Gait Training 15, and Therapeutic Activity 30      02/16/2024

## 2024-02-17 NOTE — CARE UPDATE
Pt seen    No chest pain or HA or SOB reported. Doing well. No acute neurological symptoms.    Dtr seeking SNF.         Clear lungs BL, unlabored breathing, on RA, no cyanosis  Hrt sounds RRR  No facial droop; no slurred speech  No focal motor deficits  At least 3/5 prox UE strength BL  No LE edema  EOMI; no pupil asymmetry noted

## 2024-02-17 NOTE — HOSPITAL COURSE
Patient had no recurrence of any encephalopathy or neurological symptoms. Pt was given news that her son  while she was in the hospital. Had some agitation/delirium that responded to seroquel.

## 2024-02-17 NOTE — NURSING
Nurses Note -- 4 Eyes      2/16/2024   1900      Skin assessed during: Q Shift Change      [x] No Altered Skin Integrity Present    [x]Prevention Measures Documented      [] Yes- Altered Skin Integrity Present or Discovered   [] LDA Added if Not in Epic (Describe Wound)   [] New Altered Skin Integrity was Present on Admit and Documented in LDA   [] Wound Image Taken    Wound Care Consulted? No    Attending Nurse:  HUMAIRA Wilson    Second RN/Staff Member:  Jennie

## 2024-02-17 NOTE — SUBJECTIVE & OBJECTIVE
Interval History:  Patient denies any chest pain.  No shortness a breath.  Afebrile.  Pending placement. Reports some right great toe pain citing toe nail as problem    Review of Systems  Objective:     Vital Signs (Most Recent):  Temp: 97.8 °F (36.6 °C) (02/17/24 1135)  Pulse: 64 (02/17/24 1135)  Resp: 18 (02/17/24 1135)  BP: (!) 122/57 (02/17/24 1135)  SpO2: 97 % (02/17/24 1135) Vital Signs (24h Range):  Temp:  [97.5 °F (36.4 °C)-98.7 °F (37.1 °C)] 97.8 °F (36.6 °C)  Pulse:  [57-81] 64  Resp:  [16-18] 18  SpO2:  [95 %-97 %] 97 %  BP: (103-138)/(52-65) 122/57     Weight: 65.8 kg (145 lb)  Body mass index is 22.05 kg/m².    Intake/Output Summary (Last 24 hours) at 2/17/2024 1150  Last data filed at 2/17/2024 0627  Gross per 24 hour   Intake --   Output 450 ml   Net -450 ml         Physical Exam      Clear lungs bilaterally, unlabored breathing, on room air, no cyanosis   Heart sounds indicate a regular rate and rhythm  Awake alert no acute distress   No facial droop, no slurred speech  Pupils equal bilaterally  Extraocular motions intact  5/5 proximal upper and lower extremity strength bilaterally including fist  and hip flexor strength   no obvious lower extremity edema   No sobeida ingrown toenails noted on pt's toes; to Tenderness elicited on either great toe; no drainage or sobeida erythema noted on exam of either foot

## 2024-02-17 NOTE — PLAN OF CARE
CM noted high intensity therapy level.  CM  had communicated with daughter and patient that patient is interested in therapy.  Referral sent for Rehab.  PMR placed.    Discharge Plan A and Plan B have been determined by review of patient's clinical status, future medical and therapeutic needs, and coverage/benefits for post-acute care in coordination with multidisciplinary team members.     Met with daughter/patient to review discharge recommendation of Rehab and is agreeable to plan    Patient/family provided list of facilities in-network with patient's payor plan. Providers that are owned, operated, or affiliated with [facility] are included on the list.     Notified that referral sent to below listed facilities from in-network list based on proximity to home/family support:   1.[facility]  2.[facility]  3.[facility]    Patient/family instructed to identify preference.    Preferred Facility: (if more than 1, listed in order of descending preference)  1.[facility]    If an additional preferred facility not listed above is identified, additional referral to be sent. If above facilities unable to accept, will send additional referrals to in-network providers.     02/17/24 0646   Post-Acute Status   Post-Acute Authorization Placement   Post-Acute Placement Status Referrals Sent

## 2024-02-17 NOTE — PLAN OF CARE
Problem: Physical Therapy  Goal: Physical Therapy Goal  Description: Goals to be met by: 3/1/2024     Patient will increase functional independence with mobility by performin. Supine to sit with Supervision  2. Sit to supine with Supervision  3. Rolling to Left and Right with Supervision.  4. Sit to stand transfer with Supervision with RW  5. Bed to chair transfer with Supervision using Rolling Walker  6. Gait  x 30 feet with Supervision using Rolling Walker.   7. Ascend/descend 2  small steps with no Handrails Contact Guard Assistance using Rolling Walker.   8. Lower extremity exercise program x10 reps per handout, with supervision      Outcome: Ongoing, Progressing

## 2024-02-17 NOTE — PLAN OF CARE
Problem: Diabetes Comorbidity  Goal: Blood Glucose Level Within Targeted Range  2/17/2024 0253 by Katie Spivey RN  Outcome: Ongoing, Progressing  2/17/2024 0252 by Katie Spivey RN  Outcome: Ongoing, Progressing     Problem: Fall Injury Risk  Goal: Absence of Fall and Fall-Related Injury  2/17/2024 0253 by Katie Spivey RN  Outcome: Ongoing, Progressing  2/17/2024 0252 by Katie Spivey RN  Outcome: Ongoing, Progressing     Problem: Pain Acute  Goal: Acceptable Pain Control and Functional Ability  Outcome: Ongoing, Progressing     Patient resting in bed, no family at bedside. Patient denies pain. CLWR, BR upx3, bed low, locked in position, bed alarm engaged. Safety maintained, will continue to monitor.

## 2024-02-18 LAB
ALBUMIN SERPL BCP-MCNC: 2.9 G/DL (ref 3.5–5.2)
ALP SERPL-CCNC: 53 U/L (ref 55–135)
ALT SERPL W/O P-5'-P-CCNC: 6 U/L (ref 10–44)
ANION GAP SERPL CALC-SCNC: 9 MMOL/L (ref 8–16)
AST SERPL-CCNC: 9 U/L (ref 10–40)
BASOPHILS # BLD AUTO: 0.04 K/UL (ref 0–0.2)
BASOPHILS NFR BLD: 0.7 % (ref 0–1.9)
BILIRUB SERPL-MCNC: 0.5 MG/DL (ref 0.1–1)
BUN SERPL-MCNC: 11 MG/DL (ref 8–23)
CALCIUM SERPL-MCNC: 9.4 MG/DL (ref 8.7–10.5)
CHLORIDE SERPL-SCNC: 106 MMOL/L (ref 95–110)
CO2 SERPL-SCNC: 23 MMOL/L (ref 23–29)
CREAT SERPL-MCNC: 0.7 MG/DL (ref 0.5–1.4)
DIFFERENTIAL METHOD BLD: ABNORMAL
EOSINOPHIL # BLD AUTO: 0.3 K/UL (ref 0–0.5)
EOSINOPHIL NFR BLD: 5.6 % (ref 0–8)
ERYTHROCYTE [DISTWIDTH] IN BLOOD BY AUTOMATED COUNT: 13.7 % (ref 11.5–14.5)
EST. GFR  (NO RACE VARIABLE): >60 ML/MIN/1.73 M^2
GLUCOSE SERPL-MCNC: 119 MG/DL (ref 70–110)
HCT VFR BLD AUTO: 36.2 % (ref 37–48.5)
HGB BLD-MCNC: 11.7 G/DL (ref 12–16)
IMM GRANULOCYTES # BLD AUTO: 0.01 K/UL (ref 0–0.04)
IMM GRANULOCYTES NFR BLD AUTO: 0.2 % (ref 0–0.5)
LYMPHOCYTES # BLD AUTO: 1 K/UL (ref 1–4.8)
LYMPHOCYTES NFR BLD: 17.6 % (ref 18–48)
MCH RBC QN AUTO: 29.3 PG (ref 27–31)
MCHC RBC AUTO-ENTMCNC: 32.3 G/DL (ref 32–36)
MCV RBC AUTO: 91 FL (ref 82–98)
MONOCYTES # BLD AUTO: 0.5 K/UL (ref 0.3–1)
MONOCYTES NFR BLD: 8.1 % (ref 4–15)
NEUTROPHILS # BLD AUTO: 3.9 K/UL (ref 1.8–7.7)
NEUTROPHILS NFR BLD: 67.8 % (ref 38–73)
NRBC BLD-RTO: 0 /100 WBC
PLATELET # BLD AUTO: 156 K/UL (ref 150–450)
PMV BLD AUTO: 10.3 FL (ref 9.2–12.9)
POTASSIUM SERPL-SCNC: 3.8 MMOL/L (ref 3.5–5.1)
PROT SERPL-MCNC: 6.1 G/DL (ref 6–8.4)
RBC # BLD AUTO: 4 M/UL (ref 4–5.4)
SODIUM SERPL-SCNC: 138 MMOL/L (ref 136–145)
WBC # BLD AUTO: 5.69 K/UL (ref 3.9–12.7)

## 2024-02-18 PROCEDURE — 63600175 PHARM REV CODE 636 W HCPCS: Mod: HCNC | Performed by: HOSPITALIST

## 2024-02-18 PROCEDURE — 25000003 PHARM REV CODE 250: Mod: HCNC | Performed by: HOSPITALIST

## 2024-02-18 PROCEDURE — 80053 COMPREHEN METABOLIC PANEL: CPT | Mod: HCNC | Performed by: HOSPITALIST

## 2024-02-18 PROCEDURE — 85025 COMPLETE CBC W/AUTO DIFF WBC: CPT | Mod: HCNC | Performed by: HOSPITALIST

## 2024-02-18 PROCEDURE — 36415 COLL VENOUS BLD VENIPUNCTURE: CPT | Mod: HCNC | Performed by: HOSPITALIST

## 2024-02-18 RX ADMIN — METOPROLOL TARTRATE 50 MG: 50 TABLET, FILM COATED ORAL at 10:02

## 2024-02-18 RX ADMIN — FLUOXETINE 10 MG: 10 CAPSULE ORAL at 10:02

## 2024-02-18 RX ADMIN — ACETAMINOPHEN 650 MG: 325 TABLET ORAL at 08:02

## 2024-02-18 RX ADMIN — ATORVASTATIN CALCIUM 20 MG: 20 TABLET, FILM COATED ORAL at 10:02

## 2024-02-18 RX ADMIN — Medication 6 MG: at 08:02

## 2024-02-18 RX ADMIN — HYDRALAZINE HYDROCHLORIDE 10 MG: 20 INJECTION, SOLUTION INTRAMUSCULAR; INTRAVENOUS at 11:02

## 2024-02-18 RX ADMIN — COLCHICINE 0.6 MG: 0.6 TABLET, FILM COATED ORAL at 10:02

## 2024-02-18 RX ADMIN — HYDRALAZINE HYDROCHLORIDE 10 MG: 20 INJECTION, SOLUTION INTRAMUSCULAR; INTRAVENOUS at 04:02

## 2024-02-18 RX ADMIN — METOPROLOL TARTRATE 50 MG: 50 TABLET, FILM COATED ORAL at 08:02

## 2024-02-18 RX ADMIN — ACETAMINOPHEN 650 MG: 325 TABLET ORAL at 10:02

## 2024-02-18 NOTE — SUBJECTIVE & OBJECTIVE
Interval History: no CP; no SOB. Pt reports no significant improvement in toe pain.     Review of Systems  Objective:     Vital Signs (Most Recent):  Temp: 98.1 °F (36.7 °C) (02/18/24 1132)  Pulse: 65 (02/18/24 1132)  Resp: 18 (02/18/24 1132)  BP: 124/62 (manual recheck) (02/18/24 1208)  SpO2: 97 % (02/18/24 1132) Vital Signs (24h Range):  Temp:  [97.6 °F (36.4 °C)-98.5 °F (36.9 °C)] 98.1 °F (36.7 °C)  Pulse:  [57-71] 65  Resp:  [16-20] 18  SpO2:  [93 %-97 %] 97 %  BP: ()/(49-81) 124/62     Weight: 65.8 kg (145 lb)  Body mass index is 22.05 kg/m².    Intake/Output Summary (Last 24 hours) at 2/18/2024 1436  Last data filed at 2/18/2024 0321  Gross per 24 hour   Intake --   Output 1750 ml   Net -1750 ml         Physical Exam      Clear lungs BL, unlabored breathing, on RA, no cyanosis  Hrt sounds RRR  AA, NAD  No LE edema  5/5 fist  BL  5/5 hip flexion strengt  No facial droop; no slurred speech  Pupils equal BL  No gouty findings on either foot; no evidence of ingrown toenail on either foot; mild tenderness elicited on left great toenail bed on downward pressure; no TTP elicited on right great toenail

## 2024-02-18 NOTE — PROGRESS NOTES
"[facility] ([facility])  Hospital Medicine  Progress Note    Patient Name: Kanchan Downing  MRN: [id number]  Patient Class: OP- Outpatient Recovery   Admission Date: 2/15/2024  Length of Stay: 0 days  Attending Physician: Hugo Slater MD  Primary Care Provider: Viktoria Mckeon MD        Subjective:     Principal Problem:Traumatic subdural hematoma        HPI:  84 yo F with PMHx B/L chronic traumatic SDH, HTN, and HLD who presented to [facility] for elective MMA embolization. Pt. Has been followed by neurosurgery for B/L subacute bilateral subdural hematomas which have been enlarging (R>L w midline shift and some mass effect). Pt. Has not reported significant worsening neurologic symptoms despite the enlargement, so the patient was referred for bilateral MMA embolization in an attempt to limit the growth of the SDH without pt. Needing craniotomy. Pt. Underwent procedure today, but afterwards she developed B/L weakness, dysarthria and aphasia. CTA showed "Similar appearance of predominately low-density extra-axial collection along the right hemisphere with resorption of the previously identified left-sided collection. Improved midline shift to the left. No acute findings." Pt. Symptoms improved with time. Hospital medicine was contacted for admission for further monitoring after procedure given her acute post-procedure change.    Overview/Hospital Course:  Patient had no recurrence of any encephalopathy or neurological symptoms.    Interval History: no CP; no SOB. Pt reports no significant improvement in toe pain.     Review of Systems  Objective:     Vital Signs (Most Recent):  Temp: 98.1 °F (36.7 °C) (02/18/24 1132)  Pulse: 65 (02/18/24 1132)  Resp: 18 (02/18/24 1132)  BP: 124/62 (manual recheck) (02/18/24 1208)  SpO2: 97 % (02/18/24 1132) Vital Signs (24h Range):  Temp:  [97.6 °F (36.4 °C)-98.5 °F (36.9 °C)] 98.1 °F (36.7 °C)  Pulse:  [57-71] 65  Resp:  [16-20] 18  SpO2:  [93 %-97 %] 97 %  BP: " ()/(49-81) 124/62     Weight: 65.8 kg (145 lb)  Body mass index is 22.05 kg/m².    Intake/Output Summary (Last 24 hours) at 2/18/2024 1436  Last data filed at 2/18/2024 0321  Gross per 24 hour   Intake --   Output 1750 ml   Net -1750 ml         Physical Exam      Clear lungs BL, unlabored breathing, on RA, no cyanosis  Hrt sounds RRR  AA, NAD  No LE edema  5/5 fist  BL  5/5 hip flexion strengt  No facial droop; no slurred speech  Pupils equal BL  No gouty findings on either foot; no evidence of ingrown toenail on either foot; mild tenderness elicited on left great toenail bed on downward pressure; no TTP elicited on right great toenail      Assessment/Plan:      * Traumatic subdural hematoma  -Pt. S/p MMA embolization. Procedure complicated by weakness/dysarthria, now improving  -Continue to monitor with regular neurochecks. PT/OT/SLP ordered. Consider MRI if symptoms persistent/not improving as recommended by vascular neurology      Acute confusional state  S/p MMA confusion  Now resolved      Brain compression  Midline shift improving based on repeat CT; stable  Sp MMA      Great toe pain, right  Don't see overt clinical evidence of ingrown toe nail  Trial of gout medicine      Aphasia  resolved      Depression  fluoxetine        Hypertension associated with diabetes  -Continue home lopressor. Pt. Currently holding amlodipine and lisinopril until PCP f/u. Consider restarting if BP remains elevated      Possibly podiatry consult in AM    VTE Risk Mitigation (From admission, onward)           Ordered     IP VTE HIGH RISK PATIENT  Once         02/15/24 2242     Place sequential compression device  Until discontinued         02/15/24 2242                    Discharge Planning   BRIDGETTE: 2/20/2024     Code Status: Prior   Is the patient medically ready for discharge?: Yes    Reason for patient still in hospital (select all that apply): Pending disposition  Discharge Plan A: Rehab                Hugo Slater,  MD  Department of Hospital Medicine   [facility] ([facility])

## 2024-02-19 PROCEDURE — 25000003 PHARM REV CODE 250: Mod: HCNC | Performed by: HOSPITALIST

## 2024-02-19 PROCEDURE — 97535 SELF CARE MNGMENT TRAINING: CPT | Mod: HCNC

## 2024-02-19 RX ORDER — LISINOPRIL 5 MG/1
10 TABLET ORAL DAILY
Status: DISCONTINUED | OUTPATIENT
Start: 2024-02-19 | End: 2024-02-21

## 2024-02-19 RX ADMIN — LISINOPRIL 10 MG: 5 TABLET ORAL at 11:02

## 2024-02-19 RX ADMIN — METOPROLOL TARTRATE 50 MG: 50 TABLET, FILM COATED ORAL at 09:02

## 2024-02-19 RX ADMIN — ATORVASTATIN CALCIUM 20 MG: 20 TABLET, FILM COATED ORAL at 09:02

## 2024-02-19 RX ADMIN — ACETAMINOPHEN 650 MG: 325 TABLET ORAL at 06:02

## 2024-02-19 RX ADMIN — COLCHICINE 0.6 MG: 0.6 TABLET, FILM COATED ORAL at 09:02

## 2024-02-19 RX ADMIN — FLUOXETINE 10 MG: 10 CAPSULE ORAL at 09:02

## 2024-02-19 NOTE — CONSULTS
Inpatient consult to Physical Medicine Rehab  Consult performed by: Zhanna Campbell NP  Consult ordered by: Hugo Slater MD  Reason for consult: therapy      Consult received. PM&R following.     GERMAN Mcgowan, FNP-C  Physical Medicine & Rehabilitation   02/19/2024

## 2024-02-19 NOTE — NURSING
"Pt upset and tearful throughout the night, frequently forgetting that she isn't at home, expressing anger that "her daughter is selling her house to [facility]."  Then upset about news of her son passing and no being able to visit with him due to poor relationship with son's spouse.  Pt oriented to time/place and situation, pastoral care contacted to offer support and a bible per pt's request.    "

## 2024-02-19 NOTE — PLAN OF CARE
[facility] ([facility])  Discharge Reassessment    Primary Care Provider: Viktoria Mckeon MD    Expected Discharge Date: 2/19/2024    Patient is marked as medically ready.  Patient has a high intensity level of therapy.  Rehab referral sent.  Patient is currently pending pmr.    Discharge Plan A and Plan B have been determined by review of patient's clinical status, future medical and therapeutic needs, and coverage/benefits for post-acute care in coordination with multidisciplinary team members.       Reassessment (most recent)       Discharge Reassessment - 02/19/24 9927          Discharge Reassessment    Assessment Type Discharge Planning Reassessment     Did the patient's condition or plan change since previous assessment? No     Discharge Plan discussed with: Patient;Adult children     Communicated BRIDGETTE with patient/caregiver Yes     Discharge Plan A Rehab     Discharge Plan B Skilled Nursing Facility     DME Needed Upon Discharge  none     Transition of Care Barriers None     Why the patient remains in the hospital Placement issues        Post-Acute Status    Post-Acute Authorization Placement     Post-Acute Placement Status Pending post-acute provider review/more information requested     Discharge Delays None known at this time

## 2024-02-19 NOTE — PT/OT/SLP PROGRESS
"Occupational Therapy   Treatment    Name: Kanchan Downing  MRN: [id number]  Admitting Diagnosis:  Traumatic subdural hematoma  4 Days Post-Op    Recommendations:     Discharge Recommendations: High Intensity Therapy  Discharge Equipment Recommendations:  bath bench  Barriers to discharge:  None    Assessment:     Kanchan Downing is a 83 y.o. female with a medical diagnosis of Traumatic subdural hematoma.  She presents with performance deficits affecting function are weakness, impaired self care skills, impaired functional mobility, impaired balance, impaired endurance, impaired cognition.     Rehab Prognosis:  Good; patient would benefit from acute skilled OT services to address these deficits and reach maximum level of function.       Plan:     Patient to be seen 4 x/week to address the above listed problems via self-care/home management, therapeutic activities, therapeutic exercises, neuromuscular re-education, cognitive retraining  Plan of Care Expires: 03/16/24  Plan of Care Reviewed with: patient    Subjective     Patient: "They have snakes all over here."  "I don't feel stable when I stand." At end of the session:  "Is that a snake?"   Pain/Comfort:  Pain Rating 1: 0/10  Pain Rating Post-Intervention 1: 0/10    Objective:     Communicated with: Nurse prior to session.  Patient found seated in chair with bed alarm, peripheral IV, telemetry, PureWick upon OT entry to room.    General Precautions: Standard, aphasia, fall    Orthopedic Precautions:N/A  Braces: N/A  Respiratory Status: Room air     Occupational Performance:     Bed Mobility:    NT 2* patient seated in chair    Functional Mobility/Transfers:  Patient completed Sit <> Stand Transfer with minimum assistance  with  no assistive device   Patient completed Bed <> Chair Transfer using Stand Pivot technique with minimum assistance with no assistive device    Activities of Daily Living:  Grooming: minimum assistance    Upper Body Dressing: minimum assistance  "   Lower Body Dressing: minimum assistance      [facility] 6 Click ADL: 18    Treatment & Education:  Patient alert and oriented x 3; able to follow 4/4 one step commands.  Patient attentive and interactive throughout the session.  Patient able to identify 5/5 body parts.  Able to name 5/5 objects.  Able to sequence 7/7 days of the week and 12/12 months of the year.    Patient left supine with all lines intact, call button in reach, and bed alarm on    GOALS:   Multidisciplinary Problems       Occupational Therapy Goals          Problem: Occupational Therapy    Goal Priority Disciplines Outcome Interventions   Occupational Therapy Goal     OT, PT/OT Ongoing, Progressing    Description: Goals set 2/16 with an expiration date, 3/16:  Patient will increase functional independence with ADLs by performing:    Patient will demonstrate rolling to the right with modified independence.  Not met   Patient will demonstrate rolling to the left with modified independence.   Not met  Patient will demonstrate supine -sit with modified independence.   Not met  Patient will demonstrate stand pivot transfers with CGA.   Not met  Patient will demonstrate grooming while standing with CGA.   Not met  Patient will demonstrate upper body dressing with SBA while seated EOB.   Not met  Patient will demonstrate lower body dressing with CGA while seated EOB.   Not met  Patient will demonstrate toileting with CGA.   Not met  Patient's family / caregiver will demonstrate independence and safety with assisting patient with self-care skills and functional mobility.     Not met                               Time Tracking:     OT Date of Treatment: 02/19/24  OT Start Time: 0745  OT Stop Time: 0810  OT Total Time (min): 25 min    Billable Minutes:Self Care/Home Management 25    OT/WANDA: OT          2/19/2024

## 2024-02-19 NOTE — SUBJECTIVE & OBJECTIVE
Interval History: afebrile. No Chest pain. No shortness of breath. RN and dtr affirm pt had a death in the family (her son) in the past few days. Marked confusion overnight.       Review of Systems  Objective:     Vital Signs (Most Recent):  Temp: 98 °F (36.7 °C) (02/19/24 1530)  Pulse: 63 (02/19/24 1530)  Resp: 18 (02/19/24 1530)  BP: (!) 167/76 (02/19/24 1530)  SpO2: 99 % (02/19/24 1530) Vital Signs (24h Range):  Temp:  [97.2 °F (36.2 °C)-98.2 °F (36.8 °C)] 98 °F (36.7 °C)  Pulse:  [59-71] 63  Resp:  [16-18] 18  SpO2:  [96 %-99 %] 99 %  BP: (128-175)/(63-81) 167/76     Weight: 65.8 kg (145 lb)  Body mass index is 22.05 kg/m².    Intake/Output Summary (Last 24 hours) at 2/19/2024 1642  Last data filed at 2/19/2024 0629  Gross per 24 hour   Intake --   Output 2700 ml   Net -2700 ml         Physical Exam      Clear lungs BL, unlabored breathing, on RA  Hrt sounds RRR  No LE edema  5/5 fist  and hip flexion  AA, NAD  No pupillary asymmetry

## 2024-02-19 NOTE — PROGRESS NOTES
"[facility] - Neurosurgery ([facility])  Hospital Medicine  Progress Note    Patient Name: Kanchan Downing  MRN: [id number]  Patient Class: OP- Outpatient Recovery   Admission Date: 2/15/2024  Length of Stay: 0 days  Attending Physician: Hugo Slater MD  Primary Care Provider: Viktoria Mckeon MD        Subjective:     Principal Problem:Traumatic subdural hematoma        HPI:  84 yo F with PMHx B/L chronic traumatic SDH, HTN, and HLD who presented to [facility] [place] for elective MMA embolization. Pt. Has been followed by neurosurgery for B/L subacute bilateral subdural hematomas which have been enlarging (R>L w midline shift and some mass effect). Pt. Has not reported significant worsening neurologic symptoms despite the enlargement, so the patient was referred for bilateral MMA embolization in an attempt to limit the growth of the SDH without pt. Needing craniotomy. Pt. Underwent procedure today, but afterwards she developed B/L weakness, dysarthria and aphasia. CTA showed "Similar appearance of predominately low-density extra-axial collection along the right hemisphere with resorption of the previously identified left-sided collection. Improved midline shift to the left. No acute findings." Pt. Symptoms improved with time. Hospital medicine was contacted for admission for further monitoring after procedure given her acute post-procedure change.    Overview/Hospital Course:  Patient had no recurrence of any encephalopathy or neurological symptoms. Pt was given news that her son  while she was in the hospital.       Interval History: afebrile. No Chest pain. No shortness of breath. RN and dtr affirm pt had a death in the family (her son) in the past few days. Marked confusion overnight.       Review of Systems  Objective:     Vital Signs (Most Recent):  Temp: 98 °F (36.7 °C) (24 1530)  Pulse: 63 (24 1530)  Resp: 18 (24 1530)  BP: (!) 167/76 (24 1530)  SpO2: 99 % (24 1530) Vital Signs (24h " Range):  Temp:  [97.2 °F (36.2 °C)-98.2 °F (36.8 °C)] 98 °F (36.7 °C)  Pulse:  [59-71] 63  Resp:  [16-18] 18  SpO2:  [96 %-99 %] 99 %  BP: (128-175)/(63-81) 167/76     Weight: 65.8 kg (145 lb)  Body mass index is 22.05 kg/m².    Intake/Output Summary (Last 24 hours) at 2/19/2024 1642  Last data filed at 2/19/2024 0629  Gross per 24 hour   Intake --   Output 2700 ml   Net -2700 ml         Physical Exam      Clear lungs BL, unlabored breathing, on RA  Hrt sounds RRR  No LE edema  5/5 fist  and hip flexion  AA, NAD  No pupillary asymmetry  No facial droop; no slurred speech    Assessment/Plan:      * Traumatic subdural hematoma  -Pt. S/p MMA embolization. Procedure complicated by weakness/dysarthria, now improving  -Continue to monitor with regular neurochecks. PT/OT/SLP ordered. Consider MRI if symptoms persistent/not improving as recommended by vascular neurology      Acute confusional state  S/p MMA confusion  Now resolved  Now with mild intermittent delirium - possibly worsened w/ social stressor of death of son      Brain compression  Midline shift improving based on repeat CT; stable  Sp MMA      Great toe pain, right  Don't see overt clinical evidence of ingrown toe nail  Stopping colchicine  No pain today      Aphasia  resolved      Depression  fluoxetine        Hypertension associated with diabetes  -Continue home lopressor. Pt. Currently holding amlodipine and lisinopril until PCP f/u. Consider restarting if BP remains elevated        VTE Risk Mitigation (From admission, onward)           Ordered     IP VTE HIGH RISK PATIENT  Once         02/15/24 2242     Place sequential compression device  Until discontinued         02/15/24 2242                    Discharge Planning   BRIDGETTE: 2/20/2024     Code Status: Prior   Is the patient medically ready for discharge?: Yes    Reason for patient still in hospital (select all that apply): Pending disposition  Discharge Plan A: Rehab   Discharge Delays: None known at  this time      No stroke like symptoms.         Hugo Slater MD  Department of Hospital Medicine   [facility] - Neurosurgery ([facility])

## 2024-02-19 NOTE — PLAN OF CARE
Problem: Adult Inpatient Plan of Care  Goal: Plan of Care Review  Outcome: Ongoing, Progressing  Goal: Patient-Specific Goal (Individualized)  Outcome: Ongoing, Progressing  Goal: Absence of Hospital-Acquired Illness or Injury  Outcome: Ongoing, Progressing  Intervention: Identify and Manage Fall Risk  Flowsheets (Taken 2/19/2024 0405)  Safety Promotion/Fall Prevention:   assistive device/personal item within reach   bed alarm set   /camera at bedside   side rails raised x 2   supervised activity   instructed to call staff for mobility   Fall Risk signage in place   lighting adjusted   medications reviewed   muscle strengthening facilitated  Intervention: Prevent and Manage VTE (Venous Thromboembolism) Risk  Flowsheets (Taken 2/19/2024 0405)  VTE Prevention/Management: remove, assess skin, and reapply sequential compression device  Goal: Optimal Comfort and Wellbeing  Outcome: Ongoing, Progressing  Intervention: Provide Person-Centered Care  Flowsheets (Taken 2/19/2024 0405)  Trust Relationship/Rapport:   care explained   thoughts/feelings acknowledged   questions answered   empathic listening provided

## 2024-02-19 NOTE — PLAN OF CARE
Problem: Adult Inpatient Plan of Care  Goal: Plan of Care Review  Outcome: Ongoing, Progressing  Goal: Patient-Specific Goal (Individualized)  Outcome: Ongoing, Progressing  Goal: Absence of Hospital-Acquired Illness or Injury  Outcome: Ongoing, Progressing  Goal: Optimal Comfort and Wellbeing  Outcome: Ongoing, Progressing  Goal: Readiness for Transition of Care  Outcome: Ongoing, Progressing     Problem: Diabetes Comorbidity  Goal: Blood Glucose Level Within Targeted Range  Outcome: Ongoing, Progressing     Problem: Fall Injury Risk  Goal: Absence of Fall and Fall-Related Injury  Outcome: Ongoing, Progressing     Problem: Fatigue  Goal: Improved Activity Tolerance  Outcome: Ongoing, Progressing     Problem: Pain Acute  Goal: Acceptable Pain Control and Functional Ability  Outcome: Ongoing, Progressing

## 2024-02-19 NOTE — ASSESSMENT & PLAN NOTE
S/p MMA confusion  Now resolved  Now with mild intermittent delirium - possibly worsened w/ social stressor of death of son

## 2024-02-20 LAB
OHS QRS DURATION: 74 MS
OHS QTC CALCULATION: 428 MS

## 2024-02-20 PROCEDURE — 25000003 PHARM REV CODE 250: Mod: HCNC | Performed by: HOSPITALIST

## 2024-02-20 PROCEDURE — 93005 ELECTROCARDIOGRAM TRACING: CPT | Mod: HCNC

## 2024-02-20 PROCEDURE — 97129 THER IVNTJ 1ST 15 MIN: CPT | Mod: HCNC

## 2024-02-20 PROCEDURE — 97530 THERAPEUTIC ACTIVITIES: CPT | Mod: HCNC

## 2024-02-20 PROCEDURE — G0378 HOSPITAL OBSERVATION PER HR: HCPCS | Mod: HCNC

## 2024-02-20 PROCEDURE — 93010 ELECTROCARDIOGRAM REPORT: CPT | Mod: HCNC,,, | Performed by: INTERNAL MEDICINE

## 2024-02-20 PROCEDURE — 92526 ORAL FUNCTION THERAPY: CPT | Mod: HCNC

## 2024-02-20 PROCEDURE — 99222 1ST HOSP IP/OBS MODERATE 55: CPT | Mod: HCNC,,, | Performed by: NURSE PRACTITIONER

## 2024-02-20 PROCEDURE — 92507 TX SP LANG VOICE COMM INDIV: CPT | Mod: HCNC

## 2024-02-20 PROCEDURE — 97116 GAIT TRAINING THERAPY: CPT | Mod: HCNC

## 2024-02-20 PROCEDURE — 97530 THERAPEUTIC ACTIVITIES: CPT | Mod: HCNC,59

## 2024-02-20 RX ORDER — QUETIAPINE FUMARATE 25 MG/1
25 TABLET, FILM COATED ORAL 2 TIMES DAILY
Status: DISCONTINUED | OUTPATIENT
Start: 2024-02-20 | End: 2024-02-23 | Stop reason: HOSPADM

## 2024-02-20 RX ADMIN — QUETIAPINE FUMARATE 25 MG: 25 TABLET ORAL at 10:02

## 2024-02-20 RX ADMIN — COLCHICINE 0.6 MG: 0.6 TABLET, FILM COATED ORAL at 09:02

## 2024-02-20 RX ADMIN — ACETAMINOPHEN 650 MG: 325 TABLET ORAL at 10:02

## 2024-02-20 RX ADMIN — ATORVASTATIN CALCIUM 20 MG: 20 TABLET, FILM COATED ORAL at 09:02

## 2024-02-20 RX ADMIN — FLUOXETINE 10 MG: 10 CAPSULE ORAL at 09:02

## 2024-02-20 RX ADMIN — METOPROLOL TARTRATE 50 MG: 50 TABLET, FILM COATED ORAL at 09:02

## 2024-02-20 RX ADMIN — METOPROLOL TARTRATE 50 MG: 50 TABLET, FILM COATED ORAL at 10:02

## 2024-02-20 RX ADMIN — LISINOPRIL 10 MG: 5 TABLET ORAL at 09:02

## 2024-02-20 NOTE — ASSESSMENT & PLAN NOTE
Improved; no evidence of infx  Don't see overt clinical evidence of ingrown toe nail  Outpt podiatry follow up

## 2024-02-20 NOTE — ASSESSMENT & PLAN NOTE
S/p MMA confusion  Now resolved  Now with mild intermittent delirium - possibly worsened w/ social stressor of death of son  Seroquel once EKG shows stable qtc

## 2024-02-20 NOTE — PROGRESS NOTES
"[facility] - Neurosurgery ([facility])  Hospital Medicine  Progress Note    Patient Name: Kanchan Downing  MRN: [id number]  Patient Class: OP- Outpatient Recovery   Admission Date: 2/15/2024  Length of Stay: 0 days  Attending Physician: Hugo Slater MD  Primary Care Provider: Viktoria Mckeon MD        Subjective:     Principal Problem:Traumatic subdural hematoma        HPI:  82 yo F with PMHx B/L chronic traumatic SDH, HTN, and HLD who [place] [facility] [place]. Pt. Has been followed by neurosurgery for B/L subacute bilateral subdural hematomas which have been enlarging (R>L w midline shift and some mass effect). Pt. Has not reported significant worsening neurologic symptoms despite the enlargement, so the patient was referred for bilateral MMA embolization in an attempt to limit the growth of the SDH without pt. Needing craniotomy. Pt. Underwent procedure today, but afterwards she developed B/L weakness, dysarthria and aphasia. CTA showed "Similar appearance of predominately low-density extra-axial collection along the right hemisphere with resorption of the previously identified left-sided collection. Improved midline shift to the left. No acute findings." Pt. Symptoms improved with time. Hospital medicine was contacted for admission for further monitoring after procedure given her acute post-procedure change.    Overview/Hospital Course:  Patient had no recurrence of any encephalopathy or neurological symptoms. Pt was given news that her son  while she was in the hospital.     Interval History:  no CP; no SOB. Afebrile. Sitter at bedside for getting out of bed.    Review of Systems  Objective:     Vital Signs (Most Recent):  Temp: 98.1 °F (36.7 °C) (24)  Pulse: 60 (24)  Resp: 15 (24)  BP: (!) 131/59 (24)  SpO2: 96 % (24) Vital Signs (24h Range):  Temp:  [97.5 °F (36.4 °C)-98.7 °F (37.1 °C)] 98.1 °F (36.7 °C)  Pulse:  [60-71] " 60  Resp:  [14-18] 15  SpO2:  [95 %-99 %] 96 %  BP: (104-181)/(59-77) 131/59     Weight: 65.8 kg (145 lb)  Body mass index is 22.05 kg/m².    Intake/Output Summary (Last 24 hours) at 2/20/2024 1036  Last data filed at 2/20/2024 0645  Gross per 24 hour   Intake 240 ml   Output 650 ml   Net -410 ml         Physical Exam      Clear lungs BL, unlabored breathing, on RA, no cyanosis  Hrt sounds RRR  AA, NAD  No LE edema  5/5 prox fist  and knee ext/flexion      Assessment/Plan:      * Traumatic subdural hematoma  -Pt. S/p MMA embolization. Procedure complicated by weakness/dysarthria, now improving  -Continue to monitor with regular neurochecks. PT/OT/SLP ordered. Consider MRI if symptoms persistent/not improving as recommended by vascular neurology      Acute confusional state  S/p MMA confusion  Now resolved  Now with mild intermittent delirium - possibly worsened w/ social stressor of death of srinath Chua once EKG shows stable qtc      Brain compression  Midline shift improving based on repeat CT; stable  Sp MMA      Great toe pain, right  Improved; no evidence of infx  Don't see overt clinical evidence of ingrown toe nail  Outpt podiatry follow up      Aphasia  resolved      Depression  fluoxetine        Hypertension associated with diabetes  -Continue home lopressor. Pt. Currently holding amlodipine and lisinopril until PCP f/u. Consider restarting if BP remains elevated        VTE Risk Mitigation (From admission, onward)           Ordered     IP VTE HIGH RISK PATIENT  Once         02/15/24 2242     Place sequential compression device  Until discontinued         02/15/24 2242                    Discharge Planning   BRIDGETTE: 2/20/2024     Code Status: Prior   Is the patient medically ready for discharge?: Yes    Reason for patient still in hospital (select all that apply): Pending disposition  Discharge Plan A: Rehab   Discharge Delays: None known at this time              Hugo Slater MD  Department of  [facility]   [facility] - Neurosurgery ([facility])

## 2024-02-20 NOTE — PT/OT/SLP PROGRESS
Speech Language Pathology Treatment    Patient Name:  Kanchan Downing   MRN:  [id number]  Admitting Diagnosis: Traumatic subdural hematoma    Recommendations:                 General Recommendations:  Speech/language therapy and Cognitive-linguistic therapy  Diet recommendations:  Regular Diet - IDDSI Level 7, Liquid Diet Level: Thin liquids - IDDSI Level 0   Aspiration Precautions: Standard aspiration precautions   General Precautions: Standard, aphasia  Communication strategies:  go to room if call light pushed    Assessment:     Kanchan Downing is a 83 y.o. female with an SLP diagnosis of Aphasia and Cognitive-Linguistic Impairment.      Subjective     Pt awake/alert sitting upright in bedside chair, agreeable to ST. Sitter present in room.     Pain/Comfort:  Pain Rating 1: 0/10  Pain Rating Post-Intervention 1: 0/10    Respiratory Status: Room air    Objective:     Has the patient been evaluated by SLP for swallowing?   Yes  Keep patient NPO? No     Pt seen bedside for diet check and ongoing cognitive-linguistic therapy. Pt with improved sentence formulation and ability to engage in conversation adequately compared to previous session. She continues to require occasional redirection or prompting during structured tasks. Pt able to name 5 items in a given concrete and abstract category with 100% acc given extended time. She required MIN assist to complete convergent naming task with 80% acc. She completed 3 unit mental flexibility task with 100% acc and 4 unit task with 60% acc and MOD assist. She followed 3 step, simple commands with 100% acc, noting breakdown with multi-unit complex directions despite MOD cuing. Pt able to self present and tolerate adriná cracker x1 and 4 oz thin liquids via straw sips. No overt s/sx of airway compromise appreciated.  Recommend she continue her current diet at this time. Education provided re: role of SLP, diet recs, swallow precs, s/s aspiration and POC.  Pt verbalized  understanding and agreement.     Goals:   Multidisciplinary Problems       SLP Goals          Problem: SLP    Goal Priority Disciplines Outcome   SLP Goal     SLP Ongoing, Progressing   Description: Speech Language Pathology Goals  Goals expected to be met by 2/29    1. Pt will tolerate least restrictive PO diet without any overt s/sx of airway compromise.   2. Pt will complete word finding tasks with 80% acc.   3. Pt will follow complex/multi-unit directions with 80% acc.  4. Pt will complete mental flexibility tasks with 80% acc with min assist.                                Plan:     Patient to be seen:  3 x/week   Plan of Care expires:  03/17/24  Plan of Care reviewed with:  patient   SLP Follow-Up:  Yes       Discharge recommendations:  Low Intensity Therapy   Barriers to Discharge:  None    Time Tracking:     SLP Treatment Date:   02/20/24  Speech Start Time:  1039  Speech Stop Time:  1059     Speech Total Time (min):  20 min    Billable Minutes: Speech Therapy Individual 12 and Treatment Swallowing Dysfunction 8    02/20/2024

## 2024-02-20 NOTE — PLAN OF CARE
Patient accepted by [facility] and approved with anand [place] submitted for.   02/20/24 6131   Post-Acute Status   Post-Acute Authorization Placement   Post-Acute Placement Status Pending payor review/awaiting authorization (if required)

## 2024-02-20 NOTE — SUBJECTIVE & OBJECTIVE
Interval History:  no CP; no SOB. Afebrile. Sitter at bedside for getting out of bed.    Review of Systems  Objective:     Vital Signs (Most Recent):  Temp: 98.1 °F (36.7 °C) (02/20/24 0741)  Pulse: 60 (02/20/24 0741)  Resp: 15 (02/20/24 0741)  BP: (!) 131/59 (02/20/24 0741)  SpO2: 96 % (02/20/24 0741) Vital Signs (24h Range):  Temp:  [97.5 °F (36.4 °C)-98.7 °F (37.1 °C)] 98.1 °F (36.7 °C)  Pulse:  [60-71] 60  Resp:  [14-18] 15  SpO2:  [95 %-99 %] 96 %  BP: (104-181)/(59-77) 131/59     Weight: 65.8 kg (145 lb)  Body mass index is 22.05 kg/m².    Intake/Output Summary (Last 24 hours) at 2/20/2024 1036  Last data filed at 2/20/2024 0645  Gross per 24 hour   Intake 240 ml   Output 650 ml   Net -410 ml         Physical Exam      Clear lungs BL, unlabored breathing, on RA, no cyanosis  Hrt sounds RRR  AA, NAD  No LE edema  5/5 prox fist  and knee ext/flexion

## 2024-02-20 NOTE — PT/OT/SLP PROGRESS
"Occupational Therapy   Treatment    Name: Kanchan Downing  MRN: [id number]  Admitting Diagnosis:  Traumatic subdural hematoma  5 Days Post-Op    Recommendations:     Discharge Recommendations: High Intensity Therapy  Discharge Equipment Recommendations:  bath bench  Barriers to discharge:  None    Assessment:     Kanchan Downing is a 83 y.o. female with a medical diagnosis of Traumatic subdural hematoma.  She presents with performance deficits affecting function are weakness, impaired endurance, impaired self care skills, impaired functional mobility, impaired balance, impaired cognition, decreased coordination.     Rehab Prognosis:  Good; patient would benefit from acute skilled OT services to address these deficits and reach maximum level of function.       Plan:     Patient to be seen 4 x/week to address the above listed problems via self-care/home management, therapeutic activities, therapeutic exercises, neuromuscular re-education, cognitive retraining  Plan of Care Expires: 03/16/24  Plan of Care Reviewed with: patient    Subjective     Patient:  "[place] the [place]."  "My hands feel tight."  Pain/Comfort:  Pain Rating 1: 0/10  Pain Rating Post-Intervention 1: 0/10    Objective:     Communicated with: Nurse prior to session.  Patient found seated in chair with peripheral IV, telemetry, PureWick, chair check upon OT entry to room.    General Precautions: Standard, aphasia, fall    Orthopedic Precautions:N/A  Braces: N/A  Respiratory Status: Room air     Occupational Performance:     Bed Mobility:    NT; up in chair    Functional Mobility/Transfers:  Patient completed Sit <> Stand Transfer with contact guard assistance  with  no assistive device   Patient completed Bed <> Chair Transfer using Stand Pivot technique with contact guard assistance with no assistive device    Activities of Daily Living:  Patient able to tie laces without difficulty  Min assist with LB dressing with assistance to initiate right " sock.     [facility] 6 Click ADL: 18    Treatment & Education:  Addressed [facility] with tying laces.  Addressed sit-stand; standing tolerance and memory during memory card game; patient successfully matched 5/5 pairs.    Patient left supine with all lines intact, call button in reach, and bed alarm on    GOALS:   Multidisciplinary Problems       Occupational Therapy Goals          Problem: Occupational Therapy    Goal Priority Disciplines Outcome Interventions   Occupational Therapy Goal     OT, PT/OT Ongoing, Progressing    Description: Goals set 2/16 with an expiration date, 3/16:  Patient will increase functional independence with ADLs by performing:    Patient will demonstrate rolling to the right with modified independence.  Not met   Patient will demonstrate rolling to the left with modified independence.   Not met  Patient will demonstrate supine -sit with modified independence.   Not met  Patient will demonstrate stand pivot transfers with CGA.   Not met  Patient will demonstrate grooming while standing with CGA.   Not met  Patient will demonstrate upper body dressing with SBA while seated EOB.   Not met  Patient will demonstrate lower body dressing with CGA while seated EOB.   Not met  Patient will demonstrate toileting with CGA.   Not met  Patient's family / caregiver will demonstrate independence and safety with assisting patient with self-care skills and functional mobility.     Not met                               Time Tracking:     OT Date of Treatment: 02/20/24  OT Start Time: 0745  OT Stop Time: 0810  OT Total Time (min): 25 min    Billable Minutes:Therapeutic Activity 15  Cognitive Retraining 10    OT/WANDA: OT          2/20/2024

## 2024-02-21 LAB
ANION GAP SERPL CALC-SCNC: 8 MMOL/L (ref 8–16)
BUN SERPL-MCNC: 17 MG/DL (ref 8–23)
CALCIUM SERPL-MCNC: 9.7 MG/DL (ref 8.7–10.5)
CHLORIDE SERPL-SCNC: 106 MMOL/L (ref 95–110)
CO2 SERPL-SCNC: 22 MMOL/L (ref 23–29)
CREAT SERPL-MCNC: 0.8 MG/DL (ref 0.5–1.4)
EST. GFR  (NO RACE VARIABLE): >60 ML/MIN/1.73 M^2
GLUCOSE SERPL-MCNC: 104 MG/DL (ref 70–110)
POTASSIUM SERPL-SCNC: 4.3 MMOL/L (ref 3.5–5.1)
SODIUM SERPL-SCNC: 136 MMOL/L (ref 136–145)

## 2024-02-21 PROCEDURE — 97116 GAIT TRAINING THERAPY: CPT | Mod: HCNC

## 2024-02-21 PROCEDURE — 97535 SELF CARE MNGMENT TRAINING: CPT | Mod: HCNC

## 2024-02-21 PROCEDURE — 80048 BASIC METABOLIC PNL TOTAL CA: CPT | Mod: HCNC | Performed by: HOSPITALIST

## 2024-02-21 PROCEDURE — 36415 COLL VENOUS BLD VENIPUNCTURE: CPT | Mod: HCNC | Performed by: HOSPITALIST

## 2024-02-21 PROCEDURE — 25000003 PHARM REV CODE 250: Mod: HCNC | Performed by: HOSPITALIST

## 2024-02-21 PROCEDURE — 99232 SBSQ HOSP IP/OBS MODERATE 35: CPT | Mod: HCNC,,, | Performed by: NURSE PRACTITIONER

## 2024-02-21 PROCEDURE — G0378 HOSPITAL OBSERVATION PER HR: HCPCS | Mod: HCNC

## 2024-02-21 RX ORDER — POLYETHYLENE GLYCOL 3350 17 G/17G
17 POWDER, FOR SOLUTION ORAL DAILY
Status: DISCONTINUED | OUTPATIENT
Start: 2024-02-21 | End: 2024-02-21

## 2024-02-21 RX ORDER — LISINOPRIL 10 MG/1
10 TABLET ORAL DAILY
Qty: 90 TABLET | Refills: 3
Start: 2024-02-22 | End: 2024-02-22 | Stop reason: HOSPADM

## 2024-02-21 RX ORDER — AMOXICILLIN 250 MG
1 CAPSULE ORAL 2 TIMES DAILY
Status: DISCONTINUED | OUTPATIENT
Start: 2024-02-21 | End: 2024-02-23 | Stop reason: HOSPADM

## 2024-02-21 RX ORDER — POLYETHYLENE GLYCOL 3350 17 G/17G
17 POWDER, FOR SOLUTION ORAL DAILY
Refills: 0
Start: 2024-02-22

## 2024-02-21 RX ORDER — POLYETHYLENE GLYCOL 3350 17 G/17G
17 POWDER, FOR SOLUTION ORAL 2 TIMES DAILY
Status: DISCONTINUED | OUTPATIENT
Start: 2024-02-22 | End: 2024-02-23 | Stop reason: HOSPADM

## 2024-02-21 RX ORDER — AMOXICILLIN 250 MG
1 CAPSULE ORAL DAILY PRN
Status: DISCONTINUED | OUTPATIENT
Start: 2024-02-21 | End: 2024-02-21

## 2024-02-21 RX ORDER — LACTULOSE 10 G/15ML
20 SOLUTION ORAL ONCE
Status: COMPLETED | OUTPATIENT
Start: 2024-02-21 | End: 2024-02-21

## 2024-02-21 RX ORDER — LISINOPRIL 2.5 MG/1
2.5 TABLET ORAL DAILY
Status: DISCONTINUED | OUTPATIENT
Start: 2024-02-22 | End: 2024-02-23 | Stop reason: HOSPADM

## 2024-02-21 RX ADMIN — SENNOSIDES AND DOCUSATE SODIUM 1 TABLET: 8.6; 5 TABLET ORAL at 11:02

## 2024-02-21 RX ADMIN — METOPROLOL TARTRATE 50 MG: 50 TABLET, FILM COATED ORAL at 08:02

## 2024-02-21 RX ADMIN — ATORVASTATIN CALCIUM 20 MG: 20 TABLET, FILM COATED ORAL at 08:02

## 2024-02-21 RX ADMIN — LACTULOSE 20 G: 20 SOLUTION ORAL at 10:02

## 2024-02-21 RX ADMIN — LISINOPRIL 10 MG: 5 TABLET ORAL at 08:02

## 2024-02-21 RX ADMIN — COLCHICINE 0.6 MG: 0.6 TABLET, FILM COATED ORAL at 08:02

## 2024-02-21 RX ADMIN — METOPROLOL TARTRATE 50 MG: 50 TABLET, FILM COATED ORAL at 10:02

## 2024-02-21 RX ADMIN — POLYETHYLENE GLYCOL 3350 17 G: 17 POWDER, FOR SOLUTION ORAL at 11:02

## 2024-02-21 RX ADMIN — DOCUSATE SODIUM AND SENNOSIDES 1 TABLET: 8.6; 5 TABLET, FILM COATED ORAL at 09:02

## 2024-02-21 RX ADMIN — QUETIAPINE FUMARATE 25 MG: 25 TABLET ORAL at 10:02

## 2024-02-21 RX ADMIN — QUETIAPINE FUMARATE 25 MG: 25 TABLET ORAL at 08:02

## 2024-02-21 RX ADMIN — FLUOXETINE 10 MG: 10 CAPSULE ORAL at 08:02

## 2024-02-21 NOTE — ASSESSMENT & PLAN NOTE
- S/p MMA Embolization on 2/15.     - Related to prolonged/acute hospital course.     Recommendations  -  Encourage mobility, OOB in chair at least 3 hours per day, and early ambulation as appropriate  -  PT/OT evaluate and treat  -  Pain management  -  Monitor for and prevent skin breakdown and pressure ulcers  Early mobility, repositioning/weight shifting every 20-30 minutes when sitting, turn patient every 2 hours, proper mattress/overlay and chair cushioning, pressure relief/heel protector boots  -  DVT prophylaxis    -  Reviewed discharge options (IP rehab, SNF, HH therapy, and OP therapy)

## 2024-02-21 NOTE — PLAN OF CARE
[facility]    FACILITY TRANSFER ORDERS      Patient Name: Kanchan Downing  YOB: 1940    PCP: Viktoria Mckeon MD   PCP Address: [address]  / [facility] [address]  PCP Phone Number: [phone]  PCP Fax: [phone]    Encounter Date: 02/21/2024    Admit to: rehab    Vital Signs:  Routine    Diagnoses:   Active Hospital Problems    Diagnosis  POA    *Traumatic subdural hematoma [S06.5XAA]  Yes     Priority: 1 - High    Acute confusional state [F05]  No     Priority: 2      Became confused s/p MMA  Now improving       Brain compression [G93.5]  Yes     Priority: 3      Midline shift to the left measuring 5 mm is improved from the prior study.  The basal cisterns remain patent.   Repeat cth stable   s/p MMA        Great toe pain, right [M79.674]  Yes    Depression [F32.A]  Yes     On fluoxetine       Aphasia [R47.01]  No     Aphasic post mma, improved      Vasogenic cerebral edema [G93.6]  Yes     Midline shift to the left measuring 5 mm is improved from the prior study.  The basal cisterns remain patent.   Repeat cth stable   s/p MMA      Hypertension associated with diabetes [E11.59, I15.2]  Yes      Resolved Hospital Problems    Diagnosis Date Resolved POA    Hyperlipidemia associated with type 2 diabetes mellitus [E11.69, E78.5] 02/17/2024 Yes       Allergies:  Review of patient's allergies indicates:   Allergen Reactions    Sulfa (sulfonamide antibiotics)      Other reaction(s): Rash       Diet: cardiac diet    Activities: Up with assistance and Weight bearing as tolerated    Goals of Care Treatment Preferences:  Code Status: Full Code      Nursing: routine; neurochecks qshift until discharge home     Labs: cbc and bmp weekly    CONSULTS:    Physical Therapy to evaluate and treat. , Occupational Therapy to evaluate and treat., and  to evaluate for community resources/long-range planning.         Medications: Review discharge medications with patient and family  and provide education.      Current Discharge Medication List        START taking these medications    Details   polyethylene glycol (GLYCOLAX) 17 gram PwPk Take 17 g by mouth once daily.  Refills: 0           CONTINUE these medications which have CHANGED    Details   lisinopriL 10 MG tablet Take 1 tablet (10 mg total) by mouth once daily.  Qty: 90 tablet, Refills: 3    Comments: .           CONTINUE these medications which have NOT CHANGED    Details   acetaminophen (TYLENOL) 325 MG tablet Take 2 tablets (650 mg total) by mouth every 6 (six) hours as needed for Pain.  Refills: 0      FLUoxetine 10 MG capsule Take 1 capsule (10 mg total) by mouth once daily.  Qty: 30 capsule, Refills: 11      metoprolol tartrate (LOPRESSOR) 50 MG tablet Take 1 tablet (50 mg total) by mouth 2 (two) times daily.  Qty: 60 tablet, Refills: 11    Comments: .      simvastatin (ZOCOR) 10 MG tablet Take 1 tablet (10 mg total) by mouth every evening.  Qty: 90 tablet, Refills: 2    Associated Diagnoses: Controlled type 2 diabetes mellitus with diabetic nephropathy, without long-term current use of insulin      blood sugar diagnostic (BLOOD GLUCOSE TEST) Strp 1 strip by Misc.(Non-Drug; Combo Route) route 2 (two) times daily.  Qty: 100 strip, Refills: 12      cetirizine (ZYRTEC) 10 MG tablet Take 10 mg by mouth once daily.      diclofenac sodium (VOLTAREN) 1 % Gel Apply 2 g topically 3 (three) times daily. To knees for pain  Qty: 150 g, Refills: 3    Associated Diagnoses: Chronic pain of both knees      flash glucose scanning reader (FREESTYLE FLOWER 2 READER) [facility] Continuous glucose reader  Qty: 1 each, Refills: 12      flash glucose sensor (FREESTYLE FLOWER 2 SENSOR) Kit Continuous glucose monitor  Qty: 1 kit, Refills: 12      lancets Misc Test tid  Qty: 100 each, Refills: 12           STOP taking these medications       amLODIPine (NORVASC) 5 MG tablet Comments:   Reason for Stopping:                  Immunizations Administered as of 2/21/2024        No immunizations on file.                   _________________________________  Hugo Slater MD  02/21/2024

## 2024-02-21 NOTE — PROGRESS NOTES
[facility] - Neurosurgery ([facility])  Physical Medicine & Rehab  Progress Note    Patient Name: Kanchan Downing  MRN: [id number]  Admission Date: 2/15/2024  Length of Stay: 0 days  Attending Physician: Hugo Slater MD    Subjective:     Principal Problem:Traumatic subdural hematoma    Hospital Course:   2/20/24: participated w/ PT.  76 ft x2, CGA-Nathan, RW; decreased gait speed and step length, Vcing for full upright and maintaining appropriate distance from walker. Seated rest break between trials.   2/21/24: Participated w/ OT. Bed mob SV. Sit to stand CGA. Bed <> Chair Transfer using Stand Pivot technique with contact guard assistance with no assistive device. [facility] 18.       Interval History 2/21/2024:  Patient is seen for follow-up PM&R evaluation and recommendations: Personal sitter removed. Calm at bedside. No visual hallucinations today. Following commands and participated w/ OT this morning.     HPI, Past Medical, Family, and Social History remains the same as documented in the initial encounter.    Scheduled Medications:    atorvastatin  20 mg Oral Daily    colchicine  0.6 mg Oral Daily    FLUoxetine  10 mg Oral Daily    lisinopriL  10 mg Oral Daily    metoprolol tartrate  50 mg Oral BID    polyethylene glycol  17 g Oral Daily    QUEtiapine  25 mg Oral BID     Diagnostic Results:   Labs: Reviewed    PRN Medications: acetaminophen, dextrose 10%, dextrose 10%, glucagon (human recombinant), glucose, glucose, hydrALAZINE, labetalol, melatonin, naloxone, ondansetron, prochlorperazine, senna-docusate 8.6-50 mg, sodium chloride 0.9%    Review of Systems   Constitutional:  Positive for activity change.   Musculoskeletal:  Positive for gait problem.   Neurological:  Positive for weakness.   Psychiatric/Behavioral:  Positive for behavioral problems and confusion.      Objective:     Vital Signs (Most Recent):  Temp: 98 °F (36.7 °C) (02/21/24 1109)  Pulse: 64 (02/21/24 1109)  Resp: 18 (02/21/24 1109)  BP: (!) 90/44  "(02/21/24 1115)  SpO2: 96 % (02/21/24 1109)    Vital Signs (24h Range):  Temp:  [97.8 °F (36.6 °C)-98.4 °F (36.9 °C)] 98 °F (36.7 °C)  Pulse:  [60-67] 64  Resp:  [15-18] 18  SpO2:  [93 %-99 %] 96 %  BP: ()/(44-62) 90/44         Physical Exam  Vitals and nursing note reviewed.   HENT:      Nose: Nose normal.      Mouth/Throat:      Mouth: Mucous membranes are moist.   Pulmonary:      Effort: Pulmonary effort is normal. No respiratory distress.   Musculoskeletal:      Comments: Generalized weakness    Skin:     General: Skin is warm.   Neurological:      Mental Status: She is alert.      Motor: Weakness present.      Gait: Gait abnormal.      Comments: Following commands  Intermittent Confusion, able to answer year and location  Calm    Psychiatric:         Mood and Affect: Mood normal.     Assessment/Plan:      * Traumatic subdural hematoma  - S/p MMA Embolization on 2/15.     - Related to prolonged/acute hospital course.     Recommendations  -  Encourage mobility, OOB in chair at least 3 hours per day, and early ambulation as appropriate  -  PT/OT evaluate and treat  -  Pain management  -  Monitor for and prevent skin breakdown and pressure ulcers  Early mobility, repositioning/weight shifting every 20-30 minutes when sitting, turn patient every 2 hours, proper mattress/overlay and chair cushioning, pressure relief/heel protector boots  -  DVT prophylaxis    -  Reviewed discharge options (IP rehab, SNF, HH therapy, and OP therapy)     Acute confusional state  - After procedure feceloped bilateral weakness, dysarthria.   - CTA showed "Similar appearance of predominately low-density extra-axial collection along the right hemisphere with resorption of the previously identified left-sided collection. Improved midline shift to the left. No acute findings."   - improving     PM&R Recommendation:     At this time, the PM&R team has reviewed this patient's ongoing medical case including inpatient diagnosis, medical " history, clinical examination, labs, vitals, current social and functional history to provide the post-acute recommendation as follows:     RECOMMENDATIONS: inpatient rehabilitation due to good motivation/participation with therapies, has been determined to tolerate 3 hours of therapy and good potential for recovery.     The patient will be admitted for comprehensive interdisciplinary inpatient rehabilitation to address the impairments due to medical diagnosis of Traumatic SDH. The patient will benefit from an inpatient rehabilitation program to promote functional recovery, implement compensatory strategies and will undergo assessment for needs for durable medical equipment for safe discharge to the community. This patient will benefit from a coordinated interdisciplinary rehabilitation program services that require close monitoring and treatment with 24-hour rehabilitative nursing and physical/occupational therapies for 3 hours/day for 5 days/week.This interdisciplinary program will be performed under the direction of a physiatrist.    MEDICAL STABILITY:     At this time, no barriers for post-acute rehab admission.    I will sign off with the transfer of the patient to [facility] liaison who will continue to follow going forward until admission to [facility].      Zhanna Campbell NP  Department of Physical Medicine & Rehab   [facility])

## 2024-02-21 NOTE — CONSULTS
"[facility] ([facility])  Physical Medicine & Rehab  Consult Note    Patient Name: Kanchan Downing  MRN: [id number]  Admission Date: 2/15/2024  Hospital Length of Stay: 0 days  Attending Physician: Hugo Slater MD     Inpatient consult to Physical Medicine & Rehabilitation  Consult performed by: Zhanna Campbell NP  Consult requested by:  Hugo Slater MD    Collaborating Physician: Doris Sumner MD  Reason for Consult:  Assess rehabilitation needs      Consults  Subjective:     Principal Problem: Traumatic subdural hematoma    HPI: Kanchan Downing is a 83-year-old female with PMHx of B/L chronic traumatic SDH, Depression (fluoxetine), HTN, and HLD who presented to [facility] on 2/15/24 for elective MMA embolization. Has been followed by neurosurgery for B/L subacute bilateral subdural hematomas which have been enlarging (R>L w midline shift and some mass effect). S/p MMA Embolization on 2/15. After procedure feceloped bilateral weakness, dysarthria. CTA showed "Similar appearance of predominately low-density extra-axial collection along the right hemisphere with resorption of the previously identified left-sided collection. Improved midline shift to the left. No acute findings." Hospital course complicated by encephalopathy (improving).     Functional History: Patient lives alone in a single story home with 2 small steps to enter.  Prior to admission, [place]. DME: rollator and SPC.     Hospital Course:   2/20/24: participated w/ PT.  76 ft x2, CGA-Nathan, RW; decreased gait speed and step length, Vcing for full upright and maintaining appropriate distance from walker. Seated rest break between trials.       Past Medical History:   Diagnosis Date    Anxiety     Arthritis     Dr Schofield    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    Atherosclerosis of aorta 06/08/2016    CXR 2013    Breast cancer 2011    right breast invasive micropapillary CA, Stage !A    Cataract     Controlled type 2 diabetes mellitus with circulatory " disorder, without long-term current use of insulin 10/31/2017    Controlled type 2 diabetes mellitus with circulatory disorder, without long-term current use of insulin 10/31/2017    Diabetes mellitus type 2 without retinopathy 06/12/2014    6% metformin 500 bid, FU+ 2016    DVT (deep venous thrombosis) 2001    R , Dr Chad Golden 04/06/2022     Marques passed 1/2022    Hyperlipidemia     LDL 82    Hyperlipidemia associated with type 2 diabetes mellitus 09/11/2012    Hypertension     Hypertension associated with diabetes 09/11/2012    Memory loss 12/29/2022    CT chronic changes 2022    Microalbuminuria due to type 2 diabetes mellitus 12/08/2015    taking lisinopril, losartan caused olivia effects    PVD (peripheral vascular disease) with claudication 05/02/2019    Compression hose    Risk for coronary artery disease greater than 20% in next 10 years per Carlisle score 05/01/2018    Strabismus     Type 2 diabetes mellitus with diabetic polyneuropathy 06/12/2014    Type 2 diabetes mellitus with diabetic polyneuropathy, without long-term current use of insulin 06/12/2014     Past Surgical History:   Procedure Laterality Date    BREAST BIOPSY Right 2011    BREAST LUMPECTOMY Right 2011    CO REMOVAL OF NAIL BED  6/10/2015    TONSILLECTOMY       Review of patient's allergies indicates:   Allergen Reactions    Sulfa (sulfonamide antibiotics)      Other reaction(s): Rash       Scheduled Medications:    atorvastatin  20 mg Oral Daily    colchicine  0.6 mg Oral Daily    FLUoxetine  10 mg Oral Daily    lisinopriL  10 mg Oral Daily    metoprolol tartrate  50 mg Oral BID    polyethylene glycol  17 g Oral Daily    QUEtiapine  25 mg Oral BID       PRN Medications: acetaminophen, dextrose 10%, dextrose 10%, glucagon (human recombinant), glucose, glucose, hydrALAZINE, labetalol, melatonin, naloxone, ondansetron, prochlorperazine, senna-docusate 8.6-50 mg, sodium chloride 0.9%    Family History       Problem Relation (Age of  Onset)    Breast cancer Sister    Cataracts Mother    Heart disease Mother (58), Father (50)    No Known Problems Brother, Maternal Aunt, Maternal Uncle, Paternal Aunt, Paternal Uncle, Maternal Grandmother, Maternal Grandfather, Paternal Grandmother, Paternal Grandfather    Thyroid disease Sister          Tobacco Use    Smoking status: Former     Current packs/day: 7.00     Average packs/day: 7.0 packs/day for 0.5 years (3.5 ttl pk-yrs)     Types: Cigarettes    Smokeless tobacco: Never   Substance and Sexual Activity    Alcohol use: No    Drug use: Never    Sexual activity: Not Currently     Review of Systems   Constitutional:  Positive for activity change.   Musculoskeletal:  Positive for gait problem.   Neurological:  Positive for weakness.   Psychiatric/Behavioral:  Positive for behavioral problems and confusion.      Objective:     Vital Signs (Most Recent):  Temp: 98 °F (36.7 °C) (02/21/24 1109)  Pulse: 64 (02/21/24 1109)  Resp: 18 (02/21/24 1109)  BP: (!) 90/44 (02/21/24 1115)  SpO2: 96 % (02/21/24 1109)    Vital Signs (24h Range):  Temp:  [97.8 °F (36.6 °C)-98.4 °F (36.9 °C)] 98 °F (36.7 °C)  Pulse:  [60-67] 64  Resp:  [15-18] 18  SpO2:  [93 %-99 %] 96 %  BP: ()/(44-62) 90/44     Body mass index is 22.05 kg/m².     Physical Exam  Vitals and nursing note reviewed.   HENT:      Nose: Nose normal.      Mouth/Throat:      Mouth: Mucous membranes are moist.   Pulmonary:      Effort: Pulmonary effort is normal. No respiratory distress.   Musculoskeletal:      Comments: Generalized weakness    Skin:     General: Skin is warm.   Neurological:      Mental Status: She is alert.      Motor: Weakness present.      Gait: Gait abnormal.      Comments: Following commands  Visual hallucinations  Confusion present   Psychiatric:      Comments: Personal sitter     Diagnostic Results:   Labs: Reviewed  ECG: Reviewed  CT: Reviewed    Assessment/Plan:     * Traumatic subdural hematoma  - S/p MMA Embolization on 2/15.     -  "Related to prolonged/acute hospital course.     Recommendations  -  Encourage mobility, OOB in chair at least 3 hours per day, and early ambulation as appropriate  -  PT/OT evaluate and treat  -  Pain management  -  Monitor for and prevent skin breakdown and pressure ulcers  Early mobility, repositioning/weight shifting every 20-30 minutes when sitting, turn patient every 2 hours, proper mattress/overlay and chair cushioning, pressure relief/heel protector boots  -  DVT prophylaxis    -  Reviewed discharge options (IP rehab, SNF, HH therapy, and OP therapy)     Acute confusional state  - After procedure feceloped bilateral weakness, dysarthria.   - CTA showed "Similar appearance of predominately low-density extra-axial collection along the right hemisphere with resorption of the previously identified left-sided collection. Improved midline shift to the left. No acute findings."   - improving     PM&R Recommendation:     At this time, the PM&R team has reviewed this patient's ongoing medical case including inpatient diagnosis, medical history, clinical examination, labs, vitals, current social and functional history to provide the post-acute recommendation as follows:     RECOMMENDATIONS: inpatient rehabilitation due to good motivation/participation with therapies, has been determined to tolerate 3 hours of therapy and good potential for recovery.     The patient will be admitted for comprehensive interdisciplinary inpatient rehabilitation to address the impairments due to medical diagnosis of Traumatic SDH. The patient will benefit from an inpatient rehabilitation program to promote functional recovery, implement compensatory strategies and will undergo assessment for needs for durable medical equipment for safe discharge to the community. This patient will benefit from a coordinated interdisciplinary rehabilitation program services that require close monitoring and treatment with 24-hour rehabilitative nursing and " physical/occupational therapies for 3 hours/day for 5 days/week.This interdisciplinary program will be performed under the direction of a physiatrist.    MEDICAL STABILITY:     At this time, barriers for post-acute rehab admission include removal of personal sitter and improvement in hallucinations.     We will continue to follow.     Thank you for your consult.     Zhanna Campbell NP  Department of Physical Medicine & Rehab  [facility])

## 2024-02-21 NOTE — PLAN OF CARE
Problem: Adult Inpatient Plan of Care  Goal: Plan of Care Review  Outcome: Ongoing, Progressing  Goal: Patient-Specific Goal (Individualized)  Outcome: Ongoing, Progressing  Goal: Absence of Hospital-Acquired Illness or Injury  Outcome: Ongoing, Progressing  Goal: Optimal Comfort and Wellbeing  Outcome: Ongoing, Progressing     Problem: Diabetes Comorbidity  Goal: Blood Glucose Level Within Targeted Range  Outcome: Ongoing, Progressing     Problem: Fall Injury Risk  Goal: Absence of Fall and Fall-Related Injury  Outcome: Ongoing, Progressing     Problem: Fatigue  Goal: Improved Activity Tolerance  Outcome: Ongoing, Progressing     Problem: Pain Acute  Goal: Acceptable Pain Control and Functional Ability  Outcome: Ongoing, Progressing     POC reviewed with the patient and they verbalized understanding. All comments and concerns addressed. Bed locked in lowest position with bed alarm set, call light within reach. Safety precautions maintained. VSS, see flowsheets. No events this shift. Will continue to monitor for changes to POC and clinical condition.

## 2024-02-21 NOTE — PROGRESS NOTES
"[facility] - Neurosurgery ([facility])  Hospital Medicine  Progress Note    Patient Name: Kanchan Downing  MRN: [id number]  Patient Class: OP- Observation   Admission Date: 2/15/2024  Length of Stay: 0 days  Attending Physician: Hugo Slater MD  Primary Care Provider: Viktoria Mckeon MD        Subjective:     Principal Problem:Traumatic subdural hematoma        HPI:  84 yo F with PMHx B/L chronic traumatic SDH, HTN, and HLD who presented to [facility] for elective MMA embolization. Pt. Has been followed by neurosurgery for B/L subacute bilateral subdural hematomas which have been enlarging (R>L w midline shift and some mass effect). Pt. Has not reported significant worsening neurologic symptoms despite the enlargement, so the patient was referred for bilateral MMA embolization in an attempt to limit the growth of the SDH without pt. Needing craniotomy. Pt. Underwent procedure today, but afterwards she developed B/L weakness, dysarthria and aphasia. CTA showed "Similar appearance of predominately low-density extra-axial collection along the right hemisphere with resorption of the previously identified left-sided collection. Improved midline shift to the left. No acute findings." Pt. Symptoms improved with time. Hospital medicine was contacted for admission for further monitoring after procedure given her acute post-procedure change.    Overview/Hospital Course:  Patient had no recurrence of any encephalopathy or neurological symptoms. Pt was given news that her son  while she was in the hospital. Had some agitation/delirium that responded to seroquel. Pt denies sobeida dysuria.    Interval History:  No chest pain.  No headache.  Patient reports just a mild head pressure afebrile.    Review of Systems  Objective:     Vital Signs (Most Recent):  Temp: 98.8 °F (37.1 °C) (24)  Pulse: 64 (24 152)  Resp: 18 (24 152)  BP: (!) 95/53 (24)  SpO2: 96 % (24) Vital Signs (24h " Range):  Temp:  [97.8 °F (36.6 °C)-98.8 °F (37.1 °C)] 98.8 °F (37.1 °C)  Pulse:  [62-68] 64  Resp:  [15-18] 18  SpO2:  [96 %-99 %] 96 %  BP: ()/(44-62) 95/53     Weight: 65.8 kg (145 lb)  Body mass index is 22.05 kg/m².    Intake/Output Summary (Last 24 hours) at 2/21/2024 1636  Last data filed at 2/21/2024 0516  Gross per 24 hour   Intake --   Output 700 ml   Net -700 ml         Physical Exam      Awake alert, no acute distress   Oriented to place and self   Clear lungs bilaterally, unlabored breathing, on room air, no cyanosis   Heart sounds indicate a regular rate and rhythm  Awake alert, no distress   5/5 proximal upper and lower extremity strength bilaterally  No obvious lower extremity edema     Assessment/Plan:      * Traumatic subdural hematoma  -Pt. S/p MMA embolization. Procedure complicated by weakness/dysarthria, now improving  -Continue to monitor with regular neurochecks. PT/OT/SLP ordered. Consider MRI if symptoms persistent/not improving as recommended by vascular neurology      Acute confusional state  S/p MMA confusion  Now resolved  Now with mild intermittent delirium - possibly worsened w/ social stressor of death of srinath Chua once EKG shows stable qtc      Brain compression  Midline shift improving based on repeat CT; stable  Sp MMA      Great toe pain, right  Improved; no evidence of infx  Don't see overt clinical evidence of ingrown toe nail  Outpt podiatry follow up      Aphasia  resolved      Depression  fluoxetine        Hypertension associated with diabetes  -Continue home lopressor. Pt. Currently holding amlodipine and lisinopril until PCP f/u. Consider restarting if BP remains elevated        VTE Risk Mitigation (From admission, onward)           Ordered     IP VTE HIGH RISK PATIENT  Once         02/15/24 2242     Place sequential compression device  Until discontinued         02/15/24 2242                    Discharge Planning   BRIDGETTE: 2/22/2024     Code Status: Full Code   Is  the patient medically ready for discharge?: Yes    Reason for patient still in hospital (select all that apply): Patient trending condition and Pending disposition  Discharge Plan A: Rehab   Discharge Delays: None known at this time              Hugo Slater MD  Department of Hospital Medicine   [facility] - Neurosurgery ([facility])

## 2024-02-21 NOTE — HPI
"Kanchan Downing is a 83-year-old female with PMHx of B/L chronic traumatic SDH, Depression (fluoxetine), HTN, and HLD who presented to [facility] on 2/15/24 for elective MMA embolization. Has been followed by neurosurgery for B/L subacute bilateral subdural hematomas which have been enlarging (R>L w midline shift and some mass effect). S/p MMA Embolization on 2/15. After procedure feceloped bilateral weakness, dysarthria. CTA showed "Similar appearance of predominately low-density extra-axial collection along the right hemisphere with resorption of the previously identified left-sided collection. Improved midline shift to the left. No acute findings." Hospital course complicated by encephalopathy (improving).     Functional History: Patient lives alone in a single story home with 2 small steps to enter.  Prior to admission, I. DME: ***.   "

## 2024-02-21 NOTE — PLAN OF CARE
[facility] submitted for auth this morning, SW escalated auth to case .    KAMAR Gonzalez, LMSW [facility]  [id number]

## 2024-02-21 NOTE — PLAN OF CARE
Goals remain appropriate  Problem: Occupational Therapy  Goal: Occupational Therapy Goal  Description: Goals set 2/16 with an expiration date, 3/16:  Patient will increase functional independence with ADLs by performing:    Patient will demonstrate rolling to the right with modified independence.  Not met   Patient will demonstrate rolling to the left with modified independence.   Not met  Patient will demonstrate supine -sit with modified independence.   Not met  Patient will demonstrate stand pivot transfers with CGA.   Not met  Patient will demonstrate grooming while standing with CGA.   Not met  Patient will demonstrate upper body dressing with SBA while seated EOB.   Not met  Patient will demonstrate lower body dressing with CGA while seated EOB.   Not met  Patient will demonstrate toileting with CGA.   Not met  Patient's family / caregiver will demonstrate independence and safety with assisting patient with self-care skills and functional mobility.     Not met          Outcome: Ongoing, Progressing

## 2024-02-21 NOTE — PT/OT/SLP PROGRESS
Physical Therapy Treatment    Patient Name:  Kanchan Downing   MRN:  [id number]    Recommendations:     Discharge Recommendations: High Intensity Therapy  Discharge Equipment Recommendations: walker, rolling  Barriers to discharge:  decreased caregiver support    Assessment:     Kanchan Downing is a 83 y.o. female admitted with a medical diagnosis of Traumatic subdural hematoma.  She presents with the following impairments/functional limitations: weakness, impaired endurance, impaired self care skills, impaired functional mobility, gait instability, impaired cognition, decreased coordination, decreased lower extremity function, impaired balance. Pt ambulated with PT to improve endurance, balance, and overall safety while using a RW. Vcing provided to address gait deficits as needed. Pt would benefit from high intensity/frequency therapy for: Dynamic/static standing/sitting balance through skilled balance training, strengthening with the use of skilled therapeutic exercises interventions, mobility and safety training to ensure safe discharge home through skilled patient and caregiver education, and mobility through adaptive equipment training. Pt highly motivated to return to modified independent PLOF and can tolerate 3+hours of therapy. Pt continues to benefit from a collaborative multidisciplinary program to improve quality of life and focus on recovery of impairments      Rehab Prognosis: Good; patient would benefit from acute skilled PT services to address these deficits and reach maximum level of function.    Recent Surgery: * No procedures listed * 5 Days Post-Op    Plan:     During this hospitalization, patient to be seen 4 x/week to address the identified rehab impairments via gait training, therapeutic activities, therapeutic exercises, neuromuscular re-education and progress toward the following goals:    Plan of Care Expires:  03/17/24    Subjective     Chief Complaint: none verbalized  Patient/Family  Comments/goals: pt reporting she climbed on top of the bedside table last night and then said she couldn't get back down (RN reported pr\t was confused last night)  Pain/Comfort:  Pain Rating 1: 0/10  Pain Rating Post-Intervention 1: 0/10      Objective:     Communicated with RN prior to session.  Patient found HOB elevated with peripheral IV, telemetry, PureWick upon PT entry to room.     General Precautions: Standard, fall, aphasia  Orthopedic Precautions: N/A  Braces: N/A  Respiratory Status: Room air     Functional Mobility:  Bed Mobility:     Scooting: stand by assistance  Supine to Sit: stand by assistance  Transfers:     Sit to Stand:  minimum assistance with rolling walker  Gait: 76 ft x2, CGA-Nathan, RW; decreased gait speed and step length, Vcing for full upright and maintaining appropriate distance from walker. Seated rest break between trials.      AM-PAC 6 CLICK MOBILITY  Turning over in bed (including adjusting bedclothes, sheets and blankets)?: 3  Sitting down on and standing up from a chair with arms (e.g., wheelchair, bedside commode, etc.): 3  Moving from lying on back to sitting on the side of the bed?: 3  Moving to and from a bed to a chair (including a wheelchair)?: 3  Need to walk in hospital room?: 3  Climbing 3-5 steps with a railing?: 2  Basic Mobility Total Score: 17       Treatment & Education:  Patient educated on role of therapy, goals of session, and benefits of mobilizing.   Discussed PT plan of care during hospitalization.   Patient educated on calling for assistance.   Patient educated on how their diagnosis impacts their mobility within PT scope of practice.   All questions answered within PT scope of practice.    Patient left up in chair with all lines intact, call button in reach, chair alarm on, sitter present, and RN notified.    GOALS:   Multidisciplinary Problems       Physical Therapy Goals          Problem: Physical Therapy    Goal Priority Disciplines Outcome Goal Variances  Interventions   Physical Therapy Goal     PT, PT/OT Ongoing, Progressing     Description: Goals to be met by: 3/1/2024     Patient will increase functional independence with mobility by performin. Supine to sit with Supervision  2. Sit to supine with Supervision  3. Rolling to Left and Right with Supervision.  4. Sit to stand transfer with Supervision with RW  5. Bed to chair transfer with Supervision using Rolling Walker  6. Gait  x 30 feet with Supervision using Rolling Walker.   7. Ascend/descend 2  small steps with no Handrails Contact Guard Assistance using Rolling Walker.   8. Lower extremity exercise program x10 reps per handout, with supervision                             Time Tracking:     PT Received On: 24  PT Start Time: 0815     PT Stop Time: 08  PT Total Time (min): 23 min     Billable Minutes: Gait Training 15 and Therapeutic Activity 8    Treatment Type: Treatment  PT/PTA: PT     Number of PTA visits since last PT visit: 0     2024

## 2024-02-21 NOTE — SUBJECTIVE & OBJECTIVE
Interval History:  No chest pain.  No headache.  Patient reports just a mild head pressure afebrile.    Review of Systems  Objective:     Vital Signs (Most Recent):  Temp: 98.8 °F (37.1 °C) (02/21/24 1527)  Pulse: 64 (02/21/24 1527)  Resp: 18 (02/21/24 1527)  BP: (!) 95/53 (02/21/24 1527)  SpO2: 96 % (02/21/24 1527) Vital Signs (24h Range):  Temp:  [97.8 °F (36.6 °C)-98.8 °F (37.1 °C)] 98.8 °F (37.1 °C)  Pulse:  [62-68] 64  Resp:  [15-18] 18  SpO2:  [96 %-99 %] 96 %  BP: ()/(44-62) 95/53     Weight: 65.8 kg (145 lb)  Body mass index is 22.05 kg/m².    Intake/Output Summary (Last 24 hours) at 2/21/2024 1636  Last data filed at 2/21/2024 0516  Gross per 24 hour   Intake --   Output 700 ml   Net -700 ml         Physical Exam      Awake alert, no acute distress   Oriented to place and self   Clear lungs bilaterally, unlabored breathing, on room air, no cyanosis   Heart sounds indicate a regular rate and rhythm  Awake alert, no distress   5/5 proximal upper and lower extremity strength bilaterally  No obvious lower extremity edema

## 2024-02-21 NOTE — HOSPITAL COURSE
2/20/24: participated w/ PT.  76 ft x2, CGA-Nathan, RW; decreased gait speed and step length, Vcing for full upright and maintaining appropriate distance from walker. Seated rest break between trials.   2/21/24: Participated w/ OT. Bed mob SV. Sit to stand CGA. Bed <> Chair Transfer using Stand Pivot technique with contact guard assistance with no assistive device. [facility] 18.

## 2024-02-21 NOTE — ASSESSMENT & PLAN NOTE
"- After procedure feceloped bilateral weakness, dysarthria.   - CTA showed "Similar appearance of predominately low-density extra-axial collection along the right hemisphere with resorption of the previously identified left-sided collection. Improved midline shift to the left. No acute findings."   - improving   "

## 2024-02-21 NOTE — SUBJECTIVE & OBJECTIVE
Interval History 2/21/2024:  Patient is seen for follow-up PM&R evaluation and recommendations: Personal sitter removed. Calm at bedside. No visual hallucinations today. Following commands and participated w/ OT this morning.     HPI, Past Medical, Family, and Social History remains the same as documented in the initial encounter.    Scheduled Medications:    atorvastatin  20 mg Oral Daily    colchicine  0.6 mg Oral Daily    FLUoxetine  10 mg Oral Daily    lisinopriL  10 mg Oral Daily    metoprolol tartrate  50 mg Oral BID    polyethylene glycol  17 g Oral Daily    QUEtiapine  25 mg Oral BID       Diagnostic Results: Labs: Reviewed    PRN Medications: acetaminophen, dextrose 10%, dextrose 10%, glucagon (human recombinant), glucose, glucose, hydrALAZINE, labetalol, melatonin, naloxone, ondansetron, prochlorperazine, senna-docusate 8.6-50 mg, sodium chloride 0.9%    Review of Systems   Constitutional:  Positive for activity change.   Musculoskeletal:  Positive for gait problem.   Neurological:  Positive for weakness.   Psychiatric/Behavioral:  Positive for behavioral problems and confusion.      Objective:     Vital Signs (Most Recent):  Temp: 98 °F (36.7 °C) (02/21/24 1109)  Pulse: 64 (02/21/24 1109)  Resp: 18 (02/21/24 1109)  BP: (!) 90/44 (02/21/24 1115)  SpO2: 96 % (02/21/24 1109)    Vital Signs (24h Range):  Temp:  [97.8 °F (36.6 °C)-98.4 °F (36.9 °C)] 98 °F (36.7 °C)  Pulse:  [60-67] 64  Resp:  [15-18] 18  SpO2:  [93 %-99 %] 96 %  BP: ()/(44-62) 90/44         Physical Exam  Vitals and nursing note reviewed.   HENT:      Nose: Nose normal.      Mouth/Throat:      Mouth: Mucous membranes are moist.   Pulmonary:      Effort: Pulmonary effort is normal. No respiratory distress.   Musculoskeletal:      Comments: Generalized weakness    Skin:     General: Skin is warm.   Neurological:      Mental Status: She is alert.      Motor: Weakness present.      Gait: Gait abnormal.      Comments: Following  commands  Intermittent Confusion, able to answer year and location  Calm    Psychiatric:         Mood and Affect: Mood normal.

## 2024-02-21 NOTE — SUBJECTIVE & OBJECTIVE
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, without long-term current use of insulin 10/31/2017    Diabetes mellitus type 2 without retinopathy 06/12/2014    6% metformin 500 bid, FU+ 2016    DVT (deep venous thrombosis) 2001    R , Dr Chad Golden 04/06/2022     Marques passed 1/2022    Hyperlipidemia     LDL 82    Hyperlipidemia associated with type 2 diabetes mellitus 09/11/2012    Hypertension     Hypertension associated with diabetes 09/11/2012    Memory loss 12/29/2022    CT chronic changes 2022    Microalbuminuria due to type 2 diabetes mellitus 12/08/2015    taking lisinopril, losartan caused olivia effects    PVD (peripheral vascular disease) with claudication 05/02/2019    Compression hose    Risk for coronary artery disease greater than 20% in next 10 years per [place] score 05/01/2018    Strabismus     Type 2 diabetes mellitus with diabetic polyneuropathy 06/12/2014    Type 2 diabetes mellitus with diabetic polyneuropathy, without long-term current use of insulin 06/12/2014     Past Surgical History:   Procedure Laterality Date    BREAST BIOPSY Right 2011    BREAST LUMPECTOMY Right 2011    [facility] REMOVAL OF NAIL BED  6/10/2015    TONSILLECTOMY       Review of patient's allergies indicates:   Allergen Reactions    Sulfa (sulfonamide antibiotics)      Other reaction(s): Rash       Scheduled Medications:    atorvastatin  20 mg Oral Daily    colchicine  0.6 mg Oral Daily    FLUoxetine  10 mg Oral Daily    lisinopriL  10 mg Oral Daily    metoprolol tartrate  50 mg Oral BID    polyethylene glycol  17 g Oral Daily    QUEtiapine   25 mg Oral BID       PRN Medications: acetaminophen, dextrose 10%, dextrose 10%, glucagon (human recombinant), glucose, glucose, hydrALAZINE, labetalol, melatonin, naloxone, ondansetron, prochlorperazine, senna-docusate 8.6-50 mg, sodium chloride 0.9%    Family History       Problem Relation (Age of Onset)    Breast cancer Sister    Cataracts Mother    Heart disease Mother (58), Father (50)    No Known Problems Brother, Maternal Aunt, Maternal Uncle, Paternal Aunt, Paternal Uncle, Maternal Grandmother, Maternal Grandfather, Paternal Grandmother, Paternal Grandfather    Thyroid disease Sister          Tobacco Use    Smoking status: Former     Current packs/day: 7.00     Average packs/day: 7.0 packs/day for 0.5 years (3.5 ttl pk-yrs)     Types: Cigarettes    Smokeless tobacco: Never   Substance and Sexual Activity    Alcohol use: No    Drug use: Never    Sexual activity: Not Currently     Review of Systems   Constitutional:  Positive for activity change.   Musculoskeletal:  Positive for gait problem.   Neurological:  Positive for weakness.   Psychiatric/Behavioral:  Positive for behavioral problems and confusion.      Objective:     Vital Signs (Most Recent):  Temp: 98 °F (36.7 °C) (02/21/24 1109)  Pulse: 64 (02/21/24 1109)  Resp: 18 (02/21/24 1109)  BP: (!) 90/44 (02/21/24 1115)  SpO2: 96 % (02/21/24 1109)    Vital Signs (24h Range):  Temp:  [97.8 °F (36.6 °C)-98.4 °F (36.9 °C)] 98 °F (36.7 °C)  Pulse:  [60-67] 64  Resp:  [15-18] 18  SpO2:  [93 %-99 %] 96 %  BP: ()/(44-62) 90/44     Body mass index is 22.05 kg/m².     Physical Exam  Vitals and nursing note reviewed.   HENT:      Nose: Nose normal.      Mouth/Throat:      Mouth: Mucous membranes are moist.   Pulmonary:      Effort: Pulmonary effort is normal. No respiratory distress.   Musculoskeletal:      Comments: Generalized weakness    Skin:     General: Skin is warm.   Neurological:      Mental Status: She is alert.      Motor: Weakness present.      Gait:  Gait abnormal.      Comments: Following commands  Visual hallucinations  Confusion present   Psychiatric:      Comments: Personal sitter               Diagnostic Results: Labs: Reviewed  ECG: Reviewed  CT: Reviewed

## 2024-02-21 NOTE — PT/OT/SLP PROGRESS
"Occupational Therapy   Treatment    Name: Kanchan Downing  MRN: [id number]  Admitting Diagnosis:  Traumatic subdural hematoma  6 Days Post-Op    Recommendations:     Discharge Recommendations: High Intensity Therapy  Discharge Equipment Recommendations:  shower chair  Barriers to discharge:  None    Assessment:     Kanchan Downing is a 83 y.o. female with a medical diagnosis of Traumatic subdural hematoma.  She presents with performance deficits affecting function are impaired functional mobility, impaired self care skills, impaired balance, impaired cognition.     Rehab Prognosis:  Good; patient would benefit from acute skilled OT services to address these deficits and reach maximum level of function.       Plan:     Patient to be seen 4 x/week to address the above listed problems via self-care/home management, therapeutic activities, therapeutic exercises, neuromuscular re-education, cognitive retraining  Plan of Care Expires: 03/16/24  Plan of Care Reviewed with: patient    Subjective     Patient:  "I am doing better but these eggs are too dry."  Pain/Comfort:  Pain Rating 1: 0/10  Pain Rating Post-Intervention 1: 0/10    Objective:     Communicated with: Nurse prior to session.  Patient found supine with peripheral IV, PureWick upon OT entry to room.    General Precautions: Standard, fall, aphasia    Orthopedic Precautions:N/A  Braces: N/A  Respiratory Status: Room air     Occupational Performance:     Bed Mobility:    Patient completed Rolling/Turning to Left with  supervision  Patient completed Rolling/Turning to Right with supervision  Patient completed Supine to Sit with stand by assistance  Patient completed Sit to Supine with stand by assistance     Functional Mobility/Transfers:  Patient completed Sit <> Stand Transfer with contact guard assistance  with  no assistive device   Patient completed Bed <> Chair Transfer using Stand Pivot technique with contact guard assistance with no assistive " device    Activities of Daily Living:  Grooming: contact guard assistance while standing  Upper Body Dressing: contact guard assistance    Lower Body Dressing: Min assist with initiating right sock     AMPAC 6 Click ADL: 18    Treatment & Education:  Patient alert and interactive throughout the session.  Able to follow 4/4 one step commands.  No visual hallucinations during this session.      Patient left supine with all lines intact, call button in reach, and bed alarm on    GOALS:   Multidisciplinary Problems       Occupational Therapy Goals          Problem: Occupational Therapy    Goal Priority Disciplines Outcome Interventions   Occupational Therapy Goal     OT, PT/OT Ongoing, Progressing    Description: Goals set 2/16 with an expiration date, 3/16:  Patient will increase functional independence with ADLs by performing:    Patient will demonstrate rolling to the right with modified independence.  Not met   Patient will demonstrate rolling to the left with modified independence.   Not met  Patient will demonstrate supine -sit with modified independence.   Not met  Patient will demonstrate stand pivot transfers with CGA.   Not met  Patient will demonstrate grooming while standing with CGA.   Not met  Patient will demonstrate upper body dressing with SBA while seated EOB.   Not met  Patient will demonstrate lower body dressing with CGA while seated EOB.   Not met  Patient will demonstrate toileting with CGA.   Not met  Patient's family / caregiver will demonstrate independence and safety with assisting patient with self-care skills and functional mobility.     Not met                               Time Tracking:     OT Date of Treatment: 02/21/24  OT Start Time: 0852  OT Stop Time: 0916  OT Total Time (min): 24 min    Billable Minutes:Self Care/Home Management 24    OT/WANDA: OT          2/21/2024

## 2024-02-22 PROCEDURE — 25000003 PHARM REV CODE 250: Mod: HCNC | Performed by: HOSPITALIST

## 2024-02-22 PROCEDURE — 97535 SELF CARE MNGMENT TRAINING: CPT | Mod: HCNC

## 2024-02-22 PROCEDURE — G0378 HOSPITAL OBSERVATION PER HR: HCPCS | Mod: HCNC

## 2024-02-22 RX ADMIN — ACETAMINOPHEN 650 MG: 325 TABLET ORAL at 06:02

## 2024-02-22 RX ADMIN — FLUOXETINE 10 MG: 10 CAPSULE ORAL at 09:02

## 2024-02-22 RX ADMIN — METOPROLOL TARTRATE 50 MG: 50 TABLET, FILM COATED ORAL at 09:02

## 2024-02-22 RX ADMIN — QUETIAPINE FUMARATE 25 MG: 25 TABLET ORAL at 09:02

## 2024-02-22 RX ADMIN — POLYETHYLENE GLYCOL 3350 17 G: 17 POWDER, FOR SOLUTION ORAL at 09:02

## 2024-02-22 RX ADMIN — ATORVASTATIN CALCIUM 20 MG: 20 TABLET, FILM COATED ORAL at 09:02

## 2024-02-22 RX ADMIN — DOCUSATE SODIUM AND SENNOSIDES 1 TABLET: 8.6; 5 TABLET, FILM COATED ORAL at 09:02

## 2024-02-22 RX ADMIN — LISINOPRIL 2.5 MG: 2.5 TABLET ORAL at 09:02

## 2024-02-22 NOTE — SUBJECTIVE & OBJECTIVE
Interval History: No acute events. Awaiting [facility]    Review of Systems  Objective:     Vital Signs (Most Recent):  Temp: 97.6 °F (36.4 °C) (02/22/24 1127)  Pulse: 60 (02/22/24 1127)  Resp: 20 (02/22/24 1127)  BP: (!) 107/56 (02/22/24 1127)  SpO2: 97 % (02/22/24 1127) Vital Signs (24h Range):  Temp:  [97.6 °F (36.4 °C)-98.8 °F (37.1 °C)] 97.6 °F (36.4 °C)  Pulse:  [60-66] 60  Resp:  [16-20] 20  SpO2:  [96 %-97 %] 97 %  BP: ()/(53-76) 107/56     Weight: 65.8 kg (145 lb)  Body mass index is 22.05 kg/m².    Intake/Output Summary (Last 24 hours) at 2/22/2024 1526  Last data filed at 2/21/2024 1809  Gross per 24 hour   Intake --   Output 950 ml   Net -950 ml         Physical Exam  Constitutional:       General: She is not in acute distress.     Appearance: She is not toxic-appearing.   Cardiovascular:      Rate and Rhythm: Normal rate and regular rhythm.      Heart sounds: No murmur heard.     No friction rub. No gallop.   Pulmonary:      Effort: Pulmonary effort is normal. No respiratory distress.      Breath sounds: Normal breath sounds.   Abdominal:      General: Abdomen is flat. There is no distension.      Palpations: Abdomen is soft.      Tenderness: There is no abdominal tenderness. There is no guarding or rebound.   Musculoskeletal:      Right lower leg: No edema.      Left lower leg: No edema.   Neurological:      Mental Status: She is alert.             Significant Labs: All pertinent labs within the past 24 hours have been reviewed.    Significant Imaging: I have reviewed all pertinent imaging results/findings within the past 24 hours.

## 2024-02-22 NOTE — PT/OT/SLP PROGRESS
Physical Therapy Treatment    Patient Name:  Kanchan Downing   MRN:  [id number]    Recommendations:     Discharge Recommendations: High Intensity Therapy  Discharge Equipment Recommendations: shower chair  Barriers to discharge: Inaccessible home and Decreased caregiver support    Assessment:     Kanchan Downing is a 83 y.o. female admitted with a medical diagnosis of Traumatic subdural hematoma.  She presents with the following impairments/functional limitations: weakness, impaired endurance, impaired self care skills, impaired functional mobility, gait instability, impaired balance, impaired cognition, decreased lower extremity function, decreased safety awareness. Pt would benefit from high intensity/frequency therapy for: Dynamic/static standing/sitting balance through skilled balance training, strengthening with the use of skilled therapeutic exercises interventions, mobility and safety training to ensure safe discharge home through skilled patient and caregiver education, and mobility through adaptive equipment training. Pt highly motivated to return to modified independent PLOF and can tolerate 3+hours of therapy. Pt continues to benefit from a collaborative multidisciplinary program to improve quality of life and focus on recovery of impairments      Rehab Prognosis: Good; patient would benefit from acute skilled PT services to address these deficits and reach maximum level of function.    Recent Surgery: * No procedures listed * 6 Days Post-Op    Plan:     During this hospitalization, patient to be seen 4 x/week to address the identified rehab impairments via gait training, therapeutic activities, therapeutic exercises, neuromuscular re-education and progress toward the following goals:    Plan of Care Expires:  03/17/24    Subjective     Chief Complaint: none verbalized  Patient/Family Comments/goals: pt happy she got to shower today  Pain/Comfort:  Pain Rating 1: 0/10  Pain Rating Post-Intervention 1:  0/10      Objective:     Communicated with RN prior to session.  Patient found HOB elevated with peripheral IV, PureWick upon PT entry to room.     General Precautions: Standard, fall, aphasia  Orthopedic Precautions: N/A  Braces: N/A  Respiratory Status: Room air     Functional Mobility:  Bed Mobility:     Scooting: minimum assistance  Supine to Sit: minimum assistance  Sit to Supine: minimum assistance  Transfers:     Sit to Stand:  minimum assistance with rolling walker  Gait: 144 ft, CGA to occassional Nathan, RW; decreased gait speed and step length, Vcing for full upright posture and maintaining appropriate distance from walker.       AM-PAC 6 CLICK MOBILITY  Turning over in bed (including adjusting bedclothes, sheets and blankets)?: 4  Sitting down on and standing up from a chair with arms (e.g., wheelchair, bedside commode, etc.): 3  Moving from lying on back to sitting on the side of the bed?: 3  Moving to and from a bed to a chair (including a wheelchair)?: 3  Need to walk in hospital room?: 3  Climbing 3-5 steps with a railing?: 3  Basic Mobility Total Score: 19       Treatment & Education:  Patient educated on role of therapy, goals of session, and benefits of mobilizing.   Discussed PT plan of care during hospitalization.   Patient educated on calling for assistance.   Patient educated on how their diagnosis impacts their mobility within PT scope of practice.   All questions answered within PT scope of practice.    Patient left HOB elevated with all lines intact, call button in reach, bed alarm on, and RN notified..    GOALS:   Multidisciplinary Problems       Physical Therapy Goals          Problem: Physical Therapy    Goal Priority Disciplines Outcome Goal Variances Interventions   Physical Therapy Goal     PT, PT/OT Ongoing, Progressing     Description: Goals to be met by: 3/1/2024     Patient will increase functional independence with mobility by performin. Supine to sit with Supervision  2.  Sit to supine with Supervision  3. Rolling to Left and Right with Supervision.  4. Sit to stand transfer with Supervision with RW  5. Bed to chair transfer with Supervision using Rolling Walker  6. Gait  x 30 feet with Supervision using Rolling Walker.   7. Ascend/descend 2  small steps with no Handrails Contact Guard Assistance using Rolling Walker.   8. Lower extremity exercise program x10 reps per handout, with supervision                             Time Tracking:     PT Received On: 02/21/24  PT Start Time: 1553     PT Stop Time: 1611  PT Total Time (min): 18 min     Billable Minutes: Gait Training 18    Treatment Type: Treatment  PT/PTA: PT     Number of PTA visits since last PT visit: 0     02/21/2024

## 2024-02-22 NOTE — PT/OT/SLP PROGRESS
"Occupational Therapy   Treatment    Name: Kanchan Downing  MRN: [id number]  Admitting Diagnosis:  Traumatic subdural hematoma  7 Days Post-Op    Recommendations:     Discharge Recommendations: High Intensity Therapy  Discharge Equipment Recommendations:  shower chair  Barriers to discharge:  None    Assessment:     Kanchan Donwing is a 83 y.o. female with a medical diagnosis of Traumatic subdural hematoma.  She presents with performance deficits affecting function are weakness, impaired self care skills, impaired functional mobility, impaired endurance, impaired cognition.     Rehab Prognosis:  Good; patient would benefit from acute skilled OT services to address these deficits and reach maximum level of function.       Plan:     Patient to be seen 4 x/week to address the above listed problems via cognitive retraining, neuromuscular re-education, therapeutic exercises, therapeutic activities, self-care/home management  Plan of Care Expires: 03/16/24  Plan of Care Reviewed with: patient    Subjective     Patient:  "My low back is killing me."  Pain/Comfort:  Pain Rating 1: 8/10  Location 1: back  Pain Addressed 1: Nurse notified, Reposition  Pain Rating Post-Intervention 1: 8/10    Objective:     Communicated with: Nurse prior to session.  Patient found supine with peripheral IV, [employer] camera monitoring, PureWick upon OT entry to room.    General Precautions: Standard, fall    Orthopedic Precautions:N/A  Braces: N/A  Respiratory Status: Room air     Occupational Performance:     Bed Mobility:    Patient completed Rolling/Turning to Left with  supervision  Patient completed Rolling/Turning to Right with supervision  Patient completed Supine to Sit with supervision  Patient completed Sit to Supine with supervision     Functional Mobility/Transfers:  Patient completed Sit <> Stand Transfer with contact guard assistance  with  rolling walker   Patient completed Bed <> Chair Transfer using Stand Pivot technique with contact " guard assistance with rolling walker    Activities of Daily Living:  Grooming: contact guard assistance while standing  Lower Body Dressing: minimum assistance to initiate right sock    AMPAC 6 Click ADL: 18    Treatment & Education:  Patient alert and oriented x 3; able to follow 4/4 one step commands.  Patient attentive and interactive throughout the session.     Patient left supine with all lines intact, call button in reach, and bed alarm on    GOALS:   Multidisciplinary Problems       Occupational Therapy Goals          Problem: Occupational Therapy    Goal Priority Disciplines Outcome Interventions   Occupational Therapy Goal     OT, PT/OT Ongoing, Progressing    Description: Goals set 2/16 with an expiration date, 3/16:  Patient will increase functional independence with ADLs by performing:    Patient will demonstrate rolling to the right with modified independence.  Not met   Patient will demonstrate rolling to the left with modified independence.   Not met  Patient will demonstrate supine -sit with modified independence.   Not met  Patient will demonstrate stand pivot transfers with CGA.   Not met  Patient will demonstrate grooming while standing with CGA.   Not met  Patient will demonstrate upper body dressing with SBA while seated EOB.   Not met  Patient will demonstrate lower body dressing with CGA while seated EOB.   Not met  Patient will demonstrate toileting with CGA.   Not met  Patient's family / caregiver will demonstrate independence and safety with assisting patient with self-care skills and functional mobility.     Not met                               Time Tracking:     OT Date of Treatment: 02/22/24  OT Start Time: 0525  OT Stop Time: 0550  OT Total Time (min): 25 min    Billable Minutes:Self Care/Home Management 25    OT/WANDA: OT          2/22/2024

## 2024-02-22 NOTE — PLAN OF CARE
BRANDI spoke with the Pt and her daughter Eusebia [phone], they stated they do not want more referrals sent, if [facility] cannot accept, they will take her home with [facility].     BRANDI spoke with Ananya Guzman LPN, at [facility], she's hoping to have a bed either later today or tomorrow. She has Pt's there who have appealed their discharge, but those appeals may fail and the Pt can be accepted. If not accepted by tomorrow, Pt will discharge home with .     BRANDI will follow.    KAMAR Gonzalez, BRANDI  [facility]  [id number]

## 2024-02-22 NOTE — PLAN OF CARE
[facility] - Neurosurgery (Hospital)      HOME HEALTH ORDERS  FACE TO FACE ENCOUNTER    Patient Name: Kanchan Downing  YOB: 1940    PCP: Viktoria Mckeon MD   PCP Address: [address]  [facility] [place] [address]  PCP Phone Number: [phone]  PCP Fax: [phone]    Encounter Date: 2/15/24    Admit to Home Health    Diagnoses:  Active Hospital Problems    Diagnosis  POA    *Traumatic subdural hematoma [S06.5XAA]  Yes    Great toe pain, right [M79.674]  Yes    Brain compression [G93.5]  Yes     Midline shift to the left measuring 5 mm is improved from the prior study.  The basal cisterns remain patent.   Repeat cth stable   s/p MMA        Depression [F32.A]  Yes     On fluoxetine       Aphasia [R47.01]  No     Aphasic post mma, improved      Acute confusional state [F05]  No     Became confused s/p MMA  Now improving       Vasogenic cerebral edema [G93.6]  Yes     Midline shift to the left measuring 5 mm is improved from the prior study.  The basal cisterns remain patent.   Repeat cth stable   s/p MMA      Hypertension associated with diabetes [E11.59, I15.2]  Yes      Resolved Hospital Problems    Diagnosis Date Resolved POA    Hyperlipidemia associated with type 2 diabetes mellitus [E11.69, E78.5] 02/17/2024 Yes       Follow Up Appointments:  Future Appointments   Date Time Provider Department Center   2/26/2024  8:45 AM Padmini Mcdaniels DPM [facility] [address]   2/28/2024 11:30 AM Venessa Patten, HUMAIRA [facility] NEUROS8 [facility]   3/7/2024  2:00 PM [facility] OI-CT2 500 LB LIMIT [facility]   3/7/2024  3:30 PM Cheryl Arizmendi PA-C [facility] NEUROS8 [facility]   3/14/2024  1:20 PM Viktoria Mckeon MD [facility]       Allergies:  Review of patient's allergies indicates:   Allergen Reactions    Sulfa (sulfonamide antibiotics)      Other reaction(s): Rash       Medications: Review discharge medications with patient and family and provide education.    Current Facility-Administered  Medications   Medication Dose Route Frequency Provider Last Rate Last Admin    acetaminophen tablet 650 mg  650 mg Oral Q4H Abdi Landry MD   650 mg at 02/22/24 0637    atorvastatin tablet 20 mg  20 mg Oral Daily Abdi Guevara MD   20 mg at 02/22/24 0900    dextrose 10% bolus 125 mL 125 mL  12.5 g Intravenous PRAbdi Fox MD        dextrose 10% bolus 250 mL 250 mL  25 g Intravenous PRAbdi Fox MD        FLUoxetine capsule 10 mg  10 mg Oral Daily Abdi Guevara MD   10 mg at 02/22/24 0900    glucagon (human recombinant) injection 1 mg  1 mg Intramuscular PRAbdi Fox MD        glucose chewable tablet 16 g  16 g Oral PRAbdi Fox MD        glucose chewable tablet 24 g  24 g Oral PRAbdi Fox MD        hydrALAZINE injection 10 mg  10 mg Intravenous Q6H PRAbdi Fox MD   10 mg at 02/18/24 2322    labetalol 20 mg/4 mL (5 mg/mL) IV syring  10 mg Intravenous Q4H PRAbdi Fox MD        lisinopriL tablet 2.5 mg  2.5 mg Oral Daily Hugo Slater MD   2.5 mg at 02/22/24 0900    melatonin tablet 6 mg  6 mg Oral Nightly PRAbdi Fox MD   6 mg at 02/18/24 2036    metoprolol tartrate (LOPRESSOR) tablet 50 mg  50 mg Oral BID Abdi Guevara MD   50 mg at 02/22/24 0900    naloxone 0.4 mg/mL injection 0.02 mg  0.02 mg Intravenous PRAbdi Fox MD        ondansetron injection 4 mg  4 mg Intravenous Q8H PRAbdi Fox MD        polyethylene glycol packet 17 g  17 g Oral BID Eddie Douglass MD   17 g at 02/22/24 0900    prochlorperazine injection Soln 5 mg  5 mg Intravenous Q6H PRAbdi Fox MD        QUEtiapine tablet 25 mg  25 mg Oral BID Hugo Slater MD   25 mg at 02/22/24 0900    senna-docusate 8.6-50 mg per tablet 1 tablet  1 tablet Oral BID Eddie Douglass MD   1 tablet at 02/22/24 0900    sodium chloride 0.9% flush 10 mL  10 mL Intravenous PRN Abid Guevara MD         Current Discharge Medication List         START taking these medications    Details   polyethylene glycol (GLYCOLAX) 17 gram PwPk Take 17 g by mouth once daily.  Refills: 0           CONTINUE these medications which have NOT CHANGED    Details   acetaminophen (TYLENOL) 325 MG tablet Take 2 tablets (650 mg total) by mouth every 6 (six) hours as needed for Pain.  Refills: 0      FLUoxetine 10 MG capsule Take 1 capsule (10 mg total) by mouth once daily.  Qty: 30 capsule, Refills: 11      metoprolol tartrate (LOPRESSOR) 50 MG tablet Take 1 tablet (50 mg total) by mouth 2 (two) times daily.  Qty: 60 tablet, Refills: 11    Comments: .      simvastatin (ZOCOR) 10 MG tablet Take 1 tablet (10 mg total) by mouth every evening.  Qty: 90 tablet, Refills: 2    Associated Diagnoses: Controlled type 2 diabetes mellitus with diabetic nephropathy, without long-term current use of insulin      blood sugar diagnostic (BLOOD GLUCOSE TEST) Strp 1 strip by Misc.(Non-Drug; Combo Route) route 2 (two) times daily.  Qty: 100 strip, Refills: 12      cetirizine (ZYRTEC) 10 MG tablet Take 10 mg by mouth once daily.      diclofenac sodium (VOLTAREN) 1 % Gel Apply 2 g topically 3 (three) times daily. To knees for pain  Qty: 150 g, Refills: 3    Associated Diagnoses: Chronic pain of both knees      flash glucose scanning reader (FREESTYLE FLOWER 2 READER) [facility] Continuous glucose reader  Qty: 1 each, Refills: 12      flash glucose sensor (FREESTYLE FLOWER 2 SENSOR) Kit Continuous glucose monitor  Qty: 1 kit, Refills: 12      lancets Misc Test tid  Qty: 100 each, Refills: 12           STOP taking these medications       amLODIPine (NORVASC) 5 MG tablet Comments:   Reason for Stopping:         lisinopriL (PRINIVIL,ZESTRIL) 5 MG tablet Comments:   Reason for Stopping:                 I have seen and examined this patient within the last 30 days. My clinical findings that support the need for the home health skilled services and home bound status are the following:no   Weakness/numbness  causing balance and gait disturbance due to Weakness/Debility making it taxing to leave home.     Diet:   cardiac diet and diabetic diet 2000 calorie    Labs:  Report Lab results to PCP.    Referrals/ Consults  Physical Therapy to evaluate and treat. Evaluate for home safety and equipment needs; Establish/upgrade home exercise program. Perform / instruct on therapeutic exercises, gait training, transfer training, and Range of Motion.  Occupational Therapy to evaluate and treat. Evaluate home environment for safety and equipment needs. Perform/Instruct on transfers, ADL training, ROM, and therapeutic exercises.    Activities:   activity as tolerated    Nursing:   Agency to admit patient within 24 hours of hospital discharge unless specified on physician order or at patient request    SN to complete comprehensive assessment including routine vital signs. Instruct on disease process and s/s of complications to report to MD. Review/verify medication list sent home with the patient at time of discharge  and instruct patient/caregiver as needed. Frequency may be adjusted depending on start of care date.     Skilled nurse to perform up to 3 visits PRN for symptoms related to diagnosis    Notify MD if SBP > 160 or < 90; DBP > 90 or < 50; HR > 120 or < 50; Temp > 101; O2 < 88%    Ok to schedule additional visits based on staff availability and patient request on consecutive days within the home health episode.    When multiple disciplines ordered:    Start of Care occurs on Sunday - Wednesday schedule remaining discipline evaluations as ordered on separate consecutive days following the start of care.    Thursday SOC -schedule subsequent evaluations Friday and Monday the following week.     Friday - Saturday SOC - schedule subsequent discipline evaluations on consecutive days starting Monday of the following week.    For all post-discharge communication and subsequent orders please contact patient's primary care physician.      Miscellaneous   Routine Skin for Bedridden Patients: Instruct patient/caregiver to apply moisture barrier cream to all skin folds and wet areas in perineal area daily and after baths and all bowel movements.    Home Health Aide:  Physical Therapy Three times weekly and Occupational Therapy Three times weekly    Wound Care Orders  no    I certify that this patient is confined to her home and needs intermittent skilled nursing care, physical therapy, and occupational therapy.

## 2024-02-22 NOTE — PROGRESS NOTES
"[facility] - Neurosurgery ([facility])  Hospital Medicine  Progress Note    Patient Name: Kanchan Downing  MRN: [id number]  Patient Class: OP- Observation   Admission Date: 2/15/2024  Length of Stay: 0 days  Attending Physician: Elbert Dinero*  Primary Care Provider: Viktoria Mckeon MD        Subjective:     Principal Problem:Traumatic subdural hematoma        HPI:  84 yo F with PMHx B/L chronic traumatic SDH, HTN, and HLD who presented to [facility] for elective MMA embolization. Pt. Has been followed by neurosurgery for B/L subacute bilateral subdural hematomas which have been enlarging (R>L w midline shift and some mass effect). Pt. Has not reported significant worsening neurologic symptoms despite the enlargement, so the patient was referred for bilateral MMA embolization in an attempt to limit the growth of the SDH without pt. Needing craniotomy. Pt. Underwent procedure today, but afterwards she developed B/L weakness, dysarthria and aphasia. CTA showed "Similar appearance of predominately low-density extra-axial collection along the right hemisphere with resorption of the previously identified left-sided collection. Improved midline shift to the left. No acute findings." Pt. Symptoms improved with time. Hospital medicine was contacted for admission for further monitoring after procedure given her acute post-procedure change.    Overview/Hospital Course:  Patient had no recurrence of any encephalopathy or neurological symptoms. Pt was given news that her son  while she was in the hospital. Had some agitation/delirium that responded to seroquel.    Interval History: No acute events. Awaiting SNF    Review of Systems  Objective:     Vital Signs (Most Recent):  Temp: 97.6 °F (36.4 °C) (24 1127)  Pulse: 60 (24 1127)  Resp: 20 (24 1127)  BP: (!) 107/56 (24 1127)  SpO2: 97 % (24 1127) Vital Signs (24h Range):  Temp:  [97.6 °F (36.4 °C)-98.8 °F (37.1 °C)] 97.6 °F (36.4 °C)  Pulse:  " [60-66] 60  Resp:  [16-20] 20  SpO2:  [96 %-97 %] 97 %  BP: ()/(53-76) 107/56     Weight: 65.8 kg (145 lb)  Body mass index is 22.05 kg/m².    Intake/Output Summary (Last 24 hours) at 2/22/2024 1526  Last data filed at 2/21/2024 1809  Gross per 24 hour   Intake --   Output 950 ml   Net -950 ml         Physical Exam  Constitutional:       General: She is not in acute distress.     Appearance: She is not toxic-appearing.   Cardiovascular:      Rate and Rhythm: Normal rate and regular rhythm.      Heart sounds: No murmur heard.     No friction rub. No gallop.   Pulmonary:      Effort: Pulmonary effort is normal. No respiratory distress.      Breath sounds: Normal breath sounds.   Abdominal:      General: Abdomen is flat. There is no distension.      Palpations: Abdomen is soft.      Tenderness: There is no abdominal tenderness. There is no guarding or rebound.   Musculoskeletal:      Right lower leg: No edema.      Left lower leg: No edema.   Neurological:      Mental Status: She is alert.             Significant Labs: All pertinent labs within the past 24 hours have been reviewed.    Significant Imaging: I have reviewed all pertinent imaging results/findings within the past 24 hours.    Assessment/Plan:      * Traumatic subdural hematoma  -Pt. S/p MMA embolization. Procedure complicated by weakness/dysarthria, now improving  -Continue to monitor with regular neurochecks. PT/OT/SLP ordered. Consider MRI if symptoms persistent/not improving as recommended by vascular neurology      Great toe pain, right  Improved; no evidence of infx  Don't see overt clinical evidence of ingrown toe nail  Outpt podiatry follow up      Acute confusional state  S/p MMA confusion  Now resolved  Now with mild intermittent delirium - possibly worsened w/ social stressor of death of son  Seroquel once EKG shows stable qtc      Aphasia  resolved      Depression  fluoxetine        Brain compression  Midline shift improving based on repeat  CT; stable  Sp MMA      Hypertension associated with diabetes  -Continue home lopressor. Pt. Currently holding amlodipine and lisinopril until PCP f/u. Consider restarting if BP remains elevated        VTE Risk Mitigation (From admission, onward)           Ordered     IP VTE HIGH RISK PATIENT  Once         02/15/24 2242     Place sequential compression device  Until discontinued         02/15/24 2242                    Discharge Planning   BRIDGETTE: 2/23/2024     Code Status: Full Code   Is the patient medically ready for discharge?: Yes    Reason for patient still in hospital (select all that apply): Pending disposition  Discharge Plan A: Rehab   Discharge Delays: None known at this time      Elbert Dinero MD  Department of Hospital Medicine   [facility] - Neurosurgery ([facility])

## 2024-02-22 NOTE — PLAN OF CARE
Problem: Adult Inpatient Plan of Care  Goal: Plan of Care Review  Outcome: Ongoing, Progressing  Goal: Patient-Specific Goal (Individualized)  Outcome: Ongoing, Progressing  Goal: Absence of Hospital-Acquired Illness or Injury  Outcome: Ongoing, Progressing  Goal: Optimal Comfort and Wellbeing  Outcome: Ongoing, Progressing  Goal: Readiness for Transition of Care  Outcome: Ongoing, Progressing     Problem: Diabetes Comorbidity  Goal: Blood Glucose Level Within Targeted Range  Outcome: Ongoing, Progressing     Problem: Fall Injury Risk  Goal: Absence of Fall and Fall-Related Injury  Outcome: Ongoing, Progressing  Intervention: Identify and Manage Contributors  Flowsheets (Taken 2/21/2024 2326)  Self-Care Promotion: independence encouraged  Medication Review/Management: medications reviewed     Problem: Fatigue  Goal: Improved Activity Tolerance  Outcome: Ongoing, Progressing     Problem: Pain Acute  Goal: Acceptable Pain Control and Functional Ability  Outcome: Ongoing, Progressing

## 2024-02-22 NOTE — PLAN OF CARE
"[facility] - Neurosurgery (Hospital)  Discharge Final Note    Primary Care Provider: Viktoria Mckeon MD    Expected Discharge Date: 2/23/2024    Final Discharge Note (most recent)       Final Note - 02/22/24 1625          Final Note    Assessment Type Final Discharge Note (P)      Anticipated Discharge Disposition Home-Health Care Svc (P)      What phone number can be called within the next 1-3 days to see how you are doing after discharge? [phone] (P)      Hospital Resources/Appts/Education Provided Provided patient/caregiver with written discharge plan information;Appointments scheduled and added to AVS (P)         Post-Acute Status    Post-Acute Authorization Home Health (P)      Home Health Status Set-up Complete/Auth obtained (P)      Coverage [employer] (P)      Patient choice form signed by patient/caregiver List from System Post-Acute Care (P)      Discharge Delays None known at this time (P)                      Patient's family decided to take her home, therefore, she is discharging home with [facility]. Pt's hospital follow up appointments  are scheduled and in the Pt's AVS.     Pt has no other post acute needs and is cleared for discharge from a case management standpoint.     2/23/24 8:30 AM  Pt was scheduled to discharge 2/22/24, SW notified today that the family thought the discharge was "too late" and wanted to pick her up today. Avoidable day charted.     KAMAR Gonzalez, LMSW [facility]  [id number]          "

## 2024-02-23 VITALS
RESPIRATION RATE: 20 BRPM | SYSTOLIC BLOOD PRESSURE: 109 MMHG | DIASTOLIC BLOOD PRESSURE: 56 MMHG | HEIGHT: 68 IN | BODY MASS INDEX: 21.98 KG/M2 | WEIGHT: 145 LBS | TEMPERATURE: 98 F | OXYGEN SATURATION: 99 % | HEART RATE: 64 BPM

## 2024-02-23 PROCEDURE — 25000003 PHARM REV CODE 250: Mod: HCNC | Performed by: HOSPITALIST

## 2024-02-23 PROCEDURE — 97535 SELF CARE MNGMENT TRAINING: CPT | Mod: HCNC

## 2024-02-23 PROCEDURE — G0378 HOSPITAL OBSERVATION PER HR: HCPCS | Mod: HCNC

## 2024-02-23 RX ADMIN — QUETIAPINE FUMARATE 25 MG: 25 TABLET ORAL at 08:02

## 2024-02-23 RX ADMIN — FLUOXETINE 10 MG: 10 CAPSULE ORAL at 08:02

## 2024-02-23 RX ADMIN — LISINOPRIL 2.5 MG: 2.5 TABLET ORAL at 08:02

## 2024-02-23 RX ADMIN — POLYETHYLENE GLYCOL 3350 17 G: 17 POWDER, FOR SOLUTION ORAL at 08:02

## 2024-02-23 RX ADMIN — DOCUSATE SODIUM AND SENNOSIDES 1 TABLET: 8.6; 5 TABLET, FILM COATED ORAL at 08:02

## 2024-02-23 RX ADMIN — ATORVASTATIN CALCIUM 20 MG: 20 TABLET, FILM COATED ORAL at 08:02

## 2024-02-23 NOTE — PLAN OF CARE
Problem: Adult Inpatient Plan of Care  Goal: Optimal Comfort and Wellbeing  Outcome: Ongoing, Progressing  Goal: Readiness for Transition of Care  Outcome: Ongoing, Progressing     Problem: Adult Inpatient Plan of Care  Goal: Optimal Comfort and Wellbeing  Outcome: Ongoing, Progressing     Problem: Adult Inpatient Plan of Care  Goal: Readiness for Transition of Care  Outcome: Ongoing, Progressing       POC reviewed with patient at the beside. Verbalization of understanding voiced. Questions and concerns addressed. Precautions maintained. Vital signs all shift. See flow sheet. Bed low and locked with call light within reach and bed alarm activated. POC ongoing.

## 2024-02-23 NOTE — PLAN OF CARE
[facility] - Neurosurgery (Hospital)  Discharge Final Note    Primary Care Provider: Viktoria Mckeon MD    Expected Discharge Date: 2/23/2024    Final Discharge Note (most recent)       Final Note - 02/23/24 1525          Final Note    Assessment Type Final Discharge Note (P)      Anticipated Discharge Disposition Home-Health Care Svc (P)      What phone number can be called within the next 1-3 days to see how you are doing after discharge? [phone] (P)      Hospital Resources/Appts/Education Provided Provided patient/caregiver with written discharge plan information;Appointments scheduled and added to AVS (P)         Post-Acute Status    Post-Acute Authorization Home Health (P)      Home Health Status Set-up Complete/Auth obtained (P)      Coverage [employer] (P)      Patient choice form signed by patient/caregiver List from System Post-Acute Care (P)      Discharge Delays None known at this time (P)                    Patient is discharging home with [facility]. Pt's hospital follow up appointments are scheduled and in the Pt's AVS.     Pt has no other post acute needs and is cleared for discharge from a case management standpoint.     KAMAR Gonzalez, JADEN  [facility]  [id number]

## 2024-02-23 NOTE — PLAN OF CARE
Pt discharged to home via wheelchair with belongings. IV removed. Discharge packet discussed with pt. All questions answered. Pt assisted safely into vehicle by nurse.

## 2024-02-23 NOTE — PT/OT/SLP PROGRESS
Speech Language Pathology Treatment    Patient Name:  Kanchan Downing   MRN:  [id number]  [id number]/[id number] A    Admitting Diagnosis: Traumatic subdural hematoma    Recommendations:                 General Recommendations:  Speech/language therapy and Cognitive-linguistic therapy  Diet recommendations:  Regular Diet - IDDSI Level 7, Liquid Diet Level: Thin liquids - IDDSI Level 0   Aspiration Precautions: Standard aspiration precautions   General Precautions: Standard, fall  Communication strategies:  go to room if call light pushed    Assessment:     Kanchan Downnig is a 83 y.o. female with an SLP diagnosis of Aphasia and Cognitive-Linguistic Impairment.      Subjective     Pt awake/alert sitting upright in bedside chair, agreeable to ST.     Pain/Comfort:       Respiratory Status: Room air    Objective:     Has the patient been evaluated by SLP for swallowing?   Yes  Keep patient NPO? No     Pt seen bedside for diet check and ongoing cognitive-linguistic therapy. Patient with discharge papers at bedside. She reports family is picking her up today to go to her son's . Patient then became understandably tearful and difficult to re-direct for remainder of ST session. Given tearful state, cognitive-linguistic therapy deferred. Patient reporting she is eager to continue ST at next level of care. SLP provided education on SLP role, recommendations and POC. All questions addressed. Patient reporting head pain and requesting Tylenol. RN notified.     Goals:   Multidisciplinary Problems       SLP Goals          Problem: SLP    Goal Priority Disciplines Outcome   SLP Goal     SLP Ongoing, Progressing   Description: Speech Language Pathology Goals  Goals expected to be met by     1. Pt will tolerate least restrictive PO diet without any overt s/sx of airway compromise.   2. Pt will complete word finding tasks with 80% acc.   3. Pt will follow complex/multi-unit directions with 80% acc.  4. Pt will complete mental flexibility  tasks with 80% acc with min assist.                                Plan:     Patient to be seen:  3 x/week   Plan of Care expires:  03/17/24  Plan of Care reviewed with:  patient   SLP Follow-Up:  Yes       Discharge recommendations:  Low Intensity Therapy   Barriers to Discharge:  None    Time Tracking:     SLP Treatment Date:   02/23/24  Speech Start Time:  1303  Speech Stop Time:  1318     Speech Total Time (min):  15 min    Billable Minutes: Education 15    02/23/2024

## 2024-02-26 ENCOUNTER — TELEPHONE (OUTPATIENT)
Dept: PRIMARY CARE CLINIC | Facility: CLINIC | Age: 84
End: 2024-02-26
Payer: MEDICARE

## 2024-02-26 NOTE — TELEPHONE ENCOUNTER
----- Message from Serge Kumar sent at 2/26/2024 11:15 AM CST -----  Contact: [phone]  1MEDICALADVICE     Patient is calling for Medical Advice regarding: questions     How long has patient had these symptoms:    Pharmacy name and phone#:    Would like response via Geofeediahart:  na back     Comments:    Call back

## 2024-02-27 ENCOUNTER — TELEPHONE (OUTPATIENT)
Dept: PRIMARY CARE CLINIC | Facility: CLINIC | Age: 84
End: 2024-02-27
Payer: MEDICARE

## 2024-02-27 NOTE — TELEPHONE ENCOUNTER
----- Message from Serge Kumar sent at 2/27/2024  2:58 PM CST -----  Contact: [facility] [place] [phone]  1MEDICALADVICE     Patient is calling for Medical Advice regarding: needs order for extend PT     How long has patient had these symptoms:    Pharmacy name and phone#:    Would like response via Alma Johnst:  n/a     Comments:    Please call her back

## 2024-02-28 ENCOUNTER — TELEPHONE (OUTPATIENT)
Dept: PRIMARY CARE CLINIC | Facility: CLINIC | Age: 84
End: 2024-02-28
Payer: MEDICARE

## 2024-02-28 ENCOUNTER — CLINICAL SUPPORT (OUTPATIENT)
Dept: NEUROSURGERY | Facility: CLINIC | Age: 84
End: 2024-02-28
Payer: MEDICARE

## 2024-02-28 DIAGNOSIS — S06.5XAA SDH (SUBDURAL HEMATOMA): Primary | ICD-10-CM

## 2024-02-28 NOTE — PROGRESS NOTES
Kanchan Esparza a 83 y.o. female here for 2 week post op wound check. Not able to speak with pt. She is not answering phone and has no vm. Called and spoke with pts dtr Nely. She stated pt is doing well. She did send a message to pcp due to pt is having intermittent confusion and hallucinations. She is calling family members telling them there are people in her house. She is hearing people talk. Dtr stated she was having issues before procedure. She is not having any weakness or problems seeing. Dtr thinks the groin is healed. I asked her to please check today in case  pt does not realize that there is anything wrong. Dtr stated she will check pt today. She will follow up with pcp for memory and hallucinations.      Surgery: Pt is POD 14 FROM 02/15/24 angiogram mma embolization by Dr. Nichols.    DME: none    Brace in Use: none    SUBJECTIVE    New Symptoms: none related to procedure      PAIN MANAGEMENT     0,       INCISION (S)    Location: right groin    Incision Status: Clean, Dry, WANDA. Edges well-approximated. No drainage or swelling noted.     PICTURE    Pt declined    INTERVENTION    Incision: None    Medication Refills Requested: None     EDUCATION    Reviewed Post Op instructions with patient.  Keep incision WANDA, no lotions, creams or bandages.  Ok to shower without direct water pressure to incision. Pat dry after shower.  Do not submerge wound in bath tub or go swimming until released by surgeon.  Take over the counter stool softner/laxative for constipation.  Call your doctor or report to the Emergency Room for any signs of infection, including: increased redness, drainage, pain or fever (temperature greater than or equal to 101.5 for 24 hours). Call doctor or go to the Emergency Room if there are any localized neurological changes; problems with speech, vision, numbness, tingling, weakness, or severe headache; or for other concerns.      All questions answered. Pt encouraged to call clinic or send  message through portal with any future concerns. Patient verbalized understanding.     FOLLOW UP    Future Appointments   Date Time Provider Department Center   2/28/2024 11:30 AM Venessa Patten, HUMAIRA [facility] NEUROS [facility] erika   3/7/2024  2:00 PM [facility] [address] LIMIT [facility]   3/7/2024  3:30 PM Cheryl Arizmendi PA-C [facility] NEUROS [facility] er[facility]   3/14/2024  1:20 PM Viktoria Mckeon MD [facility]

## 2024-02-28 NOTE — TELEPHONE ENCOUNTER
----- Message from Danuta Martinez sent at 2/28/2024  3:50 PM CST -----  Contact: alexandrea leo  [phone] juanita  1MEDICALADVICE     Patient is calling for Medical Advice regarding:pt daughter would like  a call back about a rx     How long has patient had these symptoms:    Pharmacy name and phone#:    Would like response via Matchbook:b  call back     Comments:

## 2024-03-01 ENCOUNTER — TELEPHONE (OUTPATIENT)
Dept: NEUROSURGERY | Facility: CLINIC | Age: 84
End: 2024-03-01
Payer: MEDICARE

## 2024-03-03 NOTE — DISCHARGE SUMMARY
"[facility] - Neurosurgery ([facility])  Hospital Medicine  Discharge Summary      Patient Name: Kanchan Downing  MRN: [id number]  SEAN: [id number]  Patient Class: OP- Observation  Admission Date: 2/15/2024  Hospital Length of Stay: 0 days  Discharge Date and Time: 24  Attending Physician: No att. providers found   Discharging Provider: Elbert Dinero MD  Primary Care Provider: Viktoria Mckeon MD  [facility] Medicine Team: [facility] HOSP MED  Elbert Dinero MD  Primary Care Team: [facility]    HPI:   84 yo F with PMHx B/L chronic traumatic SDH, HTN, and HLD who presented to [facility] for elective MMA embolization. Pt. Has been followed by neurosurgery for B/L subacute bilateral subdural hematomas which have been enlarging (R>L w midline shift and some mass effect). Pt. Has not reported significant worsening neurologic symptoms despite the enlargement, so the patient was referred for bilateral MMA embolization in an attempt to limit the growth of the SDH without pt. Needing craniotomy. Pt. Underwent procedure today, but afterwards she developed B/L weakness, dysarthria and aphasia. CTA showed "Similar appearance of predominately low-density extra-axial collection along the right hemisphere with resorption of the previously identified left-sided collection. Improved midline shift to the left. No acute findings." Pt. Symptoms improved with time. Hospital medicine was contacted for admission for further monitoring after procedure given her acute post-procedure change.          Hospital Course:   Patient had no recurrence of any encephalopathy or neurological symptoms. Pt was given news that her son  while she was in the hospital. Had some agitation/delirium that responded to seroquel.     Neuro  * Traumatic subdural hematoma  -Pt. S/p MMA embolization. Procedure complicated by weakness/dysarthria, now improving  -Continue to monitor with regular neurochecks. PT/OT/SLP ordered. Consider MRI if symptoms " persistent/not improving as recommended by vascular neurology  Patient deemed ready for discharge. Plan discussed with pt, who was agreeable and amenable; medications were discussed and reviewed, outpatient follow-up arranged, ER precautions were given, all questions were answered to the pt's satisfaction, and Kanchan Downing  was subsequently discharged.      Final Active Diagnoses:    Diagnosis Date Noted POA    PRINCIPAL PROBLEM:  Traumatic subdural hematoma [S06.5XAA] 01/13/2024 Yes    Great toe pain, right [M79.674] 02/17/2024 Yes    Brain compression [G93.5] 02/16/2024 Yes    Depression [F32.A] 02/16/2024 Yes    Aphasia [R47.01] 02/16/2024 No    Acute confusional state [F05] 02/16/2024 No    Vasogenic cerebral edema [G93.6] 01/17/2024 Yes    Hypertension associated with diabetes [E11.59, I15.2] 09/11/2012 Yes      Problems Resolved During this Admission:    Diagnosis Date Noted Date Resolved POA    Hyperlipidemia associated with type 2 diabetes mellitus [E11.69, E78.5] 09/11/2012 02/17/2024 Yes     Physical Exam  Constitutional:       General: She is not in acute distress.     Appearance: She is not toxic-appearing.   Cardiovascular:      Rate and Rhythm: Normal rate and regular rhythm.      Heart sounds: No murmur heard.     No friction rub. No gallop.   Pulmonary:      Effort: Pulmonary effort is normal. No respiratory distress.      Breath sounds: Normal breath sounds.   Abdominal:      General: Abdomen is flat. There is no distension.      Palpations: Abdomen is soft.      Tenderness: There is no abdominal tenderness. There is no guarding or rebound.   Musculoskeletal:      Right lower leg: No edema.      Left lower leg: No edema.   Neurological:      Mental Status: She is alert.     Discharged Condition: good    Disposition: Home or Self Care    Follow Up:    Patient Instructions:      CT Head Without Contrast   Standing Status: Future Standing Exp. Date: 02/15/25     Order Specific Question Answer  Comments   May the Radiologist modify the order per protocol to meet the clinical needs of the patient? Yes      Ambulatory referral/consult to Neurosurgery   Standing Status: Future   Referral Priority: Routine Referral Type: Consultation   Referral Reason: Specialty Services Required   Requested Specialty: Neurosurgery   Number of Visits Requested: 1     Notify your health care provider if you experience any of the following:  temperature >100.4     Notify your health care provider if you experience any of the following:  persistent nausea and vomiting or diarrhea     Notify your health care provider if you experience any of the following:  severe uncontrolled pain     Notify your health care provider if you experience any of the following:  redness, tenderness, or signs of infection (pain, swelling, redness, odor or green/yellow discharge around incision site)     Notify your health care provider if you experience any of the following:  difficulty breathing or increased cough     Notify your health care provider if you experience any of the following:  severe persistent headache     Notify your health care provider if you experience any of the following:  worsening rash     Notify your health care provider if you experience any of the following:  persistent dizziness, light-headedness, or visual disturbances     Notify your health care provider if you experience any of the following:  increased confusion or weakness     Activity as tolerated       Significant Diagnostic Studies: N/A    Pending Diagnostic Studies:       None           Medications:  Reconciled Home Medications:      Medication List        START taking these medications      polyethylene glycol 17 gram Pwpk  Commonly known as: GLYCOLAX  Take 17 g by mouth once daily.            CONTINUE taking these medications      acetaminophen 325 MG tablet  Commonly known as: TYLENOL  Take 2 tablets (650 mg total) by mouth every 6 (six) hours as needed for  Pain.     blood sugar diagnostic Strp  Commonly known as: BLOOD GLUCOSE TEST  1 strip by Misc.(Non-Drug; Combo Route) route 2 (two) times daily.     cetirizine 10 MG tablet  Commonly known as: ZYRTEC  Take 10 mg by mouth once daily.     diclofenac sodium 1 % Gel  Commonly known as: VOLTAREN  Apply 2 g topically 3 (three) times daily. To knees for pain     FLUoxetine 10 MG capsule  Take 1 capsule (10 mg total) by mouth once daily.     FREESTYLE FLOWER 2 READER Misc  Generic drug: flash glucose scanning reader  Continuous glucose reader     FREESTYLE FLOWER 2 SENSOR Kit  Generic drug: flash glucose sensor  Continuous glucose monitor     lancets Misc  Test tid     metoprolol tartrate 50 MG tablet  Commonly known as: LOPRESSOR  Take 1 tablet (50 mg total) by mouth 2 (two) times daily.     simvastatin 10 MG tablet  Commonly known as: ZOCOR  Take 1 tablet (10 mg total) by mouth every evening.            STOP taking these medications      amLODIPine 5 MG tablet  Commonly known as: NORVASC     lisinopriL 5 MG tablet  Commonly known as: PRINIVIL,ZESTRIL              Indwelling Lines/Drains at time of discharge:   Lines/Drains/Airways       None                   Time spent on the discharge of patient: 35 minutes         Elbert Dinero MD  Department of Hospital Medicine  [facility] ([facility])

## 2024-03-03 NOTE — ASSESSMENT & PLAN NOTE
-Pt. S/p MMA embolization. Procedure complicated by weakness/dysarthria, now improving  -Continue to monitor with regular neurochecks. PT/OT/SLP ordered. Consider MRI if symptoms persistent/not improving as recommended by vascular neurology  Patient deemed ready for discharge. Plan discussed with pt, who was agreeable and amenable; medications were discussed and reviewed, outpatient follow-up arranged, ER precautions were given, all questions were answered to the pt's satisfaction, and Kanchan Downing  was subsequently discharged.

## 2024-03-05 ENCOUNTER — DOCUMENT SCAN (OUTPATIENT)
Dept: HOME HEALTH SERVICES | Facility: HOSPITAL | Age: 84
End: 2024-03-05
Payer: MEDICARE

## 2024-03-06 ENCOUNTER — TELEPHONE (OUTPATIENT)
Dept: PRIMARY CARE CLINIC | Facility: CLINIC | Age: 84
End: 2024-03-06
Payer: MEDICARE

## 2024-03-06 NOTE — TELEPHONE ENCOUNTER
Pt informed paperwork was addressed to Dr Addison. Dr Addison will notify her once the paperwork ha been completed.

## 2024-03-06 NOTE — TELEPHONE ENCOUNTER
----- Message from Reena Higgins sent at 3/6/2024  2:07 PM CST -----  Contact: Pt  Eusebia leo  [phone] juanita  Pt's daughter called in regards to forms she dropped off to be filled out she would like to know if they are ready to be picked up please call and advise.

## 2024-03-07 ENCOUNTER — HOSPITAL ENCOUNTER (OUTPATIENT)
Dept: RADIOLOGY | Facility: HOSPITAL | Age: 84
Discharge: HOME | End: 2024-03-07
Attending: PHYSICIAN ASSISTANT
Payer: MEDICARE

## 2024-03-07 ENCOUNTER — OFFICE VISIT (OUTPATIENT)
Dept: NEUROSURGERY | Facility: CLINIC | Age: 84
End: 2024-03-07
Payer: MEDICARE

## 2024-03-07 ENCOUNTER — DOCUMENT SCAN (OUTPATIENT)
Dept: HOME HEALTH SERVICES | Facility: HOSPITAL | Age: 84
End: 2024-03-07
Payer: MEDICARE

## 2024-03-07 VITALS
HEART RATE: 56 BPM | HEIGHT: 68 IN | WEIGHT: 145 LBS | BODY MASS INDEX: 21.98 KG/M2 | DIASTOLIC BLOOD PRESSURE: 71 MMHG | SYSTOLIC BLOOD PRESSURE: 143 MMHG

## 2024-03-07 DIAGNOSIS — S06.5X0D TRAUMATIC SUBDURAL HEMATOMA WITHOUT LOSS OF CONSCIOUSNESS, SUBSEQUENT ENCOUNTER: Primary | ICD-10-CM

## 2024-03-07 DIAGNOSIS — I62.00 NONTRAUMATIC SUBDURAL HEMORRHAGE, UNSPECIFIED: ICD-10-CM

## 2024-03-07 PROCEDURE — 99214 OFFICE O/P EST MOD 30 MIN: CPT | Mod: HCNC,S$GLB,, | Performed by: PHYSICIAN ASSISTANT

## 2024-03-07 PROCEDURE — 1126F AMNT PAIN NOTED NONE PRSNT: CPT | Mod: HCNC,CPTII,S$GLB, | Performed by: PHYSICIAN ASSISTANT

## 2024-03-07 PROCEDURE — 99999 PR PBB SHADOW E&M-EST. PATIENT-LVL III: CPT | Mod: PBBFAC,HCNC,, | Performed by: PHYSICIAN ASSISTANT

## 2024-03-07 PROCEDURE — 3077F SYST BP >= 140 MM HG: CPT | Mod: HCNC,CPTII,S$GLB, | Performed by: PHYSICIAN ASSISTANT

## 2024-03-07 PROCEDURE — 3078F DIAST BP <80 MM HG: CPT | Mod: HCNC,CPTII,S$GLB, | Performed by: PHYSICIAN ASSISTANT

## 2024-03-07 PROCEDURE — 1159F MED LIST DOCD IN RCRD: CPT | Mod: HCNC,CPTII,S$GLB, | Performed by: PHYSICIAN ASSISTANT

## 2024-03-07 PROCEDURE — 70450 CT HEAD/BRAIN W/O DYE: CPT | Mod: TC,HCNC

## 2024-03-07 PROCEDURE — 70450 CT HEAD/BRAIN W/O DYE: CPT | Mod: 26,HCNC,, | Performed by: STUDENT IN AN ORGANIZED HEALTH CARE EDUCATION/TRAINING PROGRAM

## 2024-03-07 NOTE — PROGRESS NOTES
Neurosurgery  Established Patient    SUBJECTIVE:     History of Present Illness: Kanchan Downing is a 83 y.o. female who presents for 2 week follow up of bilateral SDH R>L. She initially presented to the ED on 1/13/24 after an unwitnessed fall at home. Repeat imaging was stable. She was eventually discharged with outpatient follow up. She is here today accompanied by her daughter and great grandson. She denies headache, vision changes, or weakness. She is no longer taking Keppra. She has continued to hold her ASA and is not sure why she was taking it in the first place.     Interval History 3/7/24: Patient presents for follow up of subdural hematoma. She underwent MMA embolization on 2/15/24. She unfortunately post procedure had acute onset aphasia and lower extremity weakness (CTA neg for large vessell occlusion) and symptoms resolved. Her stay was extended due to blood pressure control issues as well as delirium. She was eventually discharged home.     Toda she is with her daughter and reports her headaches have resolved. She has not had any new weakness, or recurrence of the aphasia. She overall is doing well.       Review of patient's allergies indicates:   Allergen Reactions    Sulfa (sulfonamide antibiotics)      Other reaction(s): Rash       Current Outpatient Medications   Medication Sig Dispense Refill    acetaminophen (TYLENOL) 325 MG tablet Take 2 tablets (650 mg total) by mouth every 6 (six) hours as needed for Pain.  0    cetirizine (ZYRTEC) 10 MG tablet Take 10 mg by mouth once daily.      diclofenac sodium (VOLTAREN) 1 % Gel Apply 2 g topically 3 (three) times daily. To knees for pain 150 g 3    flash glucose scanning reader (FREESTYLE FLOWER 2 READER) [facility] Continuous glucose reader 1 each 12    flash glucose sensor (FREESTYLE FLOWER 2 SENSOR) Kit Continuous glucose monitor 1 kit 12    FLUoxetine 10 MG capsule Take 1 capsule (10 mg total) by mouth once daily. 30 capsule 11    lancets Misc Test tid 100  each 12    metoprolol tartrate (LOPRESSOR) 50 MG tablet Take 1 tablet (50 mg total) by mouth 2 (two) times daily. 60 tablet 11    polyethylene glycol (GLYCOLAX) 17 gram PwPk Take 17 g by mouth once daily.  0    simvastatin (ZOCOR) 10 MG tablet Take 1 tablet (10 mg total) by mouth every evening. 90 tablet 2    blood sugar diagnostic (BLOOD GLUCOSE TEST) Strp 1 strip by Misc.(Non-Drug; Combo Route) route 2 (two) times daily. 100 strip 12     No current facility-administered medications for this visit.       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, without long-term current use of insulin 10/31/2017    Diabetes mellitus type 2 without retinopathy 06/12/2014    6% metformin 500 bid, FU+ 2016    DVT (deep venous thrombosis) 2001    R , Dr Chad Golden 04/06/2022     Marques passed 1/2022    Hyperlipidemia     LDL 82    Hyperlipidemia associated with type 2 diabetes mellitus 09/11/2012    Hypertension     Hypertension associated with diabetes 09/11/2012    Memory loss 12/29/2022    CT chronic changes 2022    Microalbuminuria due to type 2 diabetes mellitus 12/08/2015    taking lisinopril, losartan caused olivia effects    PVD (peripheral vascular disease) with claudication 05/02/2019    Compression hose    Risk for coronary artery disease greater than 20% in next 10 years per [place] score 05/01/2018    Strabismus     Type 2 diabetes mellitus with diabetic polyneuropathy 06/12/2014    Type 2 diabetes mellitus with diabetic polyneuropathy, without long-term current use of insulin 06/12/2014     Past Surgical History:   Procedure  Laterality Date    BREAST BIOPSY Right 2011    BREAST LUMPECTOMY Right 2011    [place] REMOVAL OF NAIL BED  6/10/2015    TONSILLECTOMY       Family History       Problem Relation (Age of Onset)    Breast cancer Sister    Cataracts Mother    Heart disease Mother (58), Father (50)    No Known Problems Brother, Maternal Aunt, Maternal Uncle, Paternal Aunt, Paternal Uncle, Maternal Grandmother, Maternal Grandfather, Paternal Grandmother, Paternal Grandfather    Thyroid disease Sister          Social History     Socioeconomic History    Marital status:    Tobacco Use    Smoking status: Former     Current packs/day: 7.00     Average packs/day: 7.0 packs/day for 0.5 years (3.5 ttl pk-yrs)     Types: Cigarettes    Smokeless tobacco: Never   Substance and Sexual Activity    Alcohol use: No    Drug use: Never    Sexual activity: Not Currently   Social History Narrative     to Marques, 3 children, nondrinker, nonsmoker, she declines colonoscopy     Social Determinants of Health     Financial Resource Strain: Low Risk  (2/16/2024)    Overall Financial Resource Strain (CARDIA)     Difficulty of Paying Living Expenses: Not hard at all   Food Insecurity: No Food Insecurity (2/16/2024)    Hunger Vital Sign     Worried About Running Out of Food in the Last Year: Never true     Ran Out of Food in the Last Year: Never true   Transportation Needs: No Transportation Needs (2/16/2024)    PRAPARE - Transportation     Lack of Transportation (Medical): No     Lack of Transportation (Non-Medical): No   Physical Activity: Inactive (2/16/2024)    Exercise Vital Sign     Days of Exercise per Week: 0 days     Minutes of Exercise per Session: 0 min   Stress: No Stress Concern Present (2/16/2024)    [place] [employer] - Occupational Stress Questionnaire     Feeling of Stress : Not at all   Social Connections: Socially Isolated (2/16/2024)    Social Connection and Isolation Panel [NHANES]     Frequency of Communication  "with Friends and Family: More than three times a week     Frequency of Social Gatherings with Friends and Family: More than three times a week     Attends Confucianist Services: Never     Active Member of Clubs or Organizations: No     Attends Club or Organization Meetings: Never     Marital Status:    Housing Stability: Low Risk  (2/16/2024)    Housing Stability Vital Sign     Unable to Pay for Housing in the Last Year: No     Number of Places Lived in the Last Year: 1     Unstable Housing in the Last Year: No       Review of Systems    OBJECTIVE:     Vital Signs  Pulse: (!) 56  BP: (!) 143/71  Pain Score: 0-No pain  Height: 5' 8" (172.7 cm)  Weight: 65.8 kg (145 lb)  Body mass index is 22.05 kg/m².    Neurosurgery Physical Exam  General: well developed, well nourished, no distress.   Head: normocephalic  Neurologic: Alert and oriented. Thought content appropriate.  GCS: Motor: 6/Verbal: 5/Eyes: 4 GCS Total: 15  Mental Status: Awake, Alert, Oriented x 4  Language: No aphasia  Speech: No dysarthria  Cranial nerves: face symmetric, tongue midline, CN II-XII grossly intact.   Eyes: pupils equal, round, reactive to light with accommodation, EOMI.   Pulmonary: normal respirations, no signs of respiratory distress  Abdomen: soft, non-distended, not tender to palpation  Skin: Skin is warm, dry and intact.  Sensory: intact to light touch throughout    Motor Strength:Moves all extremities spontaneously with good tone.  Full strength upper and lower extremities. No abnormal movements seen.     Strength  Deltoids Triceps Biceps Wrist Extension Wrist Flexion Hand    Upper: R 5/5 5/5 5/5 5/5 5/5 5/5    L 5/5 5/5 5/5 5/5 5/5 5/5     Iliopsoas Quadriceps Knee  Flexion Tibialis  anterior Gastro- cnemius EHL   Lower: R 5/5 5/5 5/5 5/5 5/5 5/5    L 5/5 5/5 5/5 5/5 5/5 5/5          Cerebellar:   Finger-to-nose: intact bilaterally   Pronator drift: absent bilaterally        Diagnostic Results:  CTH from 3/7/24 which I have " personally reviewed shows almost near resolution of the right extra axial subdural collection. No new hemorrhage.     ASSESSMENT/PLAN:     Kanchan Downing is a 83 y.o. female with resolving right subdural hematoma s/p MMA embolization 2/15/24. She is doing well without any neurological complaints or deficits on exam. She can follow up on an as needed basis. She and her daughter were educated on red flag symptoms for which to contact the clinic. They voiced understanding.

## 2024-03-14 ENCOUNTER — EXTERNAL HOME HEALTH (OUTPATIENT)
Dept: HOME HEALTH SERVICES | Facility: HOSPITAL | Age: 84
End: 2024-03-14
Payer: MEDICARE

## 2024-03-14 ENCOUNTER — OFFICE VISIT (OUTPATIENT)
Dept: PRIMARY CARE CLINIC | Facility: CLINIC | Age: 84
End: 2024-03-14
Payer: MEDICARE

## 2024-03-14 ENCOUNTER — DOCUMENT SCAN (OUTPATIENT)
Dept: HOME HEALTH SERVICES | Facility: HOSPITAL | Age: 84
End: 2024-03-14
Payer: MEDICARE

## 2024-03-14 VITALS
DIASTOLIC BLOOD PRESSURE: 70 MMHG | SYSTOLIC BLOOD PRESSURE: 132 MMHG | HEART RATE: 56 BPM | WEIGHT: 161.19 LBS | BODY MASS INDEX: 24.43 KG/M2 | OXYGEN SATURATION: 97 % | HEIGHT: 68 IN

## 2024-03-14 DIAGNOSIS — I73.9 PVD (PERIPHERAL VASCULAR DISEASE) WITH CLAUDICATION: ICD-10-CM

## 2024-03-14 DIAGNOSIS — S06.5X0D TRAUMATIC SUBDURAL HEMATOMA WITHOUT LOSS OF CONSCIOUSNESS, SUBSEQUENT ENCOUNTER: Primary | ICD-10-CM

## 2024-03-14 DIAGNOSIS — R77.0 LOW SERUM ALBUMIN: ICD-10-CM

## 2024-03-14 PROBLEM — E11.59 CONTROLLED TYPE 2 DIABETES MELLITUS WITH CIRCULATORY DISORDER, WITHOUT LONG-TERM CURRENT USE OF INSULIN: Status: RESOLVED | Noted: 2017-10-31 | Resolved: 2024-03-14

## 2024-03-14 PROCEDURE — 3288F FALL RISK ASSESSMENT DOCD: CPT | Mod: HCNC,CPTII,S$GLB, | Performed by: FAMILY MEDICINE

## 2024-03-14 PROCEDURE — 1100F PTFALLS ASSESS-DOCD GE2>/YR: CPT | Mod: HCNC,CPTII,S$GLB, | Performed by: FAMILY MEDICINE

## 2024-03-14 PROCEDURE — 1159F MED LIST DOCD IN RCRD: CPT | Mod: HCNC,CPTII,S$GLB, | Performed by: FAMILY MEDICINE

## 2024-03-14 PROCEDURE — 3078F DIAST BP <80 MM HG: CPT | Mod: HCNC,CPTII,S$GLB, | Performed by: FAMILY MEDICINE

## 2024-03-14 PROCEDURE — 99999 PR PBB SHADOW E&M-EST. PATIENT-LVL III: CPT | Mod: PBBFAC,HCNC,, | Performed by: FAMILY MEDICINE

## 2024-03-14 PROCEDURE — 99214 OFFICE O/P EST MOD 30 MIN: CPT | Mod: HCNC,S$GLB,, | Performed by: FAMILY MEDICINE

## 2024-03-14 PROCEDURE — 3075F SYST BP GE 130 - 139MM HG: CPT | Mod: HCNC,CPTII,S$GLB, | Performed by: FAMILY MEDICINE

## 2024-03-14 PROCEDURE — 1126F AMNT PAIN NOTED NONE PRSNT: CPT | Mod: HCNC,CPTII,S$GLB, | Performed by: FAMILY MEDICINE

## 2024-03-14 PROCEDURE — 1160F RVW MEDS BY RX/DR IN RCRD: CPT | Mod: HCNC,CPTII,S$GLB, | Performed by: FAMILY MEDICINE

## 2024-03-14 NOTE — PROGRESS NOTES
Assessment:     1. Traumatic subdural hematoma without loss of consciousness, subsequent encounter    2. Low serum albumin    3. PVD (peripheral vascular disease) with claudication      Plan:     Traumatic subdural hematoma (SDH)  Unwitnessed fall, Resolving right subdural hematoma s/p MMA embolization 2/15/24   Follow w FRANCIS Arizmendi  Continue HH PT, OT, nurse  My partner has [facility] paperwork for her to get assistance in the home  Family members see her daily. No steps, not driving    Low serum albumin  Increase protein intake    PVD (peripheral vascular disease) with claudication  Compression hose, continue HH PT OT    Spent 15 min filling out forms for [facility] assistance      HPI: Kanchan Downing is a 83 y.o. female with is here today for hospital follow up    Transitional Care Note    Family and/or Caretaker present at visit?  Yes.  Diagnostic tests reviewed/disposition: No diagnosic tests pending after this hospitalization.  Disease/illness education: done  Home health/community services discussion/referrals: Patient has home health established at home .   Establishment or re-establishment of referral orders for community resources: No other necessary community resources.   Discussion with other health care providers: No discussion with other health care providers necessary.       Hospitalized 1/13 - 17 for traumatic Subdural hematoma. Extubated & discharged home w Keppra     Hospitalized 2/15 - 2/23 for MMA embolization    Has home health , thin, weak, unstable, recurrent falls, using rolling walker, needs assitance w walking, toileting, feeding, medicines. Has lost weight. Decreased appetite. She does have urine incontinence, wear depends. Lives alone & wants to continue this. Her daughter comes by once a day.     Currently Denies chest pain, shortness of breath    Current Outpatient Medications   Medication Instructions    acetaminophen (TYLENOL) 650 mg, Oral, Every 6 hours PRN    blood sugar diagnostic (BLOOD  GLUCOSE TEST) Strp 1 strip, Misc.(Non-Drug; Combo Route), 2 times daily    cetirizine (ZYRTEC) 10 mg, Oral, Daily,      FLUoxetine 10 mg, Oral, Daily    lancets Misc Test tid    metoprolol tartrate (LOPRESSOR) 50 mg, Oral, 2 times daily    polyethylene glycol (GLYCOLAX) 17 g, Oral, Daily    simvastatin (ZOCOR) 10 mg, Oral, Nightly       Lab Results   Component Value Date    HGBA1C 5.7 (H) 01/13/2024    HGBA1C 5.8 (H) 03/31/2022    HGBA1C 6.3 (H) 12/02/2021     Lab Results   Component Value Date    MICALBCREAT 22.4 06/09/2020     Lab Results   Component Value Date    LDLCALC 144.0 01/13/2024    LDLCALC 107.4 03/31/2022    CHOL 218 (H) 01/13/2024    HDL 62 01/13/2024    TRIG 60 01/13/2024       Lab Results   Component Value Date     02/21/2024    K 4.3 02/21/2024     02/21/2024    CO2 22 (L) 02/21/2024     02/21/2024    BUN 17 02/21/2024    CREATININE 0.8 02/21/2024    CALCIUM 9.7 02/21/2024    PROT 6.1 02/18/2024    ALBUMIN 2.9 (L) 02/18/2024    BILITOT 0.5 02/18/2024    ALKPHOS 53 (L) 02/18/2024    AST 9 (L) 02/18/2024    ALT 6 (L) 02/18/2024    ANIONGAP 8 02/21/2024    ESTGFRAFRICA >60.0 03/31/2022    EGFRNONAA >60.0 03/31/2022    WBC 5.69 02/18/2024    HGB 11.7 (L) 02/18/2024    HGB 11.2 (L) 02/17/2024    HCT 36.2 (L) 02/18/2024    MCV 91 02/18/2024     02/18/2024    TSH 2.483 01/13/2024    HEPCAB Non-reactive 01/13/2024       Lab Results   Component Value Date    [id number] 41 05/08/2014         Past Medical History:   Diagnosis Date    Anxiety     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    Atherosclerosis of aorta 06/08/2016    CXR 2013    Breast cancer 2011    right breast invasive micropapillary CA, Stage !A    DVT (deep venous thrombosis) 2001    R , Dr Bonilla    Grief 04/06/2022     Marques passed 1/2022    Hyperlipidemia     LDL 82    Hypertension     Memory loss 12/29/2022    CT chronic changes 2022    PVD (peripheral vascular disease) with claudication 05/02/2019    Compression hose     "Traumatic subdural hematoma (SDH) 01/22/2024     Past Surgical History:   Procedure Laterality Date    BREAST BIOPSY Right 2011    BREAST LUMPECTOMY Right 2011    [place] REMOVAL OF NAIL BED  6/10/2015    TONSILLECTOMY       Vitals:    03/14/24 1352 03/14/24 1408   BP: (!) 144/80 132/70   Pulse: (!) 56    SpO2: 97%    Weight: 73.1 kg (161 lb 2.5 oz)    Height: 5' 8" (1.727 m)    PainSc: 0-No pain      Wt Readings from Last 5 Encounters:   03/14/24 73.1 kg (161 lb 2.5 oz)   03/07/24 65.8 kg (145 lb)   02/20/24 65.8 kg (145 lb)   02/06/24 69.5 kg (153 lb 3.5 oz)   01/28/24 70.4 kg (155 lb 3.3 oz)     Objective:   Physical Exam  Constitutional:       Appearance: She is well-developed.      Comments: Rolling walker, moving slow   Eyes:      Pupils: Pupils are equal, round, and reactive to light.   Cardiovascular:      Rate and Rhythm: Normal rate and regular rhythm.      Heart sounds: Normal heart sounds. No murmur heard.  Pulmonary:      Effort: Pulmonary effort is normal.      Breath sounds: Normal breath sounds. No wheezing.   Abdominal:      General: Bowel sounds are normal. There is no distension.      Palpations: Abdomen is soft. There is no mass.      Tenderness: There is no abdominal tenderness. There is no guarding or rebound.   Musculoskeletal:      Cervical back: Neck supple.      Right lower leg: Edema present.      Left lower leg: Edema present.      Comments: 1+ trace edema bilateral, no erythema or warmth   Skin:     General: Skin is warm and dry.   Neurological:      Mental Status: She is alert.   Psychiatric:         Behavior: Behavior normal.                                     "

## 2024-03-14 NOTE — ASSESSMENT & PLAN NOTE
Unwitnessed fall, Resolving right subdural hematoma s/p MMA embolization 2/15/24   Follow w FRANCIS Arizmendi  Continue HH PT, OT, nurse  My partner has [facility] paperwork for her to get assistance in the home  Family members see her daily. No steps, not driving

## 2024-03-20 ENCOUNTER — DOCUMENT SCAN (OUTPATIENT)
Dept: HOME HEALTH SERVICES | Facility: HOSPITAL | Age: 84
End: 2024-03-20
Payer: MEDICARE

## 2024-03-21 ENCOUNTER — DOCUMENT SCAN (OUTPATIENT)
Dept: HOME HEALTH SERVICES | Facility: HOSPITAL | Age: 84
End: 2024-03-21
Payer: MEDICARE

## 2024-03-22 ENCOUNTER — DOCUMENT SCAN (OUTPATIENT)
Dept: HOME HEALTH SERVICES | Facility: HOSPITAL | Age: 84
End: 2024-03-22
Payer: MEDICARE

## 2024-03-27 DIAGNOSIS — M25.562 PAIN IN BOTH KNEES, UNSPECIFIED CHRONICITY: Primary | ICD-10-CM

## 2024-03-27 DIAGNOSIS — M25.561 PAIN IN BOTH KNEES, UNSPECIFIED CHRONICITY: Primary | ICD-10-CM

## 2024-04-02 ENCOUNTER — HOSPITAL ENCOUNTER (OUTPATIENT)
Dept: RADIOLOGY | Facility: HOSPITAL | Age: 84
Discharge: HOME | End: 2024-04-02
Attending: NURSE PRACTITIONER
Payer: MEDICARE

## 2024-04-02 ENCOUNTER — OFFICE VISIT (OUTPATIENT)
Dept: ORTHOPEDICS | Facility: CLINIC | Age: 84
End: 2024-04-02
Payer: MEDICARE

## 2024-04-02 VITALS — BODY MASS INDEX: 24.43 KG/M2 | HEIGHT: 68 IN | WEIGHT: 161.19 LBS

## 2024-04-02 DIAGNOSIS — R60.0 LEG EDEMA, RIGHT: Primary | ICD-10-CM

## 2024-04-02 DIAGNOSIS — M17.0 PRIMARY OSTEOARTHRITIS OF BOTH KNEES: ICD-10-CM

## 2024-04-02 DIAGNOSIS — M25.562 PAIN IN BOTH KNEES, UNSPECIFIED CHRONICITY: ICD-10-CM

## 2024-04-02 DIAGNOSIS — M25.561 PAIN IN BOTH KNEES, UNSPECIFIED CHRONICITY: ICD-10-CM

## 2024-04-02 DIAGNOSIS — R60.0 LEG EDEMA, RIGHT: ICD-10-CM

## 2024-04-02 PROCEDURE — 99999 PR PBB SHADOW E&M-EST. PATIENT-LVL III: CPT | Mod: PBBFAC,,, | Performed by: NURSE PRACTITIONER

## 2024-04-02 PROCEDURE — 99213 OFFICE O/P EST LOW 20 MIN: CPT | Mod: S$GLB,,, | Performed by: NURSE PRACTITIONER

## 2024-04-02 PROCEDURE — 1101F PT FALLS ASSESS-DOCD LE1/YR: CPT | Mod: CPTII,S$GLB,, | Performed by: NURSE PRACTITIONER

## 2024-04-02 PROCEDURE — 1159F MED LIST DOCD IN RCRD: CPT | Mod: CPTII,S$GLB,, | Performed by: NURSE PRACTITIONER

## 2024-04-02 PROCEDURE — 93971 EXTREMITY STUDY: CPT | Mod: TC,RT

## 2024-04-02 PROCEDURE — 1125F AMNT PAIN NOTED PAIN PRSNT: CPT | Mod: CPTII,S$GLB,, | Performed by: NURSE PRACTITIONER

## 2024-04-02 PROCEDURE — 93971 EXTREMITY STUDY: CPT | Mod: 26,RT,, | Performed by: RADIOLOGY

## 2024-04-02 PROCEDURE — 1160F RVW MEDS BY RX/DR IN RCRD: CPT | Mod: CPTII,S$GLB,, | Performed by: NURSE PRACTITIONER

## 2024-04-02 PROCEDURE — 3288F FALL RISK ASSESSMENT DOCD: CPT | Mod: CPTII,S$GLB,, | Performed by: NURSE PRACTITIONER

## 2024-04-02 NOTE — PROGRESS NOTES
"CC: Pain of the Left Knee and Pain of the Right Knee      HPI: Pt with c/o bilateral knee pain right>left for many years. She has difficulty getting all of the timelines correct, but it seems that she has had "rooster comb" injections in the past with "excellent relief". She says that she went to a fat male, ortho doctor [place] with her daughter's mother in law and was given a "pain shot", but her daughter says that it was a gel injection. The patient states that she barely got to the car before she had to turn around and go back for a severe reaction and they had to remove all of the pain shot which they made behind a curtain in the room. I am only able to identify one instance of her getting synvisc injections from FRANCIS Melendez in 2019. She also c/o swelling in the right leg and states that they wouldn't give her a fluid pill for the swelling and that she thinks it may be related to some crabs she ate this past weekend. She says that the swelling appeared 4 days ago. Her daughter says that she didn't show the swelling to her pcp when she went to see her. She does seem to understand that the gel injections need to be approved by insurance.   The pain in her knees is aching and worse with activity . She uses a 4 wheeled walker to get around.     ROS  General: denies fever and chills  Resp: no c/o sob  CVS: no c/o cp  MSK: c/o bilateral knee pain    PE  General: AAOx3, pleasant and cooperative  Resp: respirations even and unlabored  MSK: right knee exam  0 degrees extension  90 degrees flexion  No warmth or erythema   There is 2-3 + pitting edema noted through the whole leg  She describes increased tenderness behind the knee    Left knee exm  0 degrees extension  110 degrees flexion  No warmth or erythema  - effusion  No edema  + crepitus  + tenderness over the medial joint line  Sleeve knee brace in use for stability    Xray:  Reviewed by me with the patient and her daughter: there are significant " degenerative changes and joint space narrowing noted most predominantly medially and patellofemoral    Assessment:  Right leg edema- r/o dvt  Bilateral knee djd    Plan:  Ultrasound today to evaluate for dvt  RICE  Tylenol  Synvisc one once approved by insurance- discussed with her and her daughter and they would prefer to try coming once rather than weekly x3

## 2024-04-09 ENCOUNTER — HOME CARE VISIT (OUTPATIENT)
Dept: NEUROLOGY | Facility: HOSPITAL | Age: 84
End: 2024-04-09
Payer: MEDICARE

## 2024-04-10 ENCOUNTER — TELEPHONE (OUTPATIENT)
Dept: PRIMARY CARE CLINIC | Facility: CLINIC | Age: 84
End: 2024-04-10
Payer: MEDICARE

## 2024-04-10 NOTE — TELEPHONE ENCOUNTER
----- Message from Danuta Martinez sent at 4/10/2024  4:11 PM CDT -----  1MEDICALADVICE     Patient is calling for Medical Advice regarding: pt  daughter would like a call back about some rx    How long has patient had these symptoms:    Pharmacy name and phone#:    Would like response via [facility]: call back [phone]  Lexi leo pr daughter     Comments:

## 2024-04-10 NOTE — TELEPHONE ENCOUNTER
Pt daughter states that the home health nurse spoke with you about recommending a low dose dieretic for Ms Downing but you didn't specify what you wanted. Please advise.

## 2024-04-11 DIAGNOSIS — M79.89 LEG SWELLING: Primary | ICD-10-CM

## 2024-04-11 RX ORDER — HYDROCHLOROTHIAZIDE 25 MG/1
25 TABLET ORAL DAILY
Qty: 90 TABLET | Refills: 0 | Status: SHIPPED | OUTPATIENT
Start: 2024-04-11 | End: 2025-04-11

## 2024-04-11 NOTE — TELEPHONE ENCOUNTER
Please let her know I sent HCTZ 25 daily     I sent a mgs & asked her home health nurse to check lab on this diuretic after a week or so to test her kidney function (diuretics work in the kidney)    & Virtual appt w me a few days after to discuss results & leg swelling

## 2024-04-11 NOTE — TELEPHONE ENCOUNTER
SW patients daughter to clarify Dr Mckeon approved a new diuretic med.and informed her Dr Mckeon sent a msg to home  health nurse to test her for kidney function and scheduled pt  virtual appt.

## 2024-04-17 ENCOUNTER — OFFICE VISIT (OUTPATIENT)
Dept: ORTHOPEDICS | Facility: CLINIC | Age: 84
End: 2024-04-17
Payer: MEDICARE

## 2024-04-17 DIAGNOSIS — M17.0 PRIMARY OSTEOARTHRITIS OF BOTH KNEES: ICD-10-CM

## 2024-04-17 DIAGNOSIS — M17.11 PRIMARY OSTEOARTHRITIS OF RIGHT KNEE: Primary | ICD-10-CM

## 2024-04-17 PROCEDURE — 99499 UNLISTED E&M SERVICE: CPT | Mod: S$GLB,,, | Performed by: NURSE PRACTITIONER

## 2024-04-17 PROCEDURE — 99999 PR PBB SHADOW E&M-EST. PATIENT-LVL III: CPT | Mod: PBBFAC,,, | Performed by: NURSE PRACTITIONER

## 2024-04-17 PROCEDURE — 20610 DRAIN/INJ JOINT/BURSA W/O US: CPT | Mod: 50,S$GLB,, | Performed by: NURSE PRACTITIONER

## 2024-04-17 NOTE — PROGRESS NOTES
Kanchan Downing presents to clinic today for the right knee Synvisc-One injection.     Exam demonstrates the no effusion in the right knee, and the skin is intact.    Radiographs reveal degenerative changes of knee    Diagnosis: primary osteoarthritis of both knees     After time out was performed and patient ID, side, and site were verified, the right knee was sterilly prepped in the standard fashion.  Verbal consent obtained. A 25-gauge needle was introduced into the left knee joint from an charlene-lateral site without complication. The left knee was then injected with 6 ml of Synvisc-One. Sterile dressing was applied.  The patient was instructed to resume activities as tolerated and to call with any problems.     Patient will return as needed

## 2024-04-24 ENCOUNTER — OFFICE VISIT (OUTPATIENT)
Dept: PRIMARY CARE CLINIC | Facility: CLINIC | Age: 84
End: 2024-04-24
Payer: MEDICARE

## 2024-04-24 DIAGNOSIS — I73.9 PVD (PERIPHERAL VASCULAR DISEASE) WITH CLAUDICATION: Primary | ICD-10-CM

## 2024-04-24 DIAGNOSIS — E11.21 CONTROLLED TYPE 2 DIABETES MELLITUS WITH DIABETIC NEPHROPATHY, WITHOUT LONG-TERM CURRENT USE OF INSULIN: ICD-10-CM

## 2024-04-24 DIAGNOSIS — I82.592 CHRONIC EMBOLISM AND THROMBOSIS OF OTHER SPECIFIED DEEP VEIN OF LEFT LOWER EXTREMITY: ICD-10-CM

## 2024-04-24 PROBLEM — R52 PAIN: Status: RESOLVED | Noted: 2024-01-17 | Resolved: 2024-04-24

## 2024-04-24 PROBLEM — F05 ACUTE CONFUSIONAL STATE: Status: RESOLVED | Noted: 2024-02-16 | Resolved: 2024-04-24

## 2024-04-24 PROCEDURE — 1160F RVW MEDS BY RX/DR IN RCRD: CPT | Mod: HCNC,CPTII,95, | Performed by: FAMILY MEDICINE

## 2024-04-24 PROCEDURE — 1159F MED LIST DOCD IN RCRD: CPT | Mod: HCNC,CPTII,95, | Performed by: FAMILY MEDICINE

## 2024-04-24 PROCEDURE — 99213 OFFICE O/P EST LOW 20 MIN: CPT | Mod: HCNC,95,, | Performed by: FAMILY MEDICINE

## 2024-04-24 NOTE — TELEPHONE ENCOUNTER
----- Message from Bhupendrawes Mccoy sent at 4/24/2024  3:48 PM CDT -----  Contact: self [phone]  Requesting an RX refill or new RX.    Is this a refill or new RX: refill    RX name and strength (copy/paste from chart):  metoprolol tartrate (LOPRESSOR) 50 MG tablet,FLUoxetine 10 MG capsule,simvastatin (ZOCOR) 10 MG tablet     Is this a 30 day or 90 day RX: 90    Pharmacy name and phone # (copy/paste from chart):      [id number] [address] [facility] [address]  [address]  [facility] [address]  Phone: [phone] Fax: [phone]    Call back from office when done

## 2024-04-24 NOTE — TELEPHONE ENCOUNTER
No care due was identified.  [facility] Embedded Care Due Messages. Reference number: [id number].   4/24/2024 4:26:27 PM CDT

## 2024-04-24 NOTE — PROGRESS NOTES
Assessment:     1. PVD (peripheral vascular disease) with claudication    2. 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 I showed her the picture of the device on [employer] & they will look into it.     I sent new med diuretic HCTZ 25 mg daily. Not taking daily since it makes her urinate frequently.     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 female with is here today for leg swelling    The patient location is: home  The chief complaint leading to consultation is: leg swelling    Visit type: audiovisual    Face to Face time with patient: 13  15 minutes of total time spent on the encounter, which includes face to face time and non-face to face time preparing to see the patient (eg, review of tests), Obtaining and/or reviewing separately obtained history, Documenting clinical information in the electronic or other health record, Independently interpreting results (not separately reported) and communicating results to the patient/family/caregiver, or Care coordination (not separately reported).         Each patient to whom he or she provides medical services by telemedicine is:  (1) informed of the relationship between the physician and patient and the respective role of any other health care provider with respect to management of the patient; and (2) notified that he or she may decline to receive medical services by telemedicine and may withdraw from such care at any time.    Notes:       Denies chest pain, shortness of breath    Current Outpatient Medications   Medication Instructions    acetaminophen  (TYLENOL) 650 mg, Oral, Every 6 hours PRN    blood sugar diagnostic (BLOOD GLUCOSE TEST) Strp 1 strip, Misc.(Non-Drug; Combo Route), 2 times daily    cetirizine (ZYRTEC) 10 mg, Oral, Daily,      FLUoxetine 10 mg, Oral, Daily    hydroCHLOROthiazide (HYDRODIURIL) 25 mg, Oral, Daily    lancets Misc Test tid    metoprolol tartrate (LOPRESSOR) 50 mg, Oral, 2 times daily    polyethylene glycol (GLYCOLAX) 17 g, Oral, Daily    simvastatin (ZOCOR) 10 mg, Oral, Nightly       Lab Results   Component Value Date    HGBA1C 5.7 (H) 01/13/2024    HGBA1C 5.8 (H) 03/31/2022    HGBA1C 6.3 (H) 12/02/2021     Lab Results   Component Value Date    MICALBCREAT 22.4 06/09/2020     Lab Results   Component Value Date    LDLCALC 144.0 01/13/2024    LDLCALC 107.4 03/31/2022    CHOL 218 (H) 01/13/2024    HDL 62 01/13/2024    TRIG 60 01/13/2024       Lab Results   Component Value Date     02/21/2024    K 4.3 02/21/2024     02/21/2024    CO2 22 (L) 02/21/2024     02/21/2024    BUN 17 02/21/2024    CREATININE 0.8 02/21/2024    CALCIUM 9.7 02/21/2024    PROT 6.1 02/18/2024    ALBUMIN 2.9 (L) 02/18/2024    BILITOT 0.5 02/18/2024    ALKPHOS 53 (L) 02/18/2024    AST 9 (L) 02/18/2024    ALT 6 (L) 02/18/2024    ANIONGAP 8 02/21/2024    ESTGFRAFRICA >60.0 03/31/2022    EGFRNONAA >60.0 03/31/2022    WBC 5.69 02/18/2024    HGB 11.7 (L) 02/18/2024    HGB 11.2 (L) 02/17/2024    HCT 36.2 (L) 02/18/2024    MCV 91 02/18/2024     02/18/2024    TSH 2.483 01/13/2024    HEPCAB Non-reactive 01/13/2024       Lab Results   Component Value Date    [id number] 41 05/08/2014         Past Medical History:   Diagnosis Date    Anxiety     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    Atherosclerosis of aorta 06/08/2016    CXR 2013    Breast cancer 2011    right breast invasive micropapillary CA, Stage !A    DVT (deep venous thrombosis) 2001    R , Dr Bonilla    Grief 04/06/2022     Marques passed 1/2022    Hyperlipidemia     LDL 82    Hypertension     Memory  loss 12/29/2022    CT chronic changes 2022    PVD (peripheral vascular disease) with claudication 05/02/2019    Compression hose    Traumatic subdural hematoma (SDH) 01/22/2024     Past Surgical History:   Procedure Laterality Date    BREAST BIOPSY Right 2011    BREAST LUMPECTOMY Right 2011    [place] REMOVAL OF NAIL BED  6/10/2015    TONSILLECTOMY       There were no vitals filed for this visit.  Wt Readings from Last 5 Encounters:   04/02/24 73.1 kg (161 lb 2.5 oz)   03/14/24 73.1 kg (161 lb 2.5 oz)   03/07/24 65.8 kg (145 lb)   02/20/24 65.8 kg (145 lb)   02/06/24 69.5 kg (153 lb 3.5 oz)     Objective:   Physical Exam

## 2024-04-24 NOTE — ASSESSMENT & PLAN NOTE
Compression hose to difficult to put on bc too tight, continue HH PT OT    I recommend compression hose Aid device. I showed her the picture of the device on [employer] & they will look into it.     I sent new med diuretic HCTZ 25 mg daily. Not taking daily since it makes her urinate frequently.     If she feels she needs to take it daily, then I'll recheck CMP

## 2024-04-25 RX ORDER — FLUOXETINE 10 MG/1
10 CAPSULE ORAL DAILY
Qty: 30 CAPSULE | Refills: 10 | Status: SHIPPED | OUTPATIENT
Start: 2024-04-25 | End: 2025-04-25

## 2024-04-25 RX ORDER — SIMVASTATIN 10 MG/1
10 TABLET, FILM COATED ORAL NIGHTLY
Qty: 90 TABLET | Refills: 2 | Status: SHIPPED | OUTPATIENT
Start: 2024-04-25 | End: 2025-01-20

## 2024-04-25 RX ORDER — METOPROLOL TARTRATE 50 MG/1
50 TABLET ORAL 2 TIMES DAILY
Qty: 60 TABLET | Refills: 10 | Status: SHIPPED | OUTPATIENT
Start: 2024-04-25 | End: 2025-04-25

## 2024-04-29 ENCOUNTER — TELEPHONE (OUTPATIENT)
Dept: PRIMARY CARE CLINIC | Facility: CLINIC | Age: 84
End: 2024-04-29
Payer: MEDICARE

## 2024-04-29 DIAGNOSIS — R41.3 MEMORY LOSS: Primary | ICD-10-CM

## 2024-04-29 NOTE — TELEPHONE ENCOUNTER
----- Message from Karla Tinoco sent at 4/29/2024  2:14 PM CDT -----  Contact: [phone] Pts daughter  Patient would like to get a referral.  Referral to what specialty:  Neurology  Does the patient want the referral with a specific physician:  No  Is the specialist an [facility]:    Reason (be specific):  Short term memory loss  Does the patient already have the specialty clinic appointment scheduled:  No  If yes, what date is the appointment scheduled:     Is the insurance listed in Epic correct? (this is important for a referral):  yes  Advised patient that once provider approves this either a nurse or  will return their call?: Yes  Would the patient like a call back, or a response through their [facility] portal?:   Call Back Please. Thank you.  Comments:

## 2024-05-01 VITALS — OXYGEN SATURATION: 96 % | HEART RATE: 91 BPM | SYSTOLIC BLOOD PRESSURE: 140 MMHG | DIASTOLIC BLOOD PRESSURE: 80 MMHG

## 2024-05-01 NOTE — PROGRESS NOTES
Pleasant 82 yo F greeted at front door and walked back to her living room to her recliner. She uses a rollator for support. She is awake, alert and oriented. She states she feels she has not residual from CVA, and feels she is recovered. Post CVA education provided. Educated on fall prevention and safety precautions. Pt states her only complaint is swelling of BLE. 2-3+ pitting edema noted to RLE and behind right knee, 1+ pitting edema to LLE. Pt has no c/o pain or numbness. Education provided on nutrition, limiting sodium intake, monitoring PO fluid intake. Educated to elevate BLE when lying and sitting. She states edema improves with BLE elevation but, returns when she walks around. I called Dr. Mckeon to inform her of pts c/o increased edema. Dr Mckeon ordered new diuretic and labwork 1 week after starting new diuretic. I educated pt and called pts daughter and instructed on diuretic use, s/s dehydration, and lab testing to be done at lab in one week after starting new med. Pt and daughter verbalized understanding

## 2024-05-09 ENCOUNTER — HOME CARE VISIT (OUTPATIENT)
Dept: NEUROLOGY | Facility: HOSPITAL | Age: 84
End: 2024-05-09
Payer: MEDICARE

## 2024-05-13 RX ORDER — FLUOXETINE 10 MG/1
10 CAPSULE ORAL DAILY
Qty: 30 CAPSULE | Refills: 10 | Status: CANCELLED | OUTPATIENT
Start: 2024-05-13 | End: 2025-05-13

## 2024-05-13 NOTE — TELEPHONE ENCOUNTER
No care due was identified.  [facility] Embedded Care Due Messages. Reference number: [id number].   5/13/2024 4:40:41 PM CDT

## 2024-05-14 ENCOUNTER — TELEPHONE (OUTPATIENT)
Dept: PRIMARY CARE CLINIC | Facility: CLINIC | Age: 84
End: 2024-05-14
Payer: MEDICARE

## 2024-05-14 NOTE — TELEPHONE ENCOUNTER
----- Message from Yolie Alcaraz sent at 5/14/2024 12:23 PM CDT -----  Contact: [phone]  [email or web address]     Patient is calling for Medical Advice regarding:medicine that is being denied     How long has patient had these symptoms:    Pharmacy name and phone#:    Would like response via [employer]:  no     Comments:  Pts daughter is stating that this is being denied on the portal    I do see that it was sent to [facility] on April 25th 2024 and she shows 10 refills but the daughter states the portal is stating the medication is denied

## 2024-05-29 VITALS
HEART RATE: 91 BPM | SYSTOLIC BLOOD PRESSURE: 140 MMHG | DIASTOLIC BLOOD PRESSURE: 70 MMHG | OXYGEN SATURATION: 98 % | RESPIRATION RATE: 18 BRPM

## 2024-05-29 NOTE — PROGRESS NOTES
I was greeted at door, pt using her rollator. She is steady, awake, alert and oriented x3. She states she remembers me from last home visit and recanted our conversations from that time. She states she is progressing well. Edema to BLE appears to be improved. Pt states she is doing well on new diuretic and she has been elevating BLE which helps to retard edema. VSS NAD. She states she get mildly SANTIAGO occasionally. I educated on diet and all meds, fall prevention and safety precautions. She verbalized understanding.

## 2024-06-06 ENCOUNTER — HOME CARE VISIT (OUTPATIENT)
Dept: NEUROLOGY | Facility: HOSPITAL | Age: 84
End: 2024-06-06
Payer: MEDICARE

## 2024-06-27 VITALS
HEART RATE: 90 BPM | DIASTOLIC BLOOD PRESSURE: 80 MMHG | RESPIRATION RATE: 20 BRPM | SYSTOLIC BLOOD PRESSURE: 150 MMHG | OXYGEN SATURATION: 98 %

## 2024-06-27 NOTE — PROGRESS NOTES
Greeted at door by patient, awake , alert, oriented. Ambulates with rollator. No reported falls or new s/s. Pt is progressing well. 2+ edema to BLE , educated to elevate BLE when sitting and lying. Educated on fall prevention and safety precautions. She verbalized understanding

## 2024-07-11 ENCOUNTER — HOME CARE VISIT (OUTPATIENT)
Dept: NEUROLOGY | Facility: HOSPITAL | Age: 84
End: 2024-07-11
Payer: MEDICARE

## 2024-07-17 NOTE — TELEPHONE ENCOUNTER
No care due was identified.  [facility] Embedded Care Due Messages. Reference number: [id number].   7/17/2024 3:38:17 PM CDT

## 2024-07-17 NOTE — TELEPHONE ENCOUNTER
Refill Routing Note   Medication(s) are not appropriate for processing by [facility] for the following reason(s):        Required vitals abnormal:  BP  (!) 150/80    [facility] action(s):  Defer               Appointments  past 12m or future 3m with PCP    Date Provider   Last Visit   4/24/2024 Viktoria Mckeon MD   Next Visit   Visit date not found Viktoria Mckeon MD   ED visits in past 90 days: 0        Note composed:5:36 PM 07/17/2024

## 2024-07-18 VITALS — SYSTOLIC BLOOD PRESSURE: 132 MMHG | DIASTOLIC BLOOD PRESSURE: 70 MMHG

## 2024-07-18 RX ORDER — HYDROCHLOROTHIAZIDE 25 MG/1
25 TABLET ORAL DAILY
Qty: 90 TABLET | Refills: 1 | Status: SHIPPED | OUTPATIENT
Start: 2024-07-18

## 2024-08-01 VITALS
OXYGEN SATURATION: 96 % | SYSTOLIC BLOOD PRESSURE: 130 MMHG | HEART RATE: 94 BPM | DIASTOLIC BLOOD PRESSURE: 78 MMHG | RESPIRATION RATE: 18 BRPM

## 2024-08-01 NOTE — PROGRESS NOTES
"Pt greeted me at door, uses her rollator she calls her "cordell". We sat at her kitchen table, she made her cereal with fruit. We conversed. She has no new s/s or complaints. Slight BLE edema noted(chronic). Post stroke education provided. Educatd on fall prevention and safety precautions. Pt verbalized understanding. Very pleasant visit     "

## 2024-08-07 ENCOUNTER — HOME CARE VISIT (OUTPATIENT)
Dept: NEUROLOGY | Facility: HOSPITAL | Age: 84
End: 2024-08-07
Payer: MEDICARE

## 2024-08-14 ENCOUNTER — TELEPHONE (OUTPATIENT)
Dept: PRIMARY CARE CLINIC | Facility: CLINIC | Age: 84
End: 2024-08-14
Payer: MEDICARE

## 2024-08-14 NOTE — TELEPHONE ENCOUNTER
Pt daughter requesting refill of pt BP medication but she do not know the name of it. She was informed that there are 2 BP meds on file that do not require a refill at this time. Refills are on file at the pharmacy.

## 2024-08-14 NOTE — TELEPHONE ENCOUNTER
----- Message from Darrion Blanco sent at 8/14/2024  2:48 PM CDT -----  Contact: [phone]@[email or web address]  Good afternoon patient daughter   would like to request a refill of her mother BP med but doesn't know the name of the med. Please call Ms. Laws to advise [phone]

## 2024-09-16 VITALS
DIASTOLIC BLOOD PRESSURE: 70 MMHG | RESPIRATION RATE: 18 BRPM | OXYGEN SATURATION: 96 % | SYSTOLIC BLOOD PRESSURE: 130 MMHG | HEART RATE: 94 BPM

## 2024-09-16 NOTE — PROGRESS NOTES
Pt greeted me at door, using her rollator. Very pleasant, in good spirits. VSS NAD No new s/s. Educated on fall prevention, safety precautions, pt verbalized understanding. Pt is progressing well. She appreciated visit and educated provided

## 2024-09-18 ENCOUNTER — HOME CARE VISIT (OUTPATIENT)
Dept: NEUROLOGY | Facility: HOSPITAL | Age: 84
End: 2024-09-18
Payer: MEDICARE

## 2024-09-24 ENCOUNTER — TELEPHONE (OUTPATIENT)
Dept: PRIMARY CARE CLINIC | Facility: CLINIC | Age: 84
End: 2024-09-24
Payer: MEDICARE

## 2024-09-24 DIAGNOSIS — I73.9 PVD (PERIPHERAL VASCULAR DISEASE) WITH CLAUDICATION: Primary | ICD-10-CM

## 2024-09-24 DIAGNOSIS — K21.9 GASTROESOPHAGEAL REFLUX DISEASE, UNSPECIFIED WHETHER ESOPHAGITIS PRESENT: ICD-10-CM

## 2024-09-24 NOTE — TELEPHONE ENCOUNTER
----- Message from Darrion Blanco sent at 9/24/2024  3:07 PM CDT -----  Contact: [phone]@ Ms. Laws  Good afternoon patient daughter  would like  to request a order for some compression stocking for her Mother. Please call Ms. Laws to advise [phone]

## 2024-10-11 ENCOUNTER — PATIENT MESSAGE (OUTPATIENT)
Dept: ADMINISTRATIVE | Facility: HOSPITAL | Age: 84
End: 2024-10-11
Payer: MEDICARE

## 2024-10-15 ENCOUNTER — HOME CARE VISIT (OUTPATIENT)
Dept: NEUROLOGY | Facility: HOSPITAL | Age: 84
End: 2024-10-15
Payer: MEDICARE

## 2024-10-20 VITALS
SYSTOLIC BLOOD PRESSURE: 110 MMHG | RESPIRATION RATE: 18 BRPM | HEART RATE: 74 BPM | DIASTOLIC BLOOD PRESSURE: 60 MMHG | OXYGEN SATURATION: 98 %

## 2024-10-20 NOTE — PROGRESS NOTES
Very pleasant, she greeted me at door, using the rollator. No new s/s. Progressing well. Very talkative, in good spirits, shared stories. VSS NAD. Mild SOB with walking, subsides at rest. BLE dependent edema , educated to elevated BLE to retard swelling. Educated on fall prevention and safety precautions.

## 2024-10-24 LAB
LEFT EYE DM RETINOPATHY: NEGATIVE
RIGHT EYE DM RETINOPATHY: NEGATIVE

## 2024-10-25 ENCOUNTER — LAB VISIT (OUTPATIENT)
Dept: LAB | Facility: HOSPITAL | Age: 84
End: 2024-10-25
Attending: STUDENT IN AN ORGANIZED HEALTH CARE EDUCATION/TRAINING PROGRAM
Payer: MEDICARE

## 2024-10-25 ENCOUNTER — OFFICE VISIT (OUTPATIENT)
Dept: NEUROLOGY | Facility: CLINIC | Age: 84
End: 2024-10-25
Payer: MEDICARE

## 2024-10-25 VITALS
SYSTOLIC BLOOD PRESSURE: 156 MMHG | BODY MASS INDEX: 25.61 KG/M2 | HEIGHT: 68 IN | DIASTOLIC BLOOD PRESSURE: 84 MMHG | WEIGHT: 169 LBS | HEART RATE: 77 BPM

## 2024-10-25 DIAGNOSIS — R41.3 OTHER AMNESIA: ICD-10-CM

## 2024-10-25 DIAGNOSIS — R41.3 OTHER AMNESIA: Primary | ICD-10-CM

## 2024-10-25 LAB
FOLATE SERPL-MCNC: 13.5 NG/ML (ref 4–24)
TREPONEMA PALLIDUM IGG+IGM AB [PRESENCE] IN SERUM OR PLASMA BY IMMUNOASSAY: NONREACTIVE
TSH SERPL DL<=0.005 MIU/L-ACNC: 2.65 UIU/ML (ref 0.4–4)
VIT B12 SERPL-MCNC: 476 PG/ML (ref 210–950)

## 2024-10-25 PROCEDURE — 82607 VITAMIN B-12: CPT | Mod: HCNC | Performed by: STUDENT IN AN ORGANIZED HEALTH CARE EDUCATION/TRAINING PROGRAM

## 2024-10-25 PROCEDURE — 86593 SYPHILIS TEST NON-TREP QUANT: CPT | Mod: HCNC | Performed by: STUDENT IN AN ORGANIZED HEALTH CARE EDUCATION/TRAINING PROGRAM

## 2024-10-25 PROCEDURE — 82746 ASSAY OF FOLIC ACID SERUM: CPT | Mod: HCNC | Performed by: STUDENT IN AN ORGANIZED HEALTH CARE EDUCATION/TRAINING PROGRAM

## 2024-10-25 PROCEDURE — 99999 PR PBB SHADOW E&M-EST. PATIENT-LVL IV: CPT | Mod: PBBFAC,HCNC,, | Performed by: STUDENT IN AN ORGANIZED HEALTH CARE EDUCATION/TRAINING PROGRAM

## 2024-10-25 PROCEDURE — 84425 ASSAY OF VITAMIN B-1: CPT | Mod: HCNC | Performed by: STUDENT IN AN ORGANIZED HEALTH CARE EDUCATION/TRAINING PROGRAM

## 2024-10-25 PROCEDURE — 84443 ASSAY THYROID STIM HORMONE: CPT | Mod: HCNC | Performed by: STUDENT IN AN ORGANIZED HEALTH CARE EDUCATION/TRAINING PROGRAM

## 2024-10-25 PROCEDURE — 36415 COLL VENOUS BLD VENIPUNCTURE: CPT | Mod: HCNC | Performed by: STUDENT IN AN ORGANIZED HEALTH CARE EDUCATION/TRAINING PROGRAM

## 2024-10-25 PROCEDURE — 83921 ORGANIC ACID SINGLE QUANT: CPT | Mod: HCNC | Performed by: STUDENT IN AN ORGANIZED HEALTH CARE EDUCATION/TRAINING PROGRAM

## 2024-10-25 RX ORDER — MEMANTINE HYDROCHLORIDE 5 MG/1
5 TABLET ORAL NIGHTLY
Qty: 30 TABLET | Refills: 11 | Status: SHIPPED | OUTPATIENT
Start: 2024-10-25 | End: 2025-10-25

## 2024-10-25 NOTE — PROGRESS NOTES
GENERAL NEUROLOGY VISIT     History:    Patient is a 84 y.o.  female who was self referred  for evaluation of memory problems.  PMHx of HTN, HLD, B/L chronic SDH s/p MMA embolization in January 15, 2024, after surgery patient developed bilateral weakness, dysarthria and aphasia, CTA with no new finding, patient admitted 1 day improve and subsequently back to baseline.       Patient here with daughter , reported memory concern started many years ago but since fall in jan and after MMA stabilization the symptoms worsen with time, reported more with short term memory , no issues with remote memory , she remember all the things from the past.     She lives by her self and reported she is doing good and able to do her ADLs, she does not drive.     She continue to argue with daughter and tell stories and disagree with her on everything.     Reported sleep well, reported hallucination in the hospital when she saw spiders,reported sometimes she feels her car running and when check is not, reported she did not tell her daughter because she doesn't want to think she is nut.     Patient showing difficulty with visuospatial/executive, able to see a picture and described it, language is intact, express some lack of insight.  Difficulty with recall memory.     Past Medical History:   Diagnosis Date    Anxiety     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    Atherosclerosis of aorta 06/08/2016    CXR 2013    Breast cancer 2011    right breast invasive micropapillary CA, Stage !A    DVT (deep venous thrombosis) 2001    Dr Chad PETIT 04/06/2022     Marques passed 1/2022    Hyperlipidemia     LDL 82    Hypertension     Memory loss 12/29/2022    CT chronic changes 2022    PVD (peripheral vascular disease) with claudication 05/02/2019    Compression hose    Traumatic subdural hematoma (SDH) 01/22/2024       Past Surgical History:   Procedure Laterality Date    BREAST BIOPSY Right 2011    BREAST LUMPECTOMY Right 2011    CA REMOVAL OF NAIL  BED  6/10/2015    TONSILLECTOMY         Social History     Socioeconomic History    Marital status:    Tobacco Use    Smoking status: Former     Current packs/day: 7.00     Average packs/day: 7.0 packs/day for 0.5 years (3.5 ttl pk-yrs)     Types: Cigarettes    Smokeless tobacco: Never   Substance and Sexual Activity    Alcohol use: No    Drug use: Never    Sexual activity: Not Currently   Social History Narrative     to Marques, 3 children, nondrinker, nonsmoker, she declines colonoscopy     Social Drivers of Health     Financial Resource Strain: Low Risk  (4/24/2024)    Overall Financial Resource Strain (CARDIA)     Difficulty of Paying Living Expenses: Not hard at all   Food Insecurity: No Food Insecurity (4/24/2024)    Hunger Vital Sign     Worried About Running Out of Food in the Last Year: Never true     Ran Out of Food in the Last Year: Never true   Transportation Needs: No Transportation Needs (4/24/2024)    PRAPARE - Transportation     Lack of Transportation (Medical): No     Lack of Transportation (Non-Medical): No   Physical Activity: Inactive (4/24/2024)    Exercise Vital Sign     Days of Exercise per Week: 0 days     Minutes of Exercise per Session: 0 min   Stress: No Stress Concern Present (4/24/2024)    [employer] - Occupational Stress Questionnaire     Feeling of Stress : Not at all   Housing Stability: Low Risk  (2/16/2024)    Housing Stability Vital Sign     Unable to Pay for Housing in the Last Year: No     Number of Places Lived in the Last Year: 1     Unstable Housing in the Last Year: No       Review of patient's allergies indicates:   Allergen Reactions    Sulfa (sulfonamide antibiotics)      Other reaction(s): Rash       Current Outpatient Medications on File Prior to Visit   Medication Sig Dispense Refill    acetaminophen (TYLENOL) 325 MG tablet Take 2 tablets (650 mg total) by mouth every 6 (six) hours as needed for Pain.  0    blood sugar  "diagnostic (BLOOD GLUCOSE TEST) Strp 1 strip by Misc.(Non-Drug; Combo Route) route 2 (two) times daily. 100 strip 12    cetirizine (ZYRTEC) 10 MG tablet Take 10 mg by mouth once daily.      FLUoxetine 10 MG capsule Take 1 capsule (10 mg total) by mouth once daily. 30 capsule 10    hydroCHLOROthiazide (HYDRODIURIL) 25 MG tablet Take 1 tablet (25 mg total) by mouth once daily. 90 tablet 1    lancets Misc Test tid 100 each 12    metoprolol tartrate (LOPRESSOR) 50 MG tablet Take 1 tablet (50 mg total) by mouth 2 (two) times daily. 60 tablet 10    polyethylene glycol (GLYCOLAX) 17 gram PwPk Take 17 g by mouth once daily.  0    simvastatin (ZOCOR) 10 MG tablet Take 1 tablet (10 mg total) by mouth every evening. 90 tablet 2     No current facility-administered medications on file prior to visit.        Family history:  @FAM@    Review Of Systems     Constitutional Negative for fevers, chills, weigh loss   HEENT Negative for hearing loss, dysphagia, sore throat, diplopia   Respiratory Negative for shortness of breath, cough    Cardiovascular Negative for chest pain, palpitations    Gastrointestinal Negative for constipation, diarrhea, early satiety    Skin Negative for rashes    Musculoskeletal Negative for joint pains, myalgias.   Neurological See Above    Psychological Negative for sleep disturbances.    Heme/Lymph Negative for easy bruising, easy bleeding    Endocrine Negative for polyuria, polydypsia     Physical Exam:     Physical Examination   5' 8" (1.727 m)   Wt 76.7 kg (169 lb)   LMP  (LMP Unknown)   BMI 25.70 kg/m²   Body mass index is 25.7 kg/m².      Neurological Exam  Mental Status:   Alert and oriented to name, date, location, president.   Naming and memory as above     CN:   II, III, IV, VI: PERRL, EOMI, no nystagmus, V: intact to fine touch, temperature, pain.VII: symmetrical facial movement,  no drooping.VIII: grossly intact to hearing.IX, X: symmetrical palate, normal palatal elevation.XI: 5/5 SCM and " 5/5 trapezius.XII: tongue midline, 5/5, no deviation or fasciculation.           Motor:  5/5 throughout  Tone: Normal tone in UE and LE, no atrophy, no fasciculation, no tremor no pronator drift.                Reflexes:  undetected  No Clonus, down going toes b/l.    Sensation: on both UEs and LEs    Reported equal bilateral    Coordination:    Tremor: Absent.Dysmetria: Absent..Nose to Finger: Normal.    Gait:      Normal gait    Interval/Previous Work-up:   Review  Impression:   Ms. Downing here for evaluation memory difficulty, she lives by herself and able to do her daily activities, she does not drive, does not cook, taking care of her dog, problem with short-term memory, lack of insight otherwise she is pleasant able to communicate and conversant without difficulty, reported hallucination described as hearing her car engine on but it is not, sometimes seeing shadows behind the window, reported she is not scared from it, reported sleep well and no nightmares.  No personality change however she has argue with the daughter during the interview.   - memory deficit worsen status post bilateral subdural hematoma, nonspecific etiology this time    Plan:     - MRI brain WO   - Ambulatory referral to neuro psychology clinic        - Vitamins (B1, folate, B12), Mg, RPR, MMA, TSH, T4,  ptau-181.  - Start Memantine 5 mg nightly   - Patient does not drive, ask if she will be able to drive, advised not to drive until complete evaluation, can consider driving test in the future  - encourage exercise, engaging in activities, eat healthy    RTC 6 months-1 year    Time spent on this encounter:  60 minute minutes. This includes face to face time and non-face to face time preparing to see the patient (eg, review of tests), obtaining and/or reviewing separately obtained history, documenting clinical information in the electronic or other health record, independently interpreting results and communicating results to the  patient/family/caregiver, or care coordinator.       A dictation device was used to produce this document. Use of such devices sometimes results in grammatical errors or replacement of words that sound similarly.     Ro Martell MD, M.B.Ch.B  Neurology, Vascular neurology  [facility]

## 2024-10-29 LAB — METHYLMALONATE SERPL-SCNC: 0.24 UMOL/L

## 2024-10-30 LAB — VIT B1 BLD-MCNC: 71 UG/L (ref 38–122)

## 2024-11-13 ENCOUNTER — HOSPITAL ENCOUNTER (OUTPATIENT)
Dept: RADIOLOGY | Facility: HOSPITAL | Age: 84
Discharge: HOME | End: 2024-11-13
Attending: STUDENT IN AN ORGANIZED HEALTH CARE EDUCATION/TRAINING PROGRAM
Payer: MEDICARE

## 2024-11-13 DIAGNOSIS — R41.3 OTHER AMNESIA: ICD-10-CM

## 2024-11-13 PROCEDURE — 70551 MRI BRAIN STEM W/O DYE: CPT | Mod: TC,HCNC

## 2024-11-13 PROCEDURE — 70551 MRI BRAIN STEM W/O DYE: CPT | Mod: 26,HCNC,, | Performed by: RADIOLOGY

## 2024-11-14 ENCOUNTER — HOME CARE VISIT (OUTPATIENT)
Dept: NEUROLOGY | Facility: HOSPITAL | Age: 84
End: 2024-11-14
Payer: MEDICARE

## 2024-11-17 VITALS
RESPIRATION RATE: 18 BRPM | SYSTOLIC BLOOD PRESSURE: 130 MMHG | DIASTOLIC BLOOD PRESSURE: 70 MMHG | OXYGEN SATURATION: 96 %

## 2024-11-18 NOTE — PROGRESS NOTES
Greeted me at door, ambulating well with rollator. Happy to see me. Enjoys company and conversing. Reports no new neuro s/s. Progressing well. Education provided on fall prevention and safety precautions. Will continue to follow

## 2024-11-19 ENCOUNTER — OFFICE VISIT (OUTPATIENT)
Dept: CARDIOLOGY | Facility: CLINIC | Age: 84
End: 2024-11-19
Payer: MEDICARE

## 2024-11-19 VITALS
DIASTOLIC BLOOD PRESSURE: 79 MMHG | HEART RATE: 69 BPM | WEIGHT: 169.31 LBS | BODY MASS INDEX: 25.74 KG/M2 | SYSTOLIC BLOOD PRESSURE: 168 MMHG

## 2024-11-19 DIAGNOSIS — Z86.718 HISTORY OF DVT (DEEP VEIN THROMBOSIS): Primary | ICD-10-CM

## 2024-11-19 DIAGNOSIS — I87.2 VENOUS INSUFFICIENCY: ICD-10-CM

## 2024-11-19 DIAGNOSIS — I15.2 HYPERTENSION ASSOCIATED WITH DIABETES: ICD-10-CM

## 2024-11-19 DIAGNOSIS — E11.59 HYPERTENSION ASSOCIATED WITH DIABETES: ICD-10-CM

## 2024-11-19 DIAGNOSIS — I70.0 ATHEROSCLEROSIS OF AORTA: ICD-10-CM

## 2024-11-19 PROCEDURE — 99999 PR PBB SHADOW E&M-EST. PATIENT-LVL III: CPT | Mod: PBBFAC,,, | Performed by: INTERNAL MEDICINE

## 2024-11-19 NOTE — PROGRESS NOTES
HISTORY:    84-year-old female with a history of hypertension, hyperlipidemia, aortic atherosclerosis, right lower extremity DVT, traumatic subdural hemorrhage status post embolization 2024, breast cancer status post therapy, memory deficits, positive tobacco presenting for initial evaluation by me.    Comes in to discuss B LE edema and varicosities. No pain. Swelling has been present for years. H/o RLE DVT years ago. This is based on chart review. Pt unable to remember. Daughter present. Pt unclear on details. Seen on outpt us in '17, chronic at that time. Seemingly resolved. Did take apixiban for a period of time in '17.     Most recently had a traumatic SDH after a fall. Has been struggling with progressive memory issues.    The patient denies any symptoms of chest pain, shortness of breath, or dyspnea on exertion.    Activity levels mild. Limited by OA/knee pain and balance issues. Utilizes a walker/cane. Frequent falls.     Tolerates hydrochlorothiazide 25 x 1, metoprolol 50 x 2, simvastatin 10 x 1.    PHYSICAL EXAM:    Vitals:    11/19/24 1448   BP: (!) 168/79   Pulse: 69       NAD, A+Ox3.  No jvd, no bruit.  RRR nml s1,s2. No murmurs.  CTA B no wheezes or crackles.  No edema.    LABS/STUDIES (imaging reviewed during clinic visit):    February 2024 hemoglobin 11.7/MCV 91.  Creatinine 0.8/BUN 17.  Albumin 2.9.  /HDL 62//TG 60.  A1c and TSH normal.  January /troponin low-grade.  EKG February 2024 demonstrates sinus rhythm with no Q-waves or ST changes.    TTE January 2024 normal LV size and function with EF of 55-60%.  Normal diastology.  CVP 3.    Venous ultrasound April 2024 no evidence of right lower extremity DVT.  2019 no evidence of left lower extremity DVT.  Left GSV thrombosis noted.  2017 chronic RLE femoral, popliteal, posterior tibial DVT.    MRI brain November 2024 right posterior temporal encephalomalacia.  No acute abnormalities.      ASSESSMENT & PLAN:    1. History of DVT  (deep vein thrombosis)    2. Atherosclerosis of aorta    3. Hypertension associated with diabetes    4. Venous insufficiency              Chronic B LE venous insufficiency. On diuretic. 2/2 minimal activity levels. Balance is an issue in addition to other co-morbidities. H/o RLE DVT and LLE SVT. No need to look for venous insufficiency as we wouldn't treat. Doesn't tolerate compression stockings at this time.     On antihtnsive regimen and statin tx. No need to be more aggressive.    Memory deficits and ambulation limitations with frequent falls seem to be an issue.     Follow-up as needed.     Sean Hughes MD

## 2024-12-13 ENCOUNTER — HOME CARE VISIT (OUTPATIENT)
Dept: NEUROLOGY | Facility: HOSPITAL | Age: 84
End: 2024-12-13
Payer: MEDICARE

## 2025-01-09 ENCOUNTER — PATIENT MESSAGE (OUTPATIENT)
Dept: ADMINISTRATIVE | Facility: HOSPITAL | Age: 85
End: 2025-01-09
Payer: MEDICARE

## 2025-01-10 ENCOUNTER — PATIENT MESSAGE (OUTPATIENT)
Dept: ADMINISTRATIVE | Facility: HOSPITAL | Age: 85
End: 2025-01-10
Payer: MEDICARE

## 2025-01-14 ENCOUNTER — PATIENT OUTREACH (OUTPATIENT)
Dept: ADMINISTRATIVE | Facility: HOSPITAL | Age: 85
End: 2025-01-14
Payer: MEDICARE

## 2025-01-14 ENCOUNTER — HOME CARE VISIT (OUTPATIENT)
Dept: NEUROLOGY | Facility: HOSPITAL | Age: 85
End: 2025-01-14
Payer: MEDICARE

## 2025-01-14 NOTE — PROGRESS NOTES
Outside eye exam uploaded to chart  , care everywhere, immunizations updated  Chart review complete    Health Maintenance Due   Topic Date Due    COVID-19 Vaccine (1) Never done    RSV Vaccine (Age 60+ and Pregnant patients) (1 - 1-dose 75+ series) Never done    Shingles Vaccine (1 of 2) 01/18/2016    DEXA Scan  05/05/2020    Diabetes Urine Screening  06/09/2021    Hemoglobin A1c  07/13/2024    Influenza Vaccine (1) Never done    Lipid Panel  01/13/2025

## 2025-01-15 VITALS
SYSTOLIC BLOOD PRESSURE: 134 MMHG | RESPIRATION RATE: 20 BRPM | HEART RATE: 84 BPM | OXYGEN SATURATION: 94 % | DIASTOLIC BLOOD PRESSURE: 74 MMHG

## 2025-01-15 VITALS
RESPIRATION RATE: 18 BRPM | SYSTOLIC BLOOD PRESSURE: 134 MMHG | HEART RATE: 88 BPM | DIASTOLIC BLOOD PRESSURE: 74 MMHG | OXYGEN SATURATION: 97 %

## 2025-01-15 VITALS — DIASTOLIC BLOOD PRESSURE: 68 MMHG | SYSTOLIC BLOOD PRESSURE: 120 MMHG | HEART RATE: 68 BPM | OXYGEN SATURATION: 98 %

## 2025-01-15 NOTE — PROGRESS NOTES
Greeted at door. Pt is emotional, scared because she was in her closet and thought something pushed a boxed and caused it to fall on top of her. The box did not hit her head and there is no apparent injury. I spent a lot of time to help calm her by giving her emotional reassurance and spiritual guidance per pts request. MOCA postponed due to these circumstances. By the end of visit, pt was calm and reassured , no longer scared.I will follow up and she has my contact information if she needs to reach me. No new neuro s/s. VSS NAD

## 2025-01-15 NOTE — PROGRESS NOTES
Greeted me at door , very pleasant , talkative, happy to see me. Reports of feeling lonely at times. Progressing well. No new neuro s/s. No falls. Reviewed fall prevention measures and safety precautions

## 2025-01-15 NOTE — PROGRESS NOTES
Greeted me at door, use of rollator. In good spirits, happy to see me, talkative, enjoying conversing. No new neuro s/s Progressing well No falls Educated on CVA s/s, risk factors, fall preventions and safety precautions. Emotional support and guidance provided. Pt appreciated visit

## 2025-01-30 DIAGNOSIS — Z00.00 ENCOUNTER FOR MEDICARE ANNUAL WELLNESS EXAM: ICD-10-CM

## 2025-02-18 ENCOUNTER — HOME CARE VISIT (OUTPATIENT)
Dept: NEUROLOGY | Facility: HOSPITAL | Age: 85
End: 2025-02-18
Payer: MEDICARE

## 2025-02-19 DIAGNOSIS — Z78.0 MENOPAUSE: ICD-10-CM

## 2025-02-28 ENCOUNTER — PATIENT OUTREACH (OUTPATIENT)
Dept: ADMINISTRATIVE | Facility: HOSPITAL | Age: 85
End: 2025-02-28
Payer: MEDICARE

## 2025-02-28 VITALS — DIASTOLIC BLOOD PRESSURE: 74 MMHG | SYSTOLIC BLOOD PRESSURE: 134 MMHG

## 2025-03-23 VITALS
RESPIRATION RATE: 18 BRPM | DIASTOLIC BLOOD PRESSURE: 86 MMHG | OXYGEN SATURATION: 98 % | HEART RATE: 76 BPM | SYSTOLIC BLOOD PRESSURE: 120 MMHG

## 2025-03-24 ENCOUNTER — TELEPHONE (OUTPATIENT)
Dept: PRIMARY CARE CLINIC | Facility: CLINIC | Age: 85
End: 2025-03-24
Payer: MEDICARE

## 2025-03-24 DIAGNOSIS — Z00.00 ENCOUNTER FOR ANNUAL PHYSICAL EXAM: Primary | ICD-10-CM

## 2025-03-24 DIAGNOSIS — E11.59 HYPERTENSION ASSOCIATED WITH DIABETES: ICD-10-CM

## 2025-03-24 DIAGNOSIS — I15.2 HYPERTENSION ASSOCIATED WITH DIABETES: ICD-10-CM

## 2025-03-24 DIAGNOSIS — E11.51 CONTROLLED TYPE 2 DIABETES MELLITUS WITH DIABETIC PERIPHERAL ANGIOPATHY WITHOUT GANGRENE, WITHOUT LONG-TERM CURRENT USE OF INSULIN: ICD-10-CM

## 2025-03-24 NOTE — TELEPHONE ENCOUNTER
----- Message from [facility] sent at 3/24/2025  2:01 PM CDT -----  Type: General Call Back Name of Caller:Pt's daughter StephanieSymptoms:medicationWould the patient rather a call back or a response via [facility]? Call [facility] Call Back Number:[phone] Additional Information: Pt has questions regarding some medications.

## 2025-03-24 NOTE — PROGRESS NOTES
I was greeted at door, pt using her rollator, she was happy to see me, in a good mood. She recanted conversations from our last visit. Progressing well.No recent falls or injuries. Educated on diet, fall prevention and safety precaution. Emotional reassurance and support given. Pt appreciated visit today.

## 2025-03-25 ENCOUNTER — LAB VISIT (OUTPATIENT)
Dept: LAB | Facility: HOSPITAL | Age: 85
End: 2025-03-25
Attending: FAMILY MEDICINE
Payer: MEDICARE

## 2025-03-25 DIAGNOSIS — Z00.00 ENCOUNTER FOR ANNUAL PHYSICAL EXAM: ICD-10-CM

## 2025-03-25 DIAGNOSIS — E11.59 HYPERTENSION ASSOCIATED WITH DIABETES: ICD-10-CM

## 2025-03-25 DIAGNOSIS — I15.2 HYPERTENSION ASSOCIATED WITH DIABETES: ICD-10-CM

## 2025-03-25 DIAGNOSIS — M79.89 LEG SWELLING: ICD-10-CM

## 2025-03-25 DIAGNOSIS — E11.51 CONTROLLED TYPE 2 DIABETES MELLITUS WITH DIABETIC PERIPHERAL ANGIOPATHY WITHOUT GANGRENE, WITHOUT LONG-TERM CURRENT USE OF INSULIN: ICD-10-CM

## 2025-03-25 LAB
ABSOLUTE EOSINOPHIL (OHS): 0.38 K/UL
ABSOLUTE MONOCYTE (OHS): 0.35 K/UL (ref 0.3–1)
ABSOLUTE NEUTROPHIL COUNT (OHS): 2.77 K/UL (ref 1.8–7.7)
ANION GAP (OHS): 12 MMOL/L (ref 8–16)
BASOPHILS # BLD AUTO: 0.05 K/UL
BASOPHILS NFR BLD AUTO: 1 %
BUN SERPL-MCNC: 12 MG/DL (ref 8–23)
CALCIUM SERPL-MCNC: 9.8 MG/DL (ref 8.7–10.5)
CHLORIDE SERPL-SCNC: 106 MMOL/L (ref 95–110)
CHOLEST SERPL-MCNC: 180 MG/DL (ref 120–199)
CHOLEST/HDLC SERPL: 3.1 {RATIO} (ref 2–5)
CO2 SERPL-SCNC: 23 MMOL/L (ref 23–29)
CREAT SERPL-MCNC: 0.8 MG/DL (ref 0.5–1.4)
EAG (OHS): 123 MG/DL (ref 68–131)
ERYTHROCYTE [DISTWIDTH] IN BLOOD BY AUTOMATED COUNT: 13.9 % (ref 11.5–14.5)
GFR SERPLBLD CREATININE-BSD FMLA CKD-EPI: >60 ML/MIN/1.73/M2
GLUCOSE SERPL-MCNC: 104 MG/DL (ref 70–110)
HBA1C MFR BLD: 5.9 % (ref 4–5.6)
HCT VFR BLD AUTO: 41.2 % (ref 37–48.5)
HDLC SERPL-MCNC: 58 MG/DL (ref 40–75)
HDLC SERPL: 32.2 % (ref 20–50)
HGB BLD-MCNC: 13.1 GM/DL (ref 12–16)
IMM GRANULOCYTES # BLD AUTO: 0.02 K/UL (ref 0–0.04)
IMM GRANULOCYTES NFR BLD AUTO: 0.4 % (ref 0–0.5)
LDLC SERPL CALC-MCNC: 107.4 MG/DL (ref 63–159)
LYMPHOCYTES # BLD AUTO: 1.23 K/UL (ref 1–4.8)
MCH RBC QN AUTO: 29.2 PG (ref 27–50)
MCHC RBC AUTO-ENTMCNC: 31.8 G/DL (ref 32–36)
MCV RBC AUTO: 92 FL (ref 82–98)
NONHDLC SERPL-MCNC: 122 MG/DL
NUCLEATED RBC (/100WBC) (OHS): 0 /100 WBC
PLATELET # BLD AUTO: 191 K/UL (ref 150–450)
PMV BLD AUTO: 10 FL (ref 9.2–12.9)
POTASSIUM SERPL-SCNC: 4.2 MMOL/L (ref 3.5–5.1)
RBC # BLD AUTO: 4.49 M/UL (ref 4–5.4)
RELATIVE EOSINOPHIL (OHS): 7.9 %
RELATIVE LYMPHOCYTE (OHS): 25.6 % (ref 18–48)
RELATIVE MONOCYTE (OHS): 7.3 % (ref 4–15)
RELATIVE NEUTROPHIL (OHS): 57.8 % (ref 38–73)
SODIUM SERPL-SCNC: 141 MMOL/L (ref 136–145)
TRIGL SERPL-MCNC: 73 MG/DL (ref 30–150)
WBC # BLD AUTO: 4.8 K/UL (ref 3.9–12.7)

## 2025-03-25 PROCEDURE — 36415 COLL VENOUS BLD VENIPUNCTURE: CPT

## 2025-03-25 PROCEDURE — 80061 LIPID PANEL: CPT | Mod: HCNC

## 2025-03-25 PROCEDURE — 85025 COMPLETE CBC W/AUTO DIFF WBC: CPT | Mod: HCNC

## 2025-03-25 PROCEDURE — 80048 BASIC METABOLIC PNL TOTAL CA: CPT | Mod: HCNC

## 2025-03-25 PROCEDURE — 83036 HEMOGLOBIN GLYCOSYLATED A1C: CPT | Mod: HCNC

## 2025-03-26 ENCOUNTER — HOME CARE VISIT (OUTPATIENT)
Dept: NEUROLOGY | Facility: HOSPITAL | Age: 85
End: 2025-03-26
Payer: MEDICARE

## 2025-03-28 ENCOUNTER — TELEPHONE (OUTPATIENT)
Dept: PRIMARY CARE CLINIC | Facility: CLINIC | Age: 85
End: 2025-03-28
Payer: MEDICARE

## 2025-03-28 ENCOUNTER — OFFICE VISIT (OUTPATIENT)
Dept: PRIMARY CARE CLINIC | Facility: CLINIC | Age: 85
End: 2025-03-28
Payer: MEDICARE

## 2025-03-28 VITALS
BODY MASS INDEX: 26.03 KG/M2 | SYSTOLIC BLOOD PRESSURE: 150 MMHG | OXYGEN SATURATION: 98 % | HEIGHT: 68 IN | TEMPERATURE: 98 F | WEIGHT: 171.75 LBS | DIASTOLIC BLOOD PRESSURE: 84 MMHG | HEART RATE: 76 BPM

## 2025-03-28 DIAGNOSIS — Z13.820 OSTEOPOROSIS SCREENING: ICD-10-CM

## 2025-03-28 DIAGNOSIS — F41.1 GAD (GENERALIZED ANXIETY DISORDER): ICD-10-CM

## 2025-03-28 DIAGNOSIS — I15.2 HYPERTENSION ASSOCIATED WITH DIABETES: ICD-10-CM

## 2025-03-28 DIAGNOSIS — K59.01 CONSTIPATION BY DELAYED COLONIC TRANSIT: ICD-10-CM

## 2025-03-28 DIAGNOSIS — M47.812 CERVICAL SPONDYLOSIS: ICD-10-CM

## 2025-03-28 DIAGNOSIS — E11.21 CONTROLLED TYPE 2 DIABETES MELLITUS WITH DIABETIC NEPHROPATHY, WITHOUT LONG-TERM CURRENT USE OF INSULIN: ICD-10-CM

## 2025-03-28 DIAGNOSIS — R41.3 MEMORY LOSS: ICD-10-CM

## 2025-03-28 DIAGNOSIS — E11.59 HYPERTENSION ASSOCIATED WITH DIABETES: ICD-10-CM

## 2025-03-28 DIAGNOSIS — Z00.01 ENCOUNTER FOR GENERAL ADULT MEDICAL EXAMINATION WITH ABNORMAL FINDINGS: Primary | ICD-10-CM

## 2025-03-28 PROBLEM — I82.592 CHRONIC EMBOLISM AND THROMBOSIS OF OTHER SPECIFIED DEEP VEIN OF LEFT LOWER EXTREMITY: Status: ACTIVE | Noted: 2025-03-28

## 2025-03-28 PROBLEM — F03.90 DEMENTIA WITHOUT BEHAVIORAL DISTURBANCE: Status: ACTIVE | Noted: 2025-03-28

## 2025-03-28 PROBLEM — E11.69 TYPE 2 DIABETES MELLITUS WITH OTHER SPECIFIED COMPLICATION: Status: ACTIVE | Noted: 2024-01-30

## 2025-03-28 PROCEDURE — 3079F DIAST BP 80-89 MM HG: CPT | Mod: HCNC,CPTII,S$GLB, | Performed by: NURSE PRACTITIONER

## 2025-03-28 PROCEDURE — 1101F PT FALLS ASSESS-DOCD LE1/YR: CPT | Mod: HCNC,CPTII,S$GLB, | Performed by: NURSE PRACTITIONER

## 2025-03-28 PROCEDURE — 3288F FALL RISK ASSESSMENT DOCD: CPT | Mod: HCNC,CPTII,S$GLB, | Performed by: NURSE PRACTITIONER

## 2025-03-28 PROCEDURE — 1160F RVW MEDS BY RX/DR IN RCRD: CPT | Mod: HCNC,CPTII,S$GLB, | Performed by: NURSE PRACTITIONER

## 2025-03-28 PROCEDURE — 1159F MED LIST DOCD IN RCRD: CPT | Mod: HCNC,CPTII,S$GLB, | Performed by: NURSE PRACTITIONER

## 2025-03-28 PROCEDURE — 99999 PR PBB SHADOW E&M-EST. PATIENT-LVL IV: CPT | Mod: PBBFAC,HCNC,, | Performed by: NURSE PRACTITIONER

## 2025-03-28 PROCEDURE — 99397 PER PM REEVAL EST PAT 65+ YR: CPT | Mod: HCNC,S$GLB,, | Performed by: NURSE PRACTITIONER

## 2025-03-28 PROCEDURE — 1126F AMNT PAIN NOTED NONE PRSNT: CPT | Mod: HCNC,CPTII,S$GLB, | Performed by: NURSE PRACTITIONER

## 2025-03-28 PROCEDURE — 3077F SYST BP >= 140 MM HG: CPT | Mod: HCNC,CPTII,S$GLB, | Performed by: NURSE PRACTITIONER

## 2025-03-28 RX ORDER — MEMANTINE HYDROCHLORIDE 10 MG/1
10 TABLET ORAL NIGHTLY
Qty: 30 TABLET | Refills: 11 | Status: SHIPPED | OUTPATIENT
Start: 2025-03-28 | End: 2026-03-28

## 2025-03-28 RX ORDER — POLYETHYLENE GLYCOL 3350 17 G/17G
8 POWDER, FOR SOLUTION ORAL
Qty: 1700 G | Refills: 0 | Status: SHIPPED | OUTPATIENT
Start: 2025-03-28

## 2025-03-28 RX ORDER — FLUOXETINE HYDROCHLORIDE 20 MG/1
20 CAPSULE ORAL DAILY
Qty: 30 CAPSULE | Refills: 1 | Status: SHIPPED | OUTPATIENT
Start: 2025-03-28 | End: 2026-03-28

## 2025-03-28 NOTE — PROGRESS NOTES
[facility] Note    Assessment/Plan     Kanchan Downing is a 84 y.o.female with:    1. Encounter for general adult medical examination with abnormal findings  Recommend scheduling DEXA that has been ordered. Discussed recent lab results.    2. Memory loss    Daughter states Namenda has effected short term memory issues. Repeats questions and same statements frequently during the encounter. Increase Namenda from 5 to 10 mg.     - memantine (NAMENDA) 10 MG Tab; Take 1 tablet (10 mg total) by mouth every evening.  Dispense: 30 tablet; Refill: 11    3. BANDAR (generalized anxiety disorder)    Increased fluoxetine from 10 mg to 20 mg daily to address ongoing depression symptoms, provided 30-day supply with 1 refill. Discussed that full effect of fluoxetine (SSRI antidepressant) typically takes 4-6 weeks, potentially shorter when increasing existing dose. Folow-up with Dr. Mckeon in 4-6 weeks to discuss efficacy of increased dose.    - FLUoxetine 20 MG capsule; Take 1 capsule (20 mg total) by mouth once daily.  Dispense: 30 capsule; Refill: 1    4. Hypertension associated with diabetes    Blood pressure elevated in office. Charts reviewed and it is unclear why she and her daughter believes antihypertensives would need to be stopped. HCTZ was originally prescribed as needed for swelling in legs. Will consult with Viktoria Mckeon MD when she returns to clinic.    5. Cervical spondylosis    Persistent neck pain s/p fall. Reviewed CT C-spine 12/27/22 which showed degenerative disc disease and severe right and moderate left foraminal narrowing. Neck is tense to physical exam/palpation. Aspirin is ineffective. Further imaging will likely not be beneficial or change treatment. Suggested APAP 325 mg - 650 mg, prefer 500 mg, every 6 hours. Can try 400 mg - 600 mg IBU as well.       6. Constipation by delayed colonic transit    Explained that constipation with hard, small stools indicates prolonged time in colon and  increased water absorption. Recommend increasing water intake and incorporating more vegetables into daily diet. Advised polyethylene glycol (Miralax) half cap mixed with juice or water for symptom relief. If unable to walk far then use a stationary portable fitness cycle they indicated she already had at home. Discussed use of PEG as needed for constipation.    - polyethylene glycol (GLYCOLAX) 17 gram/dose powder; Take 8 g by mouth as needed for Constipation.  Dispense: 1700 g; Refill: 0     7. Diabetes    A1c is 5.9. No meds needed given age. Continue to monitor yearly.    Chronic conditions status updated as per HPI.  Other than changes above, cont current medications and maintain follow up with specialists.  Follow up in about 3 weeks (around 4/18/2025) for follow-up with Dr. Mckeon for anxiety..    Future Appointments   Date Time Provider Department Center   4/29/2025  2:00 PM Viktoria Mckeon MD [facility]       Inder Johnson, SARITA  [facility]    Chief Complaint      Chief Complaint   Patient presents with    Annual Exam       History of Present Illness      Kanchan Downing is a 84 y.o. female with chronic conditions for annual exam.Her daughter Thuy accompanies her.      Hypertension  - not taking HCTZ or metoprolol.  - She and Thuy say they were advised to stop the BP meds.    Cholesterol  - labs done this week are normal.  - Simvastatin in med list but not taking because they ran out of refills.    BANDAR  - takes fluoxetine 10 mg.   - continues to endorse anxiety and nervousness.  - wants to know if there is a medication to take for the anxiety.    Memory  - takes Namenda 5 mg.  - Daughter indicates that short term memory continues to be a problem.    Neck Pain  - complains of frequent neck pain not relieved with ASA.  - chronic cervical spondylosis.  - history of subdural hematomas with MMA embolization after unwitnessed fall.    Constipation  - stool is hard balls.  - eats  a pack of frozen berries and fruit daily.  - does not eat many other vegetables.  - patient states she drinks enough water. Daughter's opinion varies.    Diabetes  - labs done 3 days ago.  - needs urine microalbumin.  - not on meds for diabetes.        Past Medical History:  Past Medical History:   Diagnosis Date    Anxiety     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    Atherosclerosis of aorta 06/08/2016    CXR 2013    Breast cancer 2011    right breast invasive micropapillary CA, Stage !A    DVT (deep venous thrombosis) 2001    R , Dr Bonilla    Grief 04/06/2022     Marques passed 1/2022    Hyperlipidemia     LDL 82    Hypertension     Memory loss 12/29/2022    CT chronic changes 2022    PVD (peripheral vascular disease) with claudication 05/02/2019    Compression hose    Traumatic subdural hematoma (SDH) 01/22/2024       Past Surgical History:   has a past surgical history that includes Tonsillectomy; pr removal of nail bed (6/10/2015); Breast biopsy (Right, 2011); and Breast lumpectomy (Right, 2011).    Family History:  family history includes Breast cancer in her sister; Cataracts in her mother; Heart disease (age of onset: 50) in her father; Heart disease (age of onset: 58) in her mother; No Known Problems in her brother, maternal aunt, maternal grandfather, maternal grandmother, maternal uncle, paternal aunt, paternal grandfather, paternal grandmother, and paternal uncle; Thyroid disease in her sister.     Social History:  Social History[1]    I personally reviewed all past medical, surgical, social and family history.    ROS     Medications:  Medication List with Changes/Refills   New Medications    FLUOXETINE 20 MG CAPSULE    Take 1 capsule (20 mg total) by mouth once daily.    POLYETHYLENE GLYCOL (GLYCOLAX) 17 GRAM/DOSE POWDER    Take 8 g by mouth as needed for Constipation.   Current Medications    ACETAMINOPHEN (TYLENOL) 325 MG TABLET    Take 2 tablets (650 mg total) by mouth every 6 (six) hours as needed for  Pain.    BLOOD SUGAR DIAGNOSTIC (BLOOD GLUCOSE TEST) STRP    1 strip by Misc.(Non-Drug; Combo Route) route 2 (two) times daily.    CETIRIZINE (ZYRTEC) 10 MG TABLET    Take 10 mg by mouth once daily.    HYDROCHLOROTHIAZIDE (HYDRODIURIL) 25 MG TABLET    Take 1 tablet (25 mg total) by mouth once daily.    LANCETS MISC    Test tid    METOPROLOL TARTRATE (LOPRESSOR) 50 MG TABLET    Take 1 tablet (50 mg total) by mouth 2 (two) times daily.    SIMVASTATIN (ZOCOR) 10 MG TABLET    Take 1 tablet (10 mg total) by mouth every evening.   Changed and/or Refilled Medications    Modified Medication Previous Medication    MEMANTINE (NAMENDA) 10 MG TAB memantine (NAMENDA) 5 MG Tab       Take 1 tablet (10 mg total) by mouth every evening.    Take 1 tablet (5 mg total) by mouth every evening.   Discontinued Medications    FLUOXETINE 10 MG CAPSULE    Take 1 capsule (10 mg total) by mouth once daily.    POLYETHYLENE GLYCOL (GLYCOLAX) 17 GRAM PWPK    Take 17 g by mouth once daily.        Allergies:  Review of patient's allergies indicates:   Allergen Reactions    Sulfa (sulfonamide antibiotics)      Other reaction(s): Rash       Health Maintenance:  Health Maintenance   Topic Date Due    RSV Vaccine (Age 60+ and Pregnant patients) (1 - 1-dose 75+ series) Never done    Shingles Vaccine (2 of 3) 01/18/2016    DEXA Scan  05/05/2020    Diabetes Urine Screening  06/09/2021    Influenza Vaccine (1) Never done    COVID-19 Vaccine (1 - 2024-25 season) Never done    Hemoglobin A1c  09/25/2025    Diabetic Eye Exam  10/24/2025    Lipid Panel  03/25/2026    TETANUS VACCINE  11/02/2027    Pneumococcal Vaccines (Age 50+)  Completed     Health Maintenance Topics with due status: Not Due       Topic Last Completion Date    TETANUS VACCINE 11/02/2017    Diabetic Eye Exam 10/24/2024    Lipid Panel 03/25/2025    Hemoglobin A1c 03/25/2025       Physical Exam      Vital Signs  Temp: 98 °F (36.7 °C)  Temp Source: Oral  Pulse: 76  SpO2: 98 %  BP: (!)  "150/84  Pain Score: 0-No pain  Height and Weight  Height: 5' 8" (172.7 cm)  Weight: 77.9 kg (171 lb 11.8 oz)  BSA (Calculated - sq m): 1.93 sq meters  BMI (Calculated): 26.1  Weight in (lb) to have BMI = 25: 164.1]      Physical Exam  Vitals reviewed.   HENT:      Head: Normocephalic and atraumatic.      Right Ear: Tympanic membrane normal.      Left Ear: Tympanic membrane normal.      Mouth/Throat:      Mouth: Mucous membranes are moist.      Pharynx: No oropharyngeal exudate or posterior oropharyngeal erythema.   Eyes:      Extraocular Movements: Extraocular movements intact.      Conjunctiva/sclera: Conjunctivae normal.      Pupils: Pupils are equal, round, and reactive to light.   Neck:      Vascular: No carotid bruit.   Cardiovascular:      Rate and Rhythm: Normal rate and regular rhythm.   Pulmonary:      Effort: Pulmonary effort is normal. No respiratory distress.      Breath sounds: Normal breath sounds.   Abdominal:      General: Bowel sounds are normal. There is no distension.      Palpations: Abdomen is soft. There is no hepatomegaly, splenomegaly or mass.      Tenderness: There is no abdominal tenderness. There is no guarding.   Lymphadenopathy:      Cervical: No cervical adenopathy.   Skin:     Capillary Refill: Capillary refill takes less than 2 seconds.   Neurological:      Mental Status: She is alert and oriented to person, place, and time.      Cranial Nerves: No cranial nerve deficit.      Motor: No weakness.      Deep Tendon Reflexes: Reflexes normal.   Psychiatric:         Mood and Affect: Mood normal.         Thought Content: Thought content normal.         Judgment: Judgment normal.          Laboratory:  CBC:  Recent Labs   Lab 02/17/24  0455 02/18/24  0513 03/25/25  1326   WBC 5.21 5.69 4.80   RBC 3.84 L 4.00 4.49   Hemoglobin 11.2 L 11.7 L  --    HGB  --   --  13.1   Hematocrit 35.2 L 36.2 L  --    HCT  --   --  41.2   Platelet Count  --   --  191   Platelets 157 156  --    MCV 92 91 92   MCH " 29.2 29.3 29.2   MCHC 31.8 L 32.3 31.8 L     CMP:  Recent Labs   Lab 02/16/24  0545 02/17/24  0455 02/18/24  0513 02/21/24  0850 03/25/25  1326   Glucose 153 H 104 119 H 104  --    Calcium 9.5 9.1 9.4 9.7 9.8   Albumin 3.1 L 2.8 L 2.9 L  --   --    Total Protein 6.1 5.7 L 6.1  --   --    Sodium 141 140 138 136 141   Potassium 3.9 3.7 3.8 4.3 4.2   CO2 25 28 23 22 L 23   Chloride 107 104 106 106 106   BUN 12 16 11 17 12   Alkaline Phosphatase 56 52 L 53 L  --   --    ALT 6 L 6 L 6 L  --   --    AST 9 L 9 L 9 L  --   --    Total Bilirubin 0.7 0.7 0.5  --   --      URINALYSIS:  Recent Labs   Lab 01/13/24  1254 01/17/24  1852   Color, UA Colorless A  Colorless A Yellow   Specific Gravity, UA 1.010  1.010 1.020   pH, UA 8.0  8.0 8.0   Protein, UA 1+ A  1+ A 1+ A   Bacteria None Occasional   Nitrite, UA Negative  Negative Negative   Leukocytes, UA Negative  Negative 3+ A   Hyaline Casts, UA 0 0      LIPIDS:  Recent Labs   Lab 03/31/22  1011 01/13/24  1210 10/25/24  1625 03/25/25  1326   TSH  --  2.483 2.649  --    HDL 60 62  --   --    HDL Cholesterol  --   --   --  58   Cholesterol Total  --   --   --  180   Cholesterol 180 218 H  --   --    Triglycerides 63 60  --   --    Triglyceride  --   --   --  73   LDL Cholesterol 107.4 144.0  --   --    HDL/Cholesterol Ratio 33.3 28.4  --  32.2   Non-HDL Cholesterol 120 156  --   --    Non HDL Cholesterol  --   --   --  122   Total Cholesterol/HDL Ratio 3.0 3.5  --   --    Cholesterol/HDL Ratio  --   --   --  3.1     TSH:  Recent Labs   Lab 01/13/24  1210 10/25/24  1625   TSH 2.483 2.649     A1C:  Recent Labs   Lab 03/31/22  1011 01/13/24  1249 03/25/25  1326   Hemoglobin A1C 5.8 H 5.7 H  --    Hemoglobin A1c  --   --  5.9 H                            [1]   Social History  Tobacco Use    Smoking status: Former     Current packs/day: 7.00     Average packs/day: 7.0 packs/day for 0.5 years (3.5 ttl pk-yrs)     Types: Cigarettes    Smokeless tobacco: Never   Substance Use  Topics    Alcohol use: No    Drug use: Never

## 2025-04-02 ENCOUNTER — TELEPHONE (OUTPATIENT)
Dept: PRIMARY CARE CLINIC | Facility: CLINIC | Age: 85
End: 2025-04-02
Payer: MEDICARE

## 2025-04-02 NOTE — TELEPHONE ENCOUNTER
----- Message from Ekaterina sent at 4/2/2025  1:02 PM CDT -----  [facility]  [facility]armacy called, in regards needing verification on direction on rx polyethylene glycol (GLYCOLAX) 17 gram/dose powder.[id number] [place] [id number] AT [facility] [address] Phone: [phone]: [phone] Please call and advise.

## 2025-04-02 NOTE — TELEPHONE ENCOUNTER
Spoke to pharmacist in regard to confirming the dose of rx glycolax. Pharmacist spoke with PENNIE Loving via phone and confirmed does change.

## 2025-04-23 VITALS
OXYGEN SATURATION: 98 % | HEART RATE: 80 BPM | RESPIRATION RATE: 16 BRPM | SYSTOLIC BLOOD PRESSURE: 138 MMHG | DIASTOLIC BLOOD PRESSURE: 80 MMHG

## 2025-04-23 NOTE — PROGRESS NOTES
Greeted at door, pt ambulating with rollator, happy to see me, recanted visit from last month. States she is progressing well but feels her memory is getting worse. She told me storied of her marriage and family, last weeks PCP visit, future plans for her grandchild wedding, she recently went shopping for wedding attire and looks forward to that occasion, I offered emotional support and reassurance. Educated on fall prevention and safety precautions, elevating BLE to retard edema. VSS NAD, reports she get slightly SOB with activity , she reported this to her PCP. No new Neuro s/s. Will continue to FU

## 2025-04-24 ENCOUNTER — HOME CARE VISIT (OUTPATIENT)
Dept: NEUROLOGY | Facility: HOSPITAL | Age: 85
End: 2025-04-24
Payer: MEDICARE

## 2025-04-29 ENCOUNTER — OFFICE VISIT (OUTPATIENT)
Dept: PRIMARY CARE CLINIC | Facility: CLINIC | Age: 85
End: 2025-04-29
Payer: MEDICARE

## 2025-04-29 VITALS
HEART RATE: 94 BPM | HEIGHT: 68 IN | WEIGHT: 169.06 LBS | SYSTOLIC BLOOD PRESSURE: 128 MMHG | BODY MASS INDEX: 25.62 KG/M2 | OXYGEN SATURATION: 98 % | TEMPERATURE: 98 F | DIASTOLIC BLOOD PRESSURE: 66 MMHG

## 2025-04-29 DIAGNOSIS — Z00.00 ROUTINE GENERAL MEDICAL EXAMINATION AT A HEALTH CARE FACILITY: Primary | ICD-10-CM

## 2025-04-29 DIAGNOSIS — F03.90 DEMENTIA WITHOUT BEHAVIORAL DISTURBANCE: ICD-10-CM

## 2025-04-29 DIAGNOSIS — I82.592 CHRONIC EMBOLISM AND THROMBOSIS OF OTHER SPECIFIED DEEP VEIN OF LEFT LOWER EXTREMITY: ICD-10-CM

## 2025-04-29 DIAGNOSIS — Z99.89 USES ROLLER WALKER: ICD-10-CM

## 2025-04-29 DIAGNOSIS — N39.46 MIXED STRESS AND URGE URINARY INCONTINENCE: ICD-10-CM

## 2025-04-29 DIAGNOSIS — F41.1 GAD (GENERALIZED ANXIETY DISORDER): ICD-10-CM

## 2025-04-29 DIAGNOSIS — R41.3 MEMORY LOSS: ICD-10-CM

## 2025-04-29 PROBLEM — E11.69 TYPE 2 DIABETES MELLITUS WITH OTHER SPECIFIED COMPLICATION: Status: RESOLVED | Noted: 2024-01-30 | Resolved: 2025-04-29

## 2025-04-29 PROCEDURE — 99999 PR PBB SHADOW E&M-EST. PATIENT-LVL III: CPT | Mod: PBBFAC,HCNC,, | Performed by: FAMILY MEDICINE

## 2025-04-29 RX ORDER — HYDROCHLOROTHIAZIDE 25 MG/1
25 TABLET ORAL DAILY
Qty: 90 TABLET | Refills: 2 | Status: SHIPPED | OUTPATIENT
Start: 2025-04-29

## 2025-04-29 RX ORDER — FLUOXETINE HYDROCHLORIDE 20 MG/1
20 CAPSULE ORAL DAILY
Qty: 90 CAPSULE | Refills: 2 | Status: SHIPPED | OUTPATIENT
Start: 2025-04-29 | End: 2026-04-29

## 2025-04-29 RX ORDER — MEMANTINE HYDROCHLORIDE 10 MG/1
10 TABLET ORAL NIGHTLY
Qty: 90 TABLET | Refills: 2 | Status: SHIPPED | OUTPATIENT
Start: 2025-04-29

## 2025-04-29 NOTE — PROGRESS NOTES
Assessment:     1. Dementia without behavioral disturbance    2. BANDAR (generalized anxiety disorder)    3. 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 Uses roller walker    5. 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 H/o RLE DVT and LLE SVT.  Doesn't tolerate compression stockings at this time. Balance problems & hx of traumatic subdural hematoma.     She declines anticoagulation.       Mixed stress and urge urinary incontinence  Refer Urogyn    Uses roller walker  Lives alone, daughter Thuy visits often & is with her today, encouraged walking daily    Routine general medical examination at a health care facility    Move more, High fiber, good fat (avocado, olive oil, nuts) foods  Eat more food grown from the earth (picked from trees or out of the ground)    Follow up yearly with LABS ONE WEEK PRIOR so we can discuss at your visit        See me 6 mo    HPI: Kanchan Downing is a 84 y.o. female with is here today for general exam    History of Present Illness    CHIEF COMPLAINT:  Patient presents today for follow up    NEUROLOGICAL:  She has a history of brain bleed due to trauma. She takes Namenda for memory, recently increased from 5 mg to 10 mg. She reports no noticeable improvement in memory but tolerates the medication well.    PSYCHIATRIC:  She takes fluoxetine 20 mg for mood and depression, reporting significant reduction in crying episodes.    VASCULAR:  She has a history of bilateral leg clots, including right lower extremity clot and left lower extremity superior venous thrombosis. She does not tolerate compression  stockings.    MUSCULOSKELETAL:  She reports clicking in her knee with associated weakness and pain when attempting to stand up.    GENITOURINARY:  She experiences constant bladder leakage requiring pads, which she attributes to a history of falls. She describes an irregular, multi-directional urinary stream.    ENDOCRINE:  She enjoys eating candy       ROS:  ROS as indicated in HPI.             Current Outpatient Medications   Medication Instructions    acetaminophen (TYLENOL) 650 mg, Oral, Every 6 hours PRN    blood sugar diagnostic (BLOOD GLUCOSE TEST) Strp 1 strip, Misc.(Non-Drug; Combo Route), 2 times daily    cetirizine (ZYRTEC) 10 mg, Daily    FLUoxetine 20 mg, Oral, Daily    hydroCHLOROthiazide (HYDRODIURIL) 25 mg, Oral, Daily    lancets Misc Test tid    memantine (NAMENDA) 10 mg, Oral, Nightly    polyethylene glycol (GLYCOLAX) 8 g, Oral, As needed (PRN)    simvastatin (ZOCOR) 10 mg, Oral, Nightly       Lab Results   Component Value Date    HGBA1C 5.9 (H) 03/25/2025    HGBA1C 5.7 (H) 01/13/2024    HGBA1C 5.8 (H) 03/31/2022     Lab Results   Component Value Date    MICALBCREAT 22.4 06/09/2020     Lab Results   Component Value Date    LDLCALC 107.4 03/25/2025    LDLCALC 144.0 01/13/2024    CHOL 180 03/25/2025    HDL 58 03/25/2025    TRIG 73 03/25/2025       Lab Results   Component Value Date     03/25/2025    K 4.2 03/25/2025     03/25/2025    CO2 23 03/25/2025     03/25/2025    BUN 12 03/25/2025    CREATININE 0.8 03/25/2025    CALCIUM 9.8 03/25/2025    PROT 6.1 02/18/2024    ALBUMIN 2.9 (L) 02/18/2024    BILITOT 0.5 02/18/2024    ALKPHOS 53 (L) 02/18/2024    AST 9 (L) 02/18/2024    ALT 6 (L) 02/18/2024    ANIONGAP 12 03/25/2025    ESTGFRAFRICA >60.0 03/31/2022    EGFRNONAA >60.0 03/31/2022    WBC 4.80 03/25/2025    HGB 13.1 03/25/2025    HGB 11.7 (L) 02/18/2024    HCT 41.2 03/25/2025    MCV 92 03/25/2025     03/25/2025    TSH 2.649 10/25/2024    HEPCAB Non-reactive 01/13/2024  "      Lab Results   Component Value Date    GRXJXNTG69QG 41 05/08/2014    CEEFVNQP33 476 10/25/2024         Past Medical History:   Diagnosis Date    Anxiety     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    Atherosclerosis of aorta 06/08/2016    CXR 2013    Breast cancer 2011    right breast invasive micropapillary CA, Stage !A    DVT (deep venous thrombosis) 2001    R Dr Chad 04/06/2022     Marques passed 1/2022    Hyperlipidemia     LDL 82    Hypertension     Memory loss 12/29/2022    CT chronic changes 2022    PVD (peripheral vascular disease) with claudication 05/02/2019    Compression hose    Traumatic subdural hematoma (SDH) 01/22/2024     Past Surgical History:   Procedure Laterality Date    BREAST BIOPSY Right 2011    BREAST LUMPECTOMY Right 2011    [facility] REMOVAL OF NAIL BED  6/10/2015    TONSILLECTOMY       Vitals:    04/29/25 1426   BP: 128/66   Pulse: 94   Temp: 97.7 °F (36.5 °C)   TempSrc: Oral   SpO2: 98%   Weight: 76.7 kg (169 lb 1.5 oz)   Height: 5' 8" (1.727 m)   PainSc: 0-No pain     Wt Readings from Last 5 Encounters:   04/29/25 76.7 kg (169 lb 1.5 oz)   03/28/25 77.9 kg (171 lb 11.8 oz)   11/19/24 76.8 kg (169 lb 5 oz)   10/25/24 76.7 kg (169 lb)   04/02/24 73.1 kg (161 lb 2.5 oz)     Objective:   Physical Exam  Constitutional:       Appearance: She is well-developed.      Comments: Rolling walker, slow to push up & stand   Eyes:      Pupils: Pupils are equal, round, and reactive to light.   Cardiovascular:      Rate and Rhythm: Normal rate and regular rhythm.      Heart sounds: Normal heart sounds. No murmur heard.  Pulmonary:      Effort: Pulmonary effort is normal.      Breath sounds: Normal breath sounds. No wheezing.   Abdominal:      General: Bowel sounds are normal. There is no distension.      Palpations: Abdomen is soft. There is no mass.      Tenderness: There is no abdominal tenderness. There is no guarding or rebound.   Musculoskeletal:      Cervical back: Neck supple.   Skin:     " General: Skin is warm and dry.   Neurological:      Mental Status: She is alert.      Comments: Repeats questions to me   Psychiatric:         Behavior: Behavior normal.

## 2025-04-29 NOTE — ASSESSMENT & PLAN NOTE
Move more, High fiber, good fat (avocado, olive oil, nuts) foods  Eat more food grown from the earth (picked from trees or out of the ground)    Follow up yearly with LABS ONE WEEK PRIOR so we can discuss at your visit

## 2025-04-29 NOTE — ASSESSMENT & PLAN NOTE
11/2024 yunier Marti B LE venous insufficiency. H/o RLE DVT and LLE SVT.  Doesn't tolerate compression stockings at this time. Balance problems & hx of traumatic subdural hematoma.     She declines anticoagulation.

## 2025-05-09 ENCOUNTER — HOSPITAL ENCOUNTER (OUTPATIENT)
Dept: RADIOLOGY | Facility: HOSPITAL | Age: 85
Discharge: HOME | End: 2025-05-09
Attending: FAMILY MEDICINE
Payer: MEDICARE

## 2025-05-09 DIAGNOSIS — Z78.0 MENOPAUSE: ICD-10-CM

## 2025-05-09 PROCEDURE — 77080 DXA BONE DENSITY AXIAL: CPT | Mod: 26,,, | Performed by: INTERNAL MEDICINE

## 2025-05-09 PROCEDURE — 77080 DXA BONE DENSITY AXIAL: CPT | Mod: TC

## 2025-05-14 ENCOUNTER — RESULTS FOLLOW-UP (OUTPATIENT)
Dept: PRIMARY CARE CLINIC | Facility: CLINIC | Age: 85
End: 2025-05-14

## 2025-05-15 ENCOUNTER — TELEPHONE (OUTPATIENT)
Dept: PRIMARY CARE CLINIC | Facility: CLINIC | Age: 85
End: 2025-05-15
Payer: MEDICARE

## 2025-05-15 NOTE — TELEPHONE ENCOUNTER
----- Message from Inder Johnson NP sent at 5/15/2025 11:32 AM CDT -----  Please inform patient that their DEXA scan was normal with recommendations to repeat the test in 5 years. If they have [employer] access then send the following information.    Bone health recommendations:    Daily calcium intake 7748-4578 mg, dietary sources preferred; Vitamin D 9741-2177 IU daily.    Falls prevention:    [email or web address]    5 quick and easy home modifications you can make on your own  Secure some support: Buy a shower seat, grab bar, and adjustable-height handheld shower head to make bathing easier and safer.  Light it up: Replace burnt-out bulbs with bright, non-glare lightbulbs.  Have a seat: Place a sturdy chair in your bedroom so you can sit while getting dressed.  Clear the way: Keep items off the stairs, and fix simple but serious hazards such as clutter and throw rugs.  Store for success: Keep frequently used items between your waist and shoulder height.      Exercise:   Weight bearing exercise helps maintain bone density.  Please reach out to our office if you need examples of weight bearing exercise.      ----- Message -----  From: Interface, Rad Results In  Sent: 5/14/2025   4:07 PM CDT  To: Viktoria Mckeon MD

## 2025-05-21 VITALS
DIASTOLIC BLOOD PRESSURE: 68 MMHG | RESPIRATION RATE: 16 BRPM | HEART RATE: 76 BPM | SYSTOLIC BLOOD PRESSURE: 132 MMHG | OXYGEN SATURATION: 97 %

## 2025-05-22 NOTE — PROGRESS NOTES
Greeted me at door, ambulating with use of rollator. Progressed well w/o any new neuro s/s. VSS NAD maintained follow up appointments. Very talkative, happy to see me, expresses occasional loneliness but reports she feels well cared for and loved. She is looking forward to family gatherings. Educated on fall prevention and safety precautions, diet and meds.

## 2025-06-30 DIAGNOSIS — E11.21 CONTROLLED TYPE 2 DIABETES MELLITUS WITH DIABETIC NEPHROPATHY, WITHOUT LONG-TERM CURRENT USE OF INSULIN: ICD-10-CM

## 2025-06-30 DIAGNOSIS — R41.3 MEMORY LOSS: ICD-10-CM

## 2025-06-30 DIAGNOSIS — F41.1 GAD (GENERALIZED ANXIETY DISORDER): ICD-10-CM

## 2025-06-30 RX ORDER — HYDROCHLOROTHIAZIDE 25 MG/1
TABLET ORAL
Refills: 0 | OUTPATIENT
Start: 2025-06-30

## 2025-06-30 RX ORDER — FLUOXETINE 20 MG/1
CAPSULE ORAL
Refills: 0 | OUTPATIENT
Start: 2025-06-30

## 2025-06-30 RX ORDER — MEMANTINE HYDROCHLORIDE 10 MG/1
TABLET ORAL
Refills: 0 | OUTPATIENT
Start: 2025-06-30

## 2025-06-30 RX ORDER — SIMVASTATIN 10 MG/1
TABLET, FILM COATED ORAL
Refills: 0 | OUTPATIENT
Start: 2025-06-30

## 2025-06-30 NOTE — TELEPHONE ENCOUNTER
Care Due:                  Date            Visit Type   Department     Provider  --------------------------------------------------------------------------------                                EP -                              PRIMARY      [facility]  Last Visit: 04-      CARE ([facility])   DORINA Mckeon                              EP -                              PRIMARY      [facility]  Next Visit: 10-      CARE ([facility])   DORINA Mckeon                                                            Last  Test          Frequency    Reason                     Performed    Due Date  --------------------------------------------------------------------------------    CMP.........  12 months..  simvastatin..............  02- 02-    [employer] Embedded Care Due Messages. Reference number: [id number].   6/30/2025 9:25:08 AM CDT

## 2025-07-01 NOTE — TELEPHONE ENCOUNTER
Refill Decision Note   Kanchan Downing  is requesting a refill authorization.  Brief Assessment and Rationale for Refill:  Quick Discontinue     Medication Therapy Plan:    Pharmacy is requesting new scripts for the following medications without required information, (sig/ frequency/qty/etc)      Medication Reconciliation Completed: No     Comments: Pharmacies have been requesting medications for patients without required information, (sig, frequency, qty, etc.). In addition, requests are sent for medication(s) pt. are currently not taking, and medications patients have never taken.    We have spoken to the pharmacies about these request types and advised their teams previously that we are unable to assess these New Script requests and require all details for these requests. This is a known issue and has been reported.     Note composed:7:14 PM 06/30/2025

## 2025-08-06 DIAGNOSIS — R41.3 MEMORY LOSS: ICD-10-CM

## 2025-08-06 DIAGNOSIS — E11.21 CONTROLLED TYPE 2 DIABETES MELLITUS WITH DIABETIC NEPHROPATHY, WITHOUT LONG-TERM CURRENT USE OF INSULIN: ICD-10-CM

## 2025-08-06 DIAGNOSIS — F41.1 GAD (GENERALIZED ANXIETY DISORDER): ICD-10-CM

## 2025-08-06 RX ORDER — MEMANTINE HYDROCHLORIDE 10 MG/1
10 TABLET ORAL NIGHTLY
Qty: 90 TABLET | Refills: 0 | Status: SHIPPED | OUTPATIENT
Start: 2025-08-06

## 2025-08-06 RX ORDER — SIMVASTATIN 10 MG/1
10 TABLET, FILM COATED ORAL NIGHTLY
Qty: 90 TABLET | Refills: 0 | Status: SHIPPED | OUTPATIENT
Start: 2025-08-06 | End: 2025-11-04

## 2025-08-06 RX ORDER — SIMVASTATIN 10 MG/1
TABLET, FILM COATED ORAL
Refills: 0 | OUTPATIENT
Start: 2025-08-06

## 2025-08-06 RX ORDER — FLUOXETINE 20 MG/1
20 CAPSULE ORAL DAILY
Qty: 90 CAPSULE | Refills: 0 | Status: SHIPPED | OUTPATIENT
Start: 2025-08-06 | End: 2025-11-04

## 2025-08-06 RX ORDER — MEMANTINE HYDROCHLORIDE 10 MG/1
TABLET ORAL
Refills: 0 | OUTPATIENT
Start: 2025-08-06

## 2025-08-06 RX ORDER — FLUOXETINE 20 MG/1
CAPSULE ORAL
Refills: 0 | OUTPATIENT
Start: 2025-08-06

## 2025-08-06 NOTE — TELEPHONE ENCOUNTER
Refill Decision Note   Kanchan Downing  is requesting a refill authorization.  Brief Assessment and Rationale for Refill:  Quick Discontinue     Medication Therapy Plan:   . Pharmacy is requesting new scripts for the following medications without required information, (sig/ frequency/qty/etc)          Comments: Pharmacies have been requesting medications for patients without required information, (sig, frequency, qty, etc.). In addition, requests are sent for medication(s) pt. are currently not taking, and medications patients have never taken.    We have spoken to the pharmacies about these request types and advised their teams previously that we are unable to assess these New Script requests and require all details for these requests. This is a known issue and has been reported.     Note composed:1:26 PM 08/06/2025

## 2025-08-06 NOTE — TELEPHONE ENCOUNTER
No care due was identified.  [facility] Embedded Care Due Messages. Reference number: [id number].   8/06/2025 8:14:07 AM CDT

## 2025-08-06 NOTE — TELEPHONE ENCOUNTER
No care due was identified.  [facility] Embedded Care Due Messages. Reference number: [id number].   8/06/2025 8:32:14 AM CDT